# Patient Record
Sex: MALE | Race: WHITE | Employment: OTHER | URBAN - METROPOLITAN AREA
[De-identification: names, ages, dates, MRNs, and addresses within clinical notes are randomized per-mention and may not be internally consistent; named-entity substitution may affect disease eponyms.]

---

## 2018-02-05 PROBLEM — D64.9 ANEMIA: Status: ACTIVE | Noted: 2018-02-05

## 2018-02-06 PROBLEM — K21.9 GERD (GASTROESOPHAGEAL REFLUX DISEASE): Status: ACTIVE | Noted: 2018-02-06

## 2018-02-06 PROBLEM — F41.9 ANXIETY: Status: ACTIVE | Noted: 2018-02-06

## 2018-02-06 PROBLEM — K92.2 GI BLEED: Status: ACTIVE | Noted: 2018-02-06

## 2018-08-17 ENCOUNTER — HOSPITAL ENCOUNTER (OUTPATIENT)
Dept: ULTRASOUND IMAGING | Age: 68
Discharge: HOME | End: 2018-08-17
Payer: MEDICARE

## 2018-08-17 DIAGNOSIS — R60.0 LEG EDEMA: ICD-10-CM

## 2018-08-17 PROCEDURE — 93971 EXTREMITY STUDY: CPT

## 2019-10-30 ENCOUNTER — HOSPITAL ENCOUNTER (OUTPATIENT)
Age: 69
Discharge: HOME | End: 2019-11-01
Payer: COMMERCIAL

## 2019-10-30 PROCEDURE — 88305 TISSUE EXAM BY PATHOLOGIST: CPT

## 2019-11-11 ENCOUNTER — HOSPITAL ENCOUNTER (OUTPATIENT)
Dept: CT IMAGING | Age: 69
Discharge: HOME | End: 2019-11-11
Payer: MEDICARE

## 2019-11-11 DIAGNOSIS — I70.213 ATHEROSCLER OF NATIVE ARTERY OF BOTH LEGS WITH INTERMIT CLAUDICATION (HCC): ICD-10-CM

## 2019-11-11 PROCEDURE — 75635 CT ANGIO ABDOMINAL ARTERIES: CPT

## 2019-11-11 PROCEDURE — 6360000004 HC RX CONTRAST MEDICATION: Performed by: RADIOLOGY

## 2019-11-11 RX ADMIN — IOPAMIDOL 150 ML: 755 INJECTION, SOLUTION INTRAVENOUS at 14:55

## 2021-06-14 ENCOUNTER — HOSPITAL ENCOUNTER (OUTPATIENT)
Age: 71
Discharge: HOME | End: 2021-06-14
Payer: MEDICARE

## 2021-06-14 LAB
BUN BLDV-MCNC: 5 MG/DL (ref 6–23)
CREAT SERPL-MCNC: 0.7 MG/DL (ref 0.7–1.2)
GFR AFRICAN AMERICAN: >60
GFR NON-AFRICAN AMERICAN: >60 ML/MIN/1.73

## 2021-06-14 PROCEDURE — 36415 COLL VENOUS BLD VENIPUNCTURE: CPT

## 2021-06-14 PROCEDURE — 82565 ASSAY OF CREATININE: CPT

## 2021-06-14 PROCEDURE — 84520 ASSAY OF UREA NITROGEN: CPT

## 2022-11-18 ENCOUNTER — APPOINTMENT (OUTPATIENT)
Dept: CT IMAGING | Age: 72
End: 2022-11-18
Payer: MEDICARE

## 2022-11-18 ENCOUNTER — HOSPITAL ENCOUNTER (EMERGENCY)
Age: 72
Discharge: HOME | End: 2022-11-18
Attending: EMERGENCY MEDICINE
Payer: MEDICARE

## 2022-11-18 ENCOUNTER — APPOINTMENT (OUTPATIENT)
Dept: GENERAL RADIOLOGY | Age: 72
End: 2022-11-18
Payer: MEDICARE

## 2022-11-18 VITALS
DIASTOLIC BLOOD PRESSURE: 51 MMHG | WEIGHT: 120 LBS | SYSTOLIC BLOOD PRESSURE: 111 MMHG | BODY MASS INDEX: 17.22 KG/M2 | TEMPERATURE: 98.6 F | HEART RATE: 92 BPM | RESPIRATION RATE: 20 BRPM | OXYGEN SATURATION: 98 %

## 2022-11-18 DIAGNOSIS — R13.10 DYSPHAGIA, UNSPECIFIED TYPE: Primary | ICD-10-CM

## 2022-11-18 LAB
ANION GAP SERPL CALCULATED.3IONS-SCNC: 14 MMOL/L (ref 7–16)
BASOPHILS ABSOLUTE: 0.04 E9/L (ref 0–0.2)
BASOPHILS RELATIVE PERCENT: 0.5 % (ref 0–2)
BUN BLDV-MCNC: 17 MG/DL (ref 6–23)
CALCIUM SERPL-MCNC: 9.1 MG/DL (ref 8.6–10.2)
CHLORIDE BLD-SCNC: 83 MMOL/L (ref 98–107)
CO2: 29 MMOL/L (ref 22–29)
CREAT SERPL-MCNC: 0.7 MG/DL (ref 0.7–1.2)
EOSINOPHILS ABSOLUTE: 0.01 E9/L (ref 0.05–0.5)
EOSINOPHILS RELATIVE PERCENT: 0.1 % (ref 0–6)
GFR SERPL CREATININE-BSD FRML MDRD: >60 ML/MIN/1.73
GLUCOSE BLD-MCNC: 84 MG/DL (ref 74–99)
HCT VFR BLD CALC: 36.3 % (ref 37–54)
HEMOGLOBIN: 12.5 G/DL (ref 12.5–16.5)
IMMATURE GRANULOCYTES #: 0.13 E9/L
IMMATURE GRANULOCYTES %: 1.5 % (ref 0–5)
LYMPHOCYTES ABSOLUTE: 1.06 E9/L (ref 1.5–4)
LYMPHOCYTES RELATIVE PERCENT: 12.4 % (ref 20–42)
MCH RBC QN AUTO: 33.4 PG (ref 26–35)
MCHC RBC AUTO-ENTMCNC: 34.4 % (ref 32–34.5)
MCV RBC AUTO: 97.1 FL (ref 80–99.9)
MONOCYTES ABSOLUTE: 0.73 E9/L (ref 0.1–0.95)
MONOCYTES RELATIVE PERCENT: 8.5 % (ref 2–12)
NEUTROPHILS ABSOLUTE: 6.57 E9/L (ref 1.8–7.3)
NEUTROPHILS RELATIVE PERCENT: 77 % (ref 43–80)
PDW BLD-RTO: 14.6 FL (ref 11.5–15)
PLATELET # BLD: 164 E9/L (ref 130–450)
PMV BLD AUTO: 8.9 FL (ref 7–12)
POTASSIUM SERPL-SCNC: 4 MMOL/L (ref 3.5–5)
RBC # BLD: 3.74 E12/L (ref 3.8–5.8)
SODIUM BLD-SCNC: 126 MMOL/L (ref 132–146)
WBC # BLD: 8.5 E9/L (ref 4.5–11.5)

## 2022-11-18 PROCEDURE — 92526 ORAL FUNCTION THERAPY: CPT

## 2022-11-18 PROCEDURE — 70491 CT SOFT TISSUE NECK W/DYE: CPT

## 2022-11-18 PROCEDURE — 85025 COMPLETE CBC W/AUTO DIFF WBC: CPT

## 2022-11-18 PROCEDURE — 2500000003 HC RX 250 WO HCPCS

## 2022-11-18 PROCEDURE — 36415 COLL VENOUS BLD VENIPUNCTURE: CPT

## 2022-11-18 PROCEDURE — 6360000004 HC RX CONTRAST MEDICATION: Performed by: RADIOLOGY

## 2022-11-18 PROCEDURE — 80048 BASIC METABOLIC PNL TOTAL CA: CPT

## 2022-11-18 PROCEDURE — 99285 EMERGENCY DEPT VISIT HI MDM: CPT

## 2022-11-18 PROCEDURE — 92611 MOTION FLUOROSCOPY/SWALLOW: CPT

## 2022-11-18 PROCEDURE — 74230 X-RAY XM SWLNG FUNCJ C+: CPT

## 2022-11-18 PROCEDURE — 2580000003 HC RX 258

## 2022-11-18 RX ORDER — 0.9 % SODIUM CHLORIDE 0.9 %
1000 INTRAVENOUS SOLUTION INTRAVENOUS ONCE
Status: COMPLETED | OUTPATIENT
Start: 2022-11-18 | End: 2022-11-18

## 2022-11-18 RX ADMIN — BARIUM SULFATE 45 ML: 400 SUSPENSION ORAL at 15:02

## 2022-11-18 RX ADMIN — SODIUM CHLORIDE 1000 ML: 9 INJECTION, SOLUTION INTRAVENOUS at 16:06

## 2022-11-18 RX ADMIN — IOPAMIDOL 75 ML: 755 INJECTION, SOLUTION INTRAVENOUS at 14:28

## 2022-11-18 RX ADMIN — BARIUM SULFATE 45 ML: 400 PASTE ORAL at 15:01

## 2022-11-18 RX ADMIN — BARIUM SULFATE 45 G: 0.81 POWDER, FOR SUSPENSION ORAL at 15:01

## 2022-11-18 ASSESSMENT — ENCOUNTER SYMPTOMS
ABDOMINAL PAIN: 0
CHOKING: 0
COUGH: 0
DIARRHEA: 0
VOMITING: 0
SORE THROAT: 0
SHORTNESS OF BREATH: 0
CONSTIPATION: 0
NAUSEA: 0
COLOR CHANGE: 0
BLOOD IN STOOL: 0
TROUBLE SWALLOWING: 1
CHEST TIGHTNESS: 0

## 2022-11-18 ASSESSMENT — LIFESTYLE VARIABLES: HOW OFTEN DO YOU HAVE A DRINK CONTAINING ALCOHOL: NEVER

## 2022-11-18 NOTE — PROGRESS NOTES
SPEECH/LANGUAGE PATHOLOGY  VIDEOFLUOROSCOPIC STUDY OF SWALLOWING (MBS)   and PLAN OF CARE    PATIENT NAME:  Aline Hernandez  (male)     MRN:  [id number]    :  1950  (67 y.o.)  STATUS:  Inpatient: Room     TODAY'S DATE:  2022  REFERRING PROVIDER:   Treasure Kothari MD   SPECIFIC PROVIDER ORDER: SLP video swallow  Date of order:  22   REASON FOR REFERRAL: To assess oropharyngeal swallow fx s/p recent hospital admission. EVALUATING THERAPIST: CHINYERE Kraft      RESULTS:      DYSPHAGIA DIAGNOSIS:  mild-moderate oral phase dysphagia  and moderate pharyngeal phase dysphagia     DIET RECOMMENDATIONS:  Pureed consistency solids (IDDSI level 4) with  thin liquids (IDDSI level 0)-NO STRAWS as tolerated, MEDICATION ADMINISTRATION, and Administer medication crushed, as able, with pudding/applesauce    FEEDING RECOMMENDATIONS:    Assistance level:  Supervision is needed during all oral intake     Compensatory strategies recommended: Double swallow, Effortful swallow, Small bites/sips, Alternate solids and liquids, and No straw     Discussed recommendations with nursing and/or faxed report to referring provider: Yes; informed nursing staff re: rec for puree, thin-no straws, of rec for supervision with PO intake, of rec for GI consult, and of pt report of pain in throat when swallowing at times and pt report of saliva pooling.      Laryngeal Penetration and Aspiration:  Penetration WITHOUT aspiration was observed in today's study with  thin liquid  Penetration WITH aspiration was observed in today's study with  mildly thick liquid (nectar)    SPEECH THERAPY  PLAN OF CARE   The dysphagia POC is established based on physician order and dysphagia diagnosis    Skilled SLP intervention for dysphagia management on acute care 3-5 x per week until goals met, pt plateaus in function and/or discharged from hospital      Conditions Requiring Skilled Therapeutic Intervention for dysphagia:    Patient is performing below functional baseline d/t  current acute condition, Multiple diagnoses, multiple medications, and increased dependency upon caregivers. Reduced laryngeal closure resulting in penetration  Reduced laryngeal closure resulting in aspiration   Swallow triggered when bolus head at level of pyriform sinus increasing risk of aspiration    SPECIFIC DYSPHAGIA INTERVENTIONS TO INCLUDE:     Compensatory strategy training   Therapeutic exercises  Trials of upgraded diet/liquid if appropriate  Meal time assessment for 1-2 sessions to provide diet modification and compensatory strategy implementation due to need to ensure proper implementation of compensatory strategies during PO intake     Specific instructions for next treatment:  development and training of compensatory swallow strategies to improve airway protection and swallow function  Treatment Goals:    Short Term Goals:  Pt will implement identified compensatory swallowing strategies on 90% of opportunities or greater to improve airway protection and swallow function. Pt will participate in ongoing mealtime assessment to provide diet modification and compensatory strategy implementation to minimize risk of aspiration associated with PO intake  If appropriate, pt will complete PO trials of upgraded diet textures with SLP only to determine the least restrictive PO diet to maintain adequate nutrition/hydration with no more than 1 overt s/s of pen/asp.   Pt will participate in meal time assessment for 1-2 sessions to provide diet modification and compensatory strategy implementation due to need to ensure proper implementation of compensatory strategies during PO intake   Pt will complete BOTR strength/ ROM exercises to reduce pharyngeal residuals and improve epiglottic inversion moderate verbal prompts  Pt will complete laryngeal strength/ ROM therapeutic exercises to improve airway protection for the least restrictive PO diet moderate verbal prompts  Pt will complete Analilia Maneuver therapeutically to target increased pharyngeal constrictor contractions to reduce pharyngeal residue with moderate verbal prompts     Long Term Goals:   Pt will maintain adequate nutrition/hydration via PO intake of the least restrictive oral diet with implementation of safe swallow/ compensatory strategies and decrease signs/symptoms of aspiration to less than 1 x/day. Pt will improve oropharyngeal swallow function to ensure airway protection during PO intake to maintain adequate nutrition/hydration and decrease signs/symptoms of aspiration to less than 1 x/day. OTHER RECOMMENDATIONS:  A GI consult is recommended     Patient/family Goal:    Did not state. Will further assess during treatment. Plan of care discussed with Patient   The Patient guardedly understand(s) the diagnosis, prognosis and plan of care     Rehabilitation Potential/Prognosis: fair                      ADMITTING DIAGNOSIS: No admission diagnoses are documented for this encounter. VISIT DIAGNOSIS:         PATIENT REPORT/COMPLAINT: Pt reported food/liquid \"sticking\" at times and pointed to throat/sternum when asked location. Pt also reported pain when swallowing at times and \"sometimes\" coughing with intake. Pt also reported lot of saliva at times pooling in mouth and feeling need to spit out or swallow. Nurse informed. PRIOR LEVEL OF SWALLOW FUNCTION:    Past History of Dysphagia?:  none reported    Home diet: Pt reported being unable to swallow and only taking fluids as of late.    Current Diet Order:  No diet orders on file    PROCEDURE:  Consistencies Administered During the Evaluation   Liquids: thin liquid, nectar thick liquid, and honey thick liquid   Solids:  pureed foods and solid foods      Method of Intake:   cup, spoon  Self fed      Position:   Seated, upright    INSTRUMENTAL ASSESSMENT:    ORAL PREP/ ORAL PHASE:    Dentition:  dentures-pt with loose fitting dentures during MBSS; pt reported he typically wears adhesive but not using any at this current time   Decreased mastication due to:  loose dentures  Delayed A-P transit due to: loose dentures  Decreased bolus formation resulting in observed premature pharyngeal spillage  Oral residuals were noted :  throughout the oral cavity     PHARYNGEAL PHASE:     ONSET TIME       Delayed initiation of the pharyngeal swallow was noted with swallow reflex triggered at the level of the vallecula (mostly) and pyriform sinus (occasionally)       PHARYNGEAL RESIDUALS        Vallecula/Pharyngeal Wall           Reduced tongue base retraction resulting in residuals in vallecula and/or posterior pharyngeal wall for all consistencies administered which mostly cleared with cued double, effortful swallow       Pyriform Sinuses      Residuals in the pyriform sinuses were noted due to pharyngeal weakness and 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 Further details under aspiration section. Laryngeal penetration occurred in the absence of aspiration DURING the swallow for thin liquid due to  delayed laryngeal closure and inadequate laryngeal closure which cleared from the laryngeal vestibule spontaneously (transient) and mildly remained in the laryngeal vestibule. . Laryngeal penetration was mild and occurred consistently   an absent cough/throat clear was noted    ASPIRATION  Aspiration occurred DURING the swallow for nectar consistency liquid due to  delayed laryngeal closure and inadequate laryngeal closure . Aspiration was mild-moderate and occurred inconsistently . a spontaneous cough was noted    PENETRATION-ASPIRATION SCALE (PAS):  THIN 3 = Material enters the airway, remains above the vocal folds, and is not ejected from the airway (inconsistently-pt mostly cleared penetrated thin liquid (transient) and appeared to mildly remain X1 and then clear with next swallow)  MILDLY THICK 7 = Material enters the airway, passes below the vocal folds, and is not ejected from the trachea despite effort   MODERATELY THICK 1 = Material does not enter the airway  PUREE 1 = Material does not enter the airway  HARD SOLID 1 = Material does not enter the airway    Pt presented with dry throat clear/cough prior to initiation of PO trials; this noted X1 with puree intake; however, was not related to penetration or aspiration. Confirmed with radiologist.        COMPENSATORY STRATEGIES    Compensatory strategies that were beneficial included Double swallow, Effortful swallow, Small bites/sips, Alternate solids and liquids, and No straw      STRUCTURAL/FUNCTIONAL ANOMALIES   No structural/functional anomalies were noted    CERVICAL ESOPHAGEAL STAGE :     The cervical esophagus appeared adequate          ___________    Cognition:   Within functional limits for this exam    Oral Peripheral Examination   Generalized oral weakness    Current Respiratory Status   room air     Parameters of Speech Production  Respiration:  Adequate for speech production  Quality:   Within functional limits  Intensity: Within functional limits    Pain: No pain reported. EDUCATION:   The Speech Language Pathologist (SLP) completed education regarding results of evaluation and that intervention is warranted at this time. Learner: Patient  Education: Reviewed results and recommendations of this evaluation, Reviewed diet and strategies, and Reviewed signs, symptoms and risks of aspiration  Evaluation of Education:  Verbalizes understanding and Needs further instruction    This plan may be re-evaluated and revised as warranted.         Evaluation Time includes thorough review of current medical information, gathering information on past medical history/social history and prior level of function, completion of standardized testing/informal observation of tasks, assessment of data and education on plan of care and goals. [x]The admitting diagnosis and active problem list, have been reviewed prior to initiation of this evaluation. CPT Code: 58066  dysphagia study    ACTIVE PROBLEM LIST:   Patient Active Problem List   Diagnosis    Anemia    GI bleed    GERD (gastroesophageal reflux disease)    Anxiety       Tx note (96439): SLP educated pt re: MBSS logistics and results in detail. SLP reviewed current diet recs and comp swallow strategies (small bites/sips, alt solids and liquids, double, effortful swallow) and rec for ST and GI consult. Questions answered to this time.

## 2022-11-18 NOTE — ED PROVIDER NOTES
[address] ENCOUNTER      Pt Name: Ani Bean  MRN: [id number]  [place] 1950  Date of evaluation: 11/18/2022      CHIEF COMPLAINT       Chief Complaint   Patient presents with    Dysphagia     Difficulty swallowing for a 2 weeks, no pain, unable to eat but is able to drink fluids          HPI  Ani Bean is a 67 y.o. male  with PMHx of centrilobular emphysema, hypertension, claudication status post stents in the left leg presents with dysphagia. Patient states that for the past 2 weeks has been having issues swallowing. States that he feels like solid foods are more difficulty he has some discomfort and feels like the food is stuck. He is able to tolerate fluids but more recently has also been struggling with fluids. Patient states he has a history of dilation of his esophagus 6 months ago and he had significant symptomatic relief after procedure. Describes symptoms moderate in severity with no alleviating or exacerbating factors. Denies any fever, chills, n/v, headache, dizziness, vision changes, neck tenderness or stiffness, weakness, cp, palpitations, leg swelling/tenderness, sob, cough, abd pain, dysuria, hematuria, diarrhea, constipation. Patient is a smoker 1 pack/day. Except as noted above the remainder of the review of systems was reviewed and negative. Review of Systems   Constitutional:  Positive for fatigue. Negative for appetite change, chills and fever. HENT:  Positive for trouble swallowing. Negative for congestion and sore throat. Eyes:  Negative for visual disturbance. Respiratory:  Negative for cough, choking, chest tightness and shortness of breath. Cardiovascular:  Negative for chest pain, palpitations and leg swelling. Gastrointestinal:  Negative for abdominal pain, blood in stool, constipation, diarrhea, nausea and vomiting. Endocrine: Negative for polyphagia.    Genitourinary: Negative for decreased urine volume, difficulty urinating, flank pain and hematuria. Musculoskeletal:  Negative for arthralgias, gait problem, joint swelling and myalgias. Skin:  Negative for color change, pallor, rash and wound. Neurological:  Negative for dizziness, tremors, seizures, syncope, weakness, light-headedness, numbness and headaches. Hematological:  Negative for adenopathy. Does not bruise/bleed easily. Psychiatric/Behavioral:  Negative for confusion and hallucinations. All other systems reviewed and are negative. Physical Exam  Vitals reviewed. Constitutional:       General: He is not in acute distress. Appearance: Normal appearance. He is cachectic. He is not ill-appearing, toxic-appearing or diaphoretic. HENT:      Head: Normocephalic and atraumatic. Right Ear: External ear normal.      Left Ear: External ear normal.      Nose: Nose normal. No congestion or rhinorrhea. Mouth/Throat:      Mouth: Mucous membranes are moist.      Pharynx: Oropharynx is clear. No oropharyngeal exudate or posterior oropharyngeal erythema. Eyes:      Extraocular Movements: Extraocular movements intact. Conjunctiva/sclera: Conjunctivae normal.      Pupils: Pupils are equal, round, and reactive to light. Cardiovascular:      Rate and Rhythm: Normal rate and regular rhythm. Pulses: Normal pulses. Pulmonary:      Effort: Pulmonary effort is normal. No respiratory distress. Breath sounds: Normal breath sounds. No wheezing or rhonchi. Chest:      Chest wall: No tenderness. Abdominal:      General: Abdomen is flat. Bowel sounds are normal. There is no distension. Palpations: Abdomen is soft. Tenderness: There is no abdominal tenderness. There is no right CVA tenderness, left CVA tenderness or guarding. Hernia: No hernia is present. Musculoskeletal:      Cervical back: Normal range of motion. Right lower leg: No edema. Left lower leg: No edema. Skin:     General: Skin is warm and dry. Capillary Refill: Capillary refill takes less than 2 seconds. Neurological:      General: No focal deficit present. Mental Status: He is alert and oriented to person, place, and time. Mental status is at baseline. Psychiatric:         Mood and Affect: Mood normal.         Behavior: Behavior normal.         Thought Content: Thought content normal.         Judgment: Judgment normal.        Procedures     MDM         67 y.o. male  with PMHx of centrilobular emphysema, hypertension, claudication status post stents in the left leg presents with dysphagia. While in the ED patient was hemodynamically stable, afebrile, nontoxic-appearing, in no respiratory distress. Physical exam unremarkable, patient is tolerating secretions. Labs remarkable for hyponatremia, patient received fluids with significant symptomatic improvement. CT soft tissue negative for any acute abnormalities. Barium swallow remarkable for  aspiration with thin barium and nectar with cough reflex. no evidence of aspiration or penetration with the honey, pudding or barium impregnated cookie. Spoke to Dr. Aguila Trujillo  who states he will scope the patient next week, he asked for follow up on Monday. Patient feels comfortable going home and will follow up outpatient. Patient understands to return to the ED in case of worsening symptoms. ED Course as of 11/18/22 2001 Fri Nov 18, 2022   1605    FINDINGS:  During administration of thin barium note is made of transient penetration. During administration of nectar consistency barium note is made of  penetration to the level of the vocal folds. Minimal aspiration was noted. The patient had a cough reflex. There is no evidence of aspiration or penetration with the honey consistency  barium. There is no evidence of aspiration or penetration with the pudding  or barium impregnated cookie.  [TC]   1647 To Dr. Demetria Escalante who states that patient can follow-up with him outpatient and he was scoped him on Monday. He has patient and give them a call on Monday. Patient is given strict return precautions and verbalizes understanding to return to the ED in case of worsening symptoms. [TC]      ED Course User Index  [TC] Ambrocio Zhou MD       --------------------------------------------- PAST HISTORY ---------------------------------------------  Past Medical History:  has a past medical history of Anxiety, Cancer ([address] GERD (gastroesophageal reflux disease), Heartburn, History of blood transfusion, and Hyperlipidemia. Past Surgical History:  has a past surgical history that includes knee surgery; Colonoscopy; Endoscopy, colon, diagnostic; eye surgery; and Colonoscopy (03/07/2018). Social History:  reports that he has been smoking. He started smoking about 52 years ago. He has a 40.00 pack-year smoking history. He has quit using smokeless tobacco. He reports current alcohol use. He reports that he does not use drugs. Family History: family history is not on file. The patients home medications have been reviewed. Allergies: Patient has no known allergies.     -------------------------------------------------- RESULTS -------------------------------------------------  Labs:  Results for orders placed or performed during the hospital encounter of 11/18/22   CBC with Auto Differential   Result Value Ref Range    WBC 8.5 4.5 - 11.5 E9/L    RBC 3.74 (L) 3.80 - 5.80 E12/L    Hemoglobin 12.5 12.5 - 16.5 g/dL    Hematocrit 36.3 (L) 37.0 - 54.0 %    MCV 97.1 80.0 - 99.9 fL    MCH 33.4 26.0 - 35.0 pg    MCHC 34.4 32.0 - 34.5 %    RDW 14.6 11.5 - 15.0 fL    Platelets 991 429 - 506 E9/L    MPV 8.9 7.0 - 12.0 fL    Neutrophils % 77.0 43.0 - 80.0 %    Immature Granulocytes % 1.5 0.0 - 5.0 %    Lymphocytes % 12.4 (L) 20.0 - 42.0 %    Monocytes % 8.5 2.0 - 12.0 %    Eosinophils % 0.1 0.0 - 6.0 %    Basophils % 0.5 0.0 - 2.0 %    Neutrophils Absolute 6.57 1.80 - 7.30 E9/L    Immature Granulocytes # 0.13 E9/L    Lymphocytes Absolute 1.06 (L) 1.50 - 4.00 E9/L    Monocytes Absolute 0.73 0.10 - 0.95 E9/L    Eosinophils Absolute 0.01 (L) 0.05 - 0.50 E9/L    Basophils Absolute 0.04 0.00 - 0.20 E9/L   BMP   Result Value Ref Range    Sodium 126 (L) 132 - 146 mmol/L    Potassium 4.0 3.5 - 5.0 mmol/L    Chloride 83 (L) 98 - 107 mmol/L    CO2 29 22 - 29 mmol/L    Anion Gap 14 7 - 16 mmol/L    Glucose 84 74 - 99 mg/dL    BUN 17 6 - 23 mg/dL    Creatinine 0.7 0.7 - 1.2 mg/dL    Est, Glom Filt Rate >60 >=60 mL/min/1.73    Calcium 9.1 8.6 - 10.2 mg/dL       Radiology:  Fluoroscopy modified barium swallow with video   Final Result   1. There is shallow penetration with the thin barium. 2. Deep penetration with the nectar consistency barium to the level of the   vocal folds with minimal aspiration. A cough reflex was elicited. Please see separate speech pathology report for full discussion of findings   and recommendations. CT SOFT TISSUE NECK W CONTRAST   Final Result   No acute abnormality of the soft tissue structures of the neck. ------------------------- NURSING NOTES AND VITALS REVIEWED ---------------------------  Date / Time Roomed:  11/18/2022  1:01 PM  ED Bed Assignment:  13/13    The nursing notes within the ED encounter and vital signs as below have been reviewed. BP (!) 111/51   Pulse 92   Temp 98.6 °F (37 °C) (Infrared)   Resp 20   Wt 120 lb (54.4 kg)   SpO2 98%   BMI 17.22 kg/m²   Oxygen Saturation Interpretation: Normal      ------------------------------------------ PROGRESS NOTES ------------------------------------------  8:01 PM EST  I have spoken with the patient and discussed todays results, in addition to providing specific details for the plan of care and counseling regarding the diagnosis and prognosis. Their questions are answered at this time and they are agreeable with the plan.  I discussed at length with them reasons for immediate return here for re evaluation. They will followup with their  GI  by calling their office on Monday.      --------------------------------- ADDITIONAL PROVIDER NOTES ---------------------------------  At this time the patient is without objective evidence of an acute process requiring hospitalization or inpatient management. They have remained hemodynamically stable throughout their entire ED visit and are stable for discharge with outpatient follow-up. The plan has been discussed in detail and they are aware of the specific conditions for emergent return, as well as the importance of follow-up. Discharge Medication List as of 11/18/2022  5:28 PM          Diagnosis:  1. Dysphagia, unspecified type        Disposition:  Patient's disposition: Discharge to home  Patient's condition is stable. Eliseo Harrell MD  Resident  11/18/22 2002    ATTENDING PROVIDER ATTESTATION:     I have personally performed and/or participated in the history, exam, medical decision making, and procedures and agree with all pertinent clinical information unless otherwise noted. I have also reviewed and agree with the past medical, family and social history unless otherwise noted. I have discussed this patient in detail with the resident and provided the instruction and education regarding the evidence-based evaluation and treatment of dysphagia. Any EKG that may have been performed has been personally reviewed by me and I agree with the documentation as noted by the resident. History: patient states he's been having difficulty and discomfort swallowing for the past 2 weeks. He denies SOB. He has required esophageal dilation in the past.    My findings: Earl Perkins is a 67 y.o. male whom is in no distress. Physical exam reveals well appearing. HEENT without evidence of infection or edema. No anterior masses appreciated. Heart RRR, lungs CTA, abdomen is soft and nontender.       My plan: Symptomatic and supportive care. Patient had CT and swallowing studies performed. He is able to swallow food and liquid at this time. Arrangements made for him to follow up with GI on Monday. He understands reasons to return to the ED.     Electronically signed by Cyn Sam DO on 11/18/22 at 10:46 PM CHRIS Sam DO  11/18/22 1505

## 2022-11-28 ENCOUNTER — HOSPITAL ENCOUNTER (EMERGENCY)
Age: 72
Discharge: HOME | End: 2022-11-28
Payer: MEDICARE

## 2022-11-28 VITALS
BODY MASS INDEX: 18.61 KG/M2 | HEART RATE: 66 BPM | RESPIRATION RATE: 18 BRPM | DIASTOLIC BLOOD PRESSURE: 56 MMHG | WEIGHT: 130 LBS | SYSTOLIC BLOOD PRESSURE: 110 MMHG | OXYGEN SATURATION: 99 % | HEIGHT: 70 IN | TEMPERATURE: 97.2 F

## 2022-11-28 DIAGNOSIS — R60.9 EDEMA, UNSPECIFIED TYPE: ICD-10-CM

## 2022-11-28 DIAGNOSIS — I73.9 PAD (PERIPHERAL ARTERY DISEASE) (HCC): Primary | ICD-10-CM

## 2022-11-28 PROCEDURE — 99211 OFF/OP EST MAY X REQ PHY/QHP: CPT

## 2022-11-28 RX ORDER — PENTOXIFYLLINE 400 MG/1
TABLET, EXTENDED RELEASE ORAL
COMMUNITY
Start: 2022-10-13

## 2022-11-28 RX ORDER — PANTOPRAZOLE SODIUM 40 MG/1
TABLET, DELAYED RELEASE ORAL
COMMUNITY
Start: 2022-10-13

## 2022-11-28 RX ORDER — METOPROLOL TARTRATE 100 MG/1
TABLET ORAL
COMMUNITY
Start: 2022-11-19

## 2022-11-28 ASSESSMENT — PAIN SCALES - GENERAL: PAINLEVEL_OUTOF10: 0

## 2022-11-28 ASSESSMENT — PAIN - FUNCTIONAL ASSESSMENT: PAIN_FUNCTIONAL_ASSESSMENT: NONE - DENIES PAIN

## 2022-11-28 NOTE — ED PROVIDER NOTES
Department of Emergency [address] [facility]  Provider Note  Admit Date/Time: 2022  1:56 PM  Room:   NAME: Chika Hightower  : 1950  MRN: [id number]     Chief Complaint:  Joint Swelling (Bilateral ankle and feet swelling started Friday )    History of Present Illness        Chika Hightower is a 67 y.o. male who has a past medical history of:   Past Medical History:   Diagnosis Date    Anxiety     Cancer ([address] 2010    prostrate  had radiation with seeds    GERD (gastroesophageal reflux disease)     Heartburn     History of blood transfusion     Hyperlipidemia     presents to the urgent care center by private car for evaluation. He is presenting today for swelling of his feet and ankles. He said that he does not really have pain. He said that they are never swollen and they become swollen over the past 2 days. He is denying any chest pain or shortness of breath denying any other complaints. ROS    Pertinent positives and negatives are stated within HPI, all other systems reviewed and are negative. Past Surgical History:   Procedure Laterality Date    COLONOSCOPY      COLONOSCOPY  2018    ENDOSCOPY, COLON, DIAGNOSTIC      EYE SURGERY      cataract lt eye    KNEE SURGERY      lacerations  as a child   Social History:  reports that he has been smoking. He started smoking about 52 years ago. He has a 40.00 pack-year smoking history. He has quit using smokeless tobacco. He reports current alcohol use. He reports that he does not use drugs. Family History: family history is not on file. Allergies: Patient has no known allergies.     Physical Exam   Oxygen Saturation Interpretation: Normal.   ED Triage Vitals [22 1358]   BP Temp Temp Source Heart Rate Resp SpO2 Height Weight   (!) 110/56 97.2 °F (36.2 °C) Infrared 66 18 99 % 5' 10\" (1.778 m) 130 lb (59 kg)       Physical Exam  Constitutional/General: Alert and oriented x3, well appearing, non toxic in NAD  HEENT:  NC/NT  Neck: Supple, full ROM,   Respiratory: Lungs clear to auscultation bilaterally, no wheezes, rales, or rhonchi. Not in respiratory distress  CV:  Regular rate. Both lower legs have are thin shiny and hairless with some scaly skin present. Both feet are edematous with pitting edema there there is scattered erythema and his feet and toes are cool compared to the temperature of his lower legs. I was not able to palpate pedal pulses. Musculoskeletal: Moves all extremities x 4. Integument: skin warm and dry. No rashes. Lymphatic: no lymphadenopathy noted  Neurologic: GCS 15, no focal deficits, symmetric strength 5/5 in the upper and lower extremities bilaterally  Psychiatric: Normal Affect    Lab / Imaging Results   (All laboratory and radiology results have been personally reviewed by myself)  Labs:  No results found for this visit on 11/28/22. Imaging: All Radiology results interpreted by Radiologist unless otherwise noted. No orders to display       ED Course / Medical Decision Making   Medications - No data to display       Consult(s):   None      MDM:   He has known peripheral artery disease he continues to smoke apparently he told me he had a stent placed in his groin but he said it did not work. This could be  a progression of his peripheral artery disease he said he is never swollen like this. I did advise him that he needs to go to the ED for further work-up than what can be done in urgent care. Assessment      1. PAD (peripheral artery disease) ([facility] [address]    2. Edema, unspecified type      Plan   Advised to go to the ED  New Medications     New Prescriptions    No medications on file     Electronically signed by STANFORD Huerta CNP   DD: 11/28/22  **This report was transcribed using voice recognition software. Every effort was made to ensure accuracy; however, inadvertent computerized transcription errors may be present.   END OF ED PROVIDER NOTE      STANFORD Huerta - CNP  11/28/22 1421

## 2023-09-12 ENCOUNTER — HOSPITAL ENCOUNTER (OUTPATIENT)
Age: 73
Setting detail: SPECIMEN
Discharge: HOME | End: 2023-09-12
Payer: COMMERCIAL

## 2023-09-12 LAB
HCT VFR BLD AUTO: 20.8 % (ref 37–54)
HGB BLD-MCNC: 6.6 G/DL (ref 12.5–16.5)

## 2023-09-12 PROCEDURE — 86923 COMPATIBILITY TEST ELECTRIC: CPT

## 2023-09-12 PROCEDURE — 36415 COLL VENOUS BLD VENIPUNCTURE: CPT

## 2023-09-12 PROCEDURE — 86901 BLOOD TYPING SEROLOGIC RH(D): CPT

## 2023-09-12 PROCEDURE — 85014 HEMATOCRIT: CPT

## 2023-09-12 PROCEDURE — 85018 HEMOGLOBIN: CPT

## 2023-09-12 PROCEDURE — 86850 RBC ANTIBODY SCREEN: CPT

## 2023-09-12 PROCEDURE — 86900 BLOOD TYPING SEROLOGIC ABO: CPT

## 2023-09-12 RX ORDER — SODIUM CHLORIDE 9 MG/ML
INJECTION, SOLUTION INTRAVENOUS PRN
Status: CANCELLED | OUTPATIENT
Start: 2023-09-12

## 2023-09-13 ENCOUNTER — HOSPITAL ENCOUNTER (OUTPATIENT)
Dept: PREOP | Age: 73
Discharge: HOME | End: 2023-09-13
Payer: MEDICARE

## 2023-09-13 VITALS
WEIGHT: 147 LBS | HEIGHT: 70 IN | BODY MASS INDEX: 21.05 KG/M2 | OXYGEN SATURATION: 100 % | TEMPERATURE: 98.2 F | DIASTOLIC BLOOD PRESSURE: 63 MMHG | RESPIRATION RATE: 16 BRPM | SYSTOLIC BLOOD PRESSURE: 142 MMHG | HEART RATE: 92 BPM

## 2023-09-13 PROCEDURE — 36430 TRANSFUSION BLD/BLD COMPNT: CPT

## 2023-09-13 RX ORDER — SODIUM CHLORIDE 9 MG/ML
INJECTION, SOLUTION INTRAVENOUS PRN
Status: DISCONTINUED | OUTPATIENT
Start: 2023-09-13 | End: 2023-09-14 | Stop reason: HOSPADM

## 2023-09-13 RX ORDER — LOSARTAN POTASSIUM 25 MG/1
12.5 TABLET ORAL DAILY
COMMUNITY

## 2023-09-13 RX ORDER — FERROUS SULFATE 325(65) MG
325 TABLET ORAL
COMMUNITY

## 2023-09-13 RX ORDER — ATORVASTATIN CALCIUM 40 MG/1
40 TABLET, FILM COATED ORAL DAILY
COMMUNITY

## 2023-09-13 RX ORDER — CLOPIDOGREL BISULFATE 75 MG/1
75 TABLET ORAL DAILY
COMMUNITY

## 2023-09-13 ASSESSMENT — PAIN DESCRIPTION - DESCRIPTORS: DESCRIPTORS: ACHING;DISCOMFORT;STABBING

## 2023-09-13 ASSESSMENT — PAIN - FUNCTIONAL ASSESSMENT: PAIN_FUNCTIONAL_ASSESSMENT: 0-10

## 2023-09-13 NOTE — DISCHARGE INSTRUCTIONS
Discharge Instructions           Blood Transfusion    You have received a blood transfusion today. Although a transfusion is a relatively safe treatment, a delayed reaction may occur.     If you develop any of the following symptoms, GO to the emergency room immediately:    Fever ( greater than 100 degrees)    Chills    Low back pain    Hives    Red urine

## 2023-09-14 LAB
ABO/RH: NORMAL
ANTIBODY SCREEN: NEGATIVE
ARM BAND NUMBER: NORMAL
BLOOD BANK BLOOD PRODUCT EXPIRATION DATE: NORMAL
BLOOD BANK DISPENSE STATUS: NORMAL
BLOOD BANK ISBT PRODUCT BLOOD TYPE: 5100
BLOOD BANK PRODUCT CODE: NORMAL
BLOOD BANK SAMPLE EXPIRATION: NORMAL
BLOOD BANK UNIT TYPE AND RH: NORMAL
BPU ID: NORMAL
COMPONENT: NORMAL
CROSSMATCH RESULT: NORMAL
TRANSFUSION STATUS: NORMAL
UNIT DIVISION: 0
UNIT ISSUE DATE/TIME: NORMAL

## 2023-10-02 ENCOUNTER — TRANSCRIBE ORDERS (OUTPATIENT)
Dept: VASCULAR SURGERY | Facility: HOSPITAL | Age: 73
End: 2023-10-02
Payer: MEDICARE

## 2023-10-02 DIAGNOSIS — I73.9 CLAUDICATION OF LOWER EXTREMITY WITH HISTORY OF REVASCULARIZATION (CMS-HCC): ICD-10-CM

## 2023-10-02 DIAGNOSIS — Z98.890 CLAUDICATION OF LOWER EXTREMITY WITH HISTORY OF REVASCULARIZATION (CMS-HCC): ICD-10-CM

## 2023-10-02 DIAGNOSIS — I73.9 PAD (PERIPHERAL ARTERY DISEASE) (CMS-HCC): Primary | ICD-10-CM

## 2023-11-29 ENCOUNTER — HOSPITAL ENCOUNTER (OUTPATIENT)
Dept: VASCULAR MEDICINE | Facility: HOSPITAL | Age: 73
Discharge: HOME | End: 2023-11-29
Payer: MEDICARE

## 2023-11-29 DIAGNOSIS — I73.9 CLAUDICATION OF LOWER EXTREMITY WITH HISTORY OF REVASCULARIZATION (CMS-HCC): ICD-10-CM

## 2023-11-29 DIAGNOSIS — I73.9 PAD (PERIPHERAL ARTERY DISEASE) (CMS-HCC): ICD-10-CM

## 2023-11-29 DIAGNOSIS — Z98.890 CLAUDICATION OF LOWER EXTREMITY WITH HISTORY OF REVASCULARIZATION (CMS-HCC): ICD-10-CM

## 2023-11-29 PROCEDURE — 93922 UPR/L XTREMITY ART 2 LEVELS: CPT

## 2023-11-29 PROCEDURE — 93922 UPR/L XTREMITY ART 2 LEVELS: CPT | Performed by: INTERNAL MEDICINE

## 2023-12-06 ENCOUNTER — APPOINTMENT (OUTPATIENT)
Dept: VASCULAR MEDICINE | Facility: HOSPITAL | Age: 73
End: 2023-12-06
Payer: MEDICARE

## 2023-12-06 ENCOUNTER — APPOINTMENT (OUTPATIENT)
Dept: VASCULAR SURGERY | Facility: HOSPITAL | Age: 73
End: 2023-12-06
Payer: MEDICARE

## 2024-04-04 ENCOUNTER — HOSPITAL ENCOUNTER (INPATIENT)
Facility: HOSPITAL | Age: 74
LOS: 26 days | Discharge: NURSING, REHAB OR LONG-TERM CARE | DRG: 853 | End: 2024-04-30
Attending: INTERNAL MEDICINE | Admitting: STUDENT IN AN ORGANIZED HEALTH CARE EDUCATION/TRAINING PROGRAM
Payer: MEDICARE

## 2024-04-04 DIAGNOSIS — S88.919A AMPUTATION OF LEG (MULTI): ICD-10-CM

## 2024-04-04 DIAGNOSIS — I73.9 PERIPHERAL VASCULAR DISEASE (CMS-HCC): ICD-10-CM

## 2024-04-04 DIAGNOSIS — A41.9 SEPTICEMIA (MULTI): ICD-10-CM

## 2024-04-04 DIAGNOSIS — R63.8 INADEQUATE ORAL INTAKE: ICD-10-CM

## 2024-04-04 DIAGNOSIS — E87.0 ACUTE HYPERNATREMIA: ICD-10-CM

## 2024-04-04 DIAGNOSIS — R06.02 SHORTNESS OF BREATH: ICD-10-CM

## 2024-04-04 DIAGNOSIS — J44.9 CHRONIC OBSTRUCTIVE PULMONARY DISEASE, UNSPECIFIED COPD TYPE (MULTI): ICD-10-CM

## 2024-04-04 DIAGNOSIS — D64.9 ANEMIA, UNSPECIFIED TYPE: ICD-10-CM

## 2024-04-04 DIAGNOSIS — I15.9 SECONDARY HYPERTENSION: ICD-10-CM

## 2024-04-04 DIAGNOSIS — J18.9 SEPSIS DUE TO PNEUMONIA (MULTI): Primary | ICD-10-CM

## 2024-04-04 DIAGNOSIS — I50.21 ACUTE SYSTOLIC CONGESTIVE HEART FAILURE (MULTI): ICD-10-CM

## 2024-04-04 DIAGNOSIS — A41.9 SEPSIS DUE TO PNEUMONIA (MULTI): Primary | ICD-10-CM

## 2024-04-04 PROCEDURE — 2020000001 HC ICU ROOM DAILY

## 2024-04-05 ENCOUNTER — APPOINTMENT (OUTPATIENT)
Dept: RADIOLOGY | Facility: HOSPITAL | Age: 74
DRG: 853 | End: 2024-04-05
Payer: MEDICARE

## 2024-04-05 ENCOUNTER — ANESTHESIA (OUTPATIENT)
Dept: OPERATING ROOM | Facility: HOSPITAL | Age: 74
DRG: 853 | End: 2024-04-05
Payer: MEDICARE

## 2024-04-05 ENCOUNTER — ANESTHESIA EVENT (OUTPATIENT)
Dept: OPERATING ROOM | Facility: HOSPITAL | Age: 74
DRG: 853 | End: 2024-04-05
Payer: MEDICARE

## 2024-04-05 PROBLEM — I10 HTN (HYPERTENSION): Status: ACTIVE | Noted: 2024-04-05

## 2024-04-05 PROBLEM — I25.10 CAD (CORONARY ARTERY DISEASE): Status: ACTIVE | Noted: 2024-04-05

## 2024-04-05 PROBLEM — E78.5 HYPERLIPIDEMIA: Status: ACTIVE | Noted: 2024-04-05

## 2024-04-05 PROBLEM — J44.9 CHRONIC OBSTRUCTIVE PULMONARY DISEASE (MULTI): Status: ACTIVE | Noted: 2024-04-05

## 2024-04-05 PROBLEM — K21.9 GASTROESOPHAGEAL REFLUX DISEASE: Status: ACTIVE | Noted: 2024-04-05

## 2024-04-05 PROBLEM — A41.9 SEPTICEMIA (MULTI): Status: ACTIVE | Noted: 2024-04-05

## 2024-04-05 PROBLEM — A41.9 SEPSIS DUE TO PNEUMONIA (MULTI): Status: ACTIVE | Noted: 2024-04-05

## 2024-04-05 PROBLEM — I50.9 CHF (CONGESTIVE HEART FAILURE) (MULTI): Status: ACTIVE | Noted: 2024-04-05

## 2024-04-05 PROBLEM — D64.9 ANEMIA: Status: ACTIVE | Noted: 2024-04-05

## 2024-04-05 PROBLEM — I73.9 PERIPHERAL VASCULAR DISEASE (CMS-HCC): Status: ACTIVE | Noted: 2024-04-05

## 2024-04-05 PROBLEM — J18.9 SEPSIS DUE TO PNEUMONIA (MULTI): Status: ACTIVE | Noted: 2024-04-05

## 2024-04-05 LAB
ABO GROUP (TYPE) IN BLOOD: NORMAL
ALBUMIN SERPL BCP-MCNC: 1.6 G/DL (ref 3.4–5)
ALBUMIN SERPL BCP-MCNC: 1.6 G/DL (ref 3.4–5)
ALP SERPL-CCNC: 239 U/L (ref 33–136)
ALP SERPL-CCNC: 251 U/L (ref 33–136)
ALT SERPL W P-5'-P-CCNC: 114 U/L (ref 10–52)
ALT SERPL W P-5'-P-CCNC: 118 U/L (ref 10–52)
ANION GAP BLDA CALCULATED.4IONS-SCNC: 3 MMO/L (ref 10–25)
ANION GAP BLDA CALCULATED.4IONS-SCNC: 3 MMO/L (ref 10–25)
ANION GAP BLDA CALCULATED.4IONS-SCNC: 6 MMO/L (ref 10–25)
ANION GAP SERPL CALC-SCNC: 17 MMOL/L (ref 10–20)
ANION GAP SERPL CALC-SCNC: 9 MMOL/L (ref 10–20)
ANTIBODY SCREEN: NORMAL
APPEARANCE UR: ABNORMAL
APTT PPP: 46 SECONDS (ref 27–38)
AST SERPL W P-5'-P-CCNC: 150 U/L (ref 9–39)
AST SERPL W P-5'-P-CCNC: 178 U/L (ref 9–39)
BASE EXCESS BLDA CALC-SCNC: 3.3 MMOL/L (ref -2–3)
BASE EXCESS BLDA CALC-SCNC: 5.3 MMOL/L (ref -2–3)
BASE EXCESS BLDA CALC-SCNC: 5.4 MMOL/L (ref -2–3)
BASOPHILS # BLD AUTO: 0.03 X10*3/UL (ref 0–0.1)
BASOPHILS NFR BLD AUTO: 0.2 %
BILIRUB DIRECT SERPL-MCNC: 0.8 MG/DL (ref 0–0.3)
BILIRUB SERPL-MCNC: 1 MG/DL (ref 0–1.2)
BILIRUB SERPL-MCNC: 1.1 MG/DL (ref 0–1.2)
BILIRUB UR STRIP.AUTO-MCNC: NEGATIVE MG/DL
BNP SERPL-MCNC: 146 PG/ML (ref 0–99)
BODY TEMPERATURE: 37 DEGREES CELSIUS
BUN SERPL-MCNC: 4 MG/DL (ref 6–23)
BUN SERPL-MCNC: 5 MG/DL (ref 6–23)
CA-I BLDA-SCNC: 1.1 MMOL/L (ref 1.1–1.33)
CA-I BLDA-SCNC: 1.11 MMOL/L (ref 1.1–1.33)
CA-I BLDA-SCNC: 1.13 MMOL/L (ref 1.1–1.33)
CALCIUM SERPL-MCNC: 6.9 MG/DL (ref 8.6–10.6)
CALCIUM SERPL-MCNC: 7.1 MG/DL (ref 8.6–10.6)
CHLORIDE BLDA-SCNC: 112 MMOL/L (ref 98–107)
CHLORIDE BLDA-SCNC: 112 MMOL/L (ref 98–107)
CHLORIDE BLDA-SCNC: 113 MMOL/L (ref 98–107)
CHLORIDE SERPL-SCNC: 114 MMOL/L (ref 98–107)
CHLORIDE SERPL-SCNC: 116 MMOL/L (ref 98–107)
CO2 SERPL-SCNC: 22 MMOL/L (ref 21–32)
CO2 SERPL-SCNC: 29 MMOL/L (ref 21–32)
COHGB MFR BLDA: 1 %
COLOR UR: ABNORMAL
CREAT SERPL-MCNC: 0.37 MG/DL (ref 0.5–1.3)
CREAT SERPL-MCNC: 0.4 MG/DL (ref 0.5–1.3)
DO-HGB MFR BLDA: 0.1 % (ref 0–5)
EGFRCR SERPLBLD CKD-EPI 2021: >90 ML/MIN/1.73M*2
EGFRCR SERPLBLD CKD-EPI 2021: >90 ML/MIN/1.73M*2
EOSINOPHIL # BLD AUTO: 0 X10*3/UL (ref 0–0.4)
EOSINOPHIL NFR BLD AUTO: 0 %
ERYTHROCYTE [DISTWIDTH] IN BLOOD BY AUTOMATED COUNT: 17.3 % (ref 11.5–14.5)
GLUCOSE BLD MANUAL STRIP-MCNC: 150 MG/DL (ref 74–99)
GLUCOSE BLD MANUAL STRIP-MCNC: 159 MG/DL (ref 74–99)
GLUCOSE BLD MANUAL STRIP-MCNC: 171 MG/DL (ref 74–99)
GLUCOSE BLD MANUAL STRIP-MCNC: 202 MG/DL (ref 74–99)
GLUCOSE BLDA-MCNC: 130 MG/DL (ref 74–99)
GLUCOSE BLDA-MCNC: 163 MG/DL (ref 74–99)
GLUCOSE BLDA-MCNC: 188 MG/DL (ref 74–99)
GLUCOSE SERPL-MCNC: 141 MG/DL (ref 74–99)
GLUCOSE SERPL-MCNC: 154 MG/DL (ref 74–99)
GLUCOSE UR STRIP.AUTO-MCNC: NORMAL MG/DL
HCO3 BLDA-SCNC: 28.7 MMOL/L (ref 22–26)
HCO3 BLDA-SCNC: 29.8 MMOL/L (ref 22–26)
HCO3 BLDA-SCNC: 29.9 MMOL/L (ref 22–26)
HCT VFR BLD AUTO: 28.2 % (ref 41–52)
HCT VFR BLD EST: 31 % (ref 41–52)
HCT VFR BLD EST: 32 % (ref 41–52)
HCT VFR BLD EST: 35 % (ref 41–52)
HGB BLD-MCNC: 10 G/DL (ref 13.5–17.5)
HGB BLDA-MCNC: 10.3 G/DL (ref 13.5–17.5)
HGB BLDA-MCNC: 10.8 G/DL (ref 13.5–17.5)
HGB BLDA-MCNC: 10.8 G/DL (ref 13.5–17.5)
HGB BLDA-MCNC: 11.5 G/DL (ref 13.5–17.5)
HOLD SPECIMEN: NORMAL
HYALINE CASTS #/AREA URNS AUTO: ABNORMAL /LPF
IMM GRANULOCYTES # BLD AUTO: 0.12 X10*3/UL (ref 0–0.5)
IMM GRANULOCYTES NFR BLD AUTO: 0.7 % (ref 0–0.9)
INHALED O2 CONCENTRATION: 100 %
INHALED O2 CONCENTRATION: 40 %
INHALED O2 CONCENTRATION: 80 %
INR PPP: 1.8 (ref 0.9–1.1)
KETONES UR STRIP.AUTO-MCNC: NEGATIVE MG/DL
LACTATE BLDA-SCNC: 1.1 MMOL/L (ref 0.4–2)
LACTATE BLDA-SCNC: 1.2 MMOL/L (ref 0.4–2)
LACTATE BLDA-SCNC: 1.3 MMOL/L (ref 0.4–2)
LEUKOCYTE ESTERASE UR QL STRIP.AUTO: ABNORMAL
LYMPHOCYTES # BLD AUTO: 0.25 X10*3/UL (ref 0.8–3)
LYMPHOCYTES NFR BLD AUTO: 1.5 %
MAGNESIUM SERPL-MCNC: 1.7 MG/DL (ref 1.6–2.4)
MCH RBC QN AUTO: 32.3 PG (ref 26–34)
MCHC RBC AUTO-ENTMCNC: 35.5 G/DL (ref 32–36)
MCV RBC AUTO: 91 FL (ref 80–100)
METHGB MFR BLDA: 0.7 % (ref 0–1.5)
MONOCYTES # BLD AUTO: 0.68 X10*3/UL (ref 0.05–0.8)
MONOCYTES NFR BLD AUTO: 4 %
MRSA DNA SPEC QL NAA+PROBE: NOT DETECTED
MUCOUS THREADS #/AREA URNS AUTO: ABNORMAL /LPF
NEUTROPHILS # BLD AUTO: 15.99 X10*3/UL (ref 1.6–5.5)
NEUTROPHILS NFR BLD AUTO: 93.6 %
NITRITE UR QL STRIP.AUTO: NEGATIVE
NRBC BLD-RTO: 0 /100 WBCS (ref 0–0)
OXYHGB MFR BLDA: 95.1 % (ref 94–98)
OXYHGB MFR BLDA: 97.3 % (ref 94–98)
OXYHGB MFR BLDA: 98.2 % (ref 94–98)
OXYHGB MFR BLDA: 98.2 % (ref 94–98)
PCO2 BLDA: 36 MM HG (ref 38–42)
PCO2 BLDA: 43 MM HG (ref 38–42)
PCO2 BLDA: 54 MM HG (ref 38–42)
PH BLDA: 7.35 PH (ref 7.38–7.42)
PH BLDA: 7.45 PH (ref 7.38–7.42)
PH BLDA: 7.51 PH (ref 7.38–7.42)
PH UR STRIP.AUTO: 6 [PH]
PHOSPHATE SERPL-MCNC: 3.1 MG/DL (ref 2.5–4.9)
PHOSPHATE SERPL-MCNC: 3.2 MG/DL (ref 2.5–4.9)
PLATELET # BLD AUTO: 139 X10*3/UL (ref 150–450)
PO2 BLDA: 311 MM HG (ref 85–95)
PO2 BLDA: 81 MM HG (ref 85–95)
PO2 BLDA: 99 MM HG (ref 85–95)
POTASSIUM BLDA-SCNC: 3.2 MMOL/L (ref 3.5–5.3)
POTASSIUM BLDA-SCNC: 3.4 MMOL/L (ref 3.5–5.3)
POTASSIUM BLDA-SCNC: 3.4 MMOL/L (ref 3.5–5.3)
POTASSIUM SERPL-SCNC: 3.4 MMOL/L (ref 3.5–5.3)
POTASSIUM SERPL-SCNC: 3.9 MMOL/L (ref 3.5–5.3)
PROT SERPL-MCNC: 3.4 G/DL (ref 6.4–8.2)
PROT SERPL-MCNC: 3.6 G/DL (ref 6.4–8.2)
PROT UR STRIP.AUTO-MCNC: ABNORMAL MG/DL
PROTHROMBIN TIME: 20.7 SECONDS (ref 9.8–12.8)
RBC # BLD AUTO: 3.1 X10*6/UL (ref 4.5–5.9)
RBC # UR STRIP.AUTO: ABNORMAL /UL
RBC #/AREA URNS AUTO: >20 /HPF
RH FACTOR (ANTIGEN D): NORMAL
SAO2 % BLDA: 100 % (ref 94–100)
SAO2 % BLDA: 98 % (ref 94–100)
SAO2 % BLDA: 99 % (ref 94–100)
SODIUM BLDA-SCNC: 141 MMOL/L (ref 136–145)
SODIUM BLDA-SCNC: 142 MMOL/L (ref 136–145)
SODIUM BLDA-SCNC: 144 MMOL/L (ref 136–145)
SODIUM SERPL-SCNC: 149 MMOL/L (ref 136–145)
SODIUM SERPL-SCNC: 151 MMOL/L (ref 136–145)
SP GR UR STRIP.AUTO: 1.02
SQUAMOUS #/AREA URNS AUTO: ABNORMAL /HPF
UROBILINOGEN UR STRIP.AUTO-MCNC: NORMAL MG/DL
WBC # BLD AUTO: 17.1 X10*3/UL (ref 4.4–11.3)
WBC #/AREA URNS AUTO: >50 /HPF

## 2024-04-05 PROCEDURE — 99223 1ST HOSP IP/OBS HIGH 75: CPT | Performed by: SURGERY

## 2024-04-05 PROCEDURE — 88311 DECALCIFY TISSUE: CPT | Performed by: PATHOLOGY

## 2024-04-05 PROCEDURE — 3600000008 HC OR TIME - EACH INCREMENTAL 1 MINUTE - PROCEDURE LEVEL THREE: Performed by: SURGERY

## 2024-04-05 PROCEDURE — 87205 SMEAR GRAM STAIN: CPT | Mod: 91 | Performed by: STUDENT IN AN ORGANIZED HEALTH CARE EDUCATION/TRAINING PROGRAM

## 2024-04-05 PROCEDURE — 70450 CT HEAD/BRAIN W/O DYE: CPT | Performed by: RADIOLOGY

## 2024-04-05 PROCEDURE — 94002 VENT MGMT INPAT INIT DAY: CPT

## 2024-04-05 PROCEDURE — 2500000005 HC RX 250 GENERAL PHARMACY W/O HCPCS

## 2024-04-05 PROCEDURE — 36415 COLL VENOUS BLD VENIPUNCTURE: CPT | Performed by: STUDENT IN AN ORGANIZED HEALTH CARE EDUCATION/TRAINING PROGRAM

## 2024-04-05 PROCEDURE — 2500000002 HC RX 250 W HCPCS SELF ADMINISTERED DRUGS (ALT 637 FOR MEDICARE OP, ALT 636 FOR OP/ED): Performed by: STUDENT IN AN ORGANIZED HEALTH CARE EDUCATION/TRAINING PROGRAM

## 2024-04-05 PROCEDURE — 83735 ASSAY OF MAGNESIUM: CPT | Performed by: STUDENT IN AN ORGANIZED HEALTH CARE EDUCATION/TRAINING PROGRAM

## 2024-04-05 PROCEDURE — 74018 RADEX ABDOMEN 1 VIEW: CPT | Performed by: RADIOLOGY

## 2024-04-05 PROCEDURE — 84132 ASSAY OF SERUM POTASSIUM: CPT | Mod: 91 | Performed by: STUDENT IN AN ORGANIZED HEALTH CARE EDUCATION/TRAINING PROGRAM

## 2024-04-05 PROCEDURE — 82947 ASSAY GLUCOSE BLOOD QUANT: CPT | Mod: 91

## 2024-04-05 PROCEDURE — 2500000001 HC RX 250 WO HCPCS SELF ADMINISTERED DRUGS (ALT 637 FOR MEDICARE OP): Performed by: STUDENT IN AN ORGANIZED HEALTH CARE EDUCATION/TRAINING PROGRAM

## 2024-04-05 PROCEDURE — 84100 ASSAY OF PHOSPHORUS: CPT | Performed by: STUDENT IN AN ORGANIZED HEALTH CARE EDUCATION/TRAINING PROGRAM

## 2024-04-05 PROCEDURE — 0Y6D0Z3 DETACHMENT AT LEFT UPPER LEG, LOW, OPEN APPROACH: ICD-10-PCS | Performed by: SURGERY

## 2024-04-05 PROCEDURE — 82306 VITAMIN D 25 HYDROXY: CPT

## 2024-04-05 PROCEDURE — 3E043XZ INTRODUCTION OF VASOPRESSOR INTO CENTRAL VEIN, PERCUTANEOUS APPROACH: ICD-10-PCS | Performed by: STUDENT IN AN ORGANIZED HEALTH CARE EDUCATION/TRAINING PROGRAM

## 2024-04-05 PROCEDURE — 84132 ASSAY OF SERUM POTASSIUM: CPT | Performed by: STUDENT IN AN ORGANIZED HEALTH CARE EDUCATION/TRAINING PROGRAM

## 2024-04-05 PROCEDURE — 86920 COMPATIBILITY TEST SPIN: CPT | Mod: 91

## 2024-04-05 PROCEDURE — P9045 ALBUMIN (HUMAN), 5%, 250 ML: HCPCS | Mod: JZ | Performed by: ANESTHESIOLOGIST ASSISTANT

## 2024-04-05 PROCEDURE — 2500000004 HC RX 250 GENERAL PHARMACY W/ HCPCS (ALT 636 FOR OP/ED): Performed by: STUDENT IN AN ORGANIZED HEALTH CARE EDUCATION/TRAINING PROGRAM

## 2024-04-05 PROCEDURE — 82607 VITAMIN B-12: CPT

## 2024-04-05 PROCEDURE — 71045 X-RAY EXAM CHEST 1 VIEW: CPT | Performed by: RADIOLOGY

## 2024-04-05 PROCEDURE — A27590 PR AMPUTATE THIGH,THRU FEMUR: Performed by: ANESTHESIOLOGY

## 2024-04-05 PROCEDURE — 83880 ASSAY OF NATRIURETIC PEPTIDE: CPT | Performed by: STUDENT IN AN ORGANIZED HEALTH CARE EDUCATION/TRAINING PROGRAM

## 2024-04-05 PROCEDURE — 36620 INSERTION CATHETER ARTERY: CPT | Performed by: STUDENT IN AN ORGANIZED HEALTH CARE EDUCATION/TRAINING PROGRAM

## 2024-04-05 PROCEDURE — 27590 AMPUTATE LEG AT THIGH: CPT | Performed by: SURGERY

## 2024-04-05 PROCEDURE — 84132 ASSAY OF SERUM POTASSIUM: CPT

## 2024-04-05 PROCEDURE — 99291 CRITICAL CARE FIRST HOUR: CPT | Performed by: STUDENT IN AN ORGANIZED HEALTH CARE EDUCATION/TRAINING PROGRAM

## 2024-04-05 PROCEDURE — 5A1945Z RESPIRATORY VENTILATION, 24-96 CONSECUTIVE HOURS: ICD-10-PCS | Performed by: STUDENT IN AN ORGANIZED HEALTH CARE EDUCATION/TRAINING PROGRAM

## 2024-04-05 PROCEDURE — 37799 UNLISTED PX VASCULAR SURGERY: CPT | Performed by: STUDENT IN AN ORGANIZED HEALTH CARE EDUCATION/TRAINING PROGRAM

## 2024-04-05 PROCEDURE — 85730 THROMBOPLASTIN TIME PARTIAL: CPT | Mod: 91 | Performed by: STUDENT IN AN ORGANIZED HEALTH CARE EDUCATION/TRAINING PROGRAM

## 2024-04-05 PROCEDURE — 84075 ASSAY ALKALINE PHOSPHATASE: CPT | Performed by: STUDENT IN AN ORGANIZED HEALTH CARE EDUCATION/TRAINING PROGRAM

## 2024-04-05 PROCEDURE — 0Y6H0Z1 DETACHMENT AT RIGHT LOWER LEG, HIGH, OPEN APPROACH: ICD-10-PCS | Performed by: SURGERY

## 2024-04-05 PROCEDURE — 99100 ANES PT EXTEME AGE<1 YR&>70: CPT | Performed by: ANESTHESIOLOGY

## 2024-04-05 PROCEDURE — 71045 X-RAY EXAM CHEST 1 VIEW: CPT

## 2024-04-05 PROCEDURE — 99140 ANES COMP EMERGENCY COND: CPT | Performed by: ANESTHESIOLOGY

## 2024-04-05 PROCEDURE — 87086 URINE CULTURE/COLONY COUNT: CPT | Performed by: STUDENT IN AN ORGANIZED HEALTH CARE EDUCATION/TRAINING PROGRAM

## 2024-04-05 PROCEDURE — 87040 BLOOD CULTURE FOR BACTERIA: CPT | Mod: 91 | Performed by: STUDENT IN AN ORGANIZED HEALTH CARE EDUCATION/TRAINING PROGRAM

## 2024-04-05 PROCEDURE — 86900 BLOOD TYPING SEROLOGIC ABO: CPT | Mod: 91 | Performed by: STUDENT IN AN ORGANIZED HEALTH CARE EDUCATION/TRAINING PROGRAM

## 2024-04-05 PROCEDURE — 76705 ECHO EXAM OF ABDOMEN: CPT

## 2024-04-05 PROCEDURE — 27882 AMPUTATION OF LOWER LEG: CPT | Performed by: SURGERY

## 2024-04-05 PROCEDURE — 3600000003 HC OR TIME - INITIAL BASE CHARGE - PROCEDURE LEVEL THREE: Performed by: SURGERY

## 2024-04-05 PROCEDURE — 2500000004 HC RX 250 GENERAL PHARMACY W/ HCPCS (ALT 636 FOR OP/ED)

## 2024-04-05 PROCEDURE — 2020000001 HC ICU ROOM DAILY

## 2024-04-05 PROCEDURE — 74018 RADEX ABDOMEN 1 VIEW: CPT

## 2024-04-05 PROCEDURE — 85025 COMPLETE CBC W/AUTO DIFF WBC: CPT | Performed by: STUDENT IN AN ORGANIZED HEALTH CARE EDUCATION/TRAINING PROGRAM

## 2024-04-05 PROCEDURE — 83050 HGB METHEMOGLOBIN QUAN: CPT | Mod: 91

## 2024-04-05 PROCEDURE — 70450 CT HEAD/BRAIN W/O DYE: CPT

## 2024-04-05 PROCEDURE — 2780000003 HC OR 278 NO HCPCS: Performed by: SURGERY

## 2024-04-05 PROCEDURE — 71250 CT THORAX DX C-: CPT

## 2024-04-05 PROCEDURE — 88307 TISSUE EXAM BY PATHOLOGIST: CPT | Mod: TC,91,SUR | Performed by: STUDENT IN AN ORGANIZED HEALTH CARE EDUCATION/TRAINING PROGRAM

## 2024-04-05 PROCEDURE — 2500000005 HC RX 250 GENERAL PHARMACY W/O HCPCS: Performed by: ANESTHESIOLOGIST ASSISTANT

## 2024-04-05 PROCEDURE — 2500000005 HC RX 250 GENERAL PHARMACY W/O HCPCS: Performed by: HOSPITALIST

## 2024-04-05 PROCEDURE — 3700000002 HC GENERAL ANESTHESIA TIME - EACH INCREMENTAL 1 MINUTE: Performed by: SURGERY

## 2024-04-05 PROCEDURE — 94640 AIRWAY INHALATION TREATMENT: CPT

## 2024-04-05 PROCEDURE — 36620 INSERTION CATHETER ARTERY: CPT | Mod: GC | Performed by: STUDENT IN AN ORGANIZED HEALTH CARE EDUCATION/TRAINING PROGRAM

## 2024-04-05 PROCEDURE — 88307 TISSUE EXAM BY PATHOLOGIST: CPT | Performed by: PATHOLOGY

## 2024-04-05 PROCEDURE — 85025 COMPLETE CBC W/AUTO DIFF WBC: CPT | Mod: 91 | Performed by: STUDENT IN AN ORGANIZED HEALTH CARE EDUCATION/TRAINING PROGRAM

## 2024-04-05 PROCEDURE — 81001 URINALYSIS AUTO W/SCOPE: CPT | Performed by: STUDENT IN AN ORGANIZED HEALTH CARE EDUCATION/TRAINING PROGRAM

## 2024-04-05 PROCEDURE — C9113 INJ PANTOPRAZOLE SODIUM, VIA: HCPCS | Performed by: STUDENT IN AN ORGANIZED HEALTH CARE EDUCATION/TRAINING PROGRAM

## 2024-04-05 PROCEDURE — A27590 PR AMPUTATE THIGH,THRU FEMUR: Performed by: ANESTHESIOLOGIST ASSISTANT

## 2024-04-05 PROCEDURE — 2720000007 HC OR 272 NO HCPCS: Performed by: SURGERY

## 2024-04-05 PROCEDURE — 3700000001 HC GENERAL ANESTHESIA TIME - INITIAL BASE CHARGE: Performed by: SURGERY

## 2024-04-05 PROCEDURE — 87640 STAPH A DNA AMP PROBE: CPT | Mod: 91 | Performed by: STUDENT IN AN ORGANIZED HEALTH CARE EDUCATION/TRAINING PROGRAM

## 2024-04-05 PROCEDURE — 2500000004 HC RX 250 GENERAL PHARMACY W/ HCPCS (ALT 636 FOR OP/ED): Performed by: ANESTHESIOLOGIST ASSISTANT

## 2024-04-05 RX ORDER — NOREPINEPHRINE BITARTRATE/D5W 8 MG/250ML
.01-1 PLASTIC BAG, INJECTION (ML) INTRAVENOUS CONTINUOUS
Status: DISCONTINUED | OUTPATIENT
Start: 2024-04-05 | End: 2024-04-11

## 2024-04-05 RX ORDER — FERROUS SULFATE 325(65) MG
325 TABLET ORAL 2 TIMES DAILY
Status: ON HOLD | COMMUNITY
End: 2024-04-30

## 2024-04-05 RX ORDER — MIDAZOLAM HYDROCHLORIDE 1 MG/ML
0-20 INJECTION, SOLUTION INTRAVENOUS CONTINUOUS
Status: DISCONTINUED | OUTPATIENT
Start: 2024-04-05 | End: 2024-04-05

## 2024-04-05 RX ORDER — FENTANYL CITRATE 50 UG/ML
INJECTION, SOLUTION INTRAMUSCULAR; INTRAVENOUS CONTINUOUS PRN
Status: DISCONTINUED | OUTPATIENT
Start: 2024-04-05 | End: 2024-04-05

## 2024-04-05 RX ORDER — POTASSIUM CHLORIDE 1.5 G/1.58G
40 POWDER, FOR SOLUTION ORAL ONCE
Status: COMPLETED | OUTPATIENT
Start: 2024-04-05 | End: 2024-04-09

## 2024-04-05 RX ORDER — METRONIDAZOLE 500 MG/100ML
INJECTION, SOLUTION INTRAVENOUS AS NEEDED
Status: DISCONTINUED | OUTPATIENT
Start: 2024-04-05 | End: 2024-04-05

## 2024-04-05 RX ORDER — ROCURONIUM BROMIDE 10 MG/ML
INJECTION, SOLUTION INTRAVENOUS AS NEEDED
Status: DISCONTINUED | OUTPATIENT
Start: 2024-04-05 | End: 2024-04-05

## 2024-04-05 RX ORDER — PROPOFOL 10 MG/ML
5-20 INJECTION, EMULSION INTRAVENOUS CONTINUOUS
Status: DISCONTINUED | OUTPATIENT
Start: 2024-04-05 | End: 2024-04-09

## 2024-04-05 RX ORDER — MAGNESIUM SULFATE HEPTAHYDRATE 40 MG/ML
2 INJECTION, SOLUTION INTRAVENOUS ONCE
Status: COMPLETED | OUTPATIENT
Start: 2024-04-05 | End: 2024-04-05

## 2024-04-05 RX ORDER — FENTANYL CITRATE-0.9 % NACL/PF 10 MCG/ML
50-200 PLASTIC BAG, INJECTION (ML) INTRAVENOUS CONTINUOUS
Status: DISCONTINUED | OUTPATIENT
Start: 2024-04-05 | End: 2024-04-09

## 2024-04-05 RX ORDER — ASPIRIN 81 MG/1
81 TABLET ORAL DAILY
Status: ON HOLD | COMMUNITY
End: 2024-04-30

## 2024-04-05 RX ORDER — CLOPIDOGREL BISULFATE 75 MG/1
75 TABLET ORAL DAILY
Status: DISCONTINUED | OUTPATIENT
Start: 2024-04-05 | End: 2024-04-09

## 2024-04-05 RX ORDER — THIAMINE HYDROCHLORIDE 100 MG/ML
100 INJECTION, SOLUTION INTRAMUSCULAR; INTRAVENOUS DAILY
Status: DISCONTINUED | OUTPATIENT
Start: 2024-04-05 | End: 2024-04-05

## 2024-04-05 RX ORDER — ALBUMIN HUMAN 50 G/1000ML
SOLUTION INTRAVENOUS AS NEEDED
Status: DISCONTINUED | OUTPATIENT
Start: 2024-04-05 | End: 2024-04-05

## 2024-04-05 RX ORDER — IPRATROPIUM BROMIDE AND ALBUTEROL SULFATE 2.5; .5 MG/3ML; MG/3ML
3 SOLUTION RESPIRATORY (INHALATION)
Status: DISCONTINUED | OUTPATIENT
Start: 2024-04-05 | End: 2024-04-05

## 2024-04-05 RX ORDER — ATORVASTATIN CALCIUM 40 MG/1
40 TABLET, FILM COATED ORAL DAILY
Status: DISCONTINUED | OUTPATIENT
Start: 2024-04-05 | End: 2024-04-17

## 2024-04-05 RX ORDER — PENTOXIFYLLINE 400 MG/1
400 TABLET, EXTENDED RELEASE ORAL DAILY
Status: DISCONTINUED | OUTPATIENT
Start: 2024-04-05 | End: 2024-04-10

## 2024-04-05 RX ORDER — ATORVASTATIN CALCIUM 40 MG/1
40 TABLET, FILM COATED ORAL DAILY
COMMUNITY
End: 2024-04-30 | Stop reason: HOSPADM

## 2024-04-05 RX ORDER — PANTOPRAZOLE SODIUM 40 MG/10ML
40 INJECTION, POWDER, LYOPHILIZED, FOR SOLUTION INTRAVENOUS
Status: DISCONTINUED | OUTPATIENT
Start: 2024-04-05 | End: 2024-04-12

## 2024-04-05 RX ORDER — IPRATROPIUM BROMIDE AND ALBUTEROL SULFATE 2.5; .5 MG/3ML; MG/3ML
3 SOLUTION RESPIRATORY (INHALATION) EVERY 6 HOURS PRN
Status: DISCONTINUED | OUTPATIENT
Start: 2024-04-05 | End: 2024-04-30 | Stop reason: HOSPADM

## 2024-04-05 RX ORDER — VANCOMYCIN HYDROCHLORIDE 1 G/20ML
INJECTION, POWDER, LYOPHILIZED, FOR SOLUTION INTRAVENOUS DAILY PRN
Status: DISCONTINUED | OUTPATIENT
Start: 2024-04-05 | End: 2024-04-11

## 2024-04-05 RX ORDER — POLYETHYLENE GLYCOL 3350 17 G/17G
17 POWDER, FOR SOLUTION ORAL DAILY
Status: DISCONTINUED | OUTPATIENT
Start: 2024-04-05 | End: 2024-04-10

## 2024-04-05 RX ORDER — POTASSIUM CHLORIDE 750 MG/1
10 TABLET, FILM COATED, EXTENDED RELEASE ORAL DAILY
Status: ON HOLD | COMMUNITY
End: 2024-04-30

## 2024-04-05 RX ORDER — METRONIDAZOLE 500 MG/100ML
500 INJECTION, SOLUTION INTRAVENOUS EVERY 8 HOURS
Status: DISCONTINUED | OUTPATIENT
Start: 2024-04-05 | End: 2024-04-05

## 2024-04-05 RX ORDER — CEFTRIAXONE 1 G/50ML
1 INJECTION, SOLUTION INTRAVENOUS EVERY 24 HOURS
Status: DISCONTINUED | OUTPATIENT
Start: 2024-04-05 | End: 2024-04-05

## 2024-04-05 RX ORDER — ENOXAPARIN SODIUM 100 MG/ML
40 INJECTION SUBCUTANEOUS EVERY 24 HOURS
Status: DISCONTINUED | OUTPATIENT
Start: 2024-04-05 | End: 2024-04-30 | Stop reason: HOSPADM

## 2024-04-05 RX ORDER — SODIUM CHLORIDE, SODIUM LACTATE, POTASSIUM CHLORIDE, CALCIUM CHLORIDE 600; 310; 30; 20 MG/100ML; MG/100ML; MG/100ML; MG/100ML
INJECTION, SOLUTION INTRAVENOUS CONTINUOUS PRN
Status: DISCONTINUED | OUTPATIENT
Start: 2024-04-05 | End: 2024-04-05

## 2024-04-05 RX ORDER — FOLIC ACID 1 MG/1
1 TABLET ORAL DAILY
Status: DISCONTINUED | OUTPATIENT
Start: 2024-04-05 | End: 2024-04-17

## 2024-04-05 RX ORDER — NOREPINEPHRINE BITARTRATE/D5W 8 MG/250ML
PLASTIC BAG, INJECTION (ML) INTRAVENOUS
Status: COMPLETED
Start: 2024-04-05 | End: 2024-04-05

## 2024-04-05 RX ORDER — PROPOFOL 10 MG/ML
INJECTION, EMULSION INTRAVENOUS CONTINUOUS PRN
Status: DISCONTINUED | OUTPATIENT
Start: 2024-04-05 | End: 2024-04-05

## 2024-04-05 RX ORDER — VANCOMYCIN HYDROCHLORIDE 1 G/200ML
1 INJECTION, SOLUTION INTRAVENOUS EVERY 12 HOURS
Status: DISCONTINUED | OUTPATIENT
Start: 2024-04-05 | End: 2024-04-09

## 2024-04-05 RX ADMIN — CLOPIDOGREL BISULFATE 75 MG: 75 TABLET ORAL at 08:19

## 2024-04-05 RX ADMIN — SUGAMMADEX 200 MG: 100 INJECTION, SOLUTION INTRAVENOUS at 12:43

## 2024-04-05 RX ADMIN — MAGNESIUM SULFATE HEPTAHYDRATE 2 G: 40 INJECTION, SOLUTION INTRAVENOUS at 08:20

## 2024-04-05 RX ADMIN — ATORVASTATIN CALCIUM 40 MG: 40 TABLET, FILM COATED ORAL at 08:19

## 2024-04-05 RX ADMIN — NOREPINEPHRINE BITARTRATE 0.1 MCG/KG/MIN: 8 INJECTION, SOLUTION INTRAVENOUS at 17:07

## 2024-04-05 RX ADMIN — PROPOFOL 5 MCG/KG/MIN: 10 INJECTION, EMULSION INTRAVENOUS at 01:45

## 2024-04-05 RX ADMIN — ALBUMIN HUMAN 250 ML: 0.05 INJECTION, SOLUTION INTRAVENOUS at 12:01

## 2024-04-05 RX ADMIN — Medication 100 MCG/HR: at 00:52

## 2024-04-05 RX ADMIN — VASOPRESSIN 0.03 UNITS/MIN: 0.2 INJECTION INTRAVENOUS at 22:42

## 2024-04-05 RX ADMIN — SODIUM CHLORIDE, POTASSIUM CHLORIDE, SODIUM LACTATE AND CALCIUM CHLORIDE: 600; 310; 30; 20 INJECTION, SOLUTION INTRAVENOUS at 11:15

## 2024-04-05 RX ADMIN — POLYETHYLENE GLYCOL 3350 17 G: 17 POWDER, FOR SOLUTION ORAL at 08:19

## 2024-04-05 RX ADMIN — Medication 80 PERCENT: at 00:15

## 2024-04-05 RX ADMIN — METRONIDAZOLE 500 MG: 500 INJECTION, SOLUTION INTRAVENOUS at 11:37

## 2024-04-05 RX ADMIN — THIAMINE HYDROCHLORIDE 500 MG: 100 INJECTION, SOLUTION INTRAMUSCULAR; INTRAVENOUS at 17:15

## 2024-04-05 RX ADMIN — FOLIC ACID 1 MG: 1 TABLET ORAL at 09:44

## 2024-04-05 RX ADMIN — PIPERACILLIN SODIUM AND TAZOBACTAM SODIUM 4.5 G: 4; .5 INJECTION, SOLUTION INTRAVENOUS at 22:42

## 2024-04-05 RX ADMIN — MIDAZOLAM IN SODIUM CHLORIDE 3 MG/HR: 1 INJECTION INTRAVENOUS at 01:06

## 2024-04-05 RX ADMIN — PIPERACILLIN SODIUM AND TAZOBACTAM SODIUM 4.5 G: 4; .5 INJECTION, SOLUTION INTRAVENOUS at 09:44

## 2024-04-05 RX ADMIN — ENOXAPARIN SODIUM 40 MG: 100 INJECTION SUBCUTANEOUS at 05:22

## 2024-04-05 RX ADMIN — NOREPINEPHRINE BITARTRATE 0.12 MCG/KG/MIN: 8 INJECTION, SOLUTION INTRAVENOUS at 01:44

## 2024-04-05 RX ADMIN — Medication 8 MCG: at 11:51

## 2024-04-05 RX ADMIN — DOXYCYCLINE 100 MG: 100 INJECTION, POWDER, LYOPHILIZED, FOR SOLUTION INTRAVENOUS at 04:21

## 2024-04-05 RX ADMIN — Medication 50 MCG/HR: at 17:08

## 2024-04-05 RX ADMIN — PANTOPRAZOLE SODIUM 40 MG: 40 INJECTION, POWDER, FOR SOLUTION INTRAVENOUS at 06:33

## 2024-04-05 RX ADMIN — METRONIDAZOLE 500 MG: 500 INJECTION, SOLUTION INTRAVENOUS at 02:27

## 2024-04-05 RX ADMIN — VANCOMYCIN HYDROCHLORIDE 1 G: 1 INJECTION, SOLUTION INTRAVENOUS at 21:33

## 2024-04-05 RX ADMIN — HYDROCORTISONE SODIUM SUCCINATE 50 MG: 100 INJECTION, POWDER, FOR SOLUTION INTRAMUSCULAR; INTRAVENOUS at 18:15

## 2024-04-05 RX ADMIN — PIPERACILLIN SODIUM AND TAZOBACTAM SODIUM 4.5 G: 4; .5 INJECTION, SOLUTION INTRAVENOUS at 17:07

## 2024-04-05 RX ADMIN — ROCURONIUM BROMIDE 50 MG: 10 INJECTION INTRAVENOUS at 11:22

## 2024-04-05 RX ADMIN — IPRATROPIUM BROMIDE AND ALBUTEROL SULFATE 3 ML: .5; 3 SOLUTION RESPIRATORY (INHALATION) at 02:15

## 2024-04-05 RX ADMIN — PROPOFOL 10 MCG/KG/MIN: 10 INJECTION, EMULSION INTRAVENOUS at 17:08

## 2024-04-05 RX ADMIN — VANCOMYCIN HYDROCHLORIDE 1 G: 1 INJECTION, SOLUTION INTRAVENOUS at 09:44

## 2024-04-05 RX ADMIN — Medication 8 MCG: at 12:02

## 2024-04-05 RX ADMIN — PENTOXIFYLLINE 400 MG: 400 TABLET, EXTENDED RELEASE ORAL at 09:44

## 2024-04-05 RX ADMIN — CEFTRIAXONE SODIUM 1 G: 1 INJECTION, SOLUTION INTRAVENOUS at 05:22

## 2024-04-05 SDOH — SOCIAL STABILITY: SOCIAL INSECURITY: DO YOU FEEL ANYONE HAS EXPLOITED OR TAKEN ADVANTAGE OF YOU FINANCIALLY OR OF YOUR PERSONAL PROPERTY?: UNABLE TO ASSESS

## 2024-04-05 SDOH — SOCIAL STABILITY: SOCIAL INSECURITY
WITHIN THE LAST YEAR, HAVE YOU BEEN KICKED, HIT, SLAPPED, OR OTHERWISE PHYSICALLY HURT BY YOUR PARTNER OR EX-PARTNER?: NO

## 2024-04-05 SDOH — ECONOMIC STABILITY: FOOD INSECURITY: WITHIN THE PAST 12 MONTHS, YOU WORRIED THAT YOUR FOOD WOULD RUN OUT BEFORE YOU GOT MONEY TO BUY MORE.: NEVER TRUE

## 2024-04-05 SDOH — ECONOMIC STABILITY: TRANSPORTATION INSECURITY
IN THE PAST 12 MONTHS, HAS LACK OF TRANSPORTATION KEPT YOU FROM MEETINGS, WORK, OR FROM GETTING THINGS NEEDED FOR DAILY LIVING?: PATIENT UNABLE TO ANSWER

## 2024-04-05 SDOH — SOCIAL STABILITY: SOCIAL INSECURITY: WITHIN THE LAST YEAR, HAVE YOU BEEN AFRAID OF YOUR PARTNER OR EX-PARTNER?: NO

## 2024-04-05 SDOH — SOCIAL STABILITY: SOCIAL INSECURITY: WITHIN THE LAST YEAR, HAVE YOU BEEN HUMILIATED OR EMOTIONALLY ABUSED IN OTHER WAYS BY YOUR PARTNER OR EX-PARTNER?: NO

## 2024-04-05 SDOH — ECONOMIC STABILITY: INCOME INSECURITY
HOW HARD IS IT FOR YOU TO PAY FOR THE VERY BASICS LIKE FOOD, HOUSING, MEDICAL CARE, AND HEATING?: PATIENT UNABLE TO ANSWER

## 2024-04-05 SDOH — ECONOMIC STABILITY: FOOD INSECURITY: WITHIN THE PAST 12 MONTHS, THE FOOD YOU BOUGHT JUST DIDN'T LAST AND YOU DIDN'T HAVE MONEY TO GET MORE.: NEVER TRUE

## 2024-04-05 SDOH — ECONOMIC STABILITY: INCOME INSECURITY: IN THE LAST 12 MONTHS, WAS THERE A TIME WHEN YOU WERE NOT ABLE TO PAY THE MORTGAGE OR RENT ON TIME?: NO

## 2024-04-05 SDOH — ECONOMIC STABILITY: HOUSING INSECURITY: IN THE LAST 12 MONTHS, HOW MANY PLACES HAVE YOU LIVED?: 1

## 2024-04-05 SDOH — ECONOMIC STABILITY: HOUSING INSECURITY
IN THE LAST 12 MONTHS, WAS THERE A TIME WHEN YOU DID NOT HAVE A STEADY PLACE TO SLEEP OR SLEPT IN A SHELTER (INCLUDING NOW)?: PATIENT UNABLE TO ANSWER

## 2024-04-05 SDOH — ECONOMIC STABILITY: INCOME INSECURITY: IN THE PAST 12 MONTHS, HAS THE ELECTRIC, GAS, OIL, OR WATER COMPANY THREATENED TO SHUT OFF SERVICE IN YOUR HOME?: NO

## 2024-04-05 SDOH — ECONOMIC STABILITY: TRANSPORTATION INSECURITY
IN THE PAST 12 MONTHS, HAS THE LACK OF TRANSPORTATION KEPT YOU FROM MEDICAL APPOINTMENTS OR FROM GETTING MEDICATIONS?: PATIENT UNABLE TO ANSWER

## 2024-04-05 SDOH — SOCIAL STABILITY: SOCIAL INSECURITY: DO YOU FEEL UNSAFE GOING BACK TO THE PLACE WHERE YOU ARE LIVING?: UNABLE TO ASSESS

## 2024-04-05 SDOH — SOCIAL STABILITY: SOCIAL INSECURITY: DOES ANYONE TRY TO KEEP YOU FROM HAVING/CONTACTING OTHER FRIENDS OR DOING THINGS OUTSIDE YOUR HOME?: UNABLE TO ASSESS

## 2024-04-05 SDOH — ECONOMIC STABILITY: HOUSING INSECURITY
IN THE LAST 12 MONTHS, WAS THERE A TIME WHEN YOU DID NOT HAVE A STEADY PLACE TO SLEEP OR SLEPT IN A SHELTER (INCLUDING NOW)?: NO

## 2024-04-05 SDOH — SOCIAL STABILITY: SOCIAL INSECURITY: ARE THERE ANY APPARENT SIGNS OF INJURIES/BEHAVIORS THAT COULD BE RELATED TO ABUSE/NEGLECT?: UNABLE TO ASSESS

## 2024-04-05 SDOH — SOCIAL STABILITY: SOCIAL INSECURITY
WITHIN THE LAST YEAR, HAVE TO BEEN RAPED OR FORCED TO HAVE ANY KIND OF SEXUAL ACTIVITY BY YOUR PARTNER OR EX-PARTNER?: NO

## 2024-04-05 SDOH — SOCIAL STABILITY: SOCIAL INSECURITY: ARE YOU OR HAVE YOU BEEN THREATENED OR ABUSED PHYSICALLY, EMOTIONALLY, OR SEXUALLY BY ANYONE?: UNABLE TO ASSESS

## 2024-04-05 SDOH — ECONOMIC STABILITY: TRANSPORTATION INSECURITY
IN THE PAST 12 MONTHS, HAS LACK OF TRANSPORTATION KEPT YOU FROM MEETINGS, WORK, OR FROM GETTING THINGS NEEDED FOR DAILY LIVING?: NO

## 2024-04-05 SDOH — ECONOMIC STABILITY: TRANSPORTATION INSECURITY
IN THE PAST 12 MONTHS, HAS THE LACK OF TRANSPORTATION KEPT YOU FROM MEDICAL APPOINTMENTS OR FROM GETTING MEDICATIONS?: NO

## 2024-04-05 SDOH — SOCIAL STABILITY: SOCIAL INSECURITY: HAVE YOU HAD THOUGHTS OF HARMING ANYONE ELSE?: UNABLE TO ASSESS

## 2024-04-05 SDOH — SOCIAL STABILITY: SOCIAL INSECURITY: ABUSE: ADULT

## 2024-04-05 SDOH — SOCIAL STABILITY: SOCIAL INSECURITY: HAS ANYONE EVER THREATENED TO HURT YOUR FAMILY OR YOUR PETS?: UNABLE TO ASSESS

## 2024-04-05 SDOH — ECONOMIC STABILITY: INCOME INSECURITY: HOW HARD IS IT FOR YOU TO PAY FOR THE VERY BASICS LIKE FOOD, HOUSING, MEDICAL CARE, AND HEATING?: NOT HARD AT ALL

## 2024-04-05 SDOH — ECONOMIC STABILITY: INCOME INSECURITY
IN THE LAST 12 MONTHS, WAS THERE A TIME WHEN YOU WERE NOT ABLE TO PAY THE MORTGAGE OR RENT ON TIME?: PATIENT UNABLE TO ANSWER

## 2024-04-05 SDOH — SOCIAL STABILITY: SOCIAL INSECURITY: WERE YOU ABLE TO COMPLETE ALL THE BEHAVIORAL HEALTH SCREENINGS?: NO

## 2024-04-05 ASSESSMENT — ACTIVITIES OF DAILY LIVING (ADL)
LACK_OF_TRANSPORTATION: PATIENT UNABLE TO ANSWER
TOILETING: UNABLE TO ASSESS
ASSISTIVE_DEVICE: OTHER (COMMENT)
DRESSING YOURSELF: UNABLE TO ASSESS
BATHING: UNABLE TO ASSESS
WALKS IN HOME: UNABLE TO ASSESS
PATIENT'S MEMORY ADEQUATE TO SAFELY COMPLETE DAILY ACTIVITIES?: UNABLE TO ASSESS
ADEQUATE_TO_COMPLETE_ADL: UNABLE TO ASSESS
HEARING - LEFT EAR: UNABLE TO ASSESS
GROOMING: UNABLE TO ASSESS
JUDGMENT_ADEQUATE_SAFELY_COMPLETE_DAILY_ACTIVITIES: UNABLE TO ASSESS
HEARING - RIGHT EAR: UNABLE TO ASSESS
FEEDING YOURSELF: UNABLE TO ASSESS

## 2024-04-05 ASSESSMENT — LIFESTYLE VARIABLES
HOW OFTEN DO YOU HAVE 6 OR MORE DRINKS ON ONE OCCASION: PATIENT UNABLE TO ANSWER
AUDIT-C TOTAL SCORE: -1
HOW OFTEN DO YOU HAVE A DRINK CONTAINING ALCOHOL: PATIENT UNABLE TO ANSWER
AUDIT-C TOTAL SCORE: -1
SKIP TO QUESTIONS 9-10: 0
HOW MANY STANDARD DRINKS CONTAINING ALCOHOL DO YOU HAVE ON A TYPICAL DAY: PATIENT UNABLE TO ANSWER

## 2024-04-05 ASSESSMENT — COLUMBIA-SUICIDE SEVERITY RATING SCALE - C-SSRS
2. HAVE YOU ACTUALLY HAD ANY THOUGHTS OF KILLING YOURSELF?: NO
1. IN THE PAST MONTH, HAVE YOU WISHED YOU WERE DEAD OR WISHED YOU COULD GO TO SLEEP AND NOT WAKE UP?: NO
6. HAVE YOU EVER DONE ANYTHING, STARTED TO DO ANYTHING, OR PREPARED TO DO ANYTHING TO END YOUR LIFE?: NO

## 2024-04-05 ASSESSMENT — PAIN - FUNCTIONAL ASSESSMENT
PAIN_FUNCTIONAL_ASSESSMENT: CPOT (CRITICAL CARE PAIN OBSERVATION TOOL)

## 2024-04-05 ASSESSMENT — COGNITIVE AND FUNCTIONAL STATUS - GENERAL: PATIENT BASELINE BEDBOUND: UNABLE TO ASSESS AT THIS TIME

## 2024-04-05 ASSESSMENT — PATIENT HEALTH QUESTIONNAIRE - PHQ9
SUM OF ALL RESPONSES TO PHQ9 QUESTIONS 1 & 2: 0
2. FEELING DOWN, DEPRESSED OR HOPELESS: NOT AT ALL
1. LITTLE INTEREST OR PLEASURE IN DOING THINGS: NOT AT ALL

## 2024-04-05 NOTE — OP NOTE
DATE OF SERVICE: 4/5/2024     PREOPERATIVE DIAGNOSIS:  Bilateral leg gangrene, pneumonia, septic shock.     POSTOPERATIVE DIAGNOSIS:  Same.     PROCEDURES:   1. Right below knee guillotine amputation.  2. Left above knee guillotine amputation.       SURGEON:  Efrain Arango MD, PhD.      ASSISTANT(S):  Aly Veras MD.      ANESTHESIA:  General     INDICATIONS:   This is a 74 year old male with septic shock from pneumonia and history of bilateral lower extremity wounds who is requiring increasing pressor requirement. Patient's case was discussed the case with MICU team. They report he has PNA but also now with increasing pressor requirement with concern for legs contributing to the pressor need.   Together, we discussed with the patient's wife, given that the patient is not able to voice his opinion. With the MICU team we discussed non-operative vs operative management (bilateral leg amputations; first guillotine amputations) but we also discussed this may not be curative for his ongoing sepsis and he may not recover.   After the lengthy discussion, patient's wife requested us to proceed with bilateral lower extremity amputations.   I discussed the plan for both sides left through knee, right likely below knee vs through knee guillotine amputations for the source control of his sepsis in the setting of increasing pressor need. The risks were discussed, including bleeding, infection, wound complications, chronic pain, limb swelling, reaction to medication, or the need for reoperation (including revision to a higher level of amputation), were discussed. We also discussed the additional risks of myocardial infarction, anesthetic complications, or other serious complications. The plan for post-operative care and rehabilitation were addressed. The patient's wife indicated understanding of the procedure, plan, alternatives, and risks, and voiced consent to proceed.         DESCRIPTION OF PROCEDURE:    The patient was  placed in the supine position. General endotracheal anesthesia was achieved by anesthesia team; bilateral lower extremities were prepped and draped in the usual sterile fashion.   A time out was completed verifying correct patient, site, procedure and positioning.   Circumferential skin incisions were marked on the skin above the right ankle joint.   The incision was extended medially and laterally.   The skin and subcutaneous tissues were incised down to the fascia then the muscles. The tibia and fibula were divided by using the saw approximately 2 cm proximal to the level of the ankle joint. The specimen was sent to pathology. The anterior tibial artery, posterior tibial artery and peroneal vessels were ligated. Further hemostasis was achieved by using sutures. The wound was irrigated. Hemostasis was achieved.   The tissue and muscle appeared viable without any purulent drainage.     Circumferential skin incisions were marked on the skin above the left knee joint.   The incision was extended medially and laterally.   The skin and subcutaneous tissues were incised down to the fascia then the muscles. The distal femur was divided by using the saw approximately 2 cm proximal to the level of the left knee joint. By using an electrocautery, remaining muscle and the limb was amputated. The specimen was sent to pathology. The popliteal artery and vein were ligated. Further hemostasis was achieved by using sutures. The wound was irrigated. Hemostasis was achieved.   Sterile dressing was applied to both wounds.   The patient tolerated the procedure well and was transferred to the MICU in stable condition.   I was physically present for the entire length of the case and scrubbed for the entire portions.      Efrain Arango MD, PhD.   Clinical   [employer]  Co-Director, Aortic Center  [facility]

## 2024-04-05 NOTE — CONSULTS
"Nutrition Initial Assessment:   Nutrition Assessment    Reason for Assessment: Tube feeding recommendations    Patient is a 74 y.o. male presenting from an OSH for hypoxic respiratory failure, CAP, septic shock and encephalopathy.  Noted that he had 3-4 days of N/V/D and weakness.  Treated for colitis.  Concern for B/L acute on chronic limb ischemia.  Now s/p L corry AKA and R corry ESTRADAA today.  He is intubated, sedated with fentanyl and propofol and on levo and vaso for pressure support.    Past medical history includes HTN, HLD, COPD, CAD, PAD s/p revascularization procedures to BLE,     Pt has an NGT in place for enteral access.  Current Propofol rate is 2.9mls/hr which provides 77kcals daily from fat.    Nutrition History:  Food and Nutrient History: Met with patient's wife and his daughter at bedside.  They report poor oral intake PTA for prolonged period of time, likely months.  Has lost a large amount of weight and they feel that he looks \"like skin and bones\".  He was recently having vomiting issues the few days PTA.  Wife also reports profuse diarrhea.  He was trying to drink Boost/Ensure at home for extra nutrition.  Food Allergies/Intolerances:  None       Anthropometrics:  Height: 188 cm (6' 2\")   Weight: 54.2 kg (119 lb 7.8 oz)   BMI (Calculated): 15.33  IBW/kg (Dietitian Calculated):  (Daughter reports that pt was closer to 170cm and not his current height of 188cm.  Will use 170cm for calculations.)  Percent of IBW:  (67.3kg pre amputations/61.3kg post amputations)     Weight History:     Weight from earlier today is 48.2kg  Wt Readings from Last 15 Encounters:   04/05/24 54.2 kg (119 lb 7.8 oz)   09/14/23 62.1 kg (137 lb)       Weight Change %:     Pt is down 22% in weight in 7 months.  At one time, when healthy, he weighed between 75kg to 84kg.  Unclear when he last weighed these numbers.  He is down anywhere between 36-43% in weight overall.     Nutrition Focused Physical Exam " "Findings:    Subcutaneous Fat Loss:   Orbital Fat Pads: Severe (dark circles, hollowing and loose skin)  Buccal Fat Pads: Severe (hollow, sunken and narrow face)  Triceps: Severe (negligible fat tissue)  Muscle Wasting:  Temporalis: Severe (hollowed scooping depression)  Pectoralis (Clavicular Region): Severe (protruding prominent clavicle)  Deltoid/Trapezius: Severe (squared shoulders, acromion process prominent)  Quadriceps: Defer  Gastrocnemius: Defer  Edema:  Edema: none  Physical Findings:  Skin: Positive (multiple areas of breakdown noted, some pics in chart: B/L feet; B/L pre-tibial; B/L ventral; B/L axilla; penis; B/L ankles; perineum; back; coccyx; B/L elbow; nose; B/L proximal pretibial)    Nutrition Significant Labs:  BMP Trend:   Results from last 7 days   Lab Units 04/05/24  0838 04/05/24  0138   GLUCOSE mg/dL 154* 141*   CALCIUM mg/dL 6.9* 7.1*   SODIUM mmol/L 149* 151*   POTASSIUM mmol/L 3.4* 3.9   CO2 mmol/L 29 22   CHLORIDE mmol/L 114* 116*   BUN mg/dL 5* 4*   CREATININE mg/dL 0.40* 0.37*    , A1C:No results found for: \"HGBA1C\", BG POCT trend:   Results from last 7 days   Lab Units 04/05/24  0806 04/05/24  0409   POCT GLUCOSE mg/dL 159* 150*    , Renal Lab Trend:   Results from last 7 days   Lab Units 04/05/24  0838 04/05/24  0138   POTASSIUM mmol/L 3.4* 3.9   PHOSPHORUS mg/dL 3.1 3.2   SODIUM mmol/L 149* 151*   MAGNESIUM mg/dL  --  1.70   EGFR mL/min/1.73m*2 >90 >90   BUN mg/dL 5* 4*   CREATININE mg/dL 0.40* 0.37*    , Vit D:   Lab Results   Component Value Date    VITD25 8 (A) 08/18/2023        Nutrition Specific Medications:  Scheduled medications  atorvastatin, 40 mg, oral, Daily  clopidogrel, 75 mg, oral, Daily  enoxaparin, 40 mg, subcutaneous, q24h  folic acid, 1 mg, oral, Daily  pantoprazole, 40 mg, intravenous, Daily before breakfast  pentoxifylline, 400 mg, oral, Daily  piperacillin-tazobactam, 4.5 g, intravenous, q6h  polyethylene glycol, 17 g, oral, Daily  potassium chloride, 40 mEq, " oral, Once  thiamine, 100 mg, intravenous, Daily  vancomycin, 1 g, intravenous, q12h      Continuous medications  fentaNYL,  mcg/hr, Last Rate: 50 mcg/hr (04/05/24 1300)  norepinephrine, 0.01-1 mcg/kg/min, Last Rate: 0.1 mcg/kg/min (04/05/24 1330)  oxygen,   propofol, 5-20 mcg/kg/min, Last Rate: 2.892 mL/hr (04/05/24 1213)  vasopressin, 0.03 Units/min, Last Rate: 0.03 Units/min (04/05/24 1000)      PRN medications  PRN medications: ipratropium-albuteroL, oxygen, vancomycin     I/O:   Last BM Date: 04/05/24; Stool Appearance: Loose (04/05/24 0015)        Dietary Orders (From admission, onward)       Start     Ordered    04/05/24 0101  NPO Diet; Effective now  Diet effective now         04/05/24 0104                     Estimated Needs:   Total Energy Estimated Needs (kCal):  (4337-6858)  Method for Estimating Needs: MV= 6.5, RMR= 1230 using 170.2cm and 48.2kg  Total Protein Estimated Needs (g):  (75-85)  Method for Estimating Needs: 1.5-1.8 x 48.2kg              Nutrition Diagnosis   Malnutrition Diagnosis  Patient has Malnutrition Diagnosis: Yes  Diagnosis Status: New  Malnutrition Diagnosis: Severe malnutrition related to chronic disease or condition  As Evidenced by: family reports a terrible appetite and intake for months along with a 22% weight loss in 7 months and severe fat and muscle wasting noted all over his body    Pt may benefit from a high protein, immune-modulating TF while critically ill and in need for wound healing vitamins and minerals.         Nutrition Interventions/Recommendations         Nutrition Prescription:  For tube feed: order Pivot 1.5@ start rate of 15mls/hr.  Increase by 10mls q8hrs until goal rate of 35mls/hr.  This is his goal while on Propofol.    Once off of Propofol, increase to 40mls/hr.  Water flushes per team's discretion.  Goal rate provides 730mls free water daily.    Pt is a refeeding risk.  Would order 500mgs thiamin TID for 3 days followed by 100mgs thiamin once  daily.    Check RFP with mag BID and replete lytes that are low.  Currently, potassium is low.  His Vitamin D was deficient in August 2023.  Would recheck level and replete as needed.  Repletion dose would be 8,000liquid Vitamin D daily for 6-8 weeks.        Nutrition Interventions:   Food and/or Nutrient Delivery Interventions  Interventions: Enteral intake  Goal:   TF at goal with propofol= 1337kcals, 79grams protein   TF at goal without Propofol= 1440kcals, 90grams protein         Nutrition Education:   Not appropriate       Nutrition Monitoring and Evaluation   Food/Nutrient Related History Monitoring  Monitoring and Evaluation Plan: Enteral and parenteral nutrition intake  Criteria: Pt to meet>75% estimated needs via TF       Time Spent/Follow-up Reminder:   Time Spent (min): 60 minutes  Last Date of Nutrition Visit: 04/05/24  Nutrition Follow-Up Needed?: Dietitian to reassess per policy  Follow up Comment: TF via NGT/vent

## 2024-04-05 NOTE — H&P
History Of Present Illness  74-year-old male with history of HTN, HLD, COPD, CAD, PAD s/p revascularization procedures to BLE, presents to [facility] MICU as transfer from [facility] for acute hypoxic respiratory failure secondary to community acquired pneumonia, presumed septic shock, and encephalopathy.      Patient initially presenting to outside hospital with 3 to 4 days of generalized weakness, nausea, vomiting, and diarrhea.  Found to have colitis on CT imaging, was started on IV flagyl and admitted.  Hgb 7.2 without obvious source of bleeding, received 1 unit pRBC.  Found to have severe bilateral lower extremity peripheral vascular disease with dry gangrene of the left foot, vascular surgery consulted and recommending no acute surgical intervention.    Hospital course complicated by worsening altered mental status, with CT head at that time demonstrating findings suspicious for early ischemic change.      New hypoxic respiratory failure, found to have bilateral pneumonia with pleural effusions, was placed on CPAP.  Thoracentesis on 4/2 draining 1L on left.  New hypotension requiring initiation of norepinephrine gtt.    Stable on CPAP settings, but was intubated prior to transfer here for airway protection given degree of obtundation (A&O x0 but responsive).    OUTSIDE HOSPITAL COURSE:  - Imaging:   CTH (3/31) - findings suspicious for early ischemic changes in the right basal ganglia    CT chest (4/3) - large effusions bilaterally with large geographic regions of mixed ground-glass opacities and consolidation throughout the upper and lower lobes  - Interventions:   Flagyl for colitis   Ceftriaxone and azithromycin -> doxycycline for pneumonia   Thoracentesis   CPAP -> intubation   Hydrocortisone   Norepinephrine gtt   Midodrine     Past Medical History  No past medical history on file.    Surgical History  Past Surgical History:   Procedure Laterality Date    CT ANGIO HEART CORONARY  8/16/2023    CT  "HEART CORONARY ANGIOGRAM 8/16/2023 [facility] CT    CT AORTA AND BILATERAL ILIOFEMORAL RUNOFF ANGIOGRAM W AND/OR WO IV CONTRAST  8/2/2023    CT AORTA AND BILATERAL ILIOFEMORAL RUNOFF ANGIOGRAM W AND/OR WO IV CONTRAST 8/2/2023 [facility] CT        Social History  He has no history on file for tobacco use, alcohol use, and drug use.    Family History  No family history on file.     Allergies  Patient has no allergy information on record.    Review of Systems   Unable to perform ROS: Intubated        Physical Exam  Vitals and nursing note reviewed.   Constitutional:       Comments: Critically ill cachectic male in no acute distress   HENT:      Head: Normocephalic and atraumatic.      Mouth/Throat:      Mouth: Mucous membranes are dry.   Eyes:      Comments: Bilateral chemosis.  Pupils pinpoint bilaterally.   Cardiovascular:      Rate and Rhythm: Normal rate and regular rhythm.      Heart sounds: Normal heart sounds.      Comments: PULSES:  L DP: no dopplerable signal  L PT: monophasic signal  R DP: no dopplerable signal  R PT: biphasic signal  Pulmonary:      Breath sounds: Rales (coarse bilaterally) present.      Comments: Equal breath sounds on mechanical ventilation.  Abdominal:      General: There is no distension.      Palpations: Abdomen is soft. There is no mass.      Hernia: No hernia is present.   Musculoskeletal:      Right lower leg: No edema.      Left lower leg: No edema.      Comments: Reddish discoloration of bilateral lower extremities.  Dry gangrene with desquamation of 2nd and 3rd digits of left foot.  BLE are cool to touch.  Vascular ulcer to right shin.  Sluggish capillary refill (5 seconds) BLE.   Neurological:      Comments: Intubated and sedated.  Withdraws from pain all 4 extremities.  Reaching for ETT.          Last Recorded Vitals  Pulse 97, resp. rate 16, height 1.88 m (6' 2\"), weight 48.2 kg (106 lb 4.2 oz), SpO2 93 %.    Assessment/Plan   Principal Problem:    Sepsis due to pneumonia " (CMS/[facility])    ASSESSMENT:   74-year-old male with history of COPD, PAD, CHF, transferred from outside hospital for continued management of community-acquired pneumonia with acute hypoxic respiratory failure, presumed septic shock requiring vasopressor support, and encephalopathy possibly secondary to ischemic changes.    PLAN:    NEURO:  #Obtundation  #Sedated  - Discontinue midazolam gtt  - Continue fentanyl gtt  - Start propofol gtt  - Wean sedation goal RASS 0 to -2    #Concern for right basal ganglia ischemia  #Microvascular disease  - Will need MRI brain once more stable    CV:  #Shock  - Recovered EF on most recent echo (63% 4/2/2024)  - Likely septic due to pneumonia  - Continue norepinephrine gg, wean as tolerated to goal MAP > 65  - Discontinue midodrine  - Discontinue SDS (received 1 dose of hydrocortisone)    #Severe PAD with gangrenous changes  - Continue home pentoxyfylline 400 mg daily  - Obtain CTA C/A/P with BLE run-offs  - Vascular surgery consulted, appreciate recs    #History of HTN  - Hold home losartan 12.5 mg PO daily  - Hold home metoprolol tartrate 25 mg PO daily    #History of CAD and PAD  - Continue home clopidogrel 75 mg PO daily  - Continue home atorvastatin 40 mg PO daily    PULM:  #History of COPD  - Duonebs q6h PRN wheezing    #Community acquired pneumonia  #Acute hypoxic respiratory failure  #Pleural effusion  - S/p 1L L thoracentesis 4/2   - Current vent settings: 450/16/40%/5.0, wean as tolerated  - Continue ceftriaxone and doxycycline  - Repeat CT chest  - Repeat sputum culture    FEN/GI:  #History of GERD  #GI prophylaxis  - Start pantoprazole 40 mg IV daily    #Colitis  - Seen on outpatient CT scan  - Obtain CTA C/A/P with BLE run-offs to evaluate for mesenteric ischemia or other acute process  - Continue flagyl for now, consider discontinuing based on CT results    #Diet  - NPO for now, advance tube feeds pending CT results    RENAL:  - Exchange renteria on arrival to MICU  -  Follow-up repeat urinalysis and culture  - Replace electrolytes per ICU protocol  - Strict I/Os     HEME/ONC:  #Anemia  - Baseline Hgb 8-9  - Hgb 8.1 -> 7.2 at OSH, received 1u pRBC  - Repeat CBC, coags, type and screen on arrival to MICU    ID:  - Azithromycin 500 mg daily (4/1 - 4/4)  - Ceftriaxone (3/31 - )  - Doxycycline (4/4 - )  - Flagyl (3/25 - )    ENDO:  - Follow-up CMP on arrival to MICU    PSYCH:  #History of depression  - Hold home citalopram 20 mg daily    F: bolus PRN  E: replace per ICU protocol  N: NPO for now  A: R IJ CVC (4/1 - ), L radial A-line (4/5 - ), L PIV  Vent: 450/16/40%/5.0  Drips: propofol, fentanyl, norepinephrine   Abx: Ceftriaxone (3/31 - ), Doxycycline (4/4 - ), Flagyl (3/25 - )  Bowel reg: miralax  DVT PPx: SQH  GI PPx: pantoprazole 40 mg IV daily    CODE STATUS: full code   SURROGATE DECISION MAKER: wife           Neo Walker MD

## 2024-04-05 NOTE — CONSULTS
Vancomycin Dosing by Pharmacy  INITIAL CONSULT      Ry Sandhu is a 74 y.o. male who Pharmacy is consulted to dose vancomycin for pneumonia.     Based on the patient's indication and renal status, this patient will be dosed based on a goal vancomycin AUC of 400-600.     Renal function is currently stable.    Estimated Creatinine Clearance: 117.6 mL/min (A) (by C-G formula based on SCr of 0.37 mg/dL (L)).      Results from last 7 days   Lab Units 04/05/24  0138   CREATININE mg/dL 0.37*   BUN mg/dL 4*   WBC AUTO x10*3/uL 17.1*        Visit Vitals  BP 94/57   Pulse 76   Temp 35.9 °C (96.6 °F) (Temporal)   Resp 16       Gram Stain   Date/Time Value Ref Range Status   04/05/2024 01:51 AM No polymorphonuclear leukocytes seen (A)  Preliminary   04/05/2024 01:51 AM (4+) Abundant Gram positive pleomorphic bacilli (A)  Preliminary     Blood Culture   Date/Time Value Ref Range Status   04/05/2024 01:38 AM Loaded on Instrument - Culture in progress  Preliminary        Assessment/Plan     Will order maintenance dose of 1000 mg every 12 hours. This dosing regimen is predicted by [employer] to result in the following pharmacokinetic parameters:   AUC24,ss: 448 mg/L.hr  Probability of AUC24 > 400: 62 %  Ctrough,ss: 11.8 mg/L  Probability of Ctrough,ss > 20: 15 %  Probability of nephrotoxicity (Lodise GONZALO 2009): 7 %  Vancomycin follow-up level will be ordered for 4/6 AM.  Will continue to monitor renal function daily while on vancomycin and order serum creatinine at least every 48 hours if not already ordered.  Will follow for continued vancomycin needs, clinical response, and signs/symptoms of toxicity.       Karine Weems, KristenD

## 2024-04-05 NOTE — H&P
I have reviewed the patient's History and Physical Examination. I have personally seen and evaluated the patient, repeating key portions. There is no significant interval change.     Surgery is indicated: Yes. (Increasing pressor requirement)     Consent reviewed and signed by patient/family: Yes    Operative site verified and marked: Yes bilateral    Homa Donald MD  PGY1 General Surgery  Vascular Surgery [id number]  Available on Secure Chat

## 2024-04-05 NOTE — CONSULTS
Reason For Consult  Bilateral acute on chronic limb ischemia    History Of Present Illness  Ry Sandhu is a 74 y.o. male history of HTN, HLD, COPD, CAD, PAD s/p b/l iliofemoral endarterectomy and bovine patch angioplasty (8/21/23) and L CFA/SFA arterectomy and SFA, YISEL/EIA stent (05/2020) who presents to [facility] MICU as transfer from [facility] for acute hypoxic respiratory failure secondary to community acquired pneumonia, presumed septic shock, and encephalopathy. Vascular Surgery consulted with concern for bilateral acute on chronic limb ischemia.    History is limited as patient transferred from OSH intubated and sedated for respiratory failure 2/2 to pneumonia. Maintained on norepinephrine 0.16. Sedated on fentanyl and propofol. Takes Plavix and Trental.      Past Medical History  He has no past medical history on file.    Surgical History  He has a past surgical history that includes CT angio aorta and bilateral iliofemoral runoff w and or wo IV contrast (8/2/2023) and CT angio coronary art with heartflow if score >30% (8/16/2023).     Social History  HeAlcohol use questions deferred to the physician. Drug use questions deferred to the physician. No history on file for tobacco use.    Family History  No family history on file.     Allergies  Patient has no known allergies.    Review of Systems  Limited due to intubation/sedation     Physical Exam  General: intubated, sedated  Cardiac: RRR per monitor; on norepi 0.16  Pulm: intubated, sedated; FiO2 40%, PEEP 5  GI: soft, nondistended  Extremities: bilateral groin incisions well healed; palp b/l femoral pulse; monophasic b/l PT; no DP appreciated  Skin: mottled, dry gangrene to distal BLE, left worse than right multiple areas of tissue loss over dorsal aspect of medial three digits of LLE  Neuro: withdrawals to pain; does not follow commands     Last Recorded Vitals  Blood pressure 94/57, pulse 76, temperature 35.7 °C (96.3 °F), temperature source  "Temporal, resp. rate 16, height 1.88 m (6' 2\"), weight 48.2 kg (106 lb 4.2 oz), SpO2 97 %.    Vent Mode: Volume control/assist control  FiO2 (%):  [40 %-80 %] 40 %  S RR:  [16] 16  S VT:  [400 mL-450 mL] 400 mL  PEEP/CPAP (cm H2O):  [5 cm H20] 5 cm H20  MAP (cm H2O):  [9] 9     Relevant Results  Scheduled medications  atorvastatin, 40 mg, oral, Daily  cefTRIAXone, 1 g, intravenous, q24h  clopidogrel, 75 mg, oral, Daily  doxycycline, 100 mg, intravenous, q12h  enoxaparin, 40 mg, subcutaneous, q24h  metroNIDAZOLE, 500 mg, intravenous, q8h  oxygen, , inhalation, Continuous - Inhalation  pantoprazole, 40 mg, intravenous, Daily before breakfast  pentoxifylline, 400 mg, oral, Daily  polyethylene glycol, 17 g, oral, Daily  vasopressin, , ,       Continuous medications  fentaNYL,  mcg/hr, Last Rate: 50 mcg/hr (04/05/24 0521)  norepinephrine, 0.01-1 mcg/kg/min, Last Rate: 0.14 mcg/kg/min (04/05/24 0552)  propofol, 5-20 mcg/kg/min, Last Rate: 10 mcg/kg/min (04/05/24 0149)      PRN medications  PRN medications: ipratropium-albuteroL, vasopressin    Results for orders placed or performed during the hospital encounter of 04/04/24 (from the past 24 hour(s))   Magnesium   Result Value Ref Range    Magnesium 1.70 1.60 - 2.40 mg/dL   Phosphorus   Result Value Ref Range    Phosphorus 3.2 2.5 - 4.9 mg/dL   B-Type Natriuretic Peptide   Result Value Ref Range     (H) 0 - 99 pg/mL   Coagulation Screen   Result Value Ref Range    Protime 20.7 (H) 9.8 - 12.8 seconds    INR 1.8 (H) 0.9 - 1.1    aPTT 46 (H) 27 - 38 seconds   CBC and Auto Differential   Result Value Ref Range    WBC 17.1 (H) 4.4 - 11.3 x10*3/uL    nRBC 0.0 0.0 - 0.0 /100 WBCs    RBC 3.10 (L) 4.50 - 5.90 x10*6/uL    Hemoglobin 10.0 (L) 13.5 - 17.5 g/dL    Hematocrit 28.2 (L) 41.0 - 52.0 %    MCV 91 80 - 100 fL    MCH 32.3 26.0 - 34.0 pg    MCHC 35.5 32.0 - 36.0 g/dL    RDW 17.3 (H) 11.5 - 14.5 %    Platelets 139 (L) 150 - 450 x10*3/uL    Neutrophils % 93.6 40.0 - " 80.0 %    Immature Granulocytes %, Automated 0.7 0.0 - 0.9 %    Lymphocytes % 1.5 13.0 - 44.0 %    Monocytes % 4.0 2.0 - 10.0 %    Eosinophils % 0.0 0.0 - 6.0 %    Basophils % 0.2 0.0 - 2.0 %    Neutrophils Absolute 15.99 (H) 1.60 - 5.50 x10*3/uL    Immature Granulocytes Absolute, Automated 0.12 0.00 - 0.50 x10*3/uL    Lymphocytes Absolute 0.25 (L) 0.80 - 3.00 x10*3/uL    Monocytes Absolute 0.68 0.05 - 0.80 x10*3/uL    Eosinophils Absolute 0.00 0.00 - 0.40 x10*3/uL    Basophils Absolute 0.03 0.00 - 0.10 x10*3/uL   Comprehensive metabolic panel   Result Value Ref Range    Glucose 141 (H) 74 - 99 mg/dL    Sodium 151 (H) 136 - 145 mmol/L    Potassium 3.9 3.5 - 5.3 mmol/L    Chloride 116 (H) 98 - 107 mmol/L    Bicarbonate 22 21 - 32 mmol/L    Anion Gap 17 10 - 20 mmol/L    Urea Nitrogen 4 (L) 6 - 23 mg/dL    Creatinine 0.37 (L) 0.50 - 1.30 mg/dL    eGFR >90 >60 mL/min/1.73m*2    Calcium 7.1 (L) 8.6 - 10.6 mg/dL    Albumin 1.6 (L) 3.4 - 5.0 g/dL    Alkaline Phosphatase 239 (H) 33 - 136 U/L    Total Protein 3.6 (L) 6.4 - 8.2 g/dL     (H) 9 - 39 U/L    Bilirubin, Total 1.1 0.0 - 1.2 mg/dL     (H) 10 - 52 U/L   Blood Culture    Specimen: Peripheral Venipuncture; Blood culture   Result Value Ref Range    Blood Culture Loaded on Instrument - Culture in progress    Blood Gas Arterial Full Panel   Result Value Ref Range    POCT pH, Arterial 7.51 (H) 7.38 - 7.42 pH    POCT pCO2, Arterial 36 (L) 38 - 42 mm Hg    POCT pO2, Arterial 99 (H) 85 - 95 mm Hg    POCT SO2, Arterial 99 94 - 100 %    POCT Oxy Hemoglobin, Arterial 97.3 94.0 - 98.0 %    POCT Hematocrit Calculated, Arterial 31.0 (L) 41.0 - 52.0 %    POCT Sodium, Arterial 144 136 - 145 mmol/L    POCT Potassium, Arterial 3.4 (L) 3.5 - 5.3 mmol/L    POCT Chloride, Arterial 113 (H) 98 - 107 mmol/L    POCT Ionized Calcium, Arterial 1.13 1.10 - 1.33 mmol/L    POCT Glucose, Arterial 130 (H) 74 - 99 mg/dL    POCT Lactate, Arterial 1.3 0.4 - 2.0 mmol/L    POCT Base  Excess, Arterial 5.4 (H) -2.0 - 3.0 mmol/L    POCT HCO3 Calculated, Arterial 28.7 (H) 22.0 - 26.0 mmol/L    POCT Hemoglobin, Arterial 10.3 (L) 13.5 - 17.5 g/dL    POCT Anion Gap, Arterial 6 (L) 10 - 25 mmo/L    Patient Temperature 37.0 degrees Celsius    FiO2 80 %   Type And Screen   Result Value Ref Range    ABO TYPE O     Rh TYPE POS     ANTIBODY SCREEN NEG    Urinalysis with Reflex Culture and Microscopic   Result Value Ref Range    Color, Urine Orange (N) Light-Yellow, Yellow, Dark-Yellow    Appearance, Urine Turbid (N) Clear    Specific Gravity, Urine 1.024 1.005 - 1.035    pH, Urine 6.0 5.0, 5.5, 6.0, 6.5, 7.0, 7.5, 8.0    Protein, Urine 50 (1+) (A) NEGATIVE, 10 (TRACE), 20 (TRACE) mg/dL    Glucose, Urine Normal Normal mg/dL    Blood, Urine 0.5 (2+) (A) NEGATIVE    Ketones, Urine NEGATIVE NEGATIVE mg/dL    Bilirubin, Urine NEGATIVE NEGATIVE    Urobilinogen, Urine Normal Normal mg/dL    Nitrite, Urine NEGATIVE NEGATIVE    Leukocyte Esterase, Urine 500 Jose/µL (A) NEGATIVE   Microscopic Only, Urine   Result Value Ref Range    WBC, Urine >50 (A) 1-5, NONE /HPF    RBC, Urine >20 (A) NONE, 1-2, 3-5 /HPF    Squamous Epithelial Cells, Urine 10-25 (FEW) Reference range not established. /HPF    Mucus, Urine 3+ Reference range not established. /LPF    Hyaline Casts, Urine 3+ (A) NONE /LPF   POCT GLUCOSE   Result Value Ref Range    POCT Glucose 150 (H) 74 - 99 mg/dL     XR chest 1 view    Result Date: 4/5/2024  STUDY: XR CHEST 1 VIEW; XR ABDOMEN 1 VIEW;  4/5/2024 1:29 am   INDICATION: Signs/Symptoms:ETT placement s/p transport, pneumonia, pleural effusions; Signs/Symptoms:NGT placement.   COMPARISON: Chest x-ray 08/21/2023.   ACCESSION NUMBER(S): [id number]; [id number]   ORDERING CLINICIAN: ANGELA SALDAÑA   FINDINGS: AP radiograph of the chest was provided.   Endotracheal tube tip is approximately 5.2 cm above the westley. An enteric tube courses below the diaphragm with the tip projecting over stomach. Right IJ approach  central venous catheter tip projects over lower SVC.   CARDIOMEDIASTINAL SILHOUETTE: Cardiomediastinal silhouette is normal in size and configuration.   LUNGS: There are airspace opacities in the bilateral perihilar regions extending to the bilateral lower lobes, left-greater-than-right. There is obscuration of the left costophrenic angle. No evidence of pneumothorax.   ABDOMEN: Nonobstructive bowel gas pattern.   BONES: No acute osseous changes.       1.  Airspace opacities in the bilateral perihilar regions extending to the left-greater-than-right lower lobes are concerning for multifocal pneumonia versus aspiration pneumonitis. Obscuration of the left costophrenic angle and left retrocardiac lung may represent a component of pleural effusion. 2. Medical devices as above.   I personally reviewed the images/study and I agree with the findings as stated by resident physician Dr. Evan Lai . This study was interpreted at [place], [place].   MACRO: None     Dictation workstation:   [id number]    XR abdomen 1 view    Result Date: 4/5/2024  STUDY: XR CHEST 1 VIEW; XR ABDOMEN 1 VIEW;  4/5/2024 1:29 am   INDICATION: Signs/Symptoms:ETT placement s/p transport, pneumonia, pleural effusions; Signs/Symptoms:NGT placement.   COMPARISON: Chest x-ray 08/21/2023.   ACCESSION NUMBER(S): [id number]; [id number]   ORDERING CLINICIAN: ANGELA SALDAÑA   FINDINGS: AP radiograph of the chest was provided.   Endotracheal tube tip is approximately 5.2 cm above the westley. An enteric tube courses below the diaphragm with the tip projecting over stomach. Right IJ approach central venous catheter tip projects over lower SVC.   CARDIOMEDIASTINAL SILHOUETTE: Cardiomediastinal silhouette is normal in size and configuration.   LUNGS: There are airspace opacities in the bilateral perihilar regions extending to the bilateral lower lobes, left-greater-than-right. There is obscuration of the  left costophrenic angle. No evidence of pneumothorax.   ABDOMEN: Nonobstructive bowel gas pattern.   BONES: No acute osseous changes.       1.  Airspace opacities in the bilateral perihilar regions extending to the left-greater-than-right lower lobes are concerning for multifocal pneumonia versus aspiration pneumonitis. Obscuration of the left costophrenic angle and left retrocardiac lung may represent a component of pleural effusion. 2. Medical devices as above.   I personally reviewed the images/study and I agree with the findings as stated by resident physician Dr. Evan Lai . This study was interpreted at [facility], [place], [place].   MACRO: None     Dictation workstation:   [id number]        Assessment/Plan   Ry Sandhu is a 74 y.o. male history of HTN, HLD, COPD, CAD, PAD s/p b/l iliofemoral endarterectomy and bovine patch angioplasty (8/21/23) and L CFA/SFA arterectomy and SFA, YISEL/EIA stent (05/2020) who presents to [facility] MICU as transfer from [facility] ICU for acute hypoxic respiratory failure secondary to community acquired pneumonia, presumed septic shock, and encephalopathy. Vascular Surgery consulted with concern for bilateral acute on chronic limb ischemia.    Recommendations  - Cont. MICU supportive care.   - BLE with non-re constructible disease with significant tissue loss.     Discussed with Dr. Nieves.    Lala Lassiter MD  Vascular Surgery  [id number]

## 2024-04-05 NOTE — PROCEDURES
Arterial Line Insertion    Date/Time: 4/5/2024 1:51 AM    Performed by: Neo Walker MD  Authorized by: Bertha Ahmadi MD    Consent:     Consent obtained:  Emergent situation  Pre-procedure details:     Skin preparation:  Chlorhexidine    Preparation: Patient was prepped and draped in sterile fashion    Sedation:     Sedation type: Intubated on moderate sedation.  Anesthesia:     Anesthesia method:  Local infiltration    Local anesthetic:  Lidocaine 1% WITH epi  Procedure details:     Location:  L radial    Needle gauge:  20 G    Placement technique:  Seldinger and ultrasound guided    Number of attempts:  1    Transducer: waveform confirmed    Post-procedure details:     Post-procedure:  Biopatch applied, sterile dressing applied and sutured    CMS:  Unable to assess    Procedure completion:  Tolerated well, no immediate complications

## 2024-04-05 NOTE — PROGRESS NOTES
Ry Sandhu is a 74 y.o. male on day 1 of admission presenting with Pneumonia.    Subjective   Patient remains intubated and sedated this morning.  On conversation with wife, she states that patient used to have heavy alcohol history however now drinks only 1 drink per night.  Wife also states that  may be drinking more than he she sees.    Sodium at outside hospital was 149, potassium was 3.2, AST was 473, ALT was 155, BNP was 1240, white blood cells 13.5, hemoglobin 9.5, platelets 123  RSV negative, COVID negative, blood blood cultures negative for 3 days, strep pneumo Legionella urine antigen were negative, C. difficile negative, stool pathogen panel negative  Thoracentesis studies showed no organisms after 1 day, serum total protein on 3.8, pleural fluid protein was 0.7, serum LDH was 722, fluid LDH was 183, thoracentesis studies lights criteria positive    Home meds   metoprolol to tartrate 25 mg daily  Potassium chloride 10 mill oral daily  Iron 325 mg oral twice daily  Lipitor 40 mg daily  Vitamin C 500 mg daily  Losartan 12.5 oral daily  Clopidogrel 75 mg oral daily  Zinc sulfate 220 mg oral daily  Vitamin D 1000 units daily  Pantoprazole 40 daily  Citalopram 20 mg daily  Folic acid 1 mg daily  DuoNebs 3 mL inhalation every 4 as needed  Pentoxifylline 400 mg oral daily    Objective     Physical Exam  Constitutional:       Comments: Remains sedated and intubated   HENT:      Head: Normocephalic.      Nose: Nose normal.   Cardiovascular:      Rate and Rhythm: Normal rate and regular rhythm.   Pulmonary:      Comments: Mechanically ventilated, mechanical breath sounds auscultated.  Abdominal:      General: Abdomen is flat. Bowel sounds are normal.      Palpations: Abdomen is soft.   Musculoskeletal:      Cervical back: Normal range of motion.      Comments: Lower extremities are dusky, poorly palpable posterior tibial pulses bilaterally, no palpable dorsalis pedis pulses   Neurological:       "Comments: sedated         Last Recorded Vitals  Blood pressure 94/57, pulse 76, temperature 35.9 °C (96.6 °F), temperature source Temporal, resp. rate 17, height 1.88 m (6' 2\"), weight 48.2 kg (106 lb 4.2 oz), SpO2 97 %.  Intake/Output last 3 Shifts:  I/O last 3 completed shifts:  In: 469 (9.7 mL/kg) [I.V.:119 (2.5 mL/kg); NG/GT:100; IV Piggyback:250]  Out: 455 (9.4 mL/kg) [Urine:455 (0.3 mL/kg/hr)]  Weight: 48.2 kg     Relevant Results  Scheduled medications  [MAR Hold] atorvastatin, 40 mg, oral, Daily  [MAR Hold] clopidogrel, 75 mg, oral, Daily  [MAR Hold] enoxaparin, 40 mg, subcutaneous, q24h  [MAR Hold] folic acid, 1 mg, oral, Daily  [MAR Hold] pantoprazole, 40 mg, intravenous, Daily before breakfast  [MAR Hold] pentoxifylline, 400 mg, oral, Daily  [MAR Hold] piperacillin-tazobactam, 4.5 g, intravenous, q6h  [MAR Hold] polyethylene glycol, 17 g, oral, Daily  [MAR Hold] potassium chloride, 40 mEq, oral, Once  [MAR Hold] thiamine, 100 mg, intravenous, Daily  [MAR Hold] vancomycin, 1 g, intravenous, q12h  vasopressin, , ,       Continuous medications  fentaNYL,  mcg/hr, Last Rate: 50 mcg/hr (04/05/24 0521)  norepinephrine, 0.01-1 mcg/kg/min, Last Rate: 0.14 mcg/kg/min (04/05/24 0655)  oxygen,   propofol, 5-20 mcg/kg/min, Last Rate: 10 mcg/kg/min (04/05/24 0149)  vasopressin, 0.03 Units/min      PRN medications  PRN medications: [MAR Hold] ipratropium-albuteroL, oxygen, [MAR Hold] vancomycin, vasopressin    Results for orders placed or performed during the hospital encounter of 04/04/24 (from the past 24 hour(s))   Magnesium   Result Value Ref Range    Magnesium 1.70 1.60 - 2.40 mg/dL   Phosphorus   Result Value Ref Range    Phosphorus 3.2 2.5 - 4.9 mg/dL   B-Type Natriuretic Peptide   Result Value Ref Range     (H) 0 - 99 pg/mL   Coagulation Screen   Result Value Ref Range    Protime 20.7 (H) 9.8 - 12.8 seconds    INR 1.8 (H) 0.9 - 1.1    aPTT 46 (H) 27 - 38 seconds   CBC and Auto Differential   Result " Value Ref Range    WBC 17.1 (H) 4.4 - 11.3 x10*3/uL    nRBC 0.0 0.0 - 0.0 /100 WBCs    RBC 3.10 (L) 4.50 - 5.90 x10*6/uL    Hemoglobin 10.0 (L) 13.5 - 17.5 g/dL    Hematocrit 28.2 (L) 41.0 - 52.0 %    MCV 91 80 - 100 fL    MCH 32.3 26.0 - 34.0 pg    MCHC 35.5 32.0 - 36.0 g/dL    RDW 17.3 (H) 11.5 - 14.5 %    Platelets 139 (L) 150 - 450 x10*3/uL    Neutrophils % 93.6 40.0 - 80.0 %    Immature Granulocytes %, Automated 0.7 0.0 - 0.9 %    Lymphocytes % 1.5 13.0 - 44.0 %    Monocytes % 4.0 2.0 - 10.0 %    Eosinophils % 0.0 0.0 - 6.0 %    Basophils % 0.2 0.0 - 2.0 %    Neutrophils Absolute 15.99 (H) 1.60 - 5.50 x10*3/uL    Immature Granulocytes Absolute, Automated 0.12 0.00 - 0.50 x10*3/uL    Lymphocytes Absolute 0.25 (L) 0.80 - 3.00 x10*3/uL    Monocytes Absolute 0.68 0.05 - 0.80 x10*3/uL    Eosinophils Absolute 0.00 0.00 - 0.40 x10*3/uL    Basophils Absolute 0.03 0.00 - 0.10 x10*3/uL   Comprehensive metabolic panel   Result Value Ref Range    Glucose 141 (H) 74 - 99 mg/dL    Sodium 151 (H) 136 - 145 mmol/L    Potassium 3.9 3.5 - 5.3 mmol/L    Chloride 116 (H) 98 - 107 mmol/L    Bicarbonate 22 21 - 32 mmol/L    Anion Gap 17 10 - 20 mmol/L    Urea Nitrogen 4 (L) 6 - 23 mg/dL    Creatinine 0.37 (L) 0.50 - 1.30 mg/dL    eGFR >90 >60 mL/min/1.73m*2    Calcium 7.1 (L) 8.6 - 10.6 mg/dL    Albumin 1.6 (L) 3.4 - 5.0 g/dL    Alkaline Phosphatase 239 (H) 33 - 136 U/L    Total Protein 3.6 (L) 6.4 - 8.2 g/dL     (H) 9 - 39 U/L    Bilirubin, Total 1.1 0.0 - 1.2 mg/dL     (H) 10 - 52 U/L   Blood Culture    Specimen: Peripheral Venipuncture; Blood culture   Result Value Ref Range    Blood Culture Loaded on Instrument - Culture in progress    Blood Gas Arterial Full Panel   Result Value Ref Range    POCT pH, Arterial 7.51 (H) 7.38 - 7.42 pH    POCT pCO2, Arterial 36 (L) 38 - 42 mm Hg    POCT pO2, Arterial 99 (H) 85 - 95 mm Hg    POCT SO2, Arterial 99 94 - 100 %    POCT Oxy Hemoglobin, Arterial 97.3 94.0 - 98.0 %    POCT  Hematocrit Calculated, Arterial 31.0 (L) 41.0 - 52.0 %    POCT Sodium, Arterial 144 136 - 145 mmol/L    POCT Potassium, Arterial 3.4 (L) 3.5 - 5.3 mmol/L    POCT Chloride, Arterial 113 (H) 98 - 107 mmol/L    POCT Ionized Calcium, Arterial 1.13 1.10 - 1.33 mmol/L    POCT Glucose, Arterial 130 (H) 74 - 99 mg/dL    POCT Lactate, Arterial 1.3 0.4 - 2.0 mmol/L    POCT Base Excess, Arterial 5.4 (H) -2.0 - 3.0 mmol/L    POCT HCO3 Calculated, Arterial 28.7 (H) 22.0 - 26.0 mmol/L    POCT Hemoglobin, Arterial 10.3 (L) 13.5 - 17.5 g/dL    POCT Anion Gap, Arterial 6 (L) 10 - 25 mmo/L    Patient Temperature 37.0 degrees Celsius    FiO2 80 %   Respiratory Culture/Smear    Specimen: Tracheal Aspirate; Fluid   Result Value Ref Range    Gram Stain No polymorphonuclear leukocytes seen (A)     Gram Stain (4+) Abundant Gram positive pleomorphic bacilli (A)    Type And Screen   Result Value Ref Range    ABO TYPE O     Rh TYPE POS     ANTIBODY SCREEN NEG    Urinalysis with Reflex Culture and Microscopic   Result Value Ref Range    Color, Urine Orange (N) Light-Yellow, Yellow, Dark-Yellow    Appearance, Urine Turbid (N) Clear    Specific Gravity, Urine 1.024 1.005 - 1.035    pH, Urine 6.0 5.0, 5.5, 6.0, 6.5, 7.0, 7.5, 8.0    Protein, Urine 50 (1+) (A) NEGATIVE, 10 (TRACE), 20 (TRACE) mg/dL    Glucose, Urine Normal Normal mg/dL    Blood, Urine 0.5 (2+) (A) NEGATIVE    Ketones, Urine NEGATIVE NEGATIVE mg/dL    Bilirubin, Urine NEGATIVE NEGATIVE    Urobilinogen, Urine Normal Normal mg/dL    Nitrite, Urine NEGATIVE NEGATIVE    Leukocyte Esterase, Urine 500 Jose/µL (A) NEGATIVE   Microscopic Only, Urine   Result Value Ref Range    WBC, Urine >50 (A) 1-5, NONE /HPF    RBC, Urine >20 (A) NONE, 1-2, 3-5 /HPF    Squamous Epithelial Cells, Urine 10-25 (FEW) Reference range not established. /HPF    Mucus, Urine 3+ Reference range not established. /LPF    Hyaline Casts, Urine 3+ (A) NONE /LPF   POCT GLUCOSE   Result Value Ref Range    POCT Glucose 150  (H) 74 - 99 mg/dL   POCT GLUCOSE   Result Value Ref Range    POCT Glucose 159 (H) 74 - 99 mg/dL   Hepatic Function Panel   Result Value Ref Range    Albumin 1.6 (L) 3.4 - 5.0 g/dL    Bilirubin, Total 1.0 0.0 - 1.2 mg/dL    Bilirubin, Direct 0.8 (H) 0.0 - 0.3 mg/dL    Alkaline Phosphatase 251 (H) 33 - 136 U/L     (H) 10 - 52 U/L     (H) 9 - 39 U/L    Total Protein 3.4 (L) 6.4 - 8.2 g/dL   Phosphorus   Result Value Ref Range    Phosphorus 3.1 2.5 - 4.9 mg/dL   Basic Metabolic Panel   Result Value Ref Range    Glucose 154 (H) 74 - 99 mg/dL    Sodium 149 (H) 136 - 145 mmol/L    Potassium 3.4 (L) 3.5 - 5.3 mmol/L    Chloride 114 (H) 98 - 107 mmol/L    Bicarbonate 29 21 - 32 mmol/L    Anion Gap 9 (L) 10 - 20 mmol/L    Urea Nitrogen 5 (L) 6 - 23 mg/dL    Creatinine 0.40 (L) 0.50 - 1.30 mg/dL    eGFR >90 >60 mL/min/1.73m*2    Calcium 6.9 (L) 8.6 - 10.6 mg/dL   Blood Gas Arterial Full Panel   Result Value Ref Range    POCT pH, Arterial 7.45 (H) 7.38 - 7.42 pH    POCT pCO2, Arterial 43 (H) 38 - 42 mm Hg    POCT pO2, Arterial 81 (L) 85 - 95 mm Hg    POCT SO2, Arterial 98 94 - 100 %    POCT Oxy Hemoglobin, Arterial 95.1 94.0 - 98.0 %    POCT Hematocrit Calculated, Arterial 35.0 (L) 41.0 - 52.0 %    POCT Sodium, Arterial 141 136 - 145 mmol/L    POCT Potassium, Arterial 3.4 (L) 3.5 - 5.3 mmol/L    POCT Chloride, Arterial 112 (H) 98 - 107 mmol/L    POCT Ionized Calcium, Arterial 1.10 1.10 - 1.33 mmol/L    POCT Glucose, Arterial 163 (H) 74 - 99 mg/dL    POCT Lactate, Arterial 1.2 0.4 - 2.0 mmol/L    POCT Base Excess, Arterial 5.3 (H) -2.0 - 3.0 mmol/L    POCT HCO3 Calculated, Arterial 29.9 (H) 22.0 - 26.0 mmol/L    POCT Hemoglobin, Arterial 11.5 (L) 13.5 - 17.5 g/dL    POCT Anion Gap, Arterial 3 (L) 10 - 25 mmo/L    Patient Temperature 37.0 degrees Celsius    FiO2 40 %   Prepare RBC: 2 Units   Result Value Ref Range    PRODUCT CODE G4724N25     Unit Number [id number]     Unit ABO O     Unit RH POS     XM INTEP  COMP     Dispense Status XM     Blood Expiration Date April 29, 2024 23:59 EDT     PRODUCT BLOOD TYPE 5100     UNIT VOLUME 350     PRODUCT CODE T5971S69     Unit Number [id number]     Unit ABO O     Unit RH POS     XM INTEP COMP     Dispense Status XM     Blood Expiration Date April 26, 2024 23:59 EDT     PRODUCT BLOOD TYPE 5100     UNIT VOLUME 285      XR chest 1 view    Result Date: 4/5/2024  STUDY: XR CHEST 1 VIEW; XR ABDOMEN 1 VIEW;  4/5/2024 1:29 am   INDICATION: Signs/Symptoms:ETT placement s/p transport, pneumonia, pleural effusions; Signs/Symptoms:NGT placement.   COMPARISON: Chest x-ray 08/21/2023.   ACCESSION NUMBER(S): [id number]; [id number]   ORDERING CLINICIAN: ANGELA SALDAÑA   FINDINGS: AP radiograph of the chest was provided.   Endotracheal tube tip is approximately 5.2 cm above the westley. An enteric tube courses below the diaphragm with the tip projecting over stomach. Right IJ approach central venous catheter tip projects over lower SVC.   CARDIOMEDIASTINAL SILHOUETTE: Cardiomediastinal silhouette is normal in size and configuration.   LUNGS: There are airspace opacities in the bilateral perihilar regions extending to the bilateral lower lobes, left-greater-than-right. There is obscuration of the left costophrenic angle. No evidence of pneumothorax.   ABDOMEN: Nonobstructive bowel gas pattern.   BONES: No acute osseous changes.       1.  Airspace opacities in the bilateral perihilar regions extending to the left-greater-than-right lower lobes are concerning for multifocal pneumonia versus aspiration pneumonitis. Obscuration of the left costophrenic angle and left retrocardiac lung may represent a component of pleural effusion. 2. Medical devices as above.   I personally reviewed the images/study and I agree with the findings as stated by resident physician Dr. Evan Lai . This study was interpreted at [facility], [place], [place].   MACRO: None      Dictation workstation:   [id number]    XR abdomen 1 view    Result Date: 4/5/2024  STUDY: XR CHEST 1 VIEW; XR ABDOMEN 1 VIEW;  4/5/2024 1:29 am   INDICATION: Signs/Symptoms:ETT placement s/p transport, pneumonia, pleural effusions; Signs/Symptoms:NGT placement.   COMPARISON: Chest x-ray 08/21/2023.   ACCESSION NUMBER(S): [id number]; [id number]   ORDERING CLINICIAN: ANGELA SALDAÑA   FINDINGS: AP radiograph of the chest was provided.   Endotracheal tube tip is approximately 5.2 cm above the westley. An enteric tube courses below the diaphragm with the tip projecting over stomach. Right IJ approach central venous catheter tip projects over lower SVC.   CARDIOMEDIASTINAL SILHOUETTE: Cardiomediastinal silhouette is normal in size and configuration.   LUNGS: There are airspace opacities in the bilateral perihilar regions extending to the bilateral lower lobes, left-greater-than-right. There is obscuration of the left costophrenic angle. No evidence of pneumothorax.   ABDOMEN: Nonobstructive bowel gas pattern.   BONES: No acute osseous changes.       1.  Airspace opacities in the bilateral perihilar regions extending to the left-greater-than-right lower lobes are concerning for multifocal pneumonia versus aspiration pneumonitis. Obscuration of the left costophrenic angle and left retrocardiac lung may represent a component of pleural effusion. 2. Medical devices as above.   I personally reviewed the images/study and I agree with the findings as stated by resident physician Dr. Evan Lai . This study was interpreted at [facility], [place], [place].   MACRO: None     Dictation workstation:   [id number]              Assessment/Plan   Principal Problem:    Pneumonia  Active Problems:    CAD (coronary artery disease)    CHF (congestive heart failure) (CMS/HCC)    HTN (hypertension)    Hyperlipidemia    Chronic obstructive pulmonary disease (CMS/HCC)    Peripheral vascular disease  (CMS/[facility])    Gastroesophageal reflux disease    Anemia    This is a 74-year-old male with past medical history of COPD, CAD, PAD who is presenting to the MICU from outside hospital for acute hypoxic respiratory failure secondary to acute community-acquired pneumonia and septic shock secondary to community-acquired pneumonia versus colitis now requiring bilateral below the knee amputations due to severe peripheral arterial disease and increased pressor requirements.    Updates 4/5  -Emergently going to OR for below-knee amputations bilaterally    Neurology  #Sedated  #Altered mental status  Patient noted to be altered at outside hospital, EEG showing borderline slowing consistent with toxic metabolic encephalopathy  patient got IV thiamine  Psychiatry was consulted at outside hospital for altered mental status and refusing medications, was endorsing that wife was conspiring against him and became agitated towards wife will try to get out of bed    #C/F right basal ganglia ischemia  CT head at outside hospital showing moderate distribution of white matter hypodensities consistent with chronic small vessel ischemic disease with findings suspicious for early ischemic changes in the right basal ganglia, MRI recommended to clinically correlate  -Planning for CT head after procedure      Cardiovascular  #Undifferentiated shock, likely septic  TTE on 4/1/2024 showing EF 63% with mild mitral valve regurgitation mild tricuspid regurgitation otherwise within normal limits  -Maintain maps greater than 65 with pressor support  -Stress dose steroids    #CAD  #PAD  Status post left SFA stent with Dr. Alvares at  in August 2023  This morning patient developed increased pressor requirements, due to unsalvageable wounds, vascular surgery believes that emergent BKA's are necessary  -Vascular surgery consulted, patient emergently going to the OR for bilateral below-knee amputation  -Continue Lipitor  -Continue clopidogrel  -Continue  pentoxifylline    #HTN  - Holding antihypertensives in setting of requiring pressors      Pulmonary   #COPD  Current everyday tobacco smoker  No pfts on file  -Continue supplemental oxygen    #CAP  #AHRF  #Pleural effusion  CT chest at outside hospital showing large bilateral effusions with dependent atelectasis, multiple pulmonary disease  S/p thora on 4/2, lights criteria positive but culture negative  -Will stop CTX (3/31-4/5), Doxy (4/4-4/5)  -Will start vanc, zosyn (4-5*)  -Repeat ct chest  -Repeat sputum cx      Gastrointestinal   #Elevated lfts  #Fatty infiltration of liver on CT abdomen  #Colitis  #GERD  #Concern for GI bleed  CTAP on 3/25 showing diffuse thickening of large intestines secondary to diffuse colitis, moderate fatty infiltrated liver  Drinks 1 to 2 glasses of alcohol every day which includes vodka beer and wine, what ever he can get  Outside hospital records indicating that patient endorsing dark tarry stools, patient with esophageal stricture on EGD done in January 2024, superior mesenteric artery stenosis  -Continue PPI  -Will discontinue Flagyl (3/25-4/5)  -Continue Vanco and Zosyn (4/5-*)  -Follow-up right upper quadrant ultrasound      Renal   #Hypernatremia  -Start free water flushes      Heme/onc  #Chronic Normocytic Anemia  BL 8-9  Had receieved one unit prbc for hgb 7.2  -Team active type and screen  -Transfuse for hemoglobin less than 7    #Coagulopathy  INR 1.8, was 1.0 in August  Likely due to  Liver injury in the setting of heavy alcohol history versus sepsis  -Continue to monitor      Endocrine  #JALEEL      MSK/Rheum  #No active issues      Infectious Disease  #c/f septic shock  Azithromycin 500 mg daily (4/1 - 4/4)  Ceftriaxone (3/31 - 4/5)  Doxycycline (4/4 - 4/5)  Flagyl (3/25 - 4/5)  -Continue Vanco and Zosyn (4/5-*)  -Will remove central line as it was placed at outside hospital, will replace central line sterilely    Patient recently received an antibiotic (last 12 hours)        Date/Time Action Medication Dose Rate    04/05/24 0522 New Bag    cefTRIAXone (Rocephin) IVPB 1 g 1 g 100 mL/hr    04/05/24 0421 New Bag    doxycycline (Vibramycin) in dextrose 5 % in water (D5W) 100 mL  mg 100 mg 100 mL/hr    04/05/24 0227 New Bag    metroNIDAZOLE (Flagyl) 500 mg in NaCl (iso-os) 100 mL 500 mg            F: prn  E: prn  N: npo  A: RIJ, A line, PIV  Drains: Tucker, NG  Bowel regimen: Miralax   Last bm: 4/5  Drips: Fent, prop, NE  DVT ppx: lovenox  GI ppx: PPI    Code Status: Full    LAYO RIVERA (Spouse)  [phone] (Mobile)         Debbie Emanuel MD

## 2024-04-05 NOTE — PROGRESS NOTES
SW met with patient and wife at bedside. See flowsheet for assessment details. Wife states they live at home with her 97 year old mother. Wife confirmed patient has a cane at home and reports patient has some difficulty with steps inside the home. Wife states patient was going to outpatient therapy about a week prior to this admission. Wife reports they are moving to a one story home in a month. When prompted, wife did not report SW needs. Pending PT/OT evals. SW will continue to follow.     NUBIA Castro

## 2024-04-05 NOTE — SIGNIFICANT EVENT
Vascular Surgery Plan of Care Update  74M significant PVD history s/p revascularization in August 2023 with Dr. Alvares. Admitted to OSH with colitis, progressing to sepsis and development of bilateral pneumonia resulting in septic shock and respiratory failure requiring intubation.     Secondary to critical illness and septic shock, patient has developed b/l ischemic necrosis of feet, predisposed due to known occlusive disease. At this time, primary septic source is likely pneumonia, however necrotic b/l LE represent a potential nidus for further infection and are likely going to progress as his sepsis is worsening and pressor requirements are increasing. In order to limit the potential damage that his LE present from ischemia, emergent b/l guillotine amputations are required.    Long discussion with wife at bedside regarding this. Elected to move forward with amputations with knowledge that patient will eventually require formal bilateral above-knee amputations if he were to recovery.    Aly Veras MD  PGY5 - Integrated Vascular Surgery

## 2024-04-05 NOTE — SIGNIFICANT EVENT
"S: Patient seen at bedside s/p b/l guillotine amputations in setting of sepsis secondary to pneumonia with ischemic necrosis of b/l LE for post-operative evaluation. Patient remains intubated, sedated on pressors     O:  BP 94/57   Pulse 78   Temp 36.1 °C (97 °F) (Temporal)   Resp 16   Ht 1.88 m (6' 2\")   Wt 54.2 kg (119 lb 7.8 oz)   SpO2 96%   BMI 15.34 kg/m²      - General: intubated, sedated  - CV: Nontachycardic, on levo 0.1, vaso 0.03   - Resp: intubated, PEEP 5, FiO2 40  - Abd: soft, ND  - Ext: Stump dressings in place with no strikethrough     A/P: Patient is doing appropriate post-operatively. No changes in management at this time.   Vascular surgery will change dressings to LE daily.       Homa Donald MD  PGY1 General Surgery  Vascular Surgery [id number]   "

## 2024-04-05 NOTE — BRIEF OP NOTE
Date: 2024  OR Location: [facility] OR    Name: Ry Sandhu : 1950, Age: 74 y.o., MRN: [id number], Sex: male    Diagnosis  Pre-op Diagnosis     * Sepsis due to pneumonia (CMS/HCC) [J18.9, A41.9] Post-op Diagnosis     * Sepsis due to pneumonia (CMS/HCC) [J18.9, A41.9]     Procedures  Left guillotine above-knee amputation  Right guillotine below-knee amputation      Surgeons      * Efrain Arango - Tori     * Aly Veras    Resident/Fellow/Other Assistant:  Surgeon(s) and Role:     * Aly Veras MD    Procedure Summary  Anesthesia: General  ASA: IV  Anesthesia Staff: Anesthesiologist: Adriel Ronquillo DO  C-AA: MANDO Craft; MANDO Bland  Estimated Blood Loss: 100mL  Intra-op Medications:   Administrations occurring from 1055 to 1330 on 24:   Medication Name Total Dose   atorvastatin (Lipitor) tablet 40 mg Cannot be calculated   clopidogrel (Plavix) tablet 75 mg Cannot be calculated   enoxaparin (Lovenox) syringe 40 mg Cannot be calculated   fentanyl (Sublimaze) 1000 mcg in sodium chloride 0.9% 100 mL (10 mcg/mL) infusion (premix) Cannot be calculated   folic acid (Folvite) tablet 1 mg Cannot be calculated   ipratropium-albuteroL (Duo-Neb) 0.5-2.5 mg/3 mL nebulizer solution 3 mL Cannot be calculated   norepinephrine (Levophed) 8 mg in dextrose 5% 250 mL (0.032 mg/mL) infusion (premix) 1.18 mg   pantoprazole (ProtoNix) injection 40 mg Cannot be calculated   pentoxifylline (Trental) ER tablet 400 mg Cannot be calculated   piperacillin-tazobactam-dextrose (Zosyn) IV 4.5 g Cannot be calculated   polyethylene glycol (Glycolax, Miralax) packet 17 g Cannot be calculated   potassium chloride (Klor-Con) packet 40 mEq Cannot be calculated   propofol (Diprivan) infusion Cannot be calculated   thiamine (Vitamin B1) injection 100 mg Cannot be calculated   vancomycin (Vancocin) 1 g in dextrose 5% water 200 mL Cannot be calculated   vancomycin (Vancocin) pharmacy to dose - pharmacy  monitoring Cannot be calculated              Anesthesia Record               Intraprocedure I/O Totals          Intake    Fentanyl Drip 0.00 mL    The total shown is the total volume documented since Anesthesia Start was filed.    Norepinephrine Drip 0.00 mL    The total shown is the total volume documented since Anesthesia Start was filed.    Propofol Drip 0.00 mL    The total shown is the total volume documented since Anesthesia Start was filed.    Total Intake 0 mL       Output    Urine 35 mL    Total Output 35 mL       Net    Net Volume -35 mL          Specimen:   ID Type Source Tests Collected by Time   1 : RIGHT LEG Tissue LEG AMPUTATION BELOW THE KNEE RIGHT SURGICAL PATHOLOGY EXAM Efrain Arango MD PhD 4/5/2024 1141   2 : LEFT LEG THROUGH THE KNEE AMPUTATION Tissue LEG AMPUTATION ABOVE THE KNEE LEFT SURGICAL PATHOLOGY EXAM Efrain Arango MD PhD 4/5/2024 4049        Staff:   Circulator: Kaylynn Quintanilla RN; Bean Marcelo, SURYA; Neeru Phillips, RN  Relief Scrub: Iggy Wood  Scrub Person: Yuli Alcantar RN; Bridgett Joseph RN          Findings:   Ischemic necrosis of b/l feet, poorly reactive muscle to electrocautery    Complications:  None; patient tolerated the procedure well.     Disposition: PACU - hemodynamically stable.  Condition: stable  Specimens Collected:   ID Type Source Tests Collected by Time   1 : RIGHT LEG Tissue LEG AMPUTATION BELOW THE KNEE RIGHT SURGICAL PATHOLOGY EXAM Efrain Arango MD PhD 4/5/2024 1141   2 : LEFT LEG THROUGH THE KNEE AMPUTATION Tissue LEG AMPUTATION ABOVE THE KNEE LEFT SURGICAL PATHOLOGY EXAM Efrain Arango MD PhD 4/5/2024 1157     Attending Attestation: I was present and scrubbed for the entire procedure.    Efrain Arango  Phone Number: [phone]

## 2024-04-05 NOTE — ANESTHESIA PREPROCEDURE EVALUATION
Patient: Ry Sandhu    Procedure Information       Date/Time: 04/05/24 1055    Procedure: Amputation Above Knee (Bilateral)    Location: [facility] OR 15 / [facility] OR    Surgeons: Efrain Arango MD PhD            Relevant Problems   Cardiac  Cath in August 2023 showed EF of 40%, moderate CAD, no stents placed , echo in April 2024 showed recovered EF of 63%  Currently on NE 0.16 due to septic shock    (+) CAD (coronary artery disease)   (+) CHF (congestive heart failure) (CMS/HCC)   (+) HTN (hypertension)   (+) Hyperlipidemia   (+) Peripheral vascular disease (CMS/HCC) (Sp aortic fem bypass)      Pulmonary  Presented with acute respiratory failure currently intubated secondary to pneumonia, in septic shock   Sp thora and drainage of 1L pleural effusion   (+) Chronic obstructive pulmonary disease (CMS/HCC)   (+) Pneumonia      Neuro  Currently sedated      GI   (+) Gastroesophageal reflux disease      Hematology  On plavix for PAD   (+) Anemia      ID  In septic shock on vasopressors    (+) Septicemia (CMS/HCC)       Clinical information reviewed:   Tobacco  Allergies    Med Hx  Surg Hx   Fam Hx  Soc Hx        NPO Detail:  No data recorded     Physical Exam    Airway  Mallampati: unable to assess     Cardiovascular   Rhythm: regular  Rate: normal     Dental    Pulmonary   Breath sounds clear to auscultation     Abdominal          Anesthesia Plan    History of general anesthesia?: yes  History of complications of general anesthesia?: no    ASA 4 - emergent     general   (Patient already intubated and sedated )  Patient did not smoke on day of procedure.    intravenous induction   Postoperative administration of opioids is intended.  Anesthetic plan and risks discussed with spouse.  Use of blood products discussed with spouse who consented to blood products.

## 2024-04-05 NOTE — PROGRESS NOTES
Pharmacy Admission Order Reconciliation Review    Ry Sandhu is a 74 y.o. male admitted for Pneumonia. Pharmacy reviewed the patient's unreconciled admission medications.    Prior to admission medications that were reviewed and acted on by the pharmacist include:  Acetaminophen  Ascorbic acid  Atorvastatin  Clopidogrel  Ferrous sulfate  Losartan  Metoprolol  Multivitamin  Potassium cloride  Sennside/docusate  These medications have been reconciled.     Any other unreconcilied medications have been addressed and will be ordered or held by the patient's medical team. Medications addressed by the pharmacist may be added or changed by the patient's medical team at any time.    Shana Ramirez, PharmD  Transitions of Care Pharmacist  [facility]  Please reach out via Secure Chat for questions, or if no response call [phone]

## 2024-04-05 NOTE — PROGRESS NOTES
Occupational Therapy                 Therapy Communication Note    Patient Name: Ry Sandhu  MRN: [id number]  Today's Date: 4/5/2024     Discipline: Occupational Therapy    Missed Visit Reason: Missed Visit Reason:  (patient intubated; concern for bilateral acute on chronic limb ischemia; awaiting further imaging and POC. OT will hold.)    Missed Time: Attempt    Comment:  Melissa Gonzalez OTR/L  Inpatient Occupational Therapist   Rehab Office: [phone]

## 2024-04-05 NOTE — PROGRESS NOTES
Physical Therapy                 Therapy Communication Note    Patient Name: Ry Sandhu  MRN: [id number]  Today's Date: 4/5/2024     Discipline: Physical Therapy    Missed Visit Reason: Missed Visit Reason:  (patient intubated; concern for bilateral acute on chronic limb ischemia; awaiting further imaging and POC. PT will hold.)    Missed Time: Attempt    Comment:

## 2024-04-05 NOTE — SIGNIFICANT EVENT
Vascular Surgery Post-Op Plan of Care  S/p b/l guillotine amputations in setting of sepsis secondary to pneumonia with ischemic necrosis of b/l LE.    Stumps open with kerlix/ace/ABD/betadine dressing.    Vascular surgery will change dressings to LE daily.   Monitor for signs of bleeding. Stumps may ooze somewhat, but call vascular surgery if saturating through dressing.     Resuscitation and management of sepsis per MICU    Aly Veras MD  PGY5 - Integrated Vascular Surgery

## 2024-04-05 NOTE — CONSULTS
Wound Care Consult     Visit Date: 4/5/2024      Patient Name: Ry Sandhu         MRN: [id number]           YOB: 1950     Reason for Consult: multiple wounds     Wound History: Pt is cachectic (106lb) with known hx of arterial insufficiency s/p revascularization in August 2023 with Dr. Alvares. Admitted to OSH with colitis, progressing to sepsis and development of bilateral pneumonia resulting in septic shock and respiratory failure requiring intubation. Just underwent emergent BLE amputations for source control.      Wound Assessment:  Wound 04/04/24 Knee Right;Ventral (Active)   Wound Image   04/04/24 2350   Shape round 04/05/24 0300   Margins Well-defined edges 04/05/24 0300       Wound 04/04/24 Arm Left;Lower;Ventral (Active)   Wound Image   04/04/24 2350   Shape round 04/05/24 0300   State of Healing Closed wound edges 04/05/24 0300   Margins Well-defined edges 04/05/24 0300       Wound 04/04/24 Pretibial Proximal;Right (Active)   Wound Image   04/04/24 2351   Shape round 04/05/24 0300   Margins Well-defined edges 04/05/24 0300       Wound 04/04/24 Axilla Right (Active)   Wound Image   04/04/24 2352   State of Healing Early/partial granulation 04/05/24 0300   Margins Well-defined edges 04/05/24 0300       Wound 04/04/24 Axilla Left (Active)   Wound Image   04/04/24 2352   Shape round 04/05/24 0300   State of Healing Early/partial granulation 04/05/24 0300   Margins Well-defined edges 04/05/24 0300       Wound 04/04/24 Penis (Active)   Wound Image   04/04/24 2359   Shape irregular 04/05/24 0300       Wound 04/05/24 Ankle Dorsal;Left (Active)   Wound Image   04/05/24 0003   Shape irregular 04/05/24 0300   Margins Poorly defined 04/05/24 0300       Wound 04/05/24 Ankle Dorsal;Right (Active)   Wound Image   04/05/24 0005   Shape irregular 04/05/24 0300   Margins Poorly defined 04/05/24 0300       Wound 04/05/24 Pretibial Left;Proximal (Active)   Wound Image   04/05/24 0006   Shape round  scattered 04/05/24 0300   State of Healing Closed wound edges 04/05/24 0300   Margins Other (Comment);Well-defined edges 04/05/24 1226       Wound 04/05/24 Elbow Dorsal;Right (Active)   Wound Image   04/05/24 0006   Shape round 04/05/24 0300   Margins Well-defined edges 04/05/24 0300       Wound 04/05/24 Elbow Dorsal;Left (Active)   Wound Image   04/05/24 0007   Shape round 04/05/24 0300   Margins Well-defined edges 04/05/24 0300       Wound 04/05/24 Nose (Active)   Wound Image   04/05/24 0008   Shape round 04/05/24 0300   Margins Well-defined edges 04/05/24 0300       Wound 04/05/24 Perineum (Active)   Wound Image   04/05/24 0010   Shape irregular 04/05/24 0300   Margins Poorly defined 04/05/24 0300       Wound 04/05/24 Back Medial (Active)   Wound Image   04/05/24 0009   Shape round 04/05/24 0300   State of Healing Early/partial granulation 04/05/24 0300   Margins Well-defined edges 04/05/24 0300       Wound 04/05/24 Coccyx (Active)   Wound Image   04/05/24 0010   Shape irregular 04/05/24 0300   Margins Well-defined edges 04/05/24 0300       Wound 04/05/24 Incision Pretibial Right (Active)   Margins Well-defined edges 04/05/24 1224   Closure None 04/05/24 1224   Dressing ABD;Kerlix/rolled gauze;Other (Comment) 04/05/24 1224     Wound Team Summary Assessment: Pt seen resting in MICU bed having just returned from OR for BLE amputations. Pt is cachectic with fragile skin and many BUE weeping skin tears. These were assessed and redressed with Mepilex Lite or Mepilex bordered foams. Remainder of recs d/w primary RN at bedside based on photos above, taken earlier today. Pt already on excellent sleep surface (Progressa Pulmonary bed with EHOB waffle mattress overlay). Extra layers of linens removed, and pt turned to R using wedges placed to offload sacrum. Recs below:     Wound Team Recs: Continue Progressa Pulmomary bed and EHOB waffle mattress overlay. Turn pt q2 hours using wedges placed to float sacrum/coccyx. Limit  layers of linens to fitted sheet, blue turning/positioning mat and 1-2 chux. Keep pt clean and dry. Mepilex foam to sacrum/coccyx for protection from friction (recommend placing upside down to avoid covering anus). Change q3 days and PRN. Triad barrier cream (oracle #[id number]) to perianal skin BID and PRN with incontinence care. Absorbent foams to skin tears and other draining wounds. Recommend Mepilex bordered foams, as these hold more drainage than Mepilex Lite. No need to change qshift or even daily. Change only when drainage reaches 3 out of 4 sides of foam or saturates >75% of foam. Please no Xeroform underneath. BLE wound care per Vascular Surgery.      Provider, please review.     Jeanne Howard RN, CWON  4/5/2024  1:14 PM

## 2024-04-05 NOTE — HOSPITAL COURSE
Mr. Sandhu is a 74 YOM with PMH HTN, DLD, COPD, CAD, PAD s/p revascularization procedures to b/l LE (iliofemoral endarterectomy and angioplasty 8/21/23; left CFA/SFA arterectomy and SFA, YISEL/EIA stent 05/2020) presenting [facility]  [facility]2 CAP vs colitis, septic shock, and encephalopathy.    [place] Hospital Course:  Initially presented to OSH with 3-4 days of generalized weakness, nausea, vomiting, and diarrhea.  Found to have colitis on CT imaging, was started on IV flagyl and admitted.  Hgb 7.2 without obvious source of bleeding, received 1 unit pRBC.  Found to have dry gangrene of left foot i/s/o known severe bilateral lower extremity peripheral vascular disease, vascular surgery consulted and recommending no acute surgical intervention.  Hospital course complicated by worsening altered mental status, with CT head at that time demonstrating findings suspicious for early ischemic change in right basal ganglia.  Also with acute hypoxic respiratory failure, found to have bilateral pneumonia with pleural effusions, was placed on CPAP for AHRF and CTX/doxy for PNA.  Thoracentesis on 4/2 draining 1L on left with labs suggesting transudative effusion (serum total protein on 3.8, pleural fluid protein was 0.7, serum LDH was 722, fluid LDH was 183), cultures negative. New hypotension requiring initiation of norepinephrine gtt. Stable on CPAP settings, but was intubated prior to transfer here for airway protection given degree of obtundation (A&O x0 but responsive).     MICU Course:  Patient started on vanc/zosyn for CAP and continued on Levophed, initially requiring 2nd pressor (vaso). Vascular surgery was consulted on admission due to b/l LE gangrene contributing to escalating pressor requirement. Underwent emergent right BKA and 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, planning for revision 4/16. Started midodrine 5 mg TID and weaned off pressors 4/13. Was also stress dosed hydrocortisone (50 q6  4/5-4/7 > 40 q8 4/7-4/9). Sputum cultures 4/5 growing Corneybacterium striatum group, continued on vanc/zosyn with initial plan for 7 day treatment (4/5-4/12) with repeat sputum cultures 4/10 NGTD. Extubated 4/8, weaned to as low as 4L NC but with escalating O2 requirements to 10L NC , with uptrending leukocytosis from 13 to 19. CXR 4/13 with complete opacification of right lung thought 2/2 mucus plugging, vanc/zosyn restarted and aggressive BPH initiated with repeat CXR with improved oxygenation. Left CVC removed and WBC downtrended to 14.3, vanc/zosyn discontinued. 4/15 patient underwent right-sided thora with 1L off, transudative by Light's criteria. Patient also requiring multiple transfusions of PRBC (4/8: H/H 5.5 s/p 2uPRBC; 4/13: H/H 6.7 s/p 1uPRBC, 4/16: H/H 7.6 s/p 1uPRBC) thought to be partially due to oozing from surgical site vs underlying coagulopathy i/s/o liver injury due to heavy EtOH use vs sepsis. Patient also with hypernatremia, treating with intermittent FWF. Plan for revision of amputations with vascular surgery pending resolution of leukocytosis. He failed an MBS on 4/17 and eventually there was a decision to have a PEG placed.     Regular Nursing Floor:   He was transferred to the [facility] 4/20, and he then received a PEG placement in the OR with vascular surgery during his revision of BKA/AKA on Tuesday 4/23. He tolerated this well and remained without issue. He was monitored for medical stability and bilateral lower extremity wounds were changed on 4/26. He was instructed to stop his home Plavix but continue with Aspirin daily. His home Metop Succinate was split into 2 doses and transitioned to Metop Tartrate given the Succinate's incompatibility with PEG tube administration route. The resumption of his home Losartan and PPI was left to the discretion of his PCP. He was eventually discharged to a SNF on 4/30 to continue rehab. His Na was stable at ~142 with recommendations for this to be checked  by his PCP over the subsequent 5 weeks with qweekly RFPs. Final daily wound recs from Vascular Surgery were listed as follows:    Cleanse, dry, apply cover sponge over staple line, apply occlusive dressing (tegederm) to create a barrier over the incision; wrap gently with ACE for edema management.

## 2024-04-05 NOTE — CARE PLAN
The patient's goals for the shift include      The clinical goals for the shift include wean down pressors. Over the shift, the patient did not make progress toward the following goals. Barriers to progression include increasing need for bp support. Recommendations to address these barriers include asses pt needs.

## 2024-04-05 NOTE — ANESTHESIA POSTPROCEDURE EVALUATION
Patient: Ry Sandhu    Procedure Summary       Date: 04/05/24 Room / Location: [facility] OR 15 / [facility]    Anesthesia Start: 1120 Anesthesia Stop: 1245    Procedure: Amputation Above Knee (Bilateral) Diagnosis:       Sepsis due to pneumonia (CMS/[facility])      (Sepsis due to pneumonia (CMS/[facility]) [J18.9, A41.9])    Surgeons: Efrain Arango MD PhD Responsible Provider: Adriel Ronquillo DO    Anesthesia Type: general ASA Status: 4 - Emergent            Anesthesia Type: general    Vitals Value Taken Time   /53 04/05/24 1245   Temp 36.1 04/05/24 1245   Pulse 83 04/05/24 1245   Resp 16 04/05/24 1245   SpO2 95 04/05/24 1245     Vent settings: 400 x 16, 40%, 5 PEEP    Anesthesia Post Evaluation    Patient location during evaluation: ICU  Patient participation: complete - patient cannot participate  Level of consciousness: sedated  Pain management: adequate  Airway patency: patent  Cardiovascular status: acceptable  Respiratory status: ETT  Hydration status: acceptable  Postoperative Nausea and Vomiting: none        No notable events documented.

## 2024-04-05 NOTE — PROGRESS NOTES
Pharmacy Medication History Review    Ry Sandhu is a 74 y.o. male admitted for Pneumonia. Pharmacy reviewed the patient's [id number] medications and allergies for accuracy.    The list below reflects the updated PTA list. Comments regarding how patient may be taking medications differently can be found in the Admit Orders Activity  Prior to Admission Medications   Prescriptions Informant   acetaminophen (Tylenol) 325 mg tablet Spouse/Significant Other, Child, Other   Sig: TAKE 2 TABLETS BY MOUTH EVERY 6 HOURS   ascorbic acid (Vitamin C) 500 mg tablet Spouse/Significant Other, Child, Other   Sig: TAKE 1 TABLET BY MOUTH TWO TIMES A DAY   aspirin 81 mg EC tablet Spouse/Significant Other, Child, Other   Sig: Take 1 tablet (81 mg) by mouth once daily.   atorvastatin (Lipitor) 40 mg tablet Spouse/Significant Other, Child, Other   Sig: Take 1 tablet (40 mg) by mouth once daily.   clopidogrel (Plavix) 75 mg tablet Spouse/Significant Other, Child, Other   Sig: TAKE 1 TABLET BY MOUTH ONCE DAILY   ferrous sulfate, 325 mg ferrous sulfate, tablet Spouse/Significant Other, Child, Other   Sig: Take 1 tablet by mouth 2 times a day.   losartan (Cozaar) 25 mg tablet Spouse/Significant Other, Child, Other   Sig: TAKE 1/2 TABLET BY MOUTH ONCE DAILY.   metoprolol succinate XL (Kapspargo Sprinkle) 25 mg 24 hr capsule Spouse/Significant Other, Child, Other   Si TAB(S) ORALLY ONCE A DAY -.ADOD - 23.MEDS TO BEDS - .PATIENT LOCATION T 70   multivitamin tablet Spouse/Significant Other, Child, Other   Sig: TAKE 1 TABLET BY MOUTH ONCE DAILY   potassium chloride CR 10 mEq ER tablet Spouse/Significant Other, Child, Other   Sig: Take 1 tablet (10 mEq) by mouth once daily. Do not crush, chew, or split.   sennosides-docusate sodium (Carly-Colace) 8.6-50 mg tablet Spouse/Significant Other, Child, Other   Sig: TAKE 2 TABLETS BY MOUTH TWO TIMES A DAY      Facility-Administered Medications: None        The list below reflects the  updated allergy list. Please review each documented allergy for additional clarification and justification.  Allergies  Reviewed by Ramila Vora on 4/5/2024   No Known Allergies         Patient was unable to be assessed for M2B at discharge. Please reassess when family available    Sources used: Pharmacy dispense history, OARRs, patient interview (unable to participate; spoke with daughter, Celina, and spouse, Pura- bedside; provided medication list and minimal medication history ), 11/29/23 office visit through care everywhere, 8/23 discharge summary, and called [facility] pharmacy (pt's preferred pharmacy- [phone])    Below are additional concerns with the patient's PTA list.  -per pharmacy, only recent fill was in February for a lactulose solution (1 tabletspoon 4 times daily for 30 day supply); all other medications are overdue for a fill (most maintenance medications filled in October 2023)  -could not confirm OTC medications  -Per spouse, pt has multiple bottles of medications at home    Shana Ramirez, Chantel, [facility]  Transitions of Care Pharmacist  [facility]  Please reach out via Secure Chat for questions, or if no response call [employer] or [facility]

## 2024-04-06 ENCOUNTER — APPOINTMENT (OUTPATIENT)
Dept: RADIOLOGY | Facility: HOSPITAL | Age: 74
DRG: 853 | End: 2024-04-06
Payer: MEDICARE

## 2024-04-06 LAB
25(OH)D3 SERPL-MCNC: 18 NG/ML (ref 30–100)
ALBUMIN SERPL BCP-MCNC: 1.7 G/DL (ref 3.4–5)
ALBUMIN SERPL BCP-MCNC: 1.8 G/DL (ref 3.4–5)
ALBUMIN SERPL BCP-MCNC: 1.8 G/DL (ref 3.4–5)
ALP SERPL-CCNC: 197 U/L (ref 33–136)
ALT SERPL W P-5'-P-CCNC: 75 U/L (ref 10–52)
ANION GAP BLDA CALCULATED.4IONS-SCNC: 5 MMO/L (ref 10–25)
ANION GAP SERPL CALC-SCNC: 7 MMOL/L (ref 10–20)
ANION GAP SERPL CALC-SCNC: 8 MMOL/L (ref 10–20)
ANION GAP SERPL CALC-SCNC: 8 MMOL/L (ref 10–20)
APTT PPP: 36 SECONDS (ref 27–38)
AST SERPL W P-5'-P-CCNC: 56 U/L (ref 9–39)
BACTERIA UR CULT: NO GROWTH
BASE EXCESS BLDA CALC-SCNC: 4.5 MMOL/L (ref -2–3)
BASOPHILS # BLD AUTO: 0.01 X10*3/UL (ref 0–0.1)
BASOPHILS NFR BLD AUTO: 0.1 %
BILIRUB SERPL-MCNC: 1 MG/DL (ref 0–1.2)
BODY TEMPERATURE: 37 DEGREES CELSIUS
BUN SERPL-MCNC: 6 MG/DL (ref 6–23)
BUN SERPL-MCNC: 7 MG/DL (ref 6–23)
BUN SERPL-MCNC: 8 MG/DL (ref 6–23)
CA-I BLDA-SCNC: 1.12 MMOL/L (ref 1.1–1.33)
CALCIUM SERPL-MCNC: 6.9 MG/DL (ref 8.6–10.6)
CALCIUM SERPL-MCNC: 7.1 MG/DL (ref 8.6–10.6)
CALCIUM SERPL-MCNC: 7.1 MG/DL (ref 8.6–10.6)
CHLORIDE BLDA-SCNC: 102 MMOL/L (ref 98–107)
CHLORIDE SERPL-SCNC: 104 MMOL/L (ref 98–107)
CHLORIDE SERPL-SCNC: 108 MMOL/L (ref 98–107)
CHLORIDE SERPL-SCNC: 109 MMOL/L (ref 98–107)
CO2 SERPL-SCNC: 29 MMOL/L (ref 21–32)
CREAT SERPL-MCNC: 0.38 MG/DL (ref 0.5–1.3)
CREAT SERPL-MCNC: 0.4 MG/DL (ref 0.5–1.3)
CREAT SERPL-MCNC: 0.41 MG/DL (ref 0.5–1.3)
EGFRCR SERPLBLD CKD-EPI 2021: >90 ML/MIN/1.73M*2
EOSINOPHIL # BLD AUTO: 0 X10*3/UL (ref 0–0.4)
EOSINOPHIL NFR BLD AUTO: 0 %
ERYTHROCYTE [DISTWIDTH] IN BLOOD BY AUTOMATED COUNT: 17 % (ref 11.5–14.5)
ERYTHROCYTE [DISTWIDTH] IN BLOOD BY AUTOMATED COUNT: 17.1 % (ref 11.5–14.5)
ERYTHROCYTE [DISTWIDTH] IN BLOOD BY AUTOMATED COUNT: 17.2 % (ref 11.5–14.5)
GLUCOSE BLD MANUAL STRIP-MCNC: 122 MG/DL (ref 74–99)
GLUCOSE BLD MANUAL STRIP-MCNC: 153 MG/DL (ref 74–99)
GLUCOSE BLD MANUAL STRIP-MCNC: 173 MG/DL (ref 74–99)
GLUCOSE BLD MANUAL STRIP-MCNC: 174 MG/DL (ref 74–99)
GLUCOSE BLD MANUAL STRIP-MCNC: 175 MG/DL (ref 74–99)
GLUCOSE BLD MANUAL STRIP-MCNC: 189 MG/DL (ref 74–99)
GLUCOSE BLDA-MCNC: 126 MG/DL (ref 74–99)
GLUCOSE SERPL-MCNC: 129 MG/DL (ref 74–99)
GLUCOSE SERPL-MCNC: 199 MG/DL (ref 74–99)
GLUCOSE SERPL-MCNC: 232 MG/DL (ref 74–99)
HCO3 BLDA-SCNC: 29.2 MMOL/L (ref 22–26)
HCT VFR BLD AUTO: 22.7 % (ref 41–52)
HCT VFR BLD AUTO: 23.1 % (ref 41–52)
HCT VFR BLD AUTO: 24.1 % (ref 41–52)
HCT VFR BLD EST: 30 % (ref 41–52)
HGB BLD-MCNC: 8 G/DL (ref 13.5–17.5)
HGB BLD-MCNC: 8.1 G/DL (ref 13.5–17.5)
HGB BLD-MCNC: 8.1 G/DL (ref 13.5–17.5)
HGB BLDA-MCNC: 10 G/DL (ref 13.5–17.5)
IMM GRANULOCYTES # BLD AUTO: 0.1 X10*3/UL (ref 0–0.5)
IMM GRANULOCYTES # BLD AUTO: 0.11 X10*3/UL (ref 0–0.5)
IMM GRANULOCYTES # BLD AUTO: 0.19 X10*3/UL (ref 0–0.5)
IMM GRANULOCYTES NFR BLD AUTO: 0.9 % (ref 0–0.9)
IMM GRANULOCYTES NFR BLD AUTO: 0.9 % (ref 0–0.9)
IMM GRANULOCYTES NFR BLD AUTO: 1.6 % (ref 0–0.9)
INHALED O2 CONCENTRATION: 40 %
INR PPP: 1.1 (ref 0.9–1.1)
LACTATE BLDA-SCNC: 1.2 MMOL/L (ref 0.4–2)
LYMPHOCYTES # BLD AUTO: 0.32 X10*3/UL (ref 0.8–3)
LYMPHOCYTES # BLD AUTO: 0.37 X10*3/UL (ref 0.8–3)
LYMPHOCYTES # BLD AUTO: 0.42 X10*3/UL (ref 0.8–3)
LYMPHOCYTES NFR BLD AUTO: 2.8 %
LYMPHOCYTES NFR BLD AUTO: 3 %
LYMPHOCYTES NFR BLD AUTO: 3.5 %
MAGNESIUM SERPL-MCNC: 1.81 MG/DL (ref 1.6–2.4)
MCH RBC QN AUTO: 31.6 PG (ref 26–34)
MCH RBC QN AUTO: 31.7 PG (ref 26–34)
MCH RBC QN AUTO: 32.4 PG (ref 26–34)
MCHC RBC AUTO-ENTMCNC: 33.6 G/DL (ref 32–36)
MCHC RBC AUTO-ENTMCNC: 34.6 G/DL (ref 32–36)
MCHC RBC AUTO-ENTMCNC: 35.7 G/DL (ref 32–36)
MCV RBC AUTO: 91 FL (ref 80–100)
MCV RBC AUTO: 92 FL (ref 80–100)
MCV RBC AUTO: 94 FL (ref 80–100)
MONOCYTES # BLD AUTO: 0.52 X10*3/UL (ref 0.05–0.8)
MONOCYTES # BLD AUTO: 0.61 X10*3/UL (ref 0.05–0.8)
MONOCYTES # BLD AUTO: 0.61 X10*3/UL (ref 0.05–0.8)
MONOCYTES NFR BLD AUTO: 4.3 %
MONOCYTES NFR BLD AUTO: 4.9 %
MONOCYTES NFR BLD AUTO: 5.2 %
NEUTROPHILS # BLD AUTO: 10.58 X10*3/UL (ref 1.6–5.5)
NEUTROPHILS # BLD AUTO: 10.89 X10*3/UL (ref 1.6–5.5)
NEUTROPHILS # BLD AUTO: 11.29 X10*3/UL (ref 1.6–5.5)
NEUTROPHILS NFR BLD AUTO: 90.5 %
NEUTROPHILS NFR BLD AUTO: 91 %
NEUTROPHILS NFR BLD AUTO: 91.1 %
NRBC BLD-RTO: 0 /100 WBCS (ref 0–0)
OXYHGB MFR BLDA: 96.6 % (ref 94–98)
PCO2 BLDA: 43 MM HG (ref 38–42)
PH BLDA: 7.44 PH (ref 7.38–7.42)
PHOSPHATE SERPL-MCNC: 1.3 MG/DL (ref 2.5–4.9)
PHOSPHATE SERPL-MCNC: 1.5 MG/DL (ref 2.5–4.9)
PHOSPHATE SERPL-MCNC: 2 MG/DL (ref 2.5–4.9)
PLATELET # BLD AUTO: 100 X10*3/UL (ref 150–450)
PLATELET # BLD AUTO: 105 X10*3/UL (ref 150–450)
PLATELET # BLD AUTO: 106 X10*3/UL (ref 150–450)
PO2 BLDA: 96 MM HG (ref 85–95)
POTASSIUM BLDA-SCNC: 4.2 MMOL/L (ref 3.5–5.3)
POTASSIUM SERPL-SCNC: 3.4 MMOL/L (ref 3.5–5.3)
POTASSIUM SERPL-SCNC: 4.2 MMOL/L (ref 3.5–5.3)
POTASSIUM SERPL-SCNC: 4.5 MMOL/L (ref 3.5–5.3)
PROT SERPL-MCNC: 3.6 G/DL (ref 6.4–8.2)
PROTHROMBIN TIME: 12.2 SECONDS (ref 9.8–12.8)
RBC # BLD AUTO: 2.5 X10*6/UL (ref 4.5–5.9)
RBC # BLD AUTO: 2.52 X10*6/UL (ref 4.5–5.9)
RBC # BLD AUTO: 2.56 X10*6/UL (ref 4.5–5.9)
SAO2 % BLDA: 99 % (ref 94–100)
SODIUM BLDA-SCNC: 132 MMOL/L (ref 136–145)
SODIUM SERPL-SCNC: 137 MMOL/L (ref 136–145)
SODIUM SERPL-SCNC: 140 MMOL/L (ref 136–145)
SODIUM SERPL-SCNC: 142 MMOL/L (ref 136–145)
VANCOMYCIN SERPL-MCNC: 13.6 UG/ML (ref 5–20)
WBC # BLD AUTO: 11.6 X10*3/UL (ref 4.4–11.3)
WBC # BLD AUTO: 12 X10*3/UL (ref 4.4–11.3)
WBC # BLD AUTO: 12.4 X10*3/UL (ref 4.4–11.3)

## 2024-04-06 PROCEDURE — 02HV33Z INSERTION OF INFUSION DEVICE INTO SUPERIOR VENA CAVA, PERCUTANEOUS APPROACH: ICD-10-PCS

## 2024-04-06 PROCEDURE — 2500000001 HC RX 250 WO HCPCS SELF ADMINISTERED DRUGS (ALT 637 FOR MEDICARE OP)

## 2024-04-06 PROCEDURE — 2500000004 HC RX 250 GENERAL PHARMACY W/ HCPCS (ALT 636 FOR OP/ED)

## 2024-04-06 PROCEDURE — 84100 ASSAY OF PHOSPHORUS: CPT

## 2024-04-06 PROCEDURE — 85025 COMPLETE CBC W/AUTO DIFF WBC: CPT

## 2024-04-06 PROCEDURE — 84132 ASSAY OF SERUM POTASSIUM: CPT

## 2024-04-06 PROCEDURE — 80202 ASSAY OF VANCOMYCIN: CPT

## 2024-04-06 PROCEDURE — 2500000004 HC RX 250 GENERAL PHARMACY W/ HCPCS (ALT 636 FOR OP/ED): Performed by: STUDENT IN AN ORGANIZED HEALTH CARE EDUCATION/TRAINING PROGRAM

## 2024-04-06 PROCEDURE — 2500000002 HC RX 250 W HCPCS SELF ADMINISTERED DRUGS (ALT 637 FOR MEDICARE OP, ALT 636 FOR OP/ED)

## 2024-04-06 PROCEDURE — 2500000002 HC RX 250 W HCPCS SELF ADMINISTERED DRUGS (ALT 637 FOR MEDICARE OP, ALT 636 FOR OP/ED): Performed by: STUDENT IN AN ORGANIZED HEALTH CARE EDUCATION/TRAINING PROGRAM

## 2024-04-06 PROCEDURE — 37799 UNLISTED PX VASCULAR SURGERY: CPT

## 2024-04-06 PROCEDURE — 71045 X-RAY EXAM CHEST 1 VIEW: CPT

## 2024-04-06 PROCEDURE — 71045 X-RAY EXAM CHEST 1 VIEW: CPT | Performed by: RADIOLOGY

## 2024-04-06 PROCEDURE — 80053 COMPREHEN METABOLIC PANEL: CPT

## 2024-04-06 PROCEDURE — 82947 ASSAY GLUCOSE BLOOD QUANT: CPT | Mod: MUE

## 2024-04-06 PROCEDURE — 2020000001 HC ICU ROOM DAILY

## 2024-04-06 PROCEDURE — 94003 VENT MGMT INPAT SUBQ DAY: CPT

## 2024-04-06 PROCEDURE — 85610 PROTHROMBIN TIME: CPT

## 2024-04-06 PROCEDURE — 2500000005 HC RX 250 GENERAL PHARMACY W/O HCPCS

## 2024-04-06 PROCEDURE — 83735 ASSAY OF MAGNESIUM: CPT

## 2024-04-06 PROCEDURE — 2500000001 HC RX 250 WO HCPCS SELF ADMINISTERED DRUGS (ALT 637 FOR MEDICARE OP): Performed by: STUDENT IN AN ORGANIZED HEALTH CARE EDUCATION/TRAINING PROGRAM

## 2024-04-06 PROCEDURE — C9113 INJ PANTOPRAZOLE SODIUM, VIA: HCPCS

## 2024-04-06 RX ORDER — INSULIN LISPRO 100 [IU]/ML
0-10 INJECTION, SOLUTION INTRAVENOUS; SUBCUTANEOUS
Status: DISCONTINUED | OUTPATIENT
Start: 2024-04-06 | End: 2024-04-06

## 2024-04-06 RX ORDER — POTASSIUM CHLORIDE 1.5 G/1.58G
40 POWDER, FOR SOLUTION ORAL ONCE
Status: COMPLETED | OUTPATIENT
Start: 2024-04-06 | End: 2024-04-06

## 2024-04-06 RX ORDER — DEXTROSE 50 % IN WATER (D50W) INTRAVENOUS SYRINGE
25
Status: DISCONTINUED | OUTPATIENT
Start: 2024-04-06 | End: 2024-04-30 | Stop reason: HOSPADM

## 2024-04-06 RX ORDER — INSULIN LISPRO 100 [IU]/ML
0-10 INJECTION, SOLUTION INTRAVENOUS; SUBCUTANEOUS EVERY 4 HOURS
Status: DISCONTINUED | OUTPATIENT
Start: 2024-04-06 | End: 2024-04-11

## 2024-04-06 RX ORDER — MAGNESIUM SULFATE HEPTAHYDRATE 40 MG/ML
2 INJECTION, SOLUTION INTRAVENOUS ONCE
Status: COMPLETED | OUTPATIENT
Start: 2024-04-06 | End: 2024-04-06

## 2024-04-06 RX ORDER — DEXTROSE 50 % IN WATER (D50W) INTRAVENOUS SYRINGE
12.5
Status: DISCONTINUED | OUTPATIENT
Start: 2024-04-06 | End: 2024-04-30 | Stop reason: HOSPADM

## 2024-04-06 RX ADMIN — SODIUM PHOSPHATE, MONOBASIC, MONOHYDRATE AND SODIUM PHOSPHATE, DIBASIC, ANHYDROUS 21 MMOL: 142; 276 INJECTION, SOLUTION INTRAVENOUS at 18:49

## 2024-04-06 RX ADMIN — PROPOFOL 10 MCG/KG/MIN: 10 INJECTION, EMULSION INTRAVENOUS at 05:42

## 2024-04-06 RX ADMIN — MAGNESIUM SULFATE HEPTAHYDRATE 2 G: 40 INJECTION, SOLUTION INTRAVENOUS at 09:31

## 2024-04-06 RX ADMIN — ATORVASTATIN CALCIUM 40 MG: 40 TABLET, FILM COATED ORAL at 09:31

## 2024-04-06 RX ADMIN — Medication 50 MCG/HR: at 17:03

## 2024-04-06 RX ADMIN — INSULIN LISPRO 2 UNITS: 100 INJECTION, SOLUTION INTRAVENOUS; SUBCUTANEOUS at 09:48

## 2024-04-06 RX ADMIN — HYDROCORTISONE SODIUM SUCCINATE 50 MG: 100 INJECTION, POWDER, FOR SOLUTION INTRAMUSCULAR; INTRAVENOUS at 00:18

## 2024-04-06 RX ADMIN — VANCOMYCIN HYDROCHLORIDE 1 G: 1 INJECTION, SOLUTION INTRAVENOUS at 10:45

## 2024-04-06 RX ADMIN — HYDROCORTISONE SODIUM SUCCINATE 50 MG: 100 INJECTION, POWDER, FOR SOLUTION INTRAMUSCULAR; INTRAVENOUS at 06:07

## 2024-04-06 RX ADMIN — PIPERACILLIN SODIUM AND TAZOBACTAM SODIUM 4.5 G: 4; .5 INJECTION, SOLUTION INTRAVENOUS at 17:11

## 2024-04-06 RX ADMIN — CLOPIDOGREL BISULFATE 75 MG: 75 TABLET ORAL at 09:31

## 2024-04-06 RX ADMIN — HYDROCORTISONE SODIUM SUCCINATE 50 MG: 100 INJECTION, POWDER, FOR SOLUTION INTRAMUSCULAR; INTRAVENOUS at 18:19

## 2024-04-06 RX ADMIN — VASOPRESSIN 0.03 UNITS/MIN: 0.2 INJECTION INTRAVENOUS at 16:59

## 2024-04-06 RX ADMIN — PIPERACILLIN SODIUM AND TAZOBACTAM SODIUM 4.5 G: 4; .5 INJECTION, SOLUTION INTRAVENOUS at 05:09

## 2024-04-06 RX ADMIN — FOLIC ACID 1 MG: 1 TABLET ORAL at 09:31

## 2024-04-06 RX ADMIN — PROPOFOL 10 MCG/KG/MIN: 10 INJECTION, EMULSION INTRAVENOUS at 17:03

## 2024-04-06 RX ADMIN — VANCOMYCIN HYDROCHLORIDE 1 G: 1 INJECTION, SOLUTION INTRAVENOUS at 21:45

## 2024-04-06 RX ADMIN — PIPERACILLIN SODIUM AND TAZOBACTAM SODIUM 4.5 G: 4; .5 INJECTION, SOLUTION INTRAVENOUS at 23:27

## 2024-04-06 RX ADMIN — INSULIN LISPRO 2 UNITS: 100 INJECTION, SOLUTION INTRAVENOUS; SUBCUTANEOUS at 12:27

## 2024-04-06 RX ADMIN — THIAMINE HYDROCHLORIDE 500 MG: 100 INJECTION, SOLUTION INTRAMUSCULAR; INTRAVENOUS at 09:30

## 2024-04-06 RX ADMIN — HYDROCORTISONE SODIUM SUCCINATE 50 MG: 100 INJECTION, POWDER, FOR SOLUTION INTRAMUSCULAR; INTRAVENOUS at 12:20

## 2024-04-06 RX ADMIN — POLYETHYLENE GLYCOL 3350 17 G: 17 POWDER, FOR SOLUTION ORAL at 09:31

## 2024-04-06 RX ADMIN — PANTOPRAZOLE SODIUM 40 MG: 40 INJECTION, POWDER, FOR SOLUTION INTRAVENOUS at 09:29

## 2024-04-06 RX ADMIN — POTASSIUM CHLORIDE 40 MEQ: 1.5 POWDER, FOR SOLUTION ORAL at 01:51

## 2024-04-06 RX ADMIN — Medication 50 MCG/HR: at 10:53

## 2024-04-06 RX ADMIN — INSULIN LISPRO 2 UNITS: 100 INJECTION, SOLUTION INTRAVENOUS; SUBCUTANEOUS at 20:12

## 2024-04-06 RX ADMIN — VASOPRESSIN 0.03 UNITS/MIN: 0.2 INJECTION INTRAVENOUS at 08:27

## 2024-04-06 RX ADMIN — PIPERACILLIN SODIUM AND TAZOBACTAM SODIUM 4.5 G: 4; .5 INJECTION, SOLUTION INTRAVENOUS at 12:21

## 2024-04-06 RX ADMIN — ENOXAPARIN SODIUM 40 MG: 100 INJECTION SUBCUTANEOUS at 00:56

## 2024-04-06 RX ADMIN — NOREPINEPHRINE BITARTRATE 0.08 MCG/KG/MIN: 8 INJECTION, SOLUTION INTRAVENOUS at 17:00

## 2024-04-06 ASSESSMENT — PAIN - FUNCTIONAL ASSESSMENT
PAIN_FUNCTIONAL_ASSESSMENT: CPOT (CRITICAL CARE PAIN OBSERVATION TOOL)

## 2024-04-06 NOTE — CARE PLAN
Problem: Safety - Medical Restraint  Goal: Remains free of injury from restraints (Restraint for Interference with Medical Device)  Outcome: Progressing  Flowsheets (Taken 4/6/2024 1322)  Remains free of injury from restraints (restraint for interference with medical device): Every 2 hours: Monitor safety, psychosocial status, comfort, nutrition and hydration  Goal: Free from restraint(s) (Restraint for Interference with Medical Device)  Outcome: Progressing  Flowsheets (Taken 4/6/2024 1322)  Free from restraint(s) (restraint for interference with medical device): ONCE/SHIFT or MINIMUM Every 12 hours: Assess and document the continuing need for restraints     Problem: Skin  Goal: Participates in plan/prevention/treatment measures  Outcome: Progressing  Flowsheets (Taken 4/6/2024 1322)  Participates in plan/prevention/treatment measures: Elevate heels  Goal: Prevent/minimize sheer/friction injuries  Outcome: Progressing  Flowsheets (Taken 4/6/2024 1322)  Prevent/minimize sheer/friction injuries:   Use pull sheet   Turn/reposition every 2 hours/use positioning/transfer devices

## 2024-04-06 NOTE — PROGRESS NOTES
"Vancomycin Dosing by Pharmacy- FOLLOW UP    Ry Sandhu is a 74 y.o. year old male who Pharmacy has been consulted for vancomycin dosing for pneumonia. Based on the patient's indication and renal status this patient is being dosed based on a goal AUC of 500-600.     Renal function is currently stable.    Current vancomycin dose: 1000 mg given every 12 hours    Estimated vancomycin AUC on current dose: 504 mg/L.hr     Visit Vitals  BP 94/57   Pulse 69   Temp 36.3 °C (97.3 °F) (Temporal)   Resp 16        Lab Results   Component Value Date    CREATININE 0.40 (L) 04/06/2024    CREATININE 0.38 (L) 04/05/2024    CREATININE 0.40 (L) 04/05/2024    CREATININE 0.37 (L) 04/05/2024        Patient weight is No results found for: \"PTWEIGHT\"    No results found for: \"CULTURE\"     I/O last 3 completed shifts:  In: 4281.6 (76.6 mL/kg) [I.V.:1021.6 (18.3 mL/kg); NG/GT:2110; IV Piggyback:1150]  Out: 980 (17.5 mL/kg) [Urine:780 (0.4 mL/kg/hr); Blood:200]  Weight: 55.9 kg   [unfilled]    Lab Results   Component Value Date    PATIENTTEMP 37.0 04/05/2024    PATIENTTEMP 37.0 04/05/2024    PATIENTTEMP 37.0 04/05/2024        Assessment/Plan    Within goal AUC range. Continue current vancomycin regimen.    This dosing regimen is predicted by [employer] to result in the following pharmacokinetic parameters:  <<<<<PASTE [employer] DATA HERE>>>>>  Loading dose: N/A  Regimen: 1000 mg IV every 12 hours.  Start time: 10:36 on 04/06/2024  Exposure target: AUC24 (range)400-600 mg/L.hr   AUC24,ss: 504 mg/L.hr  Probability of AUC24 > 400: 91 %  Ctrough,ss: 14.2 mg/L  Probability of Ctrough,ss > 20: 9 %  Probability of nephrotoxicity (Lodise GONZALO 2009): 9 %    The next level will be obtained on 4/9 at 0500. May be obtained sooner if clinically indicated.   Will continue to monitor renal function daily while on vancomycin and order serum creatinine at least every 48 hours if not already ordered.  Follow for continued vancomycin needs, clinical " response, and signs/symptoms of toxicity.       Mikaela Bartlett, PharmD

## 2024-04-06 NOTE — SIGNIFICANT EVENT
04/06/24 1400   Pre-Procedure Checklist   Emergent Line Insertion No   Type of Line to be Placed CVC   Consent Obtained Yes   Emergency Medication Necessary No   Patient Identified with 2 Independent Identifiers Yes   Review of Allergies, Anticoagulation, Relevant Labs, ECG/Telemetry Yes   Risks/Benefits/Alternatives Discussed with Patient/POA/Legal Representative Yes   Stop Sign on Door Yes   Time Out Performed Yes   Catheter Exchange No   Positioning and Preparation Checklist   All People, Including Patient, in the Room with Cap and Mask Yes   Fluoroscopy Used to Identify Vessel and Guide Insertion; Sterile Cover Used No   Ultrasound Used to Identify Vessel and Guide Insertion; Sterile Cover Used Yes   Full Barrier Precautions Followed (Mask, Cap, Gown, Gloves) Yes   Hands Washed Yes   Monitors Attached with Sound Alarms On Yes   Full Body Sterile Drape (Head-to-Toe) Used to Cover Patient Yes   [place] Position (For IJ and Subclavian) Yes   CHG Skin Prep Used and Allowed to Air Dry Prior to Skin Procedure Yes   Procedure Checklist   Blood Aspirated From All Lumens, All Ports Subsequently Flushed Yes   Catheter Caps Placed On All Lumens; Lumens Clamped Yes   Maintain Guidewire Control Throughout, Ensuring Guidewire Removal Yes   Maintain Sterile Field Throughout Insertion Yes   Catheter Secured Yes   Confirmatory Test of Venous Placement Non-pulsatile blood  (xray ordered)   Post-Procedure Checklist   Date and Time Written on Dressing Yes   Sharp and Wire Count and Safe Disposal of all Sharps/Wires Yes   Sterile Dressing Applied Per Protocol Yes   X-ray Ordered or ECG Image Yes

## 2024-04-06 NOTE — PROCEDURES
Central Line Placement Procedure Note    Indication: hemodynamic monitoring and parenteral medications (I.e. chemo, vasoactive)     Consent: the patient's  and wife    UNIVERSAL PROTOCOL/ TIMEOUT:  Preprocedure verification is complete patient verified and consents confirmed, procedure sites are identified and marked, timeout was called before the start of the procedure.     left internal jugular vein was prepped with betadine and draped in a sterile fashion. Local anesthesia was with 2% lidocaine was used. Real time ultrasound was utilized throughout this procedure. A large bore needle was used to identify the vein.  A guide wire was then inserted into the vein through the needle. Triple lumen catheter was then inserted into the vessel over the guide wire using the Seldinger technique.  All ports showed good, free flowing blood return and were flushed with saline solution.  The catheter was then securely fastened to the skin with sutures and covered with a sterile dressing.  A post procedure X-ray is pending at this time.    The patient tolerated the procedure Patient tolerated the procedure well.    Complications: None    Dr. Norman and Dr. Carpenter were present and supervised all critical portions of the procedure.

## 2024-04-06 NOTE — PROGRESS NOTES
"Ry Sandhu is a 74 y.o. male on day 2 of admission presenting with Pneumonia.    Subjective   Patient remains sedated and intubated. Patient with decreased urine output.     Objective     Physical Exam  Constitutional:       Comments: Remains sedated and intubated   HENT:      Head: Normocephalic.      Nose: Nose normal.   Cardiovascular:      Rate and Rhythm: Normal rate and regular rhythm.   Pulmonary:      Comments: Mechanically ventilated, mechanical breath sounds auscultated.  Abdominal:      General: Abdomen is flat. Bowel sounds are normal.      Palpations: Abdomen is soft.   Musculoskeletal:      Cervical back: Normal range of motion.      Comments: Lower extremities are dusky, poorly palpable posterior tibial pulses bilaterally, no palpable dorsalis pedis pulses   Neurological:      Comments: sedated         Last Recorded Vitals  Blood pressure 94/57, pulse 65, temperature 36.1 °C (97 °F), temperature source Temporal, resp. rate 16, height 1.88 m (6' 2\"), weight 55.9 kg (123 lb 3.8 oz), SpO2 94 %.  Intake/Output last 3 Shifts:  I/O last 3 completed shifts:  In: 1706.7 (31.5 mL/kg) [I.V.:676.7 (12.5 mL/kg); NG/GT:280; IV Piggyback:750]  Out: 805 (14.9 mL/kg) [Urine:605 (0.3 mL/kg/hr); Blood:200]  Weight: 54.2 kg     Relevant Results  Scheduled medications  atorvastatin, 40 mg, oral, Daily  clopidogrel, 75 mg, oral, Daily  enoxaparin, 40 mg, subcutaneous, q24h  folic acid, 1 mg, oral, Daily  hydrocortisone sodium succinate, 50 mg, intravenous, q6h  insulin lispro, 0-10 Units, subcutaneous, TID with meals  pantoprazole, 40 mg, intravenous, Daily before breakfast  pentoxifylline, 400 mg, oral, Daily  piperacillin-tazobactam, 4.5 g, intravenous, q6h  polyethylene glycol, 17 g, oral, Daily  potassium chloride, 40 mEq, oral, Once  thiamine, 500 mg, intravenous, Daily  vancomycin, 1 g, intravenous, q12h      Continuous medications  fentaNYL,  mcg/hr, Last Rate: 50 mcg/hr (04/06/24 0600)  norepinephrine, " 0.01-1 mcg/kg/min, Last Rate: 0.06 mcg/kg/min (04/06/24 0615)  oxygen,   propofol, 5-20 mcg/kg/min, Last Rate: 10 mcg/kg/min (04/06/24 0600)  vasopressin, 0.03 Units/min, Last Rate: 0.03 Units/min (04/06/24 0600)      PRN medications  PRN medications: dextrose, dextrose, glucagon, glucagon, ipratropium-albuteroL, oxygen, vancomycin    Results for orders placed or performed during the hospital encounter of 04/04/24 (from the past 24 hour(s))   POCT GLUCOSE   Result Value Ref Range    POCT Glucose 159 (H) 74 - 99 mg/dL   Blood Culture    Specimen: Arterial Line; Blood culture   Result Value Ref Range    Blood Culture Loaded on Instrument - Culture in progress    Hepatic Function Panel   Result Value Ref Range    Albumin 1.6 (L) 3.4 - 5.0 g/dL    Bilirubin, Total 1.0 0.0 - 1.2 mg/dL    Bilirubin, Direct 0.8 (H) 0.0 - 0.3 mg/dL    Alkaline Phosphatase 251 (H) 33 - 136 U/L     (H) 10 - 52 U/L     (H) 9 - 39 U/L    Total Protein 3.4 (L) 6.4 - 8.2 g/dL   Phosphorus   Result Value Ref Range    Phosphorus 3.1 2.5 - 4.9 mg/dL   Basic Metabolic Panel   Result Value Ref Range    Glucose 154 (H) 74 - 99 mg/dL    Sodium 149 (H) 136 - 145 mmol/L    Potassium 3.4 (L) 3.5 - 5.3 mmol/L    Chloride 114 (H) 98 - 107 mmol/L    Bicarbonate 29 21 - 32 mmol/L    Anion Gap 9 (L) 10 - 20 mmol/L    Urea Nitrogen 5 (L) 6 - 23 mg/dL    Creatinine 0.40 (L) 0.50 - 1.30 mg/dL    eGFR >90 >60 mL/min/1.73m*2    Calcium 6.9 (L) 8.6 - 10.6 mg/dL   Blood Gas Arterial Full Panel   Result Value Ref Range    POCT pH, Arterial 7.45 (H) 7.38 - 7.42 pH    POCT pCO2, Arterial 43 (H) 38 - 42 mm Hg    POCT pO2, Arterial 81 (L) 85 - 95 mm Hg    POCT SO2, Arterial 98 94 - 100 %    POCT Oxy Hemoglobin, Arterial 95.1 94.0 - 98.0 %    POCT Hematocrit Calculated, Arterial 35.0 (L) 41.0 - 52.0 %    POCT Sodium, Arterial 141 136 - 145 mmol/L    POCT Potassium, Arterial 3.4 (L) 3.5 - 5.3 mmol/L    POCT Chloride, Arterial 112 (H) 98 - 107 mmol/L    POCT  Ionized Calcium, Arterial 1.10 1.10 - 1.33 mmol/L    POCT Glucose, Arterial 163 (H) 74 - 99 mg/dL    POCT Lactate, Arterial 1.2 0.4 - 2.0 mmol/L    POCT Base Excess, Arterial 5.3 (H) -2.0 - 3.0 mmol/L    POCT HCO3 Calculated, Arterial 29.9 (H) 22.0 - 26.0 mmol/L    POCT Hemoglobin, Arterial 11.5 (L) 13.5 - 17.5 g/dL    POCT Anion Gap, Arterial 3 (L) 10 - 25 mmo/L    Patient Temperature 37.0 degrees Celsius    FiO2 40 %   MRSA Surveillance for Vancomycin De-escalation, PCR    Specimen: Anterior Nares; Swab   Result Value Ref Range    MRSA PCR Not Detected Not Detected   Prepare RBC: 2 Units   Result Value Ref Range    PRODUCT CODE L8008R38     Unit Number [id number]     Unit ABO O     Unit RH POS     XM INTEP COMP     Dispense Status XM     Blood Expiration Date April 29, 2024 23:59 EDT     PRODUCT BLOOD TYPE 5100     UNIT VOLUME 350     PRODUCT CODE U5668T46     Unit Number [id number]     Unit ABO O     Unit RH POS     XM INTEP COMP     Dispense Status XM     Blood Expiration Date April 26, 2024 23:59 EDT     PRODUCT BLOOD TYPE 5100     UNIT VOLUME 285    Blood Gas Arterial Full Panel Unsolicited   Result Value Ref Range    POCT pH, Arterial 7.35 (L) 7.38 - 7.42 pH    POCT pCO2, Arterial 54 (H) 38 - 42 mm Hg    POCT pO2, Arterial 311 (H) 85 - 95 mm Hg    POCT SO2, Arterial 100 94 - 100 %    POCT Oxy Hemoglobin, Arterial 98.2 (H) 94.0 - 98.0 %    POCT Hematocrit Calculated, Arterial 32.0 (L) 41.0 - 52.0 %    POCT Sodium, Arterial 142 136 - 145 mmol/L    POCT Potassium, Arterial 3.2 (L) 3.5 - 5.3 mmol/L    POCT Chloride, Arterial 112 (H) 98 - 107 mmol/L    POCT Ionized Calcium, Arterial 1.11 1.10 - 1.33 mmol/L    POCT Glucose, Arterial 188 (H) 74 - 99 mg/dL    POCT Lactate, Arterial 1.1 0.4 - 2.0 mmol/L    POCT Base Excess, Arterial 3.3 (H) -2.0 - 3.0 mmol/L    POCT HCO3 Calculated, Arterial 29.8 (H) 22.0 - 26.0 mmol/L    POCT Hemoglobin, Arterial 10.8 (L) 13.5 - 17.5 g/dL    POCT Anion Gap, Arterial 3 (L)  10 - 25 mmo/L    Patient Temperature 37.0 degrees Celsius    FiO2 100 %   Coox Panel, Arterial Unsolicited   Result Value Ref Range    POCT Hemoglobin, Arterial 10.8 (L) 13.5 - 17.5 g/dL    POCT Oxy Hemoglobin, Arterial 98.2 (H) 94.0 - 98.0 %    POCT Carboxyhemoglobin, Arterial 1.0 %    POCT Methemoglobin, Arterial 0.7 0.0 - 1.5 %    POCT Deoxy Hemoglobin, Arterial 0.1 0.0 - 5.0 %   POCT GLUCOSE   Result Value Ref Range    POCT Glucose 171 (H) 74 - 99 mg/dL   Vitamin D 25-Hydroxy,Total (for eval of Vitamin D levels)   Result Value Ref Range    Vitamin D, 25-Hydroxy, Total 18 (L) 30 - 100 ng/mL   Renal Function Panel   Result Value Ref Range    Glucose 232 (H) 74 - 99 mg/dL    Sodium 142 136 - 145 mmol/L    Potassium 3.4 (L) 3.5 - 5.3 mmol/L    Chloride 109 (H) 98 - 107 mmol/L    Bicarbonate 29 21 - 32 mmol/L    Anion Gap 7 (L) 10 - 20 mmol/L    Urea Nitrogen 6 6 - 23 mg/dL    Creatinine 0.38 (L) 0.50 - 1.30 mg/dL    eGFR >90 >60 mL/min/1.73m*2    Calcium 7.1 (L) 8.6 - 10.6 mg/dL    Phosphorus 2.0 (L) 2.5 - 4.9 mg/dL    Albumin 1.8 (L) 3.4 - 5.0 g/dL   CBC and Auto Differential   Result Value Ref Range    WBC 12.0 (H) 4.4 - 11.3 x10*3/uL    nRBC 0.0 0.0 - 0.0 /100 WBCs    RBC 2.52 (L) 4.50 - 5.90 x10*6/uL    Hemoglobin 8.0 (L) 13.5 - 17.5 g/dL    Hematocrit 23.1 (L) 41.0 - 52.0 %    MCV 92 80 - 100 fL    MCH 31.7 26.0 - 34.0 pg    MCHC 34.6 32.0 - 36.0 g/dL    RDW 17.2 (H) 11.5 - 14.5 %    Platelets 105 (L) 150 - 450 x10*3/uL    Neutrophils % 90.5 40.0 - 80.0 %    Immature Granulocytes %, Automated 1.6 (H) 0.0 - 0.9 %    Lymphocytes % 3.5 13.0 - 44.0 %    Monocytes % 4.3 2.0 - 10.0 %    Eosinophils % 0.0 0.0 - 6.0 %    Basophils % 0.1 0.0 - 2.0 %    Neutrophils Absolute 10.89 (H) 1.60 - 5.50 x10*3/uL    Immature Granulocytes Absolute, Automated 0.19 0.00 - 0.50 x10*3/uL    Lymphocytes Absolute 0.42 (L) 0.80 - 3.00 x10*3/uL    Monocytes Absolute 0.52 0.05 - 0.80 x10*3/uL    Eosinophils Absolute 0.00 0.00 - 0.40  x10*3/uL    Basophils Absolute 0.01 0.00 - 0.10 x10*3/uL   POCT GLUCOSE   Result Value Ref Range    POCT Glucose 202 (H) 74 - 99 mg/dL   POCT GLUCOSE   Result Value Ref Range    POCT Glucose 189 (H) 74 - 99 mg/dL   Vancomycin   Result Value Ref Range    Vancomycin 13.6 5.0 - 20.0 ug/mL   CBC and Auto Differential   Result Value Ref Range    WBC 11.6 (H) 4.4 - 11.3 x10*3/uL    nRBC 0.0 0.0 - 0.0 /100 WBCs    RBC 2.50 (L) 4.50 - 5.90 x10*6/uL    Hemoglobin 8.1 (L) 13.5 - 17.5 g/dL    Hematocrit 22.7 (L) 41.0 - 52.0 %    MCV 91 80 - 100 fL    MCH 32.4 26.0 - 34.0 pg    MCHC 35.7 32.0 - 36.0 g/dL    RDW 17.1 (H) 11.5 - 14.5 %    Platelets 106 (L) 150 - 450 x10*3/uL    Neutrophils % 91.0 40.0 - 80.0 %    Immature Granulocytes %, Automated 0.9 0.0 - 0.9 %    Lymphocytes % 2.8 13.0 - 44.0 %    Monocytes % 5.2 2.0 - 10.0 %    Eosinophils % 0.0 0.0 - 6.0 %    Basophils % 0.1 0.0 - 2.0 %    Neutrophils Absolute 10.58 (H) 1.60 - 5.50 x10*3/uL    Immature Granulocytes Absolute, Automated 0.10 0.00 - 0.50 x10*3/uL    Lymphocytes Absolute 0.32 (L) 0.80 - 3.00 x10*3/uL    Monocytes Absolute 0.61 0.05 - 0.80 x10*3/uL    Eosinophils Absolute 0.00 0.00 - 0.40 x10*3/uL    Basophils Absolute 0.01 0.00 - 0.10 x10*3/uL   Comprehensive Metabolic Panel   Result Value Ref Range    Glucose 199 (H) 74 - 99 mg/dL    Sodium 140 136 - 145 mmol/L    Potassium 4.5 3.5 - 5.3 mmol/L    Chloride 108 (H) 98 - 107 mmol/L    Bicarbonate 29 21 - 32 mmol/L    Anion Gap 8 (L) 10 - 20 mmol/L    Urea Nitrogen 7 6 - 23 mg/dL    Creatinine 0.40 (L) 0.50 - 1.30 mg/dL    eGFR >90 >60 mL/min/1.73m*2    Calcium 7.1 (L) 8.6 - 10.6 mg/dL    Albumin 1.8 (L) 3.4 - 5.0 g/dL    Alkaline Phosphatase 197 (H) 33 - 136 U/L    Total Protein 3.6 (L) 6.4 - 8.2 g/dL    AST 56 (H) 9 - 39 U/L    Bilirubin, Total 1.0 0.0 - 1.2 mg/dL    ALT 75 (H) 10 - 52 U/L   Magnesium   Result Value Ref Range    Magnesium 1.81 1.60 - 2.40 mg/dL   Phosphorus   Result Value Ref Range    Phosphorus  1.5 (L) 2.5 - 4.9 mg/dL     XR chest 1 view    Result Date: 4/5/2024  STUDY: XR CHEST 1 VIEW; XR ABDOMEN 1 VIEW;  4/5/2024 1:29 am   INDICATION: Signs/Symptoms:ETT placement s/p transport, pneumonia, pleural effusions; Signs/Symptoms:NGT placement.   COMPARISON: Chest x-ray 08/21/2023.   ACCESSION NUMBER(S): [id number]; [id number]   ORDERING CLINICIAN: ANGELA SALDAÑA   FINDINGS: AP radiograph of the chest was provided.   Endotracheal tube tip is approximately 5.2 cm above the westley. An enteric tube courses below the diaphragm with the tip projecting over stomach. Right IJ approach central venous catheter tip projects over lower SVC.   CARDIOMEDIASTINAL SILHOUETTE: Cardiomediastinal silhouette is normal in size and configuration.   LUNGS: There are airspace opacities in the bilateral perihilar regions extending to the bilateral lower lobes, left-greater-than-right. There is obscuration of the left costophrenic angle. No evidence of pneumothorax.   ABDOMEN: Nonobstructive bowel gas pattern.   BONES: No acute osseous changes.       1.  Airspace opacities in the bilateral perihilar regions extending to the left-greater-than-right lower lobes are concerning for multifocal pneumonia versus aspiration pneumonitis. Obscuration of the left costophrenic angle and left retrocardiac lung may represent a component of pleural effusion. 2. Medical devices as above.   I personally reviewed the images/study and I agree with the findings as stated by resident physician Dr. Evan Lai . This study was interpreted at [facility], [place], [place].   MACRO: None     Dictation workstation:   [id number]    XR abdomen 1 view    Result Date: 4/5/2024  STUDY: XR CHEST 1 VIEW; XR ABDOMEN 1 VIEW;  4/5/2024 1:29 am   INDICATION: Signs/Symptoms:ETT placement s/p transport, pneumonia, pleural effusions; Signs/Symptoms:NGT placement.   COMPARISON: Chest x-ray 08/21/2023.   ACCESSION NUMBER(S):  [id number]; [id number]   ORDERING CLINICIAN: ANGELA SALDAÑA   FINDINGS: AP radiograph of the chest was provided.   Endotracheal tube tip is approximately 5.2 cm above the westley. An enteric tube courses below the diaphragm with the tip projecting over stomach. Right IJ approach central venous catheter tip projects over lower SVC.   CARDIOMEDIASTINAL SILHOUETTE: Cardiomediastinal silhouette is normal in size and configuration.   LUNGS: There are airspace opacities in the bilateral perihilar regions extending to the bilateral lower lobes, left-greater-than-right. There is obscuration of the left costophrenic angle. No evidence of pneumothorax.   ABDOMEN: Nonobstructive bowel gas pattern.   BONES: No acute osseous changes.       1.  Airspace opacities in the bilateral perihilar regions extending to the left-greater-than-right lower lobes are concerning for multifocal pneumonia versus aspiration pneumonitis. Obscuration of the left costophrenic angle and left retrocardiac lung may represent a component of pleural effusion. 2. Medical devices as above.   I personally reviewed the images/study and I agree with the findings as stated by resident physician Dr. Evan Lai . This study was interpreted at [facility], [place], [place].   MACRO: None     Dictation workstation:   [id number]             Assessment/Plan   Principal Problem:    Pneumonia  Active Problems:    CAD (coronary artery disease)    CHF (congestive heart failure) (CMS/HCC)    HTN (hypertension)    Hyperlipidemia    Chronic obstructive pulmonary disease (CMS/HCC)    Peripheral vascular disease (CMS/HCC)    Gastroesophageal reflux disease    Anemia    Septicemia (CMS/HCC)    This is a 74-year-old male with past medical history of COPD, CAD, PAD who is presenting to the MICU from outside hospital for acute hypoxic respiratory failure secondary to acute community-acquired pneumonia and septic shock secondary to  community-acquired pneumonia versus colitis now requiring bilateral below the knee amputations due to severe peripheral arterial disease and increased pressor requirements.    Updates 4/6  - Removing CVC from outside hospital, placing new CVC in left IJ    Neurology  #Sedated  #Altered mental status  Patient noted to be altered at outside hospital, EEG showing borderline slowing consistent with toxic metabolic encephalopathy  patient got IV thiamine  Psychiatry was consulted at outside hospital for altered mental status and refusing medications, was endorsing that wife was conspiring against him and became agitated towards wife will try to get out of bed  -Continue sedation with propofol and fentanyl     #C/F right basal ganglia ischemia  CT head 4/5 nonacute  -NTD      Cardiovascular  #Undifferentiated shock, likely septic  TTE on 4/1/2024 showing EF 63% with mild mitral valve regurgitation mild tricuspid regurgitation otherwise within normal limits  -Maintain maps greater than 65 with pressor support  -Stress dose steroids    #CAD  #PAD  POD1 bilateral AKA/BKA amputation  -Vascular surgery consulted, appreciate recs  -Continue Lipitor  -Continue clopidogrel  -Continue pentoxifylline    #HTN  - Holding antihypertensives in setting of requiring pressors      Pulmonary   #COPD  Current everyday tobacco smoker  No pfts on file  -Continue mechanical ventilation     #CAP  #AHRF  #Pleural effusion  CT Chest 4/5 showing ground glass opacities and large bilateral pleural effusions  -Continue zosyn (4/5-*)  -Follow sputum culture      Gastrointestinal   #Elevated lfts  #Fatty infiltration of liver on CT abdomen  #Colitis  #GERD  #Concern for GI bleed  RUQ showing evidence of hepatic steatosis and edematous gallbladder due to volume overload  -Continue PPI  -Continue Vanco and Zosyn (4/5-*)      Renal   #Hypernatremia  -Continue to monitor       Heme/onc  #Chronic Normocytic Anemia  BL 8-9  Had receieved one unit prbc for  hgb 7.2  -Keep active type and screen  -Transfuse for hemoglobin less than 7    #Coagulopathy  INR 1.8, was 1.0 in August  Likely due to liver injury in the setting of heavy alcohol history versus sepsis  -Continue to monitor      Endocrine  #JALEEL      MSK/Rheum  #No active issues      Infectious Disease  #c/f septic shock  Azithromycin 500 mg daily (4/1 - 4/4)  Ceftriaxone (3/31 - 4/5)  Doxycycline (4/4 - 4/5)  Flagyl (3/25 - 4/5)  -Continue Vanco and Zosyn (4/5-*)  -Will remove central line as it was placed at outside hospital, will replace central line sterilely    F: prn  E: prn  N: npo  A: RIJ, A line, PIV  Drains: Tucker, NG  Bowel regimen: Miralax   Last bm: 4/5  Drips: Fent, prop, NE, vaso  DVT ppx: lovenox  GI ppx: PPI    Code Status: Full    LAYO RIVERA (Spouse)  [phone] (Mobile)         Debbie Emanuel MD       English

## 2024-04-06 NOTE — PROGRESS NOTES
Ry Sandhu is a 74 y.o. male on day 2 of admission presenting with Pneumonia.      Subjective   S/p bilateral guillotine amputations yesterday  Improving vasopressor requirements       Objective   Last Recorded Vitals  Heart Rate:  [60-82]   Temp:  [35.5 °C (95.9 °F)-36.3 °C (97.3 °F)]   Resp:  [16-20]   Weight:  [54.2 kg (119 lb 7.8 oz)-55.9 kg (123 lb 3.8 oz)]   SpO2:  [94 %-99 %]     Intake/Output last 3 Shifts:  I/O last 3 completed shifts:  In: 4281.6 (76.6 mL/kg) [I.V.:1021.6 (18.3 mL/kg); NG/GT:2110; IV Piggyback:1150]  Out: 980 (17.5 mL/kg) [Urine:780 (0.4 mL/kg/hr); Blood:200]  Weight: 55.9 kg     Intubated, sedated  RRR  Satting well on vent  Abdomen flat, soft, nontender  Palpable femoral pulses  L AKA and R BKA guillotine amputation stumps redressed; minimal ooze, no purulence or drainage    Relevant Results  Lab Results   Component Value Date    WBC 11.6 (H) 04/06/2024    HGB 8.1 (L) 04/06/2024    HCT 22.7 (L) 04/06/2024    MCV 91 04/06/2024     (L) 04/06/2024     Lab Results   Component Value Date    GLUCOSE 199 (H) 04/06/2024    CALCIUM 7.1 (L) 04/06/2024     04/06/2024    K 4.5 04/06/2024    CO2 29 04/06/2024     (H) 04/06/2024    BUN 7 04/06/2024    CREATININE 0.40 (L) 04/06/2024       Active Medications  Scheduled medications  atorvastatin, 40 mg, oral, Daily  clopidogrel, 75 mg, oral, Daily  enoxaparin, 40 mg, subcutaneous, q24h  folic acid, 1 mg, oral, Daily  hydrocortisone sodium succinate, 50 mg, intravenous, q6h  insulin lispro, 0-10 Units, subcutaneous, q4h  magnesium sulfate, 2 g, intravenous, Once  pantoprazole, 40 mg, intravenous, Daily before breakfast  pentoxifylline, 400 mg, oral, Daily  piperacillin-tazobactam, 4.5 g, intravenous, q6h  polyethylene glycol, 17 g, oral, Daily  potassium chloride, 40 mEq, oral, Once  thiamine, 500 mg, intravenous, Daily  vancomycin, 1 g, intravenous, q12h      Continuous medications  fentaNYL,  mcg/hr, Last Rate: 50 mcg/hr  (04/06/24 1053)  norepinephrine, 0.01-1 mcg/kg/min, Last Rate: 0.06 mcg/kg/min (04/06/24 0615)  oxygen,   propofol, 5-20 mcg/kg/min, Last Rate: 10 mcg/kg/min (04/06/24 0600)  vasopressin, 0.03 Units/min, Last Rate: Stopped (04/06/24 1000)      PRN medications  PRN medications: dextrose, dextrose, glucagon, glucagon, ipratropium-albuteroL, oxygen, vancomycin     Assessment/Plan   Principal Problem:    Pneumonia  Active Problems:    CAD (coronary artery disease)    CHF (congestive heart failure) (CMS/[facility])    HTN (hypertension)    Hyperlipidemia    Chronic obstructive pulmonary disease (CMS/[facility])    Peripheral vascular disease (CMS/[facility])    Gastroesophageal reflux disease    Anemia    Septicemia (CMS/HCC)    74M admitted for care of septic shock secondary to pneumonia; consequently developed ischemic necrosis of b/l LE in context of shock and pre-existing occlusive disease requiring bilateral major guillotine amputations.     Improving sepsis picture with downtrending pressors requirements, leukocytosis.    Recommendations:  - Vascular surgery will do daily changes of stump dresses for first few days, and then had off to nursing  - Abx and ongoing sepsis care per ICU  - No indication for plavix from vascular surgery perspective given major amputations (from my view, this was added by our team following SFA stenting in August 2023. Now patient has leg amputated, further use not of much benefit). Unless patient has other indication for plavix, ASA81 is acceptable for the purposes of secondary CVD prevention    Discussed with Dr. Imelda Veras MD  PGY5 - Integrated Vascular Surgery  [id number]

## 2024-04-07 ENCOUNTER — APPOINTMENT (OUTPATIENT)
Dept: RADIOLOGY | Facility: HOSPITAL | Age: 74
DRG: 853 | End: 2024-04-07
Payer: MEDICARE

## 2024-04-07 LAB
ALBUMIN SERPL BCP-MCNC: 1.8 G/DL (ref 3.4–5)
ALP SERPL-CCNC: 351 U/L (ref 33–136)
ALT SERPL W P-5'-P-CCNC: 57 U/L (ref 10–52)
ANION GAP BLDA CALCULATED.4IONS-SCNC: 3 MMO/L (ref 10–25)
ANION GAP SERPL CALC-SCNC: 7 MMOL/L (ref 10–20)
AST SERPL W P-5'-P-CCNC: 39 U/L (ref 9–39)
BASE EXCESS BLDA CALC-SCNC: 5.9 MMOL/L (ref -2–3)
BASOPHILS # BLD AUTO: 0.01 X10*3/UL (ref 0–0.1)
BASOPHILS NFR BLD AUTO: 0.1 %
BILIRUB SERPL-MCNC: 0.6 MG/DL (ref 0–1.2)
BODY TEMPERATURE: 37 DEGREES CELSIUS
BUN SERPL-MCNC: 11 MG/DL (ref 6–23)
CA-I BLDA-SCNC: 1.1 MMOL/L (ref 1.1–1.33)
CALCIUM SERPL-MCNC: 6.8 MG/DL (ref 8.6–10.6)
CHLORIDE BLDA-SCNC: 103 MMOL/L (ref 98–107)
CHLORIDE SERPL-SCNC: 103 MMOL/L (ref 98–107)
CO2 SERPL-SCNC: 30 MMOL/L (ref 21–32)
CREAT SERPL-MCNC: 0.35 MG/DL (ref 0.5–1.3)
EGFRCR SERPLBLD CKD-EPI 2021: >90 ML/MIN/1.73M*2
EOSINOPHIL # BLD AUTO: 0 X10*3/UL (ref 0–0.4)
EOSINOPHIL NFR BLD AUTO: 0 %
ERYTHROCYTE [DISTWIDTH] IN BLOOD BY AUTOMATED COUNT: 16.5 % (ref 11.5–14.5)
ERYTHROCYTE [DISTWIDTH] IN BLOOD BY AUTOMATED COUNT: 17.2 % (ref 11.5–14.5)
GLUCOSE BLD MANUAL STRIP-MCNC: 121 MG/DL (ref 74–99)
GLUCOSE BLD MANUAL STRIP-MCNC: 132 MG/DL (ref 74–99)
GLUCOSE BLD MANUAL STRIP-MCNC: 150 MG/DL (ref 74–99)
GLUCOSE BLD MANUAL STRIP-MCNC: 153 MG/DL (ref 74–99)
GLUCOSE BLD MANUAL STRIP-MCNC: 169 MG/DL (ref 74–99)
GLUCOSE BLD MANUAL STRIP-MCNC: 197 MG/DL (ref 74–99)
GLUCOSE BLDA-MCNC: 144 MG/DL (ref 74–99)
GLUCOSE SERPL-MCNC: 149 MG/DL (ref 74–99)
HCO3 BLDA-SCNC: 30.6 MMOL/L (ref 22–26)
HCT VFR BLD AUTO: 21.5 % (ref 41–52)
HCT VFR BLD AUTO: 24 % (ref 41–52)
HCT VFR BLD EST: 23 % (ref 41–52)
HGB BLD-MCNC: 7.3 G/DL (ref 13.5–17.5)
HGB BLD-MCNC: 7.5 G/DL (ref 13.5–17.5)
HGB BLDA-MCNC: 7.5 G/DL (ref 13.5–17.5)
IMM GRANULOCYTES # BLD AUTO: 0.1 X10*3/UL (ref 0–0.5)
IMM GRANULOCYTES NFR BLD AUTO: 0.9 % (ref 0–0.9)
INHALED O2 CONCENTRATION: 40 %
LACTATE BLDA-SCNC: 1 MMOL/L (ref 0.4–2)
LYMPHOCYTES # BLD AUTO: 0.29 X10*3/UL (ref 0.8–3)
LYMPHOCYTES NFR BLD AUTO: 2.7 %
MAGNESIUM SERPL-MCNC: 1.84 MG/DL (ref 1.6–2.4)
MCH RBC QN AUTO: 30.9 PG (ref 26–34)
MCH RBC QN AUTO: 32.3 PG (ref 26–34)
MCHC RBC AUTO-ENTMCNC: 30.4 G/DL (ref 32–36)
MCHC RBC AUTO-ENTMCNC: 34.9 G/DL (ref 32–36)
MCV RBC AUTO: 102 FL (ref 80–100)
MCV RBC AUTO: 93 FL (ref 80–100)
MONOCYTES # BLD AUTO: 0.61 X10*3/UL (ref 0.05–0.8)
MONOCYTES NFR BLD AUTO: 5.7 %
NEUTROPHILS # BLD AUTO: 9.61 X10*3/UL (ref 1.6–5.5)
NEUTROPHILS NFR BLD AUTO: 90.6 %
NRBC BLD-RTO: 0 /100 WBCS (ref 0–0)
NRBC BLD-RTO: 0 /100 WBCS (ref 0–0)
OXYHGB MFR BLDA: 95.3 % (ref 94–98)
PCO2 BLDA: 45 MM HG (ref 38–42)
PH BLDA: 7.44 PH (ref 7.38–7.42)
PHOSPHATE SERPL-MCNC: 2.4 MG/DL (ref 2.5–4.9)
PLATELET # BLD AUTO: 120 X10*3/UL (ref 150–450)
PLATELET # BLD AUTO: 92 X10*3/UL (ref 150–450)
PO2 BLDA: 80 MM HG (ref 85–95)
POTASSIUM BLDA-SCNC: 4.2 MMOL/L (ref 3.5–5.3)
POTASSIUM SERPL-SCNC: 4.1 MMOL/L (ref 3.5–5.3)
PROT SERPL-MCNC: 3.7 G/DL (ref 6.4–8.2)
RBC # BLD AUTO: 2.32 X10*6/UL (ref 4.5–5.9)
RBC # BLD AUTO: 2.36 X10*6/UL (ref 4.5–5.9)
SAO2 % BLDA: 98 % (ref 94–100)
SODIUM BLDA-SCNC: 132 MMOL/L (ref 136–145)
SODIUM SERPL-SCNC: 136 MMOL/L (ref 136–145)
WBC # BLD AUTO: 10.6 X10*3/UL (ref 4.4–11.3)
WBC # BLD AUTO: 12 X10*3/UL (ref 4.4–11.3)

## 2024-04-07 PROCEDURE — 85025 COMPLETE CBC W/AUTO DIFF WBC: CPT

## 2024-04-07 PROCEDURE — C9113 INJ PANTOPRAZOLE SODIUM, VIA: HCPCS

## 2024-04-07 PROCEDURE — 71045 X-RAY EXAM CHEST 1 VIEW: CPT | Performed by: RADIOLOGY

## 2024-04-07 PROCEDURE — 82947 ASSAY GLUCOSE BLOOD QUANT: CPT | Mod: MUE

## 2024-04-07 PROCEDURE — 2500000004 HC RX 250 GENERAL PHARMACY W/ HCPCS (ALT 636 FOR OP/ED)

## 2024-04-07 PROCEDURE — 2500000001 HC RX 250 WO HCPCS SELF ADMINISTERED DRUGS (ALT 637 FOR MEDICARE OP)

## 2024-04-07 PROCEDURE — 2500000004 HC RX 250 GENERAL PHARMACY W/ HCPCS (ALT 636 FOR OP/ED): Performed by: STUDENT IN AN ORGANIZED HEALTH CARE EDUCATION/TRAINING PROGRAM

## 2024-04-07 PROCEDURE — 84100 ASSAY OF PHOSPHORUS: CPT

## 2024-04-07 PROCEDURE — 37799 UNLISTED PX VASCULAR SURGERY: CPT | Performed by: STUDENT IN AN ORGANIZED HEALTH CARE EDUCATION/TRAINING PROGRAM

## 2024-04-07 PROCEDURE — 94003 VENT MGMT INPAT SUBQ DAY: CPT

## 2024-04-07 PROCEDURE — 83735 ASSAY OF MAGNESIUM: CPT

## 2024-04-07 PROCEDURE — 84132 ASSAY OF SERUM POTASSIUM: CPT

## 2024-04-07 PROCEDURE — 84075 ASSAY ALKALINE PHOSPHATASE: CPT

## 2024-04-07 PROCEDURE — 37799 UNLISTED PX VASCULAR SURGERY: CPT

## 2024-04-07 PROCEDURE — 2020000001 HC ICU ROOM DAILY

## 2024-04-07 PROCEDURE — 71045 X-RAY EXAM CHEST 1 VIEW: CPT

## 2024-04-07 PROCEDURE — 85027 COMPLETE CBC AUTOMATED: CPT | Performed by: STUDENT IN AN ORGANIZED HEALTH CARE EDUCATION/TRAINING PROGRAM

## 2024-04-07 RX ORDER — SILVER NITRATE 38.21; 12.74 MG/1; MG/1
STICK TOPICAL ONCE
Status: COMPLETED | OUTPATIENT
Start: 2024-04-07 | End: 2024-04-07

## 2024-04-07 RX ORDER — SILVER NITRATE 38.21; 12.74 MG/1; MG/1
STICK TOPICAL 2 TIMES DAILY PRN
Status: DISCONTINUED | OUTPATIENT
Start: 2024-04-07 | End: 2024-04-30 | Stop reason: HOSPADM

## 2024-04-07 RX ADMIN — HYDROCORTISONE SODIUM SUCCINATE 50 MG: 100 INJECTION, POWDER, FOR SOLUTION INTRAMUSCULAR; INTRAVENOUS at 13:33

## 2024-04-07 RX ADMIN — SILVER NITRATE APPLICATORS 1 APPLICATOR: 25; 75 STICK TOPICAL at 20:51

## 2024-04-07 RX ADMIN — HYDROCORTISONE SODIUM SUCCINATE 50 MG: 100 INJECTION, POWDER, FOR SOLUTION INTRAMUSCULAR; INTRAVENOUS at 00:16

## 2024-04-07 RX ADMIN — THIAMINE HYDROCHLORIDE 500 MG: 100 INJECTION, SOLUTION INTRAMUSCULAR; INTRAVENOUS at 08:39

## 2024-04-07 RX ADMIN — FOLIC ACID 1 MG: 1 TABLET ORAL at 08:39

## 2024-04-07 RX ADMIN — PIPERACILLIN SODIUM AND TAZOBACTAM SODIUM 4.5 G: 4; .5 INJECTION, SOLUTION INTRAVENOUS at 16:26

## 2024-04-07 RX ADMIN — HYDROCORTISONE SODIUM SUCCINATE 50 MG: 100 INJECTION, POWDER, FOR SOLUTION INTRAMUSCULAR; INTRAVENOUS at 21:12

## 2024-04-07 RX ADMIN — CLOPIDOGREL BISULFATE 75 MG: 75 TABLET ORAL at 08:38

## 2024-04-07 RX ADMIN — HYDROCORTISONE SODIUM SUCCINATE 50 MG: 100 INJECTION, POWDER, FOR SOLUTION INTRAMUSCULAR; INTRAVENOUS at 05:43

## 2024-04-07 RX ADMIN — PIPERACILLIN SODIUM AND TAZOBACTAM SODIUM 4.5 G: 4; .5 INJECTION, SOLUTION INTRAVENOUS at 04:57

## 2024-04-07 RX ADMIN — INSULIN LISPRO 2 UNITS: 100 INJECTION, SOLUTION INTRAVENOUS; SUBCUTANEOUS at 23:13

## 2024-04-07 RX ADMIN — VANCOMYCIN HYDROCHLORIDE 1 G: 1 INJECTION, SOLUTION INTRAVENOUS at 22:07

## 2024-04-07 RX ADMIN — PROPOFOL 10 MCG/KG/MIN: 10 INJECTION, EMULSION INTRAVENOUS at 16:13

## 2024-04-07 RX ADMIN — PIPERACILLIN SODIUM AND TAZOBACTAM SODIUM 4.5 G: 4; .5 INJECTION, SOLUTION INTRAVENOUS at 12:02

## 2024-04-07 RX ADMIN — INSULIN LISPRO 2 UNITS: 100 INJECTION, SOLUTION INTRAVENOUS; SUBCUTANEOUS at 00:16

## 2024-04-07 RX ADMIN — PANTOPRAZOLE SODIUM 40 MG: 40 INJECTION, POWDER, FOR SOLUTION INTRAVENOUS at 08:38

## 2024-04-07 RX ADMIN — ATORVASTATIN CALCIUM 40 MG: 40 TABLET, FILM COATED ORAL at 08:39

## 2024-04-07 RX ADMIN — INSULIN LISPRO 2 UNITS: 100 INJECTION, SOLUTION INTRAVENOUS; SUBCUTANEOUS at 03:47

## 2024-04-07 RX ADMIN — VANCOMYCIN HYDROCHLORIDE 1 G: 1 INJECTION, SOLUTION INTRAVENOUS at 10:26

## 2024-04-07 RX ADMIN — Medication 50 MCG/HR: at 06:31

## 2024-04-07 RX ADMIN — PIPERACILLIN SODIUM AND TAZOBACTAM SODIUM 4.5 G: 4; .5 INJECTION, SOLUTION INTRAVENOUS at 23:14

## 2024-04-07 RX ADMIN — ENOXAPARIN SODIUM 40 MG: 100 INJECTION SUBCUTANEOUS at 00:52

## 2024-04-07 RX ADMIN — VASOPRESSIN 0.03 UNITS/MIN: 0.2 INJECTION INTRAVENOUS at 02:03

## 2024-04-07 RX ADMIN — INSULIN LISPRO 2 UNITS: 100 INJECTION, SOLUTION INTRAVENOUS; SUBCUTANEOUS at 12:02

## 2024-04-07 ASSESSMENT — PAIN - FUNCTIONAL ASSESSMENT
PAIN_FUNCTIONAL_ASSESSMENT: CPOT (CRITICAL CARE PAIN OBSERVATION TOOL)

## 2024-04-07 NOTE — CARE PLAN
Problem: Safety - Medical Restraint  Goal: Free from restraint(s) (Restraint for Interference with Medical Device)  Outcome: Not Progressing  Flowsheets (Taken 4/7/2024 0743)  Free from restraint(s) (restraint for interference with medical device): ONCE/SHIFT or MINIMUM Every 12 hours: Assess and document the continuing need for restraints     Problem: Safety - Medical Restraint  Goal: Remains free of injury from restraints (Restraint for Interference with Medical Device)  Outcome: Progressing     Problem: Skin  Goal: Participates in plan/prevention/treatment measures  Outcome: Progressing  Flowsheets (Taken 4/7/2024 0743)  Participates in plan/prevention/treatment measures: Elevate heels  Goal: Prevent/manage excess moisture  Outcome: Progressing  Flowsheets (Taken 4/7/2024 0743)  Prevent/manage excess moisture:   Monitor for/manage infection if present   Cleanse incontinence/protect with barrier cream  Goal: Promote skin healing  Outcome: Progressing  Flowsheets (Taken 4/7/2024 0743)  Promote skin healing:   Turn/reposition every 2 hours/use positioning/transfer devices   Assess skin/pad under line(s)/device(s)   Protective dressings over bony prominences   Rotate device position/do not position patient on device

## 2024-04-07 NOTE — PROGRESS NOTES
"Ry Sandhu is a 74 y.o. male on day 3 of admission presenting with Pneumonia.    Subjective   No acute events overnight. Patient opened eyes to commands but, otherwise did not respond appropriately to other commands. SAT and SBT were stopped after patient was noted to be tachypneic and was found with copious secretions.     Objective     Physical Exam  Constitutional:       Comments: Remains sedated and intubated   HENT:      Head: Normocephalic.      Nose: Nose normal.   Cardiovascular:      Rate and Rhythm: Normal rate and regular rhythm.   Pulmonary:      Comments: Mechanically ventilated, mechanical breath sounds auscultated.  Abdominal:      General: Abdomen is flat. Bowel sounds are normal.      Palpations: Abdomen is soft.   Musculoskeletal:      Cervical back: Normal range of motion.      Comments: Lower extremities are dusky, poorly palpable posterior tibial pulses bilaterally, no palpable dorsalis pedis pulses   Neurological:      Comments: sedated         Last Recorded Vitals  Blood pressure 94/57, pulse 79, temperature 36.1 °C (97 °F), temperature source Temporal, resp. rate 12, height 1.88 m (6' 2\"), weight 57.2 kg (126 lb 1.7 oz), SpO2 93 %.  Intake/Output last 3 Shifts:  I/O last 3 completed shifts:  In: 5218.4 (91.2 mL/kg) [I.V.:903.4 (15.8 mL/kg); NG/GT:2765; IV Piggyback:1550]  Out: 696 (12.2 mL/kg) [Urine:696 (0.3 mL/kg/hr)]  Weight: 57.2 kg     Relevant Results  Scheduled medications  atorvastatin, 40 mg, oral, Daily  clopidogrel, 75 mg, oral, Daily  enoxaparin, 40 mg, subcutaneous, q24h  folic acid, 1 mg, oral, Daily  hydrocortisone sodium succinate, 50 mg, intravenous, q8h  insulin lispro, 0-10 Units, subcutaneous, q4h  pantoprazole, 40 mg, intravenous, Daily before breakfast  pentoxifylline, 400 mg, oral, Daily  piperacillin-tazobactam, 4.5 g, intravenous, q6h  polyethylene glycol, 17 g, oral, Daily  potassium chloride, 40 mEq, oral, Once  vancomycin, 1 g, intravenous, " q12h      Continuous medications  fentaNYL,  mcg/hr, Last Rate: 50 mcg/hr (04/07/24 0930)  norepinephrine, 0.01-1 mcg/kg/min, Last Rate: 0.04 mcg/kg/min (04/07/24 0939)  oxygen,   propofol, 5-20 mcg/kg/min, Last Rate: 5 mcg/kg/min (04/07/24 0939)  vasopressin, 0.03 Units/min, Last Rate: 0.03 Units/min (04/07/24 0600)      PRN medications  PRN medications: dextrose, dextrose, glucagon, glucagon, ipratropium-albuteroL, oxygen, vancomycin    Results for orders placed or performed during the hospital encounter of 04/04/24 (from the past 24 hour(s))   POCT GLUCOSE   Result Value Ref Range    POCT Glucose 173 (H) 74 - 99 mg/dL   Blood Gas Arterial Full Panel   Result Value Ref Range    POCT pH, Arterial 7.44 (H) 7.38 - 7.42 pH    POCT pCO2, Arterial 43 (H) 38 - 42 mm Hg    POCT pO2, Arterial 96 (H) 85 - 95 mm Hg    POCT SO2, Arterial 99 94 - 100 %    POCT Oxy Hemoglobin, Arterial 96.6 94.0 - 98.0 %    POCT Hematocrit Calculated, Arterial 30.0 (L) 41.0 - 52.0 %    POCT Sodium, Arterial 132 (L) 136 - 145 mmol/L    POCT Potassium, Arterial 4.2 3.5 - 5.3 mmol/L    POCT Chloride, Arterial 102 98 - 107 mmol/L    POCT Ionized Calcium, Arterial 1.12 1.10 - 1.33 mmol/L    POCT Glucose, Arterial 126 (H) 74 - 99 mg/dL    POCT Lactate, Arterial 1.2 0.4 - 2.0 mmol/L    POCT Base Excess, Arterial 4.5 (H) -2.0 - 3.0 mmol/L    POCT HCO3 Calculated, Arterial 29.2 (H) 22.0 - 26.0 mmol/L    POCT Hemoglobin, Arterial 10.0 (L) 13.5 - 17.5 g/dL    POCT Anion Gap, Arterial 5 (L) 10 - 25 mmo/L    Patient Temperature 37.0 degrees Celsius    FiO2 40 %   CBC and Auto Differential   Result Value Ref Range    WBC 12.4 (H) 4.4 - 11.3 x10*3/uL    nRBC 0.0 0.0 - 0.0 /100 WBCs    RBC 2.56 (L) 4.50 - 5.90 x10*6/uL    Hemoglobin 8.1 (L) 13.5 - 17.5 g/dL    Hematocrit 24.1 (L) 41.0 - 52.0 %    MCV 94 80 - 100 fL    MCH 31.6 26.0 - 34.0 pg    MCHC 33.6 32.0 - 36.0 g/dL    RDW 17.0 (H) 11.5 - 14.5 %    Platelets 100 (L) 150 - 450 x10*3/uL    Neutrophils  % 91.1 40.0 - 80.0 %    Immature Granulocytes %, Automated 0.9 0.0 - 0.9 %    Lymphocytes % 3.0 13.0 - 44.0 %    Monocytes % 4.9 2.0 - 10.0 %    Eosinophils % 0.0 0.0 - 6.0 %    Basophils % 0.1 0.0 - 2.0 %    Neutrophils Absolute 11.29 (H) 1.60 - 5.50 x10*3/uL    Immature Granulocytes Absolute, Automated 0.11 0.00 - 0.50 x10*3/uL    Lymphocytes Absolute 0.37 (L) 0.80 - 3.00 x10*3/uL    Monocytes Absolute 0.61 0.05 - 0.80 x10*3/uL    Eosinophils Absolute 0.00 0.00 - 0.40 x10*3/uL    Basophils Absolute 0.01 0.00 - 0.10 x10*3/uL   Coagulation Screen   Result Value Ref Range    Protime 12.2 9.8 - 12.8 seconds    INR 1.1 0.9 - 1.1    aPTT 36 27 - 38 seconds   Renal function panel   Result Value Ref Range    Glucose 129 (H) 74 - 99 mg/dL    Sodium 137 136 - 145 mmol/L    Potassium 4.2 3.5 - 5.3 mmol/L    Chloride 104 98 - 107 mmol/L    Bicarbonate 29 21 - 32 mmol/L    Anion Gap 8 (L) 10 - 20 mmol/L    Urea Nitrogen 8 6 - 23 mg/dL    Creatinine 0.41 (L) 0.50 - 1.30 mg/dL    eGFR >90 >60 mL/min/1.73m*2    Calcium 6.9 (L) 8.6 - 10.6 mg/dL    Phosphorus 1.3 (L) 2.5 - 4.9 mg/dL    Albumin 1.7 (L) 3.4 - 5.0 g/dL   POCT GLUCOSE   Result Value Ref Range    POCT Glucose 122 (H) 74 - 99 mg/dL   POCT GLUCOSE   Result Value Ref Range    POCT Glucose 153 (H) 74 - 99 mg/dL   POCT GLUCOSE   Result Value Ref Range    POCT Glucose 175 (H) 74 - 99 mg/dL   CBC and Auto Differential   Result Value Ref Range    WBC 10.6 4.4 - 11.3 x10*3/uL    nRBC 0.0 0.0 - 0.0 /100 WBCs    RBC 2.36 (L) 4.50 - 5.90 x10*6/uL    Hemoglobin 7.3 (L) 13.5 - 17.5 g/dL    Hematocrit 24.0 (L) 41.0 - 52.0 %     (H) 80 - 100 fL    MCH 30.9 26.0 - 34.0 pg    MCHC 30.4 (L) 32.0 - 36.0 g/dL    RDW 17.2 (H) 11.5 - 14.5 %    Platelets 92 (L) 150 - 450 x10*3/uL    Neutrophils % 90.6 40.0 - 80.0 %    Immature Granulocytes %, Automated 0.9 0.0 - 0.9 %    Lymphocytes % 2.7 13.0 - 44.0 %    Monocytes % 5.7 2.0 - 10.0 %    Eosinophils % 0.0 0.0 - 6.0 %    Basophils % 0.1  0.0 - 2.0 %    Neutrophils Absolute 9.61 (H) 1.60 - 5.50 x10*3/uL    Immature Granulocytes Absolute, Automated 0.10 0.00 - 0.50 x10*3/uL    Lymphocytes Absolute 0.29 (L) 0.80 - 3.00 x10*3/uL    Monocytes Absolute 0.61 0.05 - 0.80 x10*3/uL    Eosinophils Absolute 0.00 0.00 - 0.40 x10*3/uL    Basophils Absolute 0.01 0.00 - 0.10 x10*3/uL   Comprehensive Metabolic Panel   Result Value Ref Range    Glucose 149 (H) 74 - 99 mg/dL    Sodium 136 136 - 145 mmol/L    Potassium 4.1 3.5 - 5.3 mmol/L    Chloride 103 98 - 107 mmol/L    Bicarbonate 30 21 - 32 mmol/L    Anion Gap 7 (L) 10 - 20 mmol/L    Urea Nitrogen 11 6 - 23 mg/dL    Creatinine 0.35 (L) 0.50 - 1.30 mg/dL    eGFR >90 >60 mL/min/1.73m*2    Calcium 6.8 (L) 8.6 - 10.6 mg/dL    Albumin 1.8 (L) 3.4 - 5.0 g/dL    Alkaline Phosphatase 351 (H) 33 - 136 U/L    Total Protein 3.7 (L) 6.4 - 8.2 g/dL    AST 39 9 - 39 U/L    Bilirubin, Total 0.6 0.0 - 1.2 mg/dL    ALT 57 (H) 10 - 52 U/L   Magnesium   Result Value Ref Range    Magnesium 1.84 1.60 - 2.40 mg/dL   Phosphorus   Result Value Ref Range    Phosphorus 2.4 (L) 2.5 - 4.9 mg/dL   Blood Gas Arterial Full Panel   Result Value Ref Range    POCT pH, Arterial 7.44 (H) 7.38 - 7.42 pH    POCT pCO2, Arterial 45 (H) 38 - 42 mm Hg    POCT pO2, Arterial 80 (L) 85 - 95 mm Hg    POCT SO2, Arterial 98 94 - 100 %    POCT Oxy Hemoglobin, Arterial 95.3 94.0 - 98.0 %    POCT Hematocrit Calculated, Arterial 23.0 (L) 41.0 - 52.0 %    POCT Sodium, Arterial 132 (L) 136 - 145 mmol/L    POCT Potassium, Arterial 4.2 3.5 - 5.3 mmol/L    POCT Chloride, Arterial 103 98 - 107 mmol/L    POCT Ionized Calcium, Arterial 1.10 1.10 - 1.33 mmol/L    POCT Glucose, Arterial 144 (H) 74 - 99 mg/dL    POCT Lactate, Arterial 1.0 0.4 - 2.0 mmol/L    POCT Base Excess, Arterial 5.9 (H) -2.0 - 3.0 mmol/L    POCT HCO3 Calculated, Arterial 30.6 (H) 22.0 - 26.0 mmol/L    POCT Hemoglobin, Arterial 7.5 (L) 13.5 - 17.5 g/dL    POCT Anion Gap, Arterial 3 (L) 10 - 25 mmo/L     Patient Temperature 37.0 degrees Celsius    FiO2 40 %   POCT GLUCOSE   Result Value Ref Range    POCT Glucose 153 (H) 74 - 99 mg/dL   POCT GLUCOSE   Result Value Ref Range    POCT Glucose 150 (H) 74 - 99 mg/dL     XR chest 1 view    Result Date: 4/5/2024  STUDY: XR CHEST 1 VIEW; XR ABDOMEN 1 VIEW;  4/5/2024 1:29 am   INDICATION: Signs/Symptoms:ETT placement s/p transport, pneumonia, pleural effusions; Signs/Symptoms:NGT placement.   COMPARISON: Chest x-ray 08/21/2023.   ACCESSION NUMBER(S): [id number]; [id number]   ORDERING CLINICIAN: ANGELA SALDAÑA   FINDINGS: AP radiograph of the chest was provided.   Endotracheal tube tip is approximately 5.2 cm above the westley. An enteric tube courses below the diaphragm with the tip projecting over stomach. Right IJ approach central venous catheter tip projects over lower SVC.   CARDIOMEDIASTINAL SILHOUETTE: Cardiomediastinal silhouette is normal in size and configuration.   LUNGS: There are airspace opacities in the bilateral perihilar regions extending to the bilateral lower lobes, left-greater-than-right. There is obscuration of the left costophrenic angle. No evidence of pneumothorax.   ABDOMEN: Nonobstructive bowel gas pattern.   BONES: No acute osseous changes.       1.  Airspace opacities in the bilateral perihilar regions extending to the left-greater-than-right lower lobes are concerning for multifocal pneumonia versus aspiration pneumonitis. Obscuration of the left costophrenic angle and left retrocardiac lung may represent a component of pleural effusion. 2. Medical devices as above.   I personally reviewed the images/study and I agree with the findings as stated by resident physician Dr. Evan Lai . This study was interpreted at [facility], [place], [place].   MACRO: None     Dictation workstation:   [id number]    XR abdomen 1 view    Result Date: 4/5/2024  STUDY: XR CHEST 1 VIEW; XR ABDOMEN 1 VIEW;  4/5/2024 1:29 am    INDICATION: Signs/Symptoms:ETT placement s/p transport, pneumonia, pleural effusions; Signs/Symptoms:NGT placement.   COMPARISON: Chest x-ray 08/21/2023.   ACCESSION NUMBER(S): [id number]; [id number]   ORDERING CLINICIAN: ANGELA SALDAÑA   FINDINGS: AP radiograph of the chest was provided.   Endotracheal tube tip is approximately 5.2 cm above the westley. An enteric tube courses below the diaphragm with the tip projecting over stomach. Right IJ approach central venous catheter tip projects over lower SVC.   CARDIOMEDIASTINAL SILHOUETTE: Cardiomediastinal silhouette is normal in size and configuration.   LUNGS: There are airspace opacities in the bilateral perihilar regions extending to the bilateral lower lobes, left-greater-than-right. There is obscuration of the left costophrenic angle. No evidence of pneumothorax.   ABDOMEN: Nonobstructive bowel gas pattern.   BONES: No acute osseous changes.       1.  Airspace opacities in the bilateral perihilar regions extending to the left-greater-than-right lower lobes are concerning for multifocal pneumonia versus aspiration pneumonitis. Obscuration of the left costophrenic angle and left retrocardiac lung may represent a component of pleural effusion. 2. Medical devices as above.   I personally reviewed the images/study and I agree with the findings as stated by resident physician Dr. Evan Lai . This study was interpreted at [facility], [place], [place].   MACRO: None     Dictation workstation:   [id number]             Assessment/Plan   Principal Problem:    Pneumonia  Active Problems:    CAD (coronary artery disease)    CHF (congestive heart failure) (CMS/HCC)    HTN (hypertension)    Hyperlipidemia    Chronic obstructive pulmonary disease (CMS/HCC)    Peripheral vascular disease (CMS/HCC)    Gastroesophageal reflux disease    Anemia    Septicemia (CMS/HCC)    This is a 74-year-old male with past medical history of COPD,  CAD, PAD who is presenting to the MICU from outside hospital for acute hypoxic respiratory failure secondary to acute community-acquired pneumonia and septic shock secondary to community-acquired pneumonia versus colitis now requiring bilateral below the knee amputations due to severe peripheral arterial disease and increased pressor requirements.    Updates 4/7:  -Weaning Vasopressin today  -Decreased SDS to 50 q8h   -Failed SBT today due to secretions, will re-attempt tomorrow   -Will consider diuresis if UOP is minimal tomorrow       Neurology  #Sedated  #Altered mental status  Patient noted to be altered at outside hospital, EEG showing borderline slowing consistent with toxic metabolic encephalopathy  patient got IV thiamine  Psychiatry was consulted at outside hospital for altered mental status and refusing medications, was endorsing that wife was conspiring against him and became agitated towards wife will try to get out of bed  -Continue sedation with propofol and fentanyl     #C/F right basal ganglia ischemia  CT head 4/5 nonacute  -NTD      Cardiovascular  #Undifferentiated shock, likely septic  TTE on 4/1/2024 showing EF 63% with mild mitral valve regurgitation mild tricuspid regurgitation otherwise within normal limits  -Maintain maps greater than 65 with pressor support  -Stress dose steroids    #CAD  #PAD  POD1 bilateral AKA/BKA amputation  -Vascular surgery consulted, appreciate recs  -Continue Lipitor  -Continue clopidogrel  -Continue pentoxifylline    #HTN  - Holding antihypertensives in setting of requiring pressors      Pulmonary   #COPD  Current everyday tobacco smoker  No pfts on file  -Continue mechanical ventilation     #CAP  #AHRF  #Pleural effusion  CT Chest 4/5 showing ground glass opacities and large bilateral pleural effusions  -Continue zosyn (4/5-*)  -Follow sputum culture      Gastrointestinal   #Elevated lfts  #Fatty infiltration of liver on CT abdomen  #Colitis  #GERD  #Concern for  GI bleed  RUQ showing evidence of hepatic steatosis and edematous gallbladder due to volume overload  -Continue PPI  -Continue Vanco and Zosyn (4/5-*)  -Discuss narrowing antibiotics after sputum cx back     Renal   #Hypernatremia  -Continue to monitor       Heme/onc  #Chronic Normocytic Anemia  BL 8-9  Had receieved one unit prbc for hgb 7.2  -Keep active type and screen  -Transfuse for hemoglobin less than 7    #Coagulopathy  INR 1.8, was 1.0 in August  Likely due to liver injury in the setting of heavy alcohol history versus sepsis  -Continue to monitor      Endocrine  #JALEEL      MSK/Rheum  #No active issues      Infectious Disease  #c/f septic shock  Azithromycin 500 mg daily (4/1 - 4/4)  Ceftriaxone (3/31 - 4/5)  Doxycycline (4/4 - 4/5)  Flagyl (3/25 - 4/5)  -Continue Vanco and Zosyn (4/5-*)      F: prn  E: prn  N: npo  A: RIJ, A line, PIV  Drains: Tucker, NG  Bowel regimen: Miralax   Last bm: 4/5  Drips: Fent, prop, NE, vaso  DVT ppx: lovenox  GI ppx: PPI    Code Status: Full    RIVERALAYO (Spouse)  [phone] ([place])         Sachin Crain MD

## 2024-04-07 NOTE — PROGRESS NOTES
Ry Sandhu is a 74 y.o. male on day 3 of admission presenting with Pneumonia.      Subjective   CPAPing this morning  Continued decrease in vasopressors       Objective   Last Recorded Vitals  Heart Rate:  [56-86]   Temp:  [35.9 °C (96.6 °F)-36.6 °C (97.9 °F)]   Resp:  [11-18]   Weight:  [57.2 kg (126 lb 1.7 oz)]   SpO2:  [88 %-100 %]     Intake/Output last 3 Shifts:  I/O last 3 completed shifts:  In: 5218.4 (91.2 mL/kg) [I.V.:903.4 (15.8 mL/kg); NG/GT:2765; IV Piggyback:1550]  Out: 696 (12.2 mL/kg) [Urine:696 (0.3 mL/kg/hr)]  Weight: 57.2 kg     Intubated, sedated  RRR  Satting well on vent  Abdomen flat, soft, nontender  Palpable femoral pulses  L AKA and R BKA guillotine amputation stumps redressed; minimal ooze, no purulence or drainage    Relevant Results  Lab Results   Component Value Date    WBC 10.6 04/07/2024    HGB 7.3 (L) 04/07/2024    HCT 24.0 (L) 04/07/2024     (H) 04/07/2024    PLT 92 (L) 04/07/2024     Lab Results   Component Value Date    GLUCOSE 149 (H) 04/07/2024    CALCIUM 6.8 (L) 04/07/2024     04/07/2024    K 4.1 04/07/2024    CO2 30 04/07/2024     04/07/2024    BUN 11 04/07/2024    CREATININE 0.35 (L) 04/07/2024       Active Medications  Scheduled medications  atorvastatin, 40 mg, oral, Daily  clopidogrel, 75 mg, oral, Daily  enoxaparin, 40 mg, subcutaneous, q24h  folic acid, 1 mg, oral, Daily  hydrocortisone sodium succinate, 50 mg, intravenous, q8h  insulin lispro, 0-10 Units, subcutaneous, q4h  pantoprazole, 40 mg, intravenous, Daily before breakfast  pentoxifylline, 400 mg, oral, Daily  piperacillin-tazobactam, 4.5 g, intravenous, q6h  polyethylene glycol, 17 g, oral, Daily  potassium chloride, 40 mEq, oral, Once  vancomycin, 1 g, intravenous, q12h      Continuous medications  fentaNYL,  mcg/hr, Last Rate: 50 mcg/hr (04/07/24 0930)  norepinephrine, 0.01-1 mcg/kg/min, Last Rate: 0.04 mcg/kg/min (04/07/24 0939)  oxygen,   propofol, 5-20 mcg/kg/min, Last Rate: 5  mcg/kg/min (04/07/24 0913)  vasopressin, 0.03 Units/min, Last Rate: Stopped (04/07/24 1130)      PRN medications  PRN medications: dextrose, dextrose, glucagon, glucagon, ipratropium-albuteroL, oxygen, vancomycin     Assessment/Plan   Principal Problem:    Pneumonia  Active Problems:    CAD (coronary artery disease)    CHF (congestive heart failure) (CMS/[facility])    HTN (hypertension)    Hyperlipidemia    Chronic obstructive pulmonary disease (CMS/[facility])    Peripheral vascular disease (CMS/[facility])    Gastroesophageal reflux disease    Anemia    Septicemia (CMS/HCC)    74M admitted for care of septic shock secondary to pneumonia; consequently developed ischemic necrosis of b/l LE in context of shock and pre-existing occlusive disease requiring bilateral major guillotine amputations.     Improving sepsis picture, stumps clean. Will make plans for closure when extubated, off pressors, overall sepsis improved.     Recommendations:  - Vascular surgery will do daily changes of stump dresses for first few days, and then had off to nursing  - Abx and ongoing sepsis care per ICU    Discussed with Dr. Imelda Veras MD  PGY5 - Integrated Vascular Surgery  [id number]

## 2024-04-08 ENCOUNTER — APPOINTMENT (OUTPATIENT)
Dept: RADIOLOGY | Facility: HOSPITAL | Age: 74
DRG: 853 | End: 2024-04-08
Payer: MEDICARE

## 2024-04-08 LAB
ABO GROUP (TYPE) IN BLOOD: NORMAL
ALBUMIN SERPL BCP-MCNC: 1.7 G/DL (ref 3.4–5)
ALP SERPL-CCNC: 685 U/L (ref 33–136)
ALT SERPL W P-5'-P-CCNC: 48 U/L (ref 10–52)
ANION GAP SERPL CALC-SCNC: 7 MMOL/L (ref 10–20)
ANTIBODY SCREEN: NORMAL
AST SERPL W P-5'-P-CCNC: 39 U/L (ref 9–39)
BACTERIA SPEC RESP CULT: ABNORMAL
BASOPHILS # BLD AUTO: 0 X10*3/UL (ref 0–0.1)
BASOPHILS NFR BLD AUTO: 0 %
BILIRUB SERPL-MCNC: 0.6 MG/DL (ref 0–1.2)
BLOOD EXPIRATION DATE: NORMAL
BLOOD EXPIRATION DATE: NORMAL
BUN SERPL-MCNC: 14 MG/DL (ref 6–23)
CALCIUM SERPL-MCNC: 6.9 MG/DL (ref 8.6–10.6)
CHLORIDE SERPL-SCNC: 105 MMOL/L (ref 98–107)
CO2 SERPL-SCNC: 34 MMOL/L (ref 21–32)
CREAT SERPL-MCNC: 0.41 MG/DL (ref 0.5–1.3)
DISPENSE STATUS: NORMAL
DISPENSE STATUS: NORMAL
EGFRCR SERPLBLD CKD-EPI 2021: >90 ML/MIN/1.73M*2
EOSINOPHIL # BLD AUTO: 0 X10*3/UL (ref 0–0.4)
EOSINOPHIL NFR BLD AUTO: 0 %
ERYTHROCYTE [DISTWIDTH] IN BLOOD BY AUTOMATED COUNT: 16.6 % (ref 11.5–14.5)
GLUCOSE BLD MANUAL STRIP-MCNC: 115 MG/DL (ref 74–99)
GLUCOSE BLD MANUAL STRIP-MCNC: 117 MG/DL (ref 74–99)
GLUCOSE BLD MANUAL STRIP-MCNC: 139 MG/DL (ref 74–99)
GLUCOSE BLD MANUAL STRIP-MCNC: 152 MG/DL (ref 74–99)
GLUCOSE BLD MANUAL STRIP-MCNC: 155 MG/DL (ref 74–99)
GLUCOSE SERPL-MCNC: 150 MG/DL (ref 74–99)
GRAM STN SPEC: ABNORMAL
GRAM STN SPEC: ABNORMAL
HCT VFR BLD AUTO: 26.2 % (ref 41–52)
HGB BLD-MCNC: 8.8 G/DL (ref 13.5–17.5)
IMM GRANULOCYTES # BLD AUTO: 0.12 X10*3/UL (ref 0–0.5)
IMM GRANULOCYTES NFR BLD AUTO: 1.6 % (ref 0–0.9)
LYMPHOCYTES # BLD AUTO: 0.3 X10*3/UL (ref 0.8–3)
LYMPHOCYTES NFR BLD AUTO: 4.1 %
MAGNESIUM SERPL-MCNC: 1.76 MG/DL (ref 1.6–2.4)
MCH RBC QN AUTO: 31.3 PG (ref 26–34)
MCHC RBC AUTO-ENTMCNC: 33.6 G/DL (ref 32–36)
MCV RBC AUTO: 93 FL (ref 80–100)
MONOCYTES # BLD AUTO: 0.56 X10*3/UL (ref 0.05–0.8)
MONOCYTES NFR BLD AUTO: 7.6 %
NEUTROPHILS # BLD AUTO: 6.39 X10*3/UL (ref 1.6–5.5)
NEUTROPHILS NFR BLD AUTO: 86.7 %
NRBC BLD-RTO: 0 /100 WBCS (ref 0–0)
PHOSPHATE SERPL-MCNC: 1.7 MG/DL (ref 2.5–4.9)
PLATELET # BLD AUTO: 97 X10*3/UL (ref 150–450)
POTASSIUM SERPL-SCNC: 4 MMOL/L (ref 3.5–5.3)
PRODUCT BLOOD TYPE: 5100
PRODUCT BLOOD TYPE: 5100
PRODUCT CODE: NORMAL
PRODUCT CODE: NORMAL
PROT SERPL-MCNC: 3.5 G/DL (ref 6.4–8.2)
RBC # BLD AUTO: 2.81 X10*6/UL (ref 4.5–5.9)
RH FACTOR (ANTIGEN D): NORMAL
SODIUM SERPL-SCNC: 142 MMOL/L (ref 136–145)
UNIT ABO: NORMAL
UNIT ABO: NORMAL
UNIT NUMBER: NORMAL
UNIT NUMBER: NORMAL
UNIT RH: NORMAL
UNIT RH: NORMAL
UNIT VOLUME: 285
UNIT VOLUME: 350
WBC # BLD AUTO: 7.4 X10*3/UL (ref 4.4–11.3)
XM INTEP: NORMAL
XM INTEP: NORMAL

## 2024-04-08 PROCEDURE — 85025 COMPLETE CBC W/AUTO DIFF WBC: CPT

## 2024-04-08 PROCEDURE — 94003 VENT MGMT INPAT SUBQ DAY: CPT

## 2024-04-08 PROCEDURE — 2500000004 HC RX 250 GENERAL PHARMACY W/ HCPCS (ALT 636 FOR OP/ED)

## 2024-04-08 PROCEDURE — 74018 RADEX ABDOMEN 1 VIEW: CPT | Performed by: RADIOLOGY

## 2024-04-08 PROCEDURE — C9113 INJ PANTOPRAZOLE SODIUM, VIA: HCPCS

## 2024-04-08 PROCEDURE — 99024 POSTOP FOLLOW-UP VISIT: CPT | Performed by: SURGERY

## 2024-04-08 PROCEDURE — 84100 ASSAY OF PHOSPHORUS: CPT

## 2024-04-08 PROCEDURE — 2500000005 HC RX 250 GENERAL PHARMACY W/O HCPCS: Performed by: STUDENT IN AN ORGANIZED HEALTH CARE EDUCATION/TRAINING PROGRAM

## 2024-04-08 PROCEDURE — 37799 UNLISTED PX VASCULAR SURGERY: CPT

## 2024-04-08 PROCEDURE — 2500000001 HC RX 250 WO HCPCS SELF ADMINISTERED DRUGS (ALT 637 FOR MEDICARE OP)

## 2024-04-08 PROCEDURE — 99291 CRITICAL CARE FIRST HOUR: CPT

## 2024-04-08 PROCEDURE — 71045 X-RAY EXAM CHEST 1 VIEW: CPT | Performed by: RADIOLOGY

## 2024-04-08 PROCEDURE — 82947 ASSAY GLUCOSE BLOOD QUANT: CPT

## 2024-04-08 PROCEDURE — 2020000001 HC ICU ROOM DAILY

## 2024-04-08 PROCEDURE — 2500000005 HC RX 250 GENERAL PHARMACY W/O HCPCS

## 2024-04-08 PROCEDURE — 86920 COMPATIBILITY TEST SPIN: CPT

## 2024-04-08 PROCEDURE — 80053 COMPREHEN METABOLIC PANEL: CPT

## 2024-04-08 PROCEDURE — 74018 RADEX ABDOMEN 1 VIEW: CPT

## 2024-04-08 PROCEDURE — 83735 ASSAY OF MAGNESIUM: CPT

## 2024-04-08 PROCEDURE — 86901 BLOOD TYPING SEROLOGIC RH(D): CPT

## 2024-04-08 PROCEDURE — 71045 X-RAY EXAM CHEST 1 VIEW: CPT

## 2024-04-08 RX ADMIN — FOLIC ACID 1 MG: 1 TABLET ORAL at 08:21

## 2024-04-08 RX ADMIN — Medication 4 L/MIN: at 10:30

## 2024-04-08 RX ADMIN — PIPERACILLIN SODIUM AND TAZOBACTAM SODIUM 4.5 G: 4; .5 INJECTION, SOLUTION INTRAVENOUS at 23:30

## 2024-04-08 RX ADMIN — PIPERACILLIN SODIUM AND TAZOBACTAM SODIUM 4.5 G: 4; .5 INJECTION, SOLUTION INTRAVENOUS at 17:22

## 2024-04-08 RX ADMIN — ATORVASTATIN CALCIUM 40 MG: 40 TABLET, FILM COATED ORAL at 08:21

## 2024-04-08 RX ADMIN — HYDROCORTISONE SODIUM SUCCINATE 50 MG: 100 INJECTION, POWDER, FOR SOLUTION INTRAMUSCULAR; INTRAVENOUS at 13:33

## 2024-04-08 RX ADMIN — PIPERACILLIN SODIUM AND TAZOBACTAM SODIUM 4.5 G: 4; .5 INJECTION, SOLUTION INTRAVENOUS at 04:34

## 2024-04-08 RX ADMIN — INSULIN LISPRO 2 UNITS: 100 INJECTION, SOLUTION INTRAVENOUS; SUBCUTANEOUS at 20:21

## 2024-04-08 RX ADMIN — CLOPIDOGREL BISULFATE 75 MG: 75 TABLET ORAL at 08:21

## 2024-04-08 RX ADMIN — ENOXAPARIN SODIUM 40 MG: 100 INJECTION SUBCUTANEOUS at 00:39

## 2024-04-08 RX ADMIN — Medication 50 MCG/HR: at 00:25

## 2024-04-08 RX ADMIN — POLYETHYLENE GLYCOL 3350 17 G: 17 POWDER, FOR SOLUTION ORAL at 08:21

## 2024-04-08 RX ADMIN — PIPERACILLIN SODIUM AND TAZOBACTAM SODIUM 4.5 G: 4; .5 INJECTION, SOLUTION INTRAVENOUS at 11:26

## 2024-04-08 RX ADMIN — HYDROCORTISONE SODIUM SUCCINATE 50 MG: 100 INJECTION, POWDER, FOR SOLUTION INTRAMUSCULAR; INTRAVENOUS at 05:11

## 2024-04-08 RX ADMIN — PANTOPRAZOLE SODIUM 40 MG: 40 INJECTION, POWDER, FOR SOLUTION INTRAVENOUS at 08:21

## 2024-04-08 RX ADMIN — VANCOMYCIN HYDROCHLORIDE 1 G: 1 INJECTION, SOLUTION INTRAVENOUS at 22:18

## 2024-04-08 RX ADMIN — VANCOMYCIN HYDROCHLORIDE 1 G: 1 INJECTION, SOLUTION INTRAVENOUS at 10:10

## 2024-04-08 RX ADMIN — POTASSIUM PHOSPHATE, MONOBASIC AND POTASSIUM PHOSPHATE, DIBASIC 21 MMOL: 224; 236 INJECTION, SOLUTION, CONCENTRATE INTRAVENOUS at 17:22

## 2024-04-08 RX ADMIN — HYDROCORTISONE SODIUM SUCCINATE 50 MG: 100 INJECTION, POWDER, FOR SOLUTION INTRAMUSCULAR; INTRAVENOUS at 20:56

## 2024-04-08 RX ADMIN — INSULIN LISPRO 2 UNITS: 100 INJECTION, SOLUTION INTRAVENOUS; SUBCUTANEOUS at 04:33

## 2024-04-08 ASSESSMENT — PAIN - FUNCTIONAL ASSESSMENT
PAIN_FUNCTIONAL_ASSESSMENT: CPOT (CRITICAL CARE PAIN OBSERVATION TOOL)
PAIN_FUNCTIONAL_ASSESSMENT: 0-10
PAIN_FUNCTIONAL_ASSESSMENT: 0-10
PAIN_FUNCTIONAL_ASSESSMENT: CPOT (CRITICAL CARE PAIN OBSERVATION TOOL)
PAIN_FUNCTIONAL_ASSESSMENT: CPOT (CRITICAL CARE PAIN OBSERVATION TOOL)
PAIN_FUNCTIONAL_ASSESSMENT: 0-10

## 2024-04-08 ASSESSMENT — PAIN SCALES - GENERAL
PAINLEVEL_OUTOF10: 0 - NO PAIN

## 2024-04-08 NOTE — PROGRESS NOTES
Ry Sandhu is a 74 y.o. male on day 4 of admission presenting with Pneumonia.    Subjective   CPAPing this morning  Continued decrease in vasopressors     Objective   Last Recorded Vitals  Heart Rate:  []   Temp:  [36.1 °C (97 °F)-36.7 °C (98.1 °F)]   Resp:  [12-29]   Weight:  [55.4 kg (122 lb 2.2 oz)]   SpO2:  [85 %-100 %]     Intake/Output last 3 Shifts:  I/O last 3 completed shifts:  In: 3328 (60.1 mL/kg) [I.V.:573 (10.3 mL/kg); NG/GT:1305; IV Piggyback:1450]  Out: 2449 (44.2 mL/kg) [Urine:2449 (1.2 mL/kg/hr)]  Weight: 55.4 kg     Intubated, sedated  RRR  Satting well on vent  Abdomen flat, soft, nontender  Palpable femoral pulses  L AKA and R BKA guillotine amputation stumps dressed    Relevant Results  Lab Results   Component Value Date    WBC 7.4 04/08/2024    HGB 8.8 (L) 04/08/2024    HCT 26.2 (L) 04/08/2024    MCV 93 04/08/2024    PLT 97 (L) 04/08/2024     Lab Results   Component Value Date    GLUCOSE 150 (H) 04/08/2024    CALCIUM 6.9 (L) 04/08/2024     04/08/2024    K 4.0 04/08/2024    CO2 34 (H) 04/08/2024     04/08/2024    BUN 14 04/08/2024    CREATININE 0.41 (L) 04/08/2024       Active Medications  Scheduled medications  atorvastatin, 40 mg, oral, Daily  clopidogrel, 75 mg, oral, Daily  enoxaparin, 40 mg, subcutaneous, q24h  folic acid, 1 mg, oral, Daily  hydrocortisone sodium succinate, 50 mg, intravenous, q8h  insulin lispro, 0-10 Units, subcutaneous, q4h  pantoprazole, 40 mg, intravenous, Daily before breakfast  pentoxifylline, 400 mg, oral, Daily  piperacillin-tazobactam, 4.5 g, intravenous, q6h  polyethylene glycol, 17 g, oral, Daily  potassium chloride, 40 mEq, oral, Once  potassium phosphate, 21 mmol, intravenous, Once  vancomycin, 1 g, intravenous, q12h      Continuous medications  fentaNYL,  mcg/hr, Last Rate: Stopped (04/08/24 0727)  norepinephrine, 0.01-1 mcg/kg/min, Last Rate: Stopped (04/08/24 1040)  oxygen,   propofol, 5-20 mcg/kg/min, Last Rate: Stopped  (04/08/24 0715)  vasopressin, 0.03 Units/min, Last Rate: Stopped (04/07/24 1130)      PRN medications  PRN medications: dextrose, dextrose, glucagon, glucagon, ipratropium-albuteroL, oxygen, oxygen, silver nitrate applicators, vancomycin     Assessment/Plan   Principal Problem:    Pneumonia  Active Problems:    CAD (coronary artery disease)    CHF (congestive heart failure) (CMS/[facility])    HTN (hypertension)    Hyperlipidemia    Chronic obstructive pulmonary disease (CMS/[facility])    Peripheral vascular disease (CMS/[facility])    Gastroesophageal reflux disease    Anemia    Septicemia (CMS/HCC)    74M admitted for care of septic shock secondary to pneumonia; consequently developed ischemic necrosis of b/l LE in context of shock and pre-existing occlusive disease requiring bilateral major guillotine amputations.     Improving sepsis picture, stumps clean. Will make plans for closure when extubated, off pressors, overall sepsis improved.     Recommendations:  - Vascular Surgery to change dressing today  - Pending plan for formalization    Discussed with Dr. Conchita Arboleda MD  PGY3   General Surgery   [id number]

## 2024-04-08 NOTE — SIGNIFICANT EVENT
Restraints indicated as alternative therapies have been attempted and have been ineffective.  Restrain with soft wrist restraints until medical devices discontinued and/or patient able to participate with plan of care.

## 2024-04-08 NOTE — PROGRESS NOTES
"Ry Sandhu is a 74 y.o. male on day 4 of admission presenting with Pneumonia.    Subjective   Patient with oozing at lower extremity stump overnight, seen by vascular, new dressing placed. No other acute issues.    Upon evaluation, patient was awake, following commands.     Objective     Physical Exam  Constitutional:       Comments: Remains sedated and intubated   HENT:      Head: Normocephalic.      Nose: Nose normal.   Cardiovascular:      Rate and Rhythm: Normal rate and regular rhythm.   Pulmonary:      Comments: Mechanically ventilated, mechanical breath sounds auscultated.  Abdominal:      General: Abdomen is flat. Bowel sounds are normal.      Palpations: Abdomen is soft.   Musculoskeletal:      Cervical back: Normal range of motion.      Comments: Lower extremities are dusky, poorly palpable posterior tibial pulses bilaterally, no palpable dorsalis pedis pulses   Neurological:      Comments: sedated       Last Recorded Vitals  Blood pressure 94/57, pulse 101, temperature 36.4 °C (97.5 °F), resp. rate 16, height 1.88 m (6' 2\"), weight 55.4 kg (122 lb 2.2 oz), SpO2 100 %.  Intake/Output last 3 Shifts:  I/O last 3 completed shifts:  In: 3651.4 (63.8 mL/kg) [I.V.:731.4 (12.8 mL/kg); NG/GT:1470; IV Piggyback:1450]  Out: 1425 (24.9 mL/kg) [Urine:1425 (0.7 mL/kg/hr)]  Weight: 57.2 kg     Relevant Results  Scheduled medications  atorvastatin, 40 mg, oral, Daily  clopidogrel, 75 mg, oral, Daily  enoxaparin, 40 mg, subcutaneous, q24h  folic acid, 1 mg, oral, Daily  hydrocortisone sodium succinate, 50 mg, intravenous, q8h  insulin lispro, 0-10 Units, subcutaneous, q4h  pantoprazole, 40 mg, intravenous, Daily before breakfast  pentoxifylline, 400 mg, oral, Daily  piperacillin-tazobactam, 4.5 g, intravenous, q6h  polyethylene glycol, 17 g, oral, Daily  potassium chloride, 40 mEq, oral, Once  vancomycin, 1 g, intravenous, q12h      Continuous medications  fentaNYL,  mcg/hr, Last Rate: 50 mcg/hr (04/08/24 " 0600)  norepinephrine, 0.01-1 mcg/kg/min, Last Rate: 0.03 mcg/kg/min (04/08/24 0600)  oxygen,   propofol, 5-20 mcg/kg/min, Last Rate: 7.5 mcg/kg/min (04/08/24 0600)  vasopressin, 0.03 Units/min, Last Rate: Stopped (04/07/24 1130)      PRN medications  PRN medications: dextrose, dextrose, glucagon, glucagon, ipratropium-albuteroL, oxygen, silver nitrate applicators, vancomycin    Results for orders placed or performed during the hospital encounter of 04/04/24 (from the past 24 hour(s))   POCT GLUCOSE   Result Value Ref Range    POCT Glucose 150 (H) 74 - 99 mg/dL   POCT GLUCOSE   Result Value Ref Range    POCT Glucose 197 (H) 74 - 99 mg/dL   POCT GLUCOSE   Result Value Ref Range    POCT Glucose 132 (H) 74 - 99 mg/dL   CBC   Result Value Ref Range    WBC 12.0 (H) 4.4 - 11.3 x10*3/uL    nRBC 0.0 0.0 - 0.0 /100 WBCs    RBC 2.32 (L) 4.50 - 5.90 x10*6/uL    Hemoglobin 7.5 (L) 13.5 - 17.5 g/dL    Hematocrit 21.5 (L) 41.0 - 52.0 %    MCV 93 80 - 100 fL    MCH 32.3 26.0 - 34.0 pg    MCHC 34.9 32.0 - 36.0 g/dL    RDW 16.5 (H) 11.5 - 14.5 %    Platelets 120 (L) 150 - 450 x10*3/uL   POCT GLUCOSE   Result Value Ref Range    POCT Glucose 121 (H) 74 - 99 mg/dL   POCT GLUCOSE   Result Value Ref Range    POCT Glucose 169 (H) 74 - 99 mg/dL   POCT GLUCOSE   Result Value Ref Range    POCT Glucose 152 (H) 74 - 99 mg/dL   CBC and Auto Differential   Result Value Ref Range    WBC 7.4 4.4 - 11.3 x10*3/uL    nRBC 0.0 0.0 - 0.0 /100 WBCs    RBC 2.81 (L) 4.50 - 5.90 x10*6/uL    Hemoglobin 8.8 (L) 13.5 - 17.5 g/dL    Hematocrit 26.2 (L) 41.0 - 52.0 %    MCV 93 80 - 100 fL    MCH 31.3 26.0 - 34.0 pg    MCHC 33.6 32.0 - 36.0 g/dL    RDW 16.6 (H) 11.5 - 14.5 %    Platelets 97 (L) 150 - 450 x10*3/uL    Neutrophils % 86.7 40.0 - 80.0 %    Immature Granulocytes %, Automated 1.6 (H) 0.0 - 0.9 %    Lymphocytes % 4.1 13.0 - 44.0 %    Monocytes % 7.6 2.0 - 10.0 %    Eosinophils % 0.0 0.0 - 6.0 %    Basophils % 0.0 0.0 - 2.0 %    Neutrophils Absolute 6.39  (H) 1.60 - 5.50 x10*3/uL    Immature Granulocytes Absolute, Automated 0.12 0.00 - 0.50 x10*3/uL    Lymphocytes Absolute 0.30 (L) 0.80 - 3.00 x10*3/uL    Monocytes Absolute 0.56 0.05 - 0.80 x10*3/uL    Eosinophils Absolute 0.00 0.00 - 0.40 x10*3/uL    Basophils Absolute 0.00 0.00 - 0.10 x10*3/uL   Comprehensive Metabolic Panel   Result Value Ref Range    Glucose 150 (H) 74 - 99 mg/dL    Sodium 142 136 - 145 mmol/L    Potassium 4.0 3.5 - 5.3 mmol/L    Chloride 105 98 - 107 mmol/L    Bicarbonate 34 (H) 21 - 32 mmol/L    Anion Gap 7 (L) 10 - 20 mmol/L    Urea Nitrogen 14 6 - 23 mg/dL    Creatinine 0.41 (L) 0.50 - 1.30 mg/dL    eGFR >90 >60 mL/min/1.73m*2    Calcium 6.9 (L) 8.6 - 10.6 mg/dL    Albumin 1.7 (L) 3.4 - 5.0 g/dL    Alkaline Phosphatase 685 (H) 33 - 136 U/L    Total Protein 3.5 (L) 6.4 - 8.2 g/dL    AST 39 9 - 39 U/L    Bilirubin, Total 0.6 0.0 - 1.2 mg/dL    ALT 48 10 - 52 U/L   Magnesium   Result Value Ref Range    Magnesium 1.76 1.60 - 2.40 mg/dL   Phosphorus   Result Value Ref Range    Phosphorus 1.7 (L) 2.5 - 4.9 mg/dL     XR chest 1 view    Result Date: 4/5/2024  STUDY: XR CHEST 1 VIEW; XR ABDOMEN 1 VIEW;  4/5/2024 1:29 am   INDICATION: Signs/Symptoms:ETT placement s/p transport, pneumonia, pleural effusions; Signs/Symptoms:NGT placement.   COMPARISON: Chest x-ray 08/21/2023.   ACCESSION NUMBER(S): [id number]; [id number]   ORDERING CLINICIAN: ANGELA SALDAÑA   FINDINGS: AP radiograph of the chest was provided.   Endotracheal tube tip is approximately 5.2 cm above the westley. An enteric tube courses below the diaphragm with the tip projecting over stomach. Right IJ approach central venous catheter tip projects over lower SVC.   CARDIOMEDIASTINAL SILHOUETTE: Cardiomediastinal silhouette is normal in size and configuration.   LUNGS: There are airspace opacities in the bilateral perihilar regions extending to the bilateral lower lobes, left-greater-than-right. There is obscuration of the left costophrenic  angle. No evidence of pneumothorax.   ABDOMEN: Nonobstructive bowel gas pattern.   BONES: No acute osseous changes.       1.  Airspace opacities in the bilateral perihilar regions extending to the left-greater-than-right lower lobes are concerning for multifocal pneumonia versus aspiration pneumonitis. Obscuration of the left costophrenic angle and left retrocardiac lung may represent a component of pleural effusion. 2. Medical devices as above.   I personally reviewed the images/study and I agree with the findings as stated by resident physician Dr. Evan Lai . This study was interpreted at [place], [place].   MACRO: None     Dictation workstation:   [id number]    XR abdomen 1 view    Result Date: 4/5/2024  STUDY: XR CHEST 1 VIEW; XR ABDOMEN 1 VIEW;  4/5/2024 1:29 am   INDICATION: Signs/Symptoms:ETT placement s/p transport, pneumonia, pleural effusions; Signs/Symptoms:NGT placement.   COMPARISON: Chest x-ray 08/21/2023.   ACCESSION NUMBER(S): [id number]; [id number]   ORDERING CLINICIAN: ANGELA SALDAÑA   FINDINGS: AP radiograph of the chest was provided.   Endotracheal tube tip is approximately 5.2 cm above the westley. An enteric tube courses below the diaphragm with the tip projecting over stomach. Right IJ approach central venous catheter tip projects over lower SVC.   CARDIOMEDIASTINAL SILHOUETTE: Cardiomediastinal silhouette is normal in size and configuration.   LUNGS: There are airspace opacities in the bilateral perihilar regions extending to the bilateral lower lobes, left-greater-than-right. There is obscuration of the left costophrenic angle. No evidence of pneumothorax.   ABDOMEN: Nonobstructive bowel gas pattern.   BONES: No acute osseous changes.       1.  Airspace opacities in the bilateral perihilar regions extending to the left-greater-than-right lower lobes are concerning for multifocal pneumonia versus aspiration pneumonitis. Obscuration  of the left costophrenic angle and left retrocardiac lung may represent a component of pleural effusion. 2. Medical devices as above.   I personally reviewed the images/study and I agree with the findings as stated by resident physician Dr. Evan Lai . This study was interpreted at [facility], [place], [place].   MACRO: None     Dictation workstation:   [id number]             Assessment/Plan   Principal Problem:    Pneumonia  Active Problems:    CAD (coronary artery disease)    CHF (congestive heart failure) (CMS/[facility])    HTN (hypertension)    Hyperlipidemia    Chronic obstructive pulmonary disease (CMS/[facility])    Peripheral vascular disease (CMS/[facility])    Gastroesophageal reflux disease    Anemia    Septicemia (CMS/[facility])    This is a 74-year-old male with past medical history of COPD, CAD, PAD who is presenting to the MICU from outside hospital for acute hypoxic respiratory failure secondary to acute community-acquired pneumonia and septic shock secondary to community-acquired pneumonia versus colitis now requiring bilateral below the knee amputations due to severe peripheral arterial disease and increased pressor requirements.    Updates 4/8  - no new updates, may be extubated today    Neurology  #Sedated  #Altered mental status  Weaning off sedation  - Continue to monitor     #C/F right basal ganglia ischemia  CT head 4/5 nonacute  - NTD      Cardiovascular  #Undifferentiated shock, likely septic, resolving  Weaning off pressors  - Continue pressor support for MAPs > 65    #CAD  #PAD  POD3 bilateral AKA/BKA amputation  -Vascular surgery consulted, appreciate recs  -Continue Lipitor  -Continue clopidogrel  -Continue pentoxifylline    #HTN  - Holding antihypertensives in setting of requiring pressors      Pulmonary   #COPD  Current everyday tobacco smoker  No pfts on file  -Continue mechanical ventilation     #CAP  #AHRF  #Pleural effusion  CXR 4/7 showing similar airspace  opacities with improving pulmonary edema   Sputum culture growing corn  -Continue zosyn (4/5-*)      Gastrointestinal   #Elevated lfts  #Fatty infiltration of liver on CT abdomen  #Colitis  #GERD  #Concern for GI bleed  RUQ showing evidence of hepatic steatosis and edematous gallbladder due to volume overload  -Continue PPI  -Continue Vanco and Zosyn (4/5-*)  -Discuss narrowing antibiotics after sputum cx back     Renal   #Hypernatremia  -Continue to monitor       Heme/onc  #Chronic Normocytic Anemia  -Keep active type and screen  -Transfuse for hemoglobin less than 7    #Coagulopathy  INR 1.8, was 1.0 in August  Likely due to liver injury in the setting of heavy alcohol history versus sepsis  -Continue to monitor      Endocrine  #JALEEL      MSK/Rheum  #No active issues      Infectious Disease  #c/f septic shock  Azithromycin 500 mg daily (4/1 - 4/4)  Ceftriaxone (3/31 - 4/5)  Doxycycline (4/4 - 4/5)  Flagyl (3/25 - 4/5)  -Continue Vanco and Zosyn (4/5-*)      F: prn  E: prn  N: npo  A: LIJ, A line, PIV  Drains: Tucker, NG  Bowel regimen: Miralax   Last bm: 4/5  Drips: Fent, prop, NE  DVT ppx: lovenox  GI ppx: PPI    Code Status: Full    LAYO RIVERA (Spouse)  [phone] ([place])         Debbie Emanuel MD

## 2024-04-09 ENCOUNTER — APPOINTMENT (OUTPATIENT)
Dept: RADIOLOGY | Facility: HOSPITAL | Age: 74
DRG: 853 | End: 2024-04-09
Payer: MEDICARE

## 2024-04-09 LAB
ALBUMIN SERPL BCP-MCNC: 1.8 G/DL (ref 3.4–5)
ALP SERPL-CCNC: 917 U/L (ref 33–136)
ALT SERPL W P-5'-P-CCNC: 61 U/L (ref 10–52)
ANION GAP BLDA CALCULATED.4IONS-SCNC: 4 MMO/L (ref 10–25)
ANION GAP BLDMV CALCULATED.4IONS-SCNC: 5 MMO/L (ref 10–25)
ANION GAP BLDV CALCULATED.4IONS-SCNC: ABNORMAL MMOL/L
ANION GAP SERPL CALC-SCNC: 12 MMOL/L (ref 10–20)
AST SERPL W P-5'-P-CCNC: 74 U/L (ref 9–39)
BACTERIA BLD CULT: NORMAL
BACTERIA BLD CULT: NORMAL
BASE EXCESS BLDA CALC-SCNC: 9.3 MMOL/L (ref -2–3)
BASE EXCESS BLDMV CALC-SCNC: 10 MMOL/L (ref -2–3)
BASE EXCESS BLDV CALC-SCNC: 4.8 MMOL/L (ref -2–3)
BASOPHILS # BLD AUTO: 0.02 X10*3/UL (ref 0–0.1)
BASOPHILS # BLD MANUAL: 0 X10*3/UL (ref 0–0.1)
BASOPHILS NFR BLD AUTO: 0.1 %
BASOPHILS NFR BLD MANUAL: 0 %
BILIRUB SERPL-MCNC: 0.9 MG/DL (ref 0–1.2)
BLOOD EXPIRATION DATE: NORMAL
BLOOD EXPIRATION DATE: NORMAL
BODY TEMPERATURE: 37 DEGREES CELSIUS
BUN SERPL-MCNC: 20 MG/DL (ref 6–23)
CA-I BLDA-SCNC: 1.06 MMOL/L (ref 1.1–1.33)
CA-I BLDMV-SCNC: 1.06 MMOL/L (ref 1.1–1.33)
CA-I BLDV-SCNC: 1.07 MMOL/L (ref 1.1–1.33)
CALCIUM SERPL-MCNC: 7.4 MG/DL (ref 8.6–10.6)
CHLORIDE BLD-SCNC: 105 MMOL/L (ref 98–107)
CHLORIDE BLDA-SCNC: 106 MMOL/L (ref 98–107)
CHLORIDE BLDV-SCNC: ABNORMAL MMOL/L
CHLORIDE SERPL-SCNC: 103 MMOL/L (ref 98–107)
CO2 SERPL-SCNC: 32 MMOL/L (ref 21–32)
CREAT SERPL-MCNC: 0.45 MG/DL (ref 0.5–1.3)
DISPENSE STATUS: NORMAL
DISPENSE STATUS: NORMAL
EGFRCR SERPLBLD CKD-EPI 2021: >90 ML/MIN/1.73M*2
EOSINOPHIL # BLD AUTO: 0 X10*3/UL (ref 0–0.4)
EOSINOPHIL # BLD MANUAL: 0 X10*3/UL (ref 0–0.4)
EOSINOPHIL NFR BLD AUTO: 0 %
EOSINOPHIL NFR BLD MANUAL: 0 %
ERYTHROCYTE [DISTWIDTH] IN BLOOD BY AUTOMATED COUNT: 15.5 % (ref 11.5–14.5)
ERYTHROCYTE [DISTWIDTH] IN BLOOD BY AUTOMATED COUNT: 15.6 % (ref 11.5–14.5)
ERYTHROCYTE [DISTWIDTH] IN BLOOD BY AUTOMATED COUNT: 17 % (ref 11.5–14.5)
GLUCOSE BLD MANUAL STRIP-MCNC: 132 MG/DL (ref 74–99)
GLUCOSE BLD MANUAL STRIP-MCNC: 136 MG/DL (ref 74–99)
GLUCOSE BLD MANUAL STRIP-MCNC: 136 MG/DL (ref 74–99)
GLUCOSE BLD MANUAL STRIP-MCNC: 140 MG/DL (ref 74–99)
GLUCOSE BLD MANUAL STRIP-MCNC: 143 MG/DL (ref 74–99)
GLUCOSE BLD MANUAL STRIP-MCNC: 157 MG/DL (ref 74–99)
GLUCOSE BLD-MCNC: 151 MG/DL (ref 74–99)
GLUCOSE BLDA-MCNC: 147 MG/DL (ref 74–99)
GLUCOSE BLDV-MCNC: 127 MG/DL (ref 74–99)
GLUCOSE SERPL-MCNC: 137 MG/DL (ref 74–99)
HCO3 BLDA-SCNC: 32.4 MMOL/L (ref 22–26)
HCO3 BLDMV-SCNC: 33.3 MMOL/L (ref 22–26)
HCO3 BLDV-SCNC: 29.2 MMOL/L (ref 22–26)
HCT VFR BLD AUTO: 15.8 % (ref 41–52)
HCT VFR BLD AUTO: 22.4 % (ref 41–52)
HCT VFR BLD AUTO: 24.8 % (ref 41–52)
HCT VFR BLD EST: 15 % (ref 41–52)
HCT VFR BLD EST: 27 % (ref 41–52)
HCT VFR BLD EST: 29 % (ref 41–52)
HGB BLD-MCNC: 5.1 G/DL (ref 13.5–17.5)
HGB BLD-MCNC: 7.9 G/DL (ref 13.5–17.5)
HGB BLD-MCNC: 8.9 G/DL (ref 13.5–17.5)
HGB BLDA-MCNC: 8.9 G/DL (ref 13.5–17.5)
HGB BLDMV-MCNC: 9.5 G/DL (ref 13.5–17.5)
HGB BLDV-MCNC: 5.1 G/DL (ref 13.5–17.5)
IMM GRANULOCYTES # BLD AUTO: 0.55 X10*3/UL (ref 0–0.5)
IMM GRANULOCYTES # BLD AUTO: 0.64 X10*3/UL (ref 0–0.5)
IMM GRANULOCYTES NFR BLD AUTO: 4.1 % (ref 0–0.9)
IMM GRANULOCYTES NFR BLD AUTO: 4.6 % (ref 0–0.9)
INHALED O2 CONCENTRATION: 36 %
INHALED O2 CONCENTRATION: 40 %
LACTATE BLDA-SCNC: 1.4 MMOL/L (ref 0.4–2)
LACTATE BLDMV-SCNC: 1.5 MMOL/L (ref 0.4–2)
LACTATE BLDV-SCNC: 6.7 MMOL/L (ref 0.4–2)
LACTATE SERPL-SCNC: 1.6 MMOL/L (ref 0.4–2)
LACTATE SERPL-SCNC: 1.7 MMOL/L (ref 0.4–2)
LYMPHOCYTES # BLD AUTO: 0.5 X10*3/UL (ref 0.8–3)
LYMPHOCYTES # BLD MANUAL: 0.78 X10*3/UL (ref 0.8–3)
LYMPHOCYTES NFR BLD AUTO: 3.6 %
LYMPHOCYTES NFR BLD MANUAL: 5.9 %
MAGNESIUM SERPL-MCNC: 1.98 MG/DL (ref 1.6–2.4)
MCH RBC QN AUTO: 30.9 PG (ref 26–34)
MCH RBC QN AUTO: 31.9 PG (ref 26–34)
MCH RBC QN AUTO: 32.1 PG (ref 26–34)
MCHC RBC AUTO-ENTMCNC: 32.3 G/DL (ref 32–36)
MCHC RBC AUTO-ENTMCNC: 35.3 G/DL (ref 32–36)
MCHC RBC AUTO-ENTMCNC: 35.9 G/DL (ref 32–36)
MCV RBC AUTO: 88 FL (ref 80–100)
MCV RBC AUTO: 89 FL (ref 80–100)
MCV RBC AUTO: 99 FL (ref 80–100)
MONOCYTES # BLD AUTO: 0.95 X10*3/UL (ref 0.05–0.8)
MONOCYTES # BLD MANUAL: 0.77 X10*3/UL (ref 0.05–0.8)
MONOCYTES NFR BLD AUTO: 6.9 %
MONOCYTES NFR BLD MANUAL: 5.8 %
MYELOCYTES # BLD MANUAL: 0.11 X10*3/UL
MYELOCYTES NFR BLD MANUAL: 0.8 %
NEUTROPHILS # BLD AUTO: 11.75 X10*3/UL (ref 1.6–5.5)
NEUTROPHILS NFR BLD AUTO: 84.8 %
NEUTS SEG # BLD MANUAL: 11.64 X10*3/UL (ref 1.6–5)
NEUTS SEG NFR BLD MANUAL: 87.5 %
NRBC BLD-RTO: 0.2 /100 WBCS (ref 0–0)
NRBC BLD-RTO: 0.2 /100 WBCS (ref 0–0)
NRBC BLD-RTO: 0.4 /100 WBCS (ref 0–0)
OXYHGB MFR BLDA: 95.8 % (ref 94–98)
OXYHGB MFR BLDMV: 95.9 % (ref 45–75)
OXYHGB MFR BLDV: 48.4 % (ref 45–75)
PCO2 BLDA: 37 MM HG (ref 38–42)
PCO2 BLDMV: 39 MM HG (ref 41–51)
PCO2 BLDV: 42 MM HG (ref 41–51)
PH BLDA: 7.55 PH (ref 7.38–7.42)
PH BLDMV: 7.54 PH (ref 7.33–7.43)
PH BLDV: 7.45 PH (ref 7.33–7.43)
PHOSPHATE SERPL-MCNC: 2.2 MG/DL (ref 2.5–4.9)
PLATELET # BLD AUTO: 138 X10*3/UL (ref 150–450)
PLATELET # BLD AUTO: 164 X10*3/UL (ref 150–450)
PLATELET # BLD AUTO: 213 X10*3/UL (ref 150–450)
PO2 BLDA: 79 MM HG (ref 85–95)
PO2 BLDMV: 79 MM HG (ref 35–45)
PO2 BLDV: 35 MM HG (ref 35–45)
POTASSIUM BLDA-SCNC: 3.6 MMOL/L (ref 3.5–5.3)
POTASSIUM BLDMV-SCNC: 3.8 MMOL/L (ref 3.5–5.3)
POTASSIUM BLDV-SCNC: 3.9 MMOL/L (ref 3.5–5.3)
POTASSIUM SERPL-SCNC: 3.8 MMOL/L (ref 3.5–5.3)
PRODUCT BLOOD TYPE: 5100
PRODUCT BLOOD TYPE: 5100
PRODUCT CODE: NORMAL
PRODUCT CODE: NORMAL
PROT SERPL-MCNC: 3.8 G/DL (ref 6.4–8.2)
RBC # BLD AUTO: 1.59 X10*6/UL (ref 4.5–5.9)
RBC # BLD AUTO: 2.56 X10*6/UL (ref 4.5–5.9)
RBC # BLD AUTO: 2.79 X10*6/UL (ref 4.5–5.9)
RBC MORPH BLD: ABNORMAL
SAO2 % BLDA: 99 % (ref 94–100)
SAO2 % BLDMV: 98 % (ref 45–75)
SAO2 % BLDV: 50 % (ref 45–75)
SODIUM BLDA-SCNC: 139 MMOL/L (ref 136–145)
SODIUM BLDMV-SCNC: 139 MMOL/L (ref 136–145)
SODIUM BLDV-SCNC: 140 MMOL/L (ref 136–145)
SODIUM SERPL-SCNC: 143 MMOL/L (ref 136–145)
TOTAL CELLS COUNTED BLD: 120
UNIT ABO: NORMAL
UNIT ABO: NORMAL
UNIT NUMBER: NORMAL
UNIT NUMBER: NORMAL
UNIT RH: NORMAL
UNIT RH: NORMAL
UNIT VOLUME: 350
UNIT VOLUME: 350
VANCOMYCIN SERPL-MCNC: 25.7 UG/ML (ref 5–20)
WBC # BLD AUTO: 12.3 X10*3/UL (ref 4.4–11.3)
WBC # BLD AUTO: 13.3 X10*3/UL (ref 4.4–11.3)
WBC # BLD AUTO: 13.9 X10*3/UL (ref 4.4–11.3)
XM INTEP: NORMAL
XM INTEP: NORMAL

## 2024-04-09 PROCEDURE — 37799 UNLISTED PX VASCULAR SURGERY: CPT | Performed by: STUDENT IN AN ORGANIZED HEALTH CARE EDUCATION/TRAINING PROGRAM

## 2024-04-09 PROCEDURE — 99024 POSTOP FOLLOW-UP VISIT: CPT | Performed by: SURGERY

## 2024-04-09 PROCEDURE — 71045 X-RAY EXAM CHEST 1 VIEW: CPT

## 2024-04-09 PROCEDURE — 85027 COMPLETE CBC AUTOMATED: CPT | Performed by: STUDENT IN AN ORGANIZED HEALTH CARE EDUCATION/TRAINING PROGRAM

## 2024-04-09 PROCEDURE — 36415 COLL VENOUS BLD VENIPUNCTURE: CPT | Performed by: HOSPITALIST

## 2024-04-09 PROCEDURE — 83605 ASSAY OF LACTIC ACID: CPT | Mod: MUE | Performed by: STUDENT IN AN ORGANIZED HEALTH CARE EDUCATION/TRAINING PROGRAM

## 2024-04-09 PROCEDURE — 2020000001 HC ICU ROOM DAILY

## 2024-04-09 PROCEDURE — 94667 MNPJ CHEST WALL 1ST: CPT

## 2024-04-09 PROCEDURE — C9113 INJ PANTOPRAZOLE SODIUM, VIA: HCPCS

## 2024-04-09 PROCEDURE — 92610 EVALUATE SWALLOWING FUNCTION: CPT | Mod: GN

## 2024-04-09 PROCEDURE — 36430 TRANSFUSION BLD/BLD COMPNT: CPT

## 2024-04-09 PROCEDURE — 80202 ASSAY OF VANCOMYCIN: CPT | Performed by: STUDENT IN AN ORGANIZED HEALTH CARE EDUCATION/TRAINING PROGRAM

## 2024-04-09 PROCEDURE — 71045 X-RAY EXAM CHEST 1 VIEW: CPT | Performed by: RADIOLOGY

## 2024-04-09 PROCEDURE — 83735 ASSAY OF MAGNESIUM: CPT

## 2024-04-09 PROCEDURE — 2500000004 HC RX 250 GENERAL PHARMACY W/ HCPCS (ALT 636 FOR OP/ED)

## 2024-04-09 PROCEDURE — 84132 ASSAY OF SERUM POTASSIUM: CPT | Performed by: HOSPITALIST

## 2024-04-09 PROCEDURE — 99291 CRITICAL CARE FIRST HOUR: CPT

## 2024-04-09 PROCEDURE — 94660 CPAP INITIATION&MGMT: CPT

## 2024-04-09 PROCEDURE — 84100 ASSAY OF PHOSPHORUS: CPT

## 2024-04-09 PROCEDURE — 97162 PT EVAL MOD COMPLEX 30 MIN: CPT | Mod: GP

## 2024-04-09 PROCEDURE — 85007 BL SMEAR W/DIFF WBC COUNT: CPT

## 2024-04-09 PROCEDURE — P9016 RBC LEUKOCYTES REDUCED: HCPCS

## 2024-04-09 PROCEDURE — 85025 COMPLETE CBC W/AUTO DIFF WBC: CPT

## 2024-04-09 PROCEDURE — 2500000001 HC RX 250 WO HCPCS SELF ADMINISTERED DRUGS (ALT 637 FOR MEDICARE OP)

## 2024-04-09 PROCEDURE — 84075 ASSAY ALKALINE PHOSPHATASE: CPT

## 2024-04-09 PROCEDURE — 2500000004 HC RX 250 GENERAL PHARMACY W/ HCPCS (ALT 636 FOR OP/ED): Performed by: STUDENT IN AN ORGANIZED HEALTH CARE EDUCATION/TRAINING PROGRAM

## 2024-04-09 PROCEDURE — 82947 ASSAY GLUCOSE BLOOD QUANT: CPT

## 2024-04-09 PROCEDURE — 94668 MNPJ CHEST WALL SBSQ: CPT

## 2024-04-09 PROCEDURE — 37799 UNLISTED PX VASCULAR SURGERY: CPT

## 2024-04-09 PROCEDURE — 85027 COMPLETE CBC AUTOMATED: CPT

## 2024-04-09 PROCEDURE — 84132 ASSAY OF SERUM POTASSIUM: CPT

## 2024-04-09 PROCEDURE — 84132 ASSAY OF SERUM POTASSIUM: CPT | Performed by: STUDENT IN AN ORGANIZED HEALTH CARE EDUCATION/TRAINING PROGRAM

## 2024-04-09 PROCEDURE — 2500000005 HC RX 250 GENERAL PHARMACY W/O HCPCS: Performed by: STUDENT IN AN ORGANIZED HEALTH CARE EDUCATION/TRAINING PROGRAM

## 2024-04-09 RX ORDER — HYDROMORPHONE HYDROCHLORIDE 1 MG/ML
INJECTION, SOLUTION INTRAMUSCULAR; INTRAVENOUS; SUBCUTANEOUS
Status: COMPLETED
Start: 2024-04-09 | End: 2024-04-09

## 2024-04-09 RX ORDER — HYDROMORPHONE HYDROCHLORIDE 1 MG/ML
0.2 INJECTION, SOLUTION INTRAMUSCULAR; INTRAVENOUS; SUBCUTANEOUS EVERY 6 HOURS PRN
Status: DISCONTINUED | OUTPATIENT
Start: 2024-04-09 | End: 2024-04-09

## 2024-04-09 RX ORDER — OXYCODONE HYDROCHLORIDE 5 MG/1
5 TABLET ORAL EVERY 6 HOURS PRN
Status: DISCONTINUED | OUTPATIENT
Start: 2024-04-09 | End: 2024-04-23

## 2024-04-09 RX ORDER — ACETAMINOPHEN 325 MG/1
650 TABLET ORAL EVERY 6 HOURS PRN
Status: DISCONTINUED | OUTPATIENT
Start: 2024-04-09 | End: 2024-04-17

## 2024-04-09 RX ORDER — VANCOMYCIN HYDROCHLORIDE 750 MG/150ML
750 INJECTION, SOLUTION INTRAVENOUS EVERY 12 HOURS
Status: DISCONTINUED | OUTPATIENT
Start: 2024-04-09 | End: 2024-04-11

## 2024-04-09 RX ORDER — NOREPINEPHRINE BITARTRATE 1 MG/ML
INJECTION, SOLUTION INTRAVENOUS
Status: COMPLETED
Start: 2024-04-09 | End: 2024-04-09

## 2024-04-09 RX ADMIN — Medication 40 PERCENT: at 06:24

## 2024-04-09 RX ADMIN — PANTOPRAZOLE SODIUM 40 MG: 40 INJECTION, POWDER, FOR SOLUTION INTRAVENOUS at 08:50

## 2024-04-09 RX ADMIN — POTASSIUM CHLORIDE 40 MEQ: 1.5 POWDER, FOR SOLUTION ORAL at 05:59

## 2024-04-09 RX ADMIN — VANCOMYCIN HYDROCHLORIDE 750 MG: 750 INJECTION, SOLUTION INTRAVENOUS at 22:17

## 2024-04-09 RX ADMIN — SODIUM CHLORIDE, POTASSIUM CHLORIDE, SODIUM LACTATE AND CALCIUM CHLORIDE 500 ML: 600; 310; 30; 20 INJECTION, SOLUTION INTRAVENOUS at 11:23

## 2024-04-09 RX ADMIN — ATORVASTATIN CALCIUM 40 MG: 40 TABLET, FILM COATED ORAL at 08:51

## 2024-04-09 RX ADMIN — VANCOMYCIN HYDROCHLORIDE 750 MG: 750 INJECTION, SOLUTION INTRAVENOUS at 09:02

## 2024-04-09 RX ADMIN — HYDROCORTISONE SODIUM SUCCINATE 50 MG: 100 INJECTION, POWDER, FOR SOLUTION INTRAMUSCULAR; INTRAVENOUS at 05:09

## 2024-04-09 RX ADMIN — NOREPINEPHRINE BITARTRATE 8 MG: 1 INJECTION, SOLUTION, CONCENTRATE INTRAVENOUS at 07:57

## 2024-04-09 RX ADMIN — ENOXAPARIN SODIUM 40 MG: 100 INJECTION SUBCUTANEOUS at 00:32

## 2024-04-09 RX ADMIN — FOLIC ACID 1 MG: 1 TABLET ORAL at 08:51

## 2024-04-09 RX ADMIN — PIPERACILLIN SODIUM AND TAZOBACTAM SODIUM 4.5 G: 4; .5 INJECTION, SOLUTION INTRAVENOUS at 04:22

## 2024-04-09 RX ADMIN — HYDROMORPHONE HYDROCHLORIDE 0.2 MG: 1 INJECTION, SOLUTION INTRAMUSCULAR; INTRAVENOUS; SUBCUTANEOUS at 09:04

## 2024-04-09 RX ADMIN — SODIUM CHLORIDE, POTASSIUM CHLORIDE, SODIUM LACTATE AND CALCIUM CHLORIDE 1000 ML: 600; 310; 30; 20 INJECTION, SOLUTION INTRAVENOUS at 13:35

## 2024-04-09 RX ADMIN — CLOPIDOGREL BISULFATE 75 MG: 75 TABLET ORAL at 08:51

## 2024-04-09 RX ADMIN — INSULIN LISPRO 2 UNITS: 100 INJECTION, SOLUTION INTRAVENOUS; SUBCUTANEOUS at 00:32

## 2024-04-09 ASSESSMENT — PAIN SCALES - GENERAL
PAINLEVEL_OUTOF10: 0 - NO PAIN
PAINLEVEL_OUTOF10: 10 - WORST POSSIBLE PAIN
PAINLEVEL_OUTOF10: 0 - NO PAIN
PAINLEVEL_OUTOF10: 10 - WORST POSSIBLE PAIN
PAINLEVEL_OUTOF10: 0 - NO PAIN

## 2024-04-09 ASSESSMENT — PAIN - FUNCTIONAL ASSESSMENT
PAIN_FUNCTIONAL_ASSESSMENT: 0-10

## 2024-04-09 ASSESSMENT — COGNITIVE AND FUNCTIONAL STATUS - GENERAL
CLIMB 3 TO 5 STEPS WITH RAILING: TOTAL
WALKING IN HOSPITAL ROOM: TOTAL
MOVING TO AND FROM BED TO CHAIR: TOTAL
STANDING UP FROM CHAIR USING ARMS: TOTAL
MOBILITY SCORE: 8
TURNING FROM BACK TO SIDE WHILE IN FLAT BAD: A LOT
MOVING FROM LYING ON BACK TO SITTING ON SIDE OF FLAT BED WITH BEDRAILS: A LOT

## 2024-04-09 ASSESSMENT — PAIN DESCRIPTION - ORIENTATION: ORIENTATION: RIGHT;LEFT

## 2024-04-09 ASSESSMENT — PAIN DESCRIPTION - LOCATION: LOCATION: LEG

## 2024-04-09 ASSESSMENT — ACTIVITIES OF DAILY LIVING (ADL): ADL_ASSISTANCE: INDEPENDENT

## 2024-04-09 NOTE — PROGRESS NOTES
Critical Care Daily Progress      INTERVAL HISTORY:  Patient pulled NG tube overnight.  Blood pressures were labile with systolics in the 50s.  Respiratory 30s and breath sounds were coarse.  Patient was briefly placed on CPAP with improvement.  CBC notable for hemoglobin of 5.1 without any evidence of overt GI bleed.  Patient transfused with 2 units of blood.  Vascular surgery saw patient overnight due to wounds weeping, dressing changed overnight.    Subjective :  Patient evaluated at bedside this AM, alert and oriented. Sad affect this morning. Denies CP, SOB (on 5L NC ), N/V or abdominal pain. He had 3 BM's, soft brown. No melena or hematochezia.       Objective :  VITALS:  Vitals:    04/09/24 0618 04/09/24 0624 04/09/24 0633 04/09/24 0700   BP: 113/52  119/57    BP Location:       Pulse: 110 108 109 94   Resp: (!) 34 (!) 46 23 26   Temp: 36.3 °C (97.3 °F)  36.5 °C (97.7 °F)    TempSrc: Temporal  Temporal    SpO2: 100% 99% 100% 100%   Weight:       Height:            Vent Mode: Pressure support  FiO2 (%):  [36 %-40 %] 40 %  PEEP/CPAP (cm H2O):  [5 cm H20] 5 cm H20  NH SUP:  [5 cm H20] 5 cm H20:     24hr Min/Max:  Temp  Min: 36.1 °C (97 °F)  Max: 36.8 °C (98.2 °F)  Pulse  Min: 82  Max: 122  BP  Min: 113/52  Max: 119/57  Resp  Min: 15  Max: 46  SpO2  Min: 85 %  Max: 100 %      PHYSICAL EXAM:  Physical Exam  Constitutional:       General: He is not in acute distress.     Appearance: He is not ill-appearing.   HENT:      Head: Normocephalic.      Mouth/Throat:      Mouth: Mucous membranes are dry.      Pharynx: Oropharynx is clear.   Eyes:      Extraocular Movements: Extraocular movements intact.      Pupils: Pupils are equal, round, and reactive to light.   Cardiovascular:      Rate and Rhythm: Normal rate and regular rhythm.      Pulses: Normal pulses.      Heart sounds: No murmur heard.  Pulmonary:      Effort: Pulmonary effort is normal. No respiratory distress.      Breath sounds: Normal breath sounds. No  wheezing or rales.   Abdominal:      General: Bowel sounds are normal. There is no distension.      Palpations: Abdomen is soft.      Tenderness: There is no abdominal tenderness.   Musculoskeletal:      Comments: Palpable femoral pulses. L AKA and R BKA guillotine amputation stumps dressed.    Skin:     General: Skin is warm and dry.   Neurological:      General: No focal deficit present.      Mental Status: He is alert and oriented to person, place, and time.   Psychiatric:         Mood and Affect: Mood normal.          Scheduled Medications:   atorvastatin, 40 mg, oral, Daily  clopidogrel, 75 mg, oral, Daily  enoxaparin, 40 mg, subcutaneous, q24h  folic acid, 1 mg, oral, Daily  hydrocortisone sodium succinate, 50 mg, intravenous, q8h  insulin lispro, 0-10 Units, subcutaneous, q4h  oxygen, , inhalation, Continuous - Inhalation  pantoprazole, 40 mg, intravenous, Daily before breakfast  pentoxifylline, 400 mg, oral, Daily  piperacillin-tazobactam, 4.5 g, intravenous, q6h  polyethylene glycol, 17 g, oral, Daily  vancomycin, 750 mg, intravenous, q12h         Continuous Medications:   norepinephrine, 0.01-1 mcg/kg/min, Last Rate: 0.06 mcg/kg/min (04/09/24 0400)  oxygen,   vasopressin, 0.03 Units/min, Last Rate: Stopped (04/07/24 1130)         PRN Medications:   PRN medications: dextrose, dextrose, glucagon, glucagon, ipratropium-albuteroL, oxygen, oxygen, silver nitrate applicators, vancomycin    LDA:  CVC, pIV, A-line (L radial)  Urethral catheter        LABS:  Results from last 7 days   Lab Units 04/09/24  0406 04/08/24  0430 04/07/24  1806   WBC AUTO x10*3/uL 13.3* 7.4 12.0*   HEMOGLOBIN g/dL 5.1* 8.8* 7.5*   HEMATOCRIT % 15.8* 26.2* 21.5*   PLATELETS AUTO x10*3/uL 213 97* 120*            Results from last 7 days   Lab Units 04/09/24  0406 04/08/24  0430 04/07/24  0309   SODIUM mmol/L 143 142 136   POTASSIUM mmol/L 3.8 4.0 4.1   CHLORIDE mmol/L 103 105 103   CO2 mmol/L 32 34* 30   BUN mg/dL 20 14 11   CREATININE  mg/dL 0.45* 0.41* 0.35*   CALCIUM mg/dL 7.4* 6.9* 6.8*     Results from last 7 days   Lab Units 04/09/24  0406 04/08/24  0430 04/07/24  0309 04/06/24  0504 04/05/24  0838   ALK PHOS U/L 917* 685* 351*   < > 251*   BILIRUBIN TOTAL mg/dL 0.9 0.6 0.6   < > 1.0   BILIRUBIN DIRECT mg/dL  --   --   --   --  0.8*   PROTEIN TOTAL g/dL 3.8* 3.5* 3.7*   < > 3.4*   ALT U/L 61* 48 57*   < > 114*   AST U/L 74* 39 39   < > 150*    < > = values in this interval not displayed.      Results from last 7 days   Lab Units 04/06/24  1452 04/05/24  0138   APTT seconds 36 46*   INR  1.1 1.8*        IMAGING:  === 04/04/24 ===    US RIGHT UPPER QUADRANT    - Impression -  1. Diffusely echogenic liver parenchyma consistent with steatosis.  2. Thickened/edematous gallbladder wall with no evidence of  hyperemia, gallbladder wall distention or cholelithiasis. Findings  are equivocal and most likely due to fluid overload.      I personally reviewed the images/study and I agree with the findings  as stated by Resident Dalton Bernal MD. This study was interpreted  at [place], [place].    MACRO:  None    Signed by: Daniel Shahid 4/5/2024 6:56 PM  Dictation workstation:   [id number]  === 04/04/24 ===    CT HEAD WO IV CONTRAST    - Impression -  No evidence of acute intracranial abnormality. If concerns for an  acute stroke may consider brain MRI for further evaluation.    I personally reviewed the images/study and I agree with the findings  as stated by resident physician Dr. Evan Lai . This study  was interpreted at [place], [place].    MACRO:  None    Signed by: Rashi Michael 4/5/2024 6:36 PM  Dictation workstation:   [id number]    Assessment/Plan :  Ry Sandhu is a 74-year-old M w/PMHx of COPD, CAD, PAD who is presenting to the MICU from outside hospital for acute hypoxic respiratory failure secondary to acute  community-acquired pneumonia and septic shock secondary to community-acquired pneumonia versus colitis now requiring bilateral below the knee amputations due to severe peripheral arterial disease and increased pressor requirements.     UPDATES 4/9:  -Appeared dry on exam, given 500 ml LR   -Plavix discontinued per vasc surgery recommendation   -Received 2 units pRBC given Hgb 5.5  -Follow up post transfusion CBC, mixed venous and lactate @ 1pm   -Zosyn stopped     NEURO:  #Altered Mental Status-Resolved   Weaned off sedation  PLAN:  - Continue to monitor      #C/F right basal ganglia ischemia  CT head 4/5 nonacute  - NTD     CARDIO:  #Undifferentiated shock, likely septic, resolving  #Lactic Acidosis  :: Septic shock likely 2/2 CAP. Weaning off pressors as able   :: VBG: pH 7.45, pC02 42, HC03 32  :: Lactate 6.7  PLAN:  - Continue pressor support for MAPs > 65     #CAD  #HTN  #PAD  #L AKA and R BKA   PLAN:  -Vascular surgery consulted, appreciate recs  - Continue Lipitor  - Discontinued clopidogrel  - Continue pentoxifylline  - Holding antihypertensives in setting of requiring pressors     PULM:  #COPD  Current everyday tobacco smoker  No pfts on file  -on 5L NC      #CAP  #AHRF 2/2 CAP   #Pleural Effusion  :: CXR 4/7 showing similar airspace opacities with improving pulmonary edema   Sputum culture growing Corynebacterium striatum group   PLAN:  -C/w Vancomycin (4/5-*), Zosyn discontinued (4/9)     GI  #Elevated Liver Enzymes   #Fatty infiltration of liver on CT abdomen  #Colitis  #GERD  #Concern for GI bleed  RUQ showing evidence of hepatic steatosis and edematous gallbladder.  PLAN:  - C/w PPI  - C/w Vancomycin (4/5-*), discontinued zosyn (4/5-4/9)    RENAL  #Hypernatremia-Resolved   PLAN:  - RFP, Mg   - Continue to monitor      HEME/ONC  #Chronic Normocytic Anemia  #Coagulopathy  Likely due to liver injury in the setting of heavy alcohol history versus sepsis  :: INR 1.8, was 1.0 in August  :: 4/8: Hgb dropped  to 5.5, received 2 units pRBCs  PLAN:  -Follow up with pm CBC   -Keep active type and screen  -Transfuse for Hgb < 7  -Maintain 2 large bore pIVs      ENDO:  -No active issues to address      MSK/Rheum:  -No active issues to address        ID:  #C/f Septic Shock 2/2 CAP  Azithromycin 500 mg daily (4/1 - 4/4)  Ceftriaxone (3/31 - 4/5)  Doxycycline (4/4 - 4/5)  Flagyl (3/25 - 4/5)  PLAN:  -C/w Vancomycin (4/5-4/12* end date)       Patient staffed with the attending physician.  Rinku Saenz MD, MPH  PGY-1 Internal Medicine

## 2024-04-09 NOTE — PROGRESS NOTES
Physical Therapy    Physical Therapy Evaluation    Patient Name: Ry Sandhu  MRN: [id number]  Today's Date: 4/9/2024   Time Calculation  Start Time: 1233  Stop Time: 1300  Time Calculation (min): 27 min    Assessment/Plan   PT Assessment  PT Assessment Results: Decreased strength, Impaired balance, Decreased mobility, Decreased cognition, Impaired judgement, Decreased safety awareness, Orthopedic restrictions, Impaired hearing  Rehab Prognosis: Good  End of Session Communication: Bedside nurse  End of Session Patient Position: Bed, 3 rail up, Alarm off, not on at start of session (DEP chair position)  IP OR SWING BED PT PLAN  Inpatient or Swing Bed: Inpatient  PT Plan  Treatment/Interventions: Bed mobility, Transfer training, Gait training, Balance training, Strengthening, Neuromuscular re-education, Therapeutic exercise, Therapeutic activity, Positioning, Postural re-education  PT Plan: Skilled PT  PT Frequency: 4 times per week  PT Discharge Recommendations: Moderate intensity level of continued care  PT Recommended Transfer Status: Assist x2 (to EOB)  PT - OK to Discharge: Yes      Subjective   General Visit Information:  General  Reason for Referral: presenting to the MICU from OSH for acute hypoxic respiratory failure secondary to acute community-acquired pneumonia and septic shock secondary to community-acquired pneumonia vs colitis, intubated (now extubated); s/p L AKA and R BKA due to severe peripheral arterial disease and increased pressor requirements.  Past Medical History Relevant to Rehab: COPD, CAD, PAD  Family/Caregiver Present: No  Co-Treatment:  (rehab tech in room to assist with room set-up, line management and patient mobility)  Prior to Session Communication: Bedside nurse  Patient Position Received: Bed, 3 rail up, Alarm off, not on at start of session  General Comment: agreeable to participate in therapy; required increase verbal stim to become alert; patient with mild fluctuations in  "alertness during session. triple lumen, 5l NC, renteria, tele, dobhoff, arterial line.  Home Living:  Home Living  Type of Home: Apartment (reports 3rd floor apartment)  Lives With:  (+wife, wife's 98 y/o mother)  Home Adaptive Equipment:  (+SPC, walker)  Home Access:  (patient did not report elevator, anticipate at least 2 flights of stairs to 2nd floor)  Bathroom Shower/Tub: Walk-in shower  Bathroom Equipment: None  Prior Level of Function:  Prior Function Per Pt/Caregiver Report  Level of [place]: Independent with ADLs and functional transfers, Needs assistance with homemaking (reports cooking \"sometimes\")  Receives Help From: Family  ADL Assistance: Independent  Homemaking Assistance: Needs assistance  Driving/Transportation:  ((+) drive)  Ambulatory Assistance:  (reports using walker for ambulation; unclear if household vs community ambulator)  Hand Dominance: Right  Precautions:  Precautions  Hearing/Visual Limitations: hearing appears somewhat [place], reports wearing glasses  LE Weight Bearing Status:  (anticipate NWB BLEs d/t recent AKA/BKA)  Medical Precautions: Fall precautions, Oxygen therapy device and L/min  Vital Signs:  Vital Signs  Heart Rate:  (pre: 92 post: 93)  Resp:  (pre: 18 post: 20)  SpO2:  (pre: 98% post: 100%)  BP:  (pre: 128/44 post: 115/44; MAP ranged from 62-67)  BP Location: Left arm  BP Method: Arterial line    Objective   Pain:  Pain Assessment  Pain Assessment: 0-10  Pain Score:  (did not report pain when asked)  Cognition:  Cognition  Overall Cognitive Status: Impaired  Arousal/Alertness: Inconsistent responses to stimuli  Orientation Level:  (able to state name, stated location as \"[facility]\", stated March as month, with cues stated April, stated year as 2024. Re-orientation provided. CAM-ICU (+))  Following Commands: Follows one step commands with repetition (90%, increase time and cues)    General Assessments:      Activity Tolerance  Early Mobility/Exercise Safety Screen: " "Proceed with mobilization - No exclusion criteria met  Activity Tolerance Comments: reported mild SOB sitting EOB, VSS    Sensation  Sensation Comment: patient reports numbness/tingling \"all over\" patient unable to elaborate.    Strength  Strength Comments: BUE:  4-/5, elbow flexion/ext 3/5, shoulder flexion 3-/5 (RLE: recent BKA, knee ext 3/5; LLE: recent AKA)  Postural Control  Postural Control: Impaired  Head Control: favors increase cervical flexion, required cueing to keep head in neutral position  Trunk Control: retrolean, difficulty completing anterior lean over seated EVA to reduce retrolean.  Righting Reactions: reduced    Static Sitting Balance  Static Sitting-Balance Support: Bilateral upper extremity supported  Static Sitting-Level of Assistance: Moderate assistance, Maximum assistance  Static Sitting-Comment/Number of Minutes: x1 with increase cueing to shift COM more anterior over EVA       Functional Assessments:  Bed Mobility  Bed Mobility: Yes  Bed Mobility 1  Bed Mobility 1: Supine to sitting, Sitting to supine  Level of Assistance 1: Dependent, Moderate tactile cues, Moderate verbal cues  Bed Mobility Comments 1: x2 with HOB elevated for supine-> sit task, draw sheet/    Transfers  Transfer: No    Ambulation/Gait Training  Ambulation/Gait Training Performed: No  Extremity/Trunk Assessments:  RUE   RUE :  (AAROM shoulder flexion WFL, remainder WFL AROM)  LUE   LUE:  (AAROM shoulder flexion WFL, remainder WFL AROM)  RLE   RLE :  (recent BKA, knee ext to neutral AAROM)  LLE   LLE :  (recent AKA)  Outcome Measures:  [facility] Basic Mobility  Turning from your back to your side while in a flat bed without using bedrails: A lot  Moving from lying on your back to sitting on the side of a flat bed without using bedrails: A lot  Moving to and from bed to chair (including a wheelchair): Total  Standing up from a chair using your arms (e.g. wheelchair or bedside chair): Total  To walk in hospital room: " Total  Climbing 3-5 steps with railing: Total  Basic Mobility - Total Score: 8    FSS-ICU  Ambulation: Unable to attempt due to weakness  Rolling: Maximal assistance (performs 25% - 49% of task)  Sitting: Maximal assistance (performs 25% - 49% of task)  Transfer Sit-to-Stand: Unable to perform  Transfer Supine-to-Sit: Total assistance (performs 25% or requires another person)  Total Score: 5      E = Exercise and Early Mobility  Early Mobility/Exercise Safety Screen: Proceed with mobilization - No exclusion criteria met  Current Activity: Sitting at edge of bed    Encounter Problems       Encounter Problems (Active)       PT Problem       Patient will complete bed mobility with MINx1 with HOB elevated, use of bedrail         Start:  04/09/24    Expected End:  04/30/24            Patient will complete bed<->chair lateral slide transfer with OD x1 using LRD as needed without acute LOB        Start:  04/09/24    Expected End:  04/30/24            Patient will complete static (contact guard assist x1) and dynamic (minimal assist x1) sitting balance activities using UE support as needed in order to maintain midline without acute LOB.        Start:  04/09/24    Expected End:  04/30/24            Patient will participate in BLE there-ex program in order to assist in improving strength and to assist with the completion of functional mobility tasks.        Start:  04/09/24    Expected End:  04/30/24                   Education Documentation  Mobility Training, taught by Wanda Mcfarladn PT at 4/9/2024  3:36 PM.  Learner: Patient  Readiness: Acceptance  Method: Explanation  Response: Needs Reinforcement  Comment: mobility progression    Education Comments  No comments found.      Wanda Mcfarland PT, DPT

## 2024-04-09 NOTE — PROGRESS NOTES
"Vancomycin Dosing by Pharmacy- FOLLOW UP    Ry Sandhu is a 74 y.o. year old male who Pharmacy has been consulted for vancomycin dosing for pneumonia. Based on the patient's indication and renal status this patient is being dosed based on a goal AUC of 400-600.     Renal function is currently stable.    Current vancomycin dose: 1000 mg given every 12 hours    Estimated vancomycin AUC on current dose: 630 mg/L.hr     Visit Vitals  /57   Pulse 109   Temp 36.5 °C (97.7 °F) (Temporal)   Resp 23        Lab Results   Component Value Date    CREATININE 0.45 (L) 04/09/2024    CREATININE 0.41 (L) 04/08/2024    CREATININE 0.35 (L) 04/07/2024    CREATININE 0.41 (L) 04/06/2024        Patient weight is No results found for: \"PTWEIGHT\"    No results found for: \"CULTURE\"     I/O last 3 completed shifts:  In: 2827.7 (53.8 mL/kg) [I.V.:225.7 (4.3 mL/kg); NG/GT:945; IV Piggyback:1657]  Out: 1910 (36.3 mL/kg) [Urine:1910 (1 mL/kg/hr)]  Weight: 52.6 kg   [unfilled]    Lab Results   Component Value Date    PATIENTTEMP 37.0 04/09/2024    PATIENTTEMP 37.0 04/07/2024    PATIENTTEMP 37.0 04/06/2024        Assessment/Plan    Above goal AUC. Orders placed for new vancomcyin regimen of 750 every 12 hours to begin at 4/9/24 at 10:00.    Loading dose: N/A  Regimen: 750 mg IV every 12 hours.  Start time: 10:18 on 04/09/2024  Exposure target: AUC24 (range)400-600 mg/L.hr   AUC24,ss: 477 mg/L.hr  Probability of AUC24 > 400: 92 %  Ctrough,ss: 14.5 mg/L  Probability of Ctrough,ss > 20: 4 %  Probability of nephrotoxicity (Lodise GONZALO 2009): 10 %    The next level will be obtained on 4/10/24 at AM labs. May be obtained sooner if clinically indicated.   Will continue to monitor renal function daily while on vancomycin and order serum creatinine at least every 48 hours if not already ordered.  Follow for continued vancomycin needs, clinical response, and signs/symptoms of toxicity.       Yoly Wheeler, PharmD           "

## 2024-04-09 NOTE — PROGRESS NOTES
SOCIAL WORK NOTE   SW met with team for interdisciplinary rounds.  Patient presents with concerns for bleeding from stump following amputation. He has been extubated. Patient is currently recommended for moderate intensity therapy.  Social work to follow.  TERENCE Tucker, [place] ([id number])

## 2024-04-09 NOTE — CARE PLAN
The clinical goals for the shift include Patient remains hemodynamically stable through the shift      Problem: Pain  Goal: My pain/discomfort is manageable  Outcome: Progressing     Problem: Safety  Goal: Patient will be injury free during hospitalization  Outcome: Progressing  Goal: I will remain free of falls  Outcome: Progressing     Problem: Daily Care  Goal: Daily care needs are met  Outcome: Progressing     Problem: Psychosocial Needs  Goal: Demonstrates ability to cope with hospitalization/illness  Outcome: Progressing  Goal: Collaborate with me, my family, and caregiver to identify my specific goals  Outcome: Progressing     Problem: Discharge Barriers  Goal: My discharge needs are met  Outcome: Progressing     Problem: Safety - Medical Restraint  Goal: Remains free of injury from restraints (Restraint for Interference with Medical Device)  Outcome: Progressing  Goal: Free from restraint(s) (Restraint for Interference with Medical Device)  Outcome: Progressing     Problem: Respiratory  Goal: Clear secretions with interventions this shift  Outcome: Progressing  Goal: Minimize anxiety/maximize coping throughout shift  Outcome: Progressing  Goal: Minimal/no exertional discomfort or dyspnea this shift  Outcome: Progressing  Goal: No signs of respiratory distress (eg. Use of accessory muscles. Peds grunting)  Outcome: Progressing  Goal: Patent airway maintained this shift  Outcome: Progressing  Goal: Tolerate mechanical ventilation evidenced by VS/agitation level this shift  Outcome: Progressing  Goal: Tolerate pulmonary toileting this shift  Outcome: Progressing  Goal: Verbalize decreased shortness of breath this shift  Outcome: Progressing  Goal: Wean oxygen to maintain O2 saturation per order/standard this shift  Outcome: Progressing  Goal: Increase self care and/or family involvement in next 24 hours  Outcome: Progressing     Problem: Skin  Goal: Decreased wound size/increased tissue granulation at next  dressing change  Outcome: Progressing  Flowsheets (Taken 4/8/2024 2230)  Decreased wound size/increased tissue granulation at next dressing change:   Promote sleep for wound healing   Protective dressings over bony prominences   Utilize specialty bed per algorithm  Goal: Participates in plan/prevention/treatment measures  Outcome: Progressing  Flowsheets (Taken 4/8/2024 2230)  Participates in plan/prevention/treatment measures:   Discuss with provider PT/OT consult   Elevate heels   Increase activity/out of bed for meals  Goal: Prevent/manage excess moisture  Outcome: Progressing  Flowsheets (Taken 4/8/2024 2230)  Prevent/manage excess moisture:   Cleanse incontinence/protect with barrier cream   Monitor for/manage infection if present   Follow provider orders for dressing changes   Use wicking fabric (obtain order)   Moisturize dry skin  Goal: Prevent/minimize sheer/friction injuries  Outcome: Progressing  Flowsheets (Taken 4/8/2024 2230)  Prevent/minimize sheer/friction injuries:   Use pull sheet   HOB 30 degrees or less   Turn/reposition every 2 hours/use positioning/transfer devices   Utilize specialty bed per algorithm   Complete micro-shifts as needed if patient unable. Adjust patient position to relieve pressure points, not a full turn   Increase activity/out of bed for meals  Goal: Promote/optimize nutrition  Outcome: Progressing  Flowsheets (Taken 4/8/2024 2230)  Promote/optimize nutrition:   Offer water/supplements/favorite foods   Discuss with provider if NPO > 2 days  Goal: Promote skin healing  Outcome: Progressing  Flowsheets (Taken 4/8/2024 2230)  Promote skin healing:   Turn/reposition every 2 hours/use positioning/transfer devices   Protective dressings over bony prominences   Assess skin/pad under line(s)/device(s)   Ensure correct size (line/device) and apply per  instructions   Rotate device position/do not position patient on device

## 2024-04-09 NOTE — SIGNIFICANT EVENT
Called overnight for bleeding from RLE amp. Applied silver nitrate to small area of venous oozing and redressed with surgicel, betadine, abd, kerlix, and ace wrap.    Lala Lassiter MD  Vascular Surgery  [id number]

## 2024-04-09 NOTE — PROGRESS NOTES
Ry Sandhu is a 74 y.o. male on day 5 of admission presenting with Pneumonia.    Subjective   Weaned off pressors yesterday morning and extubated. Some additional bleeding from right stump which was stopped with surgicel and silver nitrate. Hemoglobin drop to 5.1 from 8.8. Received 1 unit of blood. Tachycardic and hypotensive requiring levophed but improved after blood.      Objective   Last Recorded Vitals  Heart Rate:  []   Temp:  [36.2 °C (97.2 °F)-36.8 °C (98.2 °F)]   Resp:  [15-46]   BP: (113-133)/(52-57)   Weight:  [52.6 kg (115 lb 15.4 oz)]   SpO2:  [85 %-100 %]     Intake/Output last 3 Shifts:  I/O last 3 completed shifts:  In: 2827.7 (53.8 mL/kg) [I.V.:225.7 (4.3 mL/kg); NG/GT:945; IV Piggyback:1657]  Out: 1915 (36.4 mL/kg) [Urine:1915 (1 mL/kg/hr)]  Weight: 52.6 kg     Awake, conversive yet confused  Mildly tachycardic on monitor  Satting well on nasal cannula  Abdomen flat, soft, nontender  Palpable femoral pulses  L AKA and R BKA guillotine amputation stumps dressed. Right stump hemostatic with brown staining from surgicel. No drainage, no bogginess    Relevant Results  Lab Results   Component Value Date    WBC 13.3 (H) 04/09/2024    HGB 5.1 (LL) 04/09/2024    HCT 15.8 (L) 04/09/2024    MCV 99 04/09/2024     04/09/2024     Lab Results   Component Value Date    GLUCOSE 137 (H) 04/09/2024    CALCIUM 7.4 (L) 04/09/2024     04/09/2024    K 3.8 04/09/2024    CO2 32 04/09/2024     04/09/2024    BUN 20 04/09/2024    CREATININE 0.45 (L) 04/09/2024       Active Medications  Scheduled medications  atorvastatin, 40 mg, oral, Daily  clopidogrel, 75 mg, oral, Daily  enoxaparin, 40 mg, subcutaneous, q24h  folic acid, 1 mg, oral, Daily  hydrocortisone sodium succinate, 50 mg, intravenous, q8h  insulin lispro, 0-10 Units, subcutaneous, q4h  oxygen, , inhalation, Continuous - Inhalation  pantoprazole, 40 mg, intravenous, Daily before breakfast  pentoxifylline, 400 mg, oral,  Daily  piperacillin-tazobactam, 4.5 g, intravenous, q6h  polyethylene glycol, 17 g, oral, Daily  vancomycin, 750 mg, intravenous, q12h      Continuous medications  norepinephrine, 0.01-1 mcg/kg/min, Last Rate: 0.02 mcg/kg/min (04/09/24 0715)  oxygen,   vasopressin, 0.03 Units/min, Last Rate: Stopped (04/07/24 1130)      PRN medications  PRN medications: dextrose, dextrose, glucagon, glucagon, ipratropium-albuteroL, oxygen, oxygen, silver nitrate applicators, vancomycin     Assessment/Plan   Principal Problem:    Pneumonia  Active Problems:    CAD (coronary artery disease)    CHF (congestive heart failure) (CMS/[facility])    HTN (hypertension)    Hyperlipidemia    Chronic obstructive pulmonary disease (CMS/[facility])    Peripheral vascular disease (CMS/[facility])    Gastroesophageal reflux disease    Anemia    Septicemia (CMS/[facility])    74M admitted for care of septic shock secondary to pneumonia; consequently developed ischemic necrosis of b/l LE in context of shock and pre-existing occlusive disease requiring bilateral major guillotine amputations. Improving sepsis picture, stumps clean. Stumps hemostatic.    Recommendations:  - Dressing to be changed daily (nursing can do - Vascular can be alerted of concerns about wound) - betadine, kerlix, abd, kerlix, ace  - Pending plan for formalization - waiting for normalization of white count and no pressor requirement  - No indication for plavix from vascular surgery perspective given major amputations (this was added by our team following SFA stenting in August 2023. Now patient has leg amputated, further use not of much benefit). Unless patient has other indication for plavix, [id number] is acceptable for the purposes of secondary CVD prevention     Discussed with Dr. Conchita Arboleda MD  [id number]   General Surgery   [id number]

## 2024-04-09 NOTE — PROGRESS NOTES
Speech-Language Pathology    SLP Adult Inpatient Speech-Language Pathology Clinical Swallow Evaluation    Patient Name: Ry Sandhu  MRN: [id number]  Today's Date: 4/9/2024   Time Calculation  Start Time: 1546  Stop Time: 1600  Time Calculation (min): 14 min       Assessment:  Suspect severe pharyngeal dysphagia    Swallowing function likely impacted by intubation and generalized weakness r/t critical illness       Recommendations:  NPO  Continued short term alternative means of nutrition/hydration/medication support.    Frequent, aggressive oral care as tolerated to improve infection control, as well as to reduce dental plaque and bacteria on oropharyngeal surfaces which may increase the risk nosocomial infections, including pneumonia.     OK for small amounts of ice chips (one at a time, 1-2x/hour) for oral comfort and to prevent swallow disuse atrophy, but only after aggressive oral care to avoid colonization of bacteria within the oral cavity.     Pt will likely require instrumental assessment of swallowing prior to initiation of PO diet.  Not yet appropriate given current severity of swallowing deficits.  Will continue to monitor to determine readiness.          Plan:  SLP Plan: Skilled SLP  SLP Frequency: 2x per week  Duration:  4 weeks  Discussed POC: Pt, RN      Goals:  Pt will consistently form labial/lingual seal and initiate swallow response with presentations of moist toothettes x10 reps.      Pt will demonstrate adequate oral phase and initiate effortful swallows with small ice chips x10 reps.    Pt will tolerate least restrictive diet with adequate oral phase and no s/s of aspiration.           Subjective   RN cleared pt for evaluation.  Pt properly identified and evaluated bedside.  Resting, but able to alert for participation.  O2 via NC.  DHT in place.  No family present.  Perseverating on locating a cigarette- SLP redirected as able.     Pt admitted from an OSH w/ septic shock r/t PNA.  Intubated  4/5-4/8.  Also found to have ischemic necrosis s/p BL LE amputations.      Pt reports baseline dysphagia with solids and liquids, but is unable to elaborate further.         Objective     Cognition:  Command Following: WFL simple 1-step   Attention: Reduced   Orientation: Oriented x3 with min cues       Oral/Motor Assessment:  Oral Hygiene: WFL  Dentition: Edentulous; has dentures, typically wears them to eat   Oral Motor: Generalized weakness   Voice (Perceptually): Dysphonic  Volitional Cough (Perceptually): Weak   Volitional Swallow: Delayed            Consistencies Trialed:  Ice chips.  Not appropriate for additional trials given apparent severity of swallowing deficits.         Clinical Observations:  Patient Positioning: Upright in Bed  Management of Oral Secretions: Impaired; required yankauer suctioning   Was The 3 oz Swallow Protocol Completed: No (Deferred at this time 2' to pt performance  w/ single ice chips)       Clinical bedside swallow evaluation completed.  Pt presented with small, single ice chips x3.  Reduced, but functional oral phase overall.  Multiple swallows per ice chip.  Overt s/s of aspiration in 3/3 trials; weak cough response.  Able to expectorate mod thick secretions x1.  PO trials ceased due to concern for pharyngeal dysphagia/aspiration.      Education:  Education provided to patient regarding NPO recommendations, allowance of ice chips (1-2/ hr), importance of oral care, and overall dysphagia plan of care.  Understanding indicated.  Discussed pt status w/ RN.          04/09/24 at 4:40 PM - Nadia Haynes, SLP

## 2024-04-10 ENCOUNTER — APPOINTMENT (OUTPATIENT)
Dept: RADIOLOGY | Facility: HOSPITAL | Age: 74
DRG: 853 | End: 2024-04-10
Payer: MEDICARE

## 2024-04-10 LAB
ALBUMIN SERPL BCP-MCNC: 1.5 G/DL (ref 3.4–5)
ALBUMIN SERPL BCP-MCNC: 1.6 G/DL (ref 3.4–5)
ALBUMIN SERPL BCP-MCNC: 1.7 G/DL (ref 3.4–5)
ALP SERPL-CCNC: 843 U/L (ref 33–136)
ALT SERPL W P-5'-P-CCNC: 68 U/L (ref 10–52)
ANION GAP BLDA CALCULATED.4IONS-SCNC: 2 MMO/L (ref 10–25)
ANION GAP SERPL CALC-SCNC: 7 MMOL/L
ANION GAP SERPL CALC-SCNC: 7 MMOL/L (ref 10–20)
ANION GAP SERPL CALC-SCNC: 7 MMOL/L (ref 10–20)
AST SERPL W P-5'-P-CCNC: 75 U/L (ref 9–39)
BASE EXCESS BLDA CALC-SCNC: 10.3 MMOL/L (ref -2–3)
BASOPHILS # BLD AUTO: 0.01 X10*3/UL (ref 0–0.1)
BASOPHILS NFR BLD AUTO: 0.1 %
BILIRUB SERPL-MCNC: 1 MG/DL (ref 0–1.2)
BODY TEMPERATURE: 37 DEGREES CELSIUS
BUN SERPL-MCNC: 22 MG/DL (ref 6–23)
BUN SERPL-MCNC: 23 MG/DL (ref 6–23)
BUN SERPL-MCNC: 24 MG/DL (ref 6–23)
CA-I BLDA-SCNC: 1.12 MMOL/L (ref 1.1–1.33)
CALCIUM SERPL-MCNC: 6.8 MG/DL (ref 8.6–10.6)
CALCIUM SERPL-MCNC: 7.1 MG/DL (ref 8.6–10.6)
CALCIUM SERPL-MCNC: 7.1 MG/DL (ref 8.6–10.6)
CHLORIDE BLDA-SCNC: 108 MMOL/L (ref 98–107)
CHLORIDE SERPL-SCNC: 106 MMOL/L (ref 98–107)
CHLORIDE SERPL-SCNC: 108 MMOL/L (ref 98–107)
CHLORIDE SERPL-SCNC: 108 MMOL/L (ref 98–107)
CHLORIDE UR-SCNC: 45 MMOL/L
CHLORIDE/CREATININE (MMOL/G) IN URINE: 157 MMOL/G CREAT (ref 23–275)
CO2 SERPL-SCNC: 34 MMOL/L (ref 21–32)
CO2 SERPL-SCNC: 35 MMOL/L (ref 21–32)
CO2 SERPL-SCNC: 35 MMOL/L (ref 21–32)
CREAT SERPL-MCNC: 0.42 MG/DL (ref 0.5–1.3)
CREAT SERPL-MCNC: 0.47 MG/DL (ref 0.5–1.3)
CREAT SERPL-MCNC: 0.49 MG/DL (ref 0.5–1.3)
CREAT UR-MCNC: 28.7 MG/DL (ref 20–370)
EGFRCR SERPLBLD CKD-EPI 2021: >90 ML/MIN/1.73M*2
EOSINOPHIL # BLD AUTO: 0.06 X10*3/UL (ref 0–0.4)
EOSINOPHIL NFR BLD AUTO: 0.3 %
ERYTHROCYTE [DISTWIDTH] IN BLOOD BY AUTOMATED COUNT: 16.9 % (ref 11.5–14.5)
FOLATE SERPL-MCNC: 11.9 NG/ML
GGT SERPL-CCNC: 1358 U/L (ref 5–64)
GLUCOSE BLD MANUAL STRIP-MCNC: 107 MG/DL (ref 74–99)
GLUCOSE BLD MANUAL STRIP-MCNC: 116 MG/DL (ref 74–99)
GLUCOSE BLD MANUAL STRIP-MCNC: 120 MG/DL (ref 74–99)
GLUCOSE BLD MANUAL STRIP-MCNC: 123 MG/DL (ref 74–99)
GLUCOSE BLD MANUAL STRIP-MCNC: 131 MG/DL (ref 74–99)
GLUCOSE BLD MANUAL STRIP-MCNC: 98 MG/DL (ref 74–99)
GLUCOSE BLDA-MCNC: 99 MG/DL (ref 74–99)
GLUCOSE SERPL-MCNC: 100 MG/DL (ref 74–99)
GLUCOSE SERPL-MCNC: 133 MG/DL (ref 74–99)
GLUCOSE SERPL-MCNC: 137 MG/DL (ref 74–99)
HAV IGM SER QL: NONREACTIVE
HBV CORE IGM SER QL: NONREACTIVE
HBV SURFACE AG SERPL QL IA: NONREACTIVE
HCO3 BLDA-SCNC: 34.3 MMOL/L (ref 22–26)
HCT VFR BLD AUTO: 22.5 % (ref 41–52)
HCT VFR BLD EST: 24 % (ref 41–52)
HCV AB SER QL: NONREACTIVE
HGB BLD-MCNC: 7.6 G/DL (ref 13.5–17.5)
HGB BLDA-MCNC: 7.9 G/DL (ref 13.5–17.5)
IMM GRANULOCYTES # BLD AUTO: 0.45 X10*3/UL (ref 0–0.5)
IMM GRANULOCYTES NFR BLD AUTO: 2.6 % (ref 0–0.9)
INHALED O2 CONCENTRATION: 0 %
LACTATE BLDA-SCNC: 0.6 MMOL/L (ref 0.4–2)
LIPASE SERPL-CCNC: 43 U/L (ref 9–82)
LYMPHOCYTES # BLD AUTO: 0.89 X10*3/UL (ref 0.8–3)
LYMPHOCYTES NFR BLD AUTO: 5.1 %
MAGNESIUM SERPL-MCNC: 1.77 MG/DL (ref 1.6–2.4)
MAGNESIUM SERPL-MCNC: 2.05 MG/DL (ref 1.6–2.4)
MAGNESIUM SERPL-MCNC: 2.08 MG/DL (ref 1.6–2.4)
MCH RBC QN AUTO: 31.3 PG (ref 26–34)
MCHC RBC AUTO-ENTMCNC: 33.8 G/DL (ref 32–36)
MCV RBC AUTO: 93 FL (ref 80–100)
MONOCYTES # BLD AUTO: 0.79 X10*3/UL (ref 0.05–0.8)
MONOCYTES NFR BLD AUTO: 4.5 %
NEUTROPHILS # BLD AUTO: 15.39 X10*3/UL (ref 1.6–5.5)
NEUTROPHILS NFR BLD AUTO: 87.4 %
NRBC BLD-RTO: 0.2 /100 WBCS (ref 0–0)
OXYHGB MFR BLDA: 97.4 % (ref 94–98)
PCO2 BLDA: 43 MM HG (ref 38–42)
PH BLDA: 7.51 PH (ref 7.38–7.42)
PHOSPHATE SERPL-MCNC: 1.6 MG/DL (ref 2.5–4.9)
PHOSPHATE SERPL-MCNC: 2.3 MG/DL (ref 2.5–4.9)
PHOSPHATE SERPL-MCNC: 3.4 MG/DL (ref 2.5–4.9)
PLATELET # BLD AUTO: 255 X10*3/UL (ref 150–450)
PO2 BLDA: 135 MM HG (ref 85–95)
POTASSIUM BLDA-SCNC: 3 MMOL/L (ref 3.5–5.3)
POTASSIUM SERPL-SCNC: 3.1 MMOL/L (ref 3.5–5.3)
POTASSIUM SERPL-SCNC: 4 MMOL/L (ref 3.5–5.3)
POTASSIUM SERPL-SCNC: 4 MMOL/L (ref 3.5–5.3)
POTASSIUM UR-SCNC: 35 MMOL/L
POTASSIUM/CREAT UR-RTO: 122 MMOL/G CREAT
PROT SERPL-MCNC: 3.3 G/DL (ref 6.4–8.2)
RBC # BLD AUTO: 2.43 X10*6/UL (ref 4.5–5.9)
SAO2 % BLDA: 100 % (ref 94–100)
SODIUM BLDA-SCNC: 141 MMOL/L (ref 136–145)
SODIUM SERPL-SCNC: 145 MMOL/L (ref 136–145)
SODIUM SERPL-SCNC: 145 MMOL/L (ref 136–145)
SODIUM SERPL-SCNC: 146 MMOL/L (ref 136–145)
SODIUM UR-SCNC: 26 MMOL/L
SODIUM/CREAT UR-RTO: 91 MMOL/G CREAT
T4 FREE SERPL-MCNC: 0.96 NG/DL (ref 0.78–1.48)
TSH SERPL-ACNC: 7.61 MIU/L (ref 0.44–3.98)
VANCOMYCIN SERPL-MCNC: 24.4 UG/ML (ref 5–20)
VIT B12 SERPL-MCNC: 881 PG/ML (ref 211–911)
WBC # BLD AUTO: 17.6 X10*3/UL (ref 4.4–11.3)

## 2024-04-10 PROCEDURE — 86705 HEP B CORE ANTIBODY IGM: CPT | Performed by: STUDENT IN AN ORGANIZED HEALTH CARE EDUCATION/TRAINING PROGRAM

## 2024-04-10 PROCEDURE — 84132 ASSAY OF SERUM POTASSIUM: CPT | Performed by: STUDENT IN AN ORGANIZED HEALTH CARE EDUCATION/TRAINING PROGRAM

## 2024-04-10 PROCEDURE — 2500000004 HC RX 250 GENERAL PHARMACY W/ HCPCS (ALT 636 FOR OP/ED)

## 2024-04-10 PROCEDURE — 71045 X-RAY EXAM CHEST 1 VIEW: CPT | Performed by: RADIOLOGY

## 2024-04-10 PROCEDURE — 83735 ASSAY OF MAGNESIUM: CPT

## 2024-04-10 PROCEDURE — 2500000001 HC RX 250 WO HCPCS SELF ADMINISTERED DRUGS (ALT 637 FOR MEDICARE OP): Performed by: STUDENT IN AN ORGANIZED HEALTH CARE EDUCATION/TRAINING PROGRAM

## 2024-04-10 PROCEDURE — 83735 ASSAY OF MAGNESIUM: CPT | Performed by: STUDENT IN AN ORGANIZED HEALTH CARE EDUCATION/TRAINING PROGRAM

## 2024-04-10 PROCEDURE — 84132 ASSAY OF SERUM POTASSIUM: CPT

## 2024-04-10 PROCEDURE — 82436 ASSAY OF URINE CHLORIDE: CPT

## 2024-04-10 PROCEDURE — 99024 POSTOP FOLLOW-UP VISIT: CPT | Performed by: SURGERY

## 2024-04-10 PROCEDURE — C9113 INJ PANTOPRAZOLE SODIUM, VIA: HCPCS

## 2024-04-10 PROCEDURE — 2500000001 HC RX 250 WO HCPCS SELF ADMINISTERED DRUGS (ALT 637 FOR MEDICARE OP)

## 2024-04-10 PROCEDURE — 84439 ASSAY OF FREE THYROXINE: CPT

## 2024-04-10 PROCEDURE — 70450 CT HEAD/BRAIN W/O DYE: CPT | Performed by: RADIOLOGY

## 2024-04-10 PROCEDURE — 2500000004 HC RX 250 GENERAL PHARMACY W/ HCPCS (ALT 636 FOR OP/ED): Performed by: STUDENT IN AN ORGANIZED HEALTH CARE EDUCATION/TRAINING PROGRAM

## 2024-04-10 PROCEDURE — 37799 UNLISTED PX VASCULAR SURGERY: CPT | Performed by: STUDENT IN AN ORGANIZED HEALTH CARE EDUCATION/TRAINING PROGRAM

## 2024-04-10 PROCEDURE — 97166 OT EVAL MOD COMPLEX 45 MIN: CPT | Mod: GO

## 2024-04-10 PROCEDURE — 85025 COMPLETE CBC W/AUTO DIFF WBC: CPT

## 2024-04-10 PROCEDURE — 76705 ECHO EXAM OF ABDOMEN: CPT

## 2024-04-10 PROCEDURE — 37799 UNLISTED PX VASCULAR SURGERY: CPT

## 2024-04-10 PROCEDURE — 2020000001 HC ICU ROOM DAILY

## 2024-04-10 PROCEDURE — 87205 SMEAR GRAM STAIN: CPT | Performed by: STUDENT IN AN ORGANIZED HEALTH CARE EDUCATION/TRAINING PROGRAM

## 2024-04-10 PROCEDURE — 70450 CT HEAD/BRAIN W/O DYE: CPT

## 2024-04-10 PROCEDURE — 82947 ASSAY GLUCOSE BLOOD QUANT: CPT

## 2024-04-10 PROCEDURE — 99291 CRITICAL CARE FIRST HOUR: CPT

## 2024-04-10 PROCEDURE — 2500000005 HC RX 250 GENERAL PHARMACY W/O HCPCS: Performed by: STUDENT IN AN ORGANIZED HEALTH CARE EDUCATION/TRAINING PROGRAM

## 2024-04-10 PROCEDURE — 76705 ECHO EXAM OF ABDOMEN: CPT | Performed by: RADIOLOGY

## 2024-04-10 PROCEDURE — 80053 COMPREHEN METABOLIC PANEL: CPT

## 2024-04-10 PROCEDURE — 2500000005 HC RX 250 GENERAL PHARMACY W/O HCPCS: Performed by: HOSPITALIST

## 2024-04-10 PROCEDURE — 71045 X-RAY EXAM CHEST 1 VIEW: CPT

## 2024-04-10 PROCEDURE — 83690 ASSAY OF LIPASE: CPT | Performed by: STUDENT IN AN ORGANIZED HEALTH CARE EDUCATION/TRAINING PROGRAM

## 2024-04-10 PROCEDURE — 82977 ASSAY OF GGT: CPT | Performed by: STUDENT IN AN ORGANIZED HEALTH CARE EDUCATION/TRAINING PROGRAM

## 2024-04-10 PROCEDURE — 86780 TREPONEMA PALLIDUM: CPT

## 2024-04-10 PROCEDURE — 82947 ASSAY GLUCOSE BLOOD QUANT: CPT | Mod: MUE

## 2024-04-10 PROCEDURE — 84100 ASSAY OF PHOSPHORUS: CPT

## 2024-04-10 PROCEDURE — 82746 ASSAY OF FOLIC ACID SERUM: CPT

## 2024-04-10 PROCEDURE — 94660 CPAP INITIATION&MGMT: CPT

## 2024-04-10 PROCEDURE — 84443 ASSAY THYROID STIM HORMONE: CPT

## 2024-04-10 PROCEDURE — 80202 ASSAY OF VANCOMYCIN: CPT | Performed by: STUDENT IN AN ORGANIZED HEALTH CARE EDUCATION/TRAINING PROGRAM

## 2024-04-10 RX ORDER — POTASSIUM CHLORIDE 14.9 MG/ML
20 INJECTION INTRAVENOUS
Status: DISCONTINUED | OUTPATIENT
Start: 2024-04-10 | End: 2024-04-10

## 2024-04-10 RX ORDER — POTASSIUM CHLORIDE 14.9 MG/ML
20 INJECTION INTRAVENOUS
Qty: 200 ML | Refills: 0 | Status: DISCONTINUED | OUTPATIENT
Start: 2024-04-10 | End: 2024-04-10

## 2024-04-10 RX ORDER — MAGNESIUM SULFATE HEPTAHYDRATE 40 MG/ML
2 INJECTION, SOLUTION INTRAVENOUS ONCE
Status: COMPLETED | OUTPATIENT
Start: 2024-04-10 | End: 2024-04-10

## 2024-04-10 RX ORDER — POTASSIUM CHLORIDE 29.8 MG/ML
40 INJECTION INTRAVENOUS ONCE
Status: COMPLETED | OUTPATIENT
Start: 2024-04-10 | End: 2024-04-10

## 2024-04-10 RX ORDER — POLYETHYLENE GLYCOL 3350 17 G/17G
17 POWDER, FOR SOLUTION ORAL DAILY PRN
Status: DISCONTINUED | OUTPATIENT
Start: 2024-04-10 | End: 2024-04-17

## 2024-04-10 RX ADMIN — ATORVASTATIN CALCIUM 40 MG: 40 TABLET, FILM COATED ORAL at 08:33

## 2024-04-10 RX ADMIN — SODIUM CHLORIDE, POTASSIUM CHLORIDE, SODIUM LACTATE AND CALCIUM CHLORIDE 1000 ML: 600; 310; 30; 20 INJECTION, SOLUTION INTRAVENOUS at 08:34

## 2024-04-10 RX ADMIN — SODIUM CHLORIDE, POTASSIUM CHLORIDE, SODIUM LACTATE AND CALCIUM CHLORIDE 1000 ML: 600; 310; 30; 20 INJECTION, SOLUTION INTRAVENOUS at 23:54

## 2024-04-10 RX ADMIN — PIPERACILLIN SODIUM AND TAZOBACTAM SODIUM 3.38 G: 3; .375 INJECTION, SOLUTION INTRAVENOUS at 23:11

## 2024-04-10 RX ADMIN — MAGNESIUM SULFATE HEPTAHYDRATE 2 G: 40 INJECTION, SOLUTION INTRAVENOUS at 08:33

## 2024-04-10 RX ADMIN — Medication 4 L/MIN: at 08:00

## 2024-04-10 RX ADMIN — FOLIC ACID 1 MG: 1 TABLET ORAL at 08:34

## 2024-04-10 RX ADMIN — PIPERACILLIN SODIUM AND TAZOBACTAM SODIUM 3.38 G: 3; .375 INJECTION, SOLUTION INTRAVENOUS at 17:00

## 2024-04-10 RX ADMIN — OXYCODONE HYDROCHLORIDE 5 MG: 5 TABLET ORAL at 09:32

## 2024-04-10 RX ADMIN — POTASSIUM PHOSPHATE, MONOBASIC POTASSIUM PHOSPHATE, DIBASIC 21 MMOL: 224; 236 INJECTION, SOLUTION, CONCENTRATE INTRAVENOUS at 11:58

## 2024-04-10 RX ADMIN — VANCOMYCIN HYDROCHLORIDE 750 MG: 750 INJECTION, SOLUTION INTRAVENOUS at 10:44

## 2024-04-10 RX ADMIN — PANTOPRAZOLE SODIUM 40 MG: 40 INJECTION, POWDER, FOR SOLUTION INTRAVENOUS at 08:34

## 2024-04-10 RX ADMIN — PIPERACILLIN SODIUM AND TAZOBACTAM SODIUM 3.38 G: 3; .375 INJECTION, SOLUTION INTRAVENOUS at 11:37

## 2024-04-10 RX ADMIN — POTASSIUM CHLORIDE 40 MEQ: 29.8 INJECTION, SOLUTION INTRAVENOUS at 08:33

## 2024-04-10 RX ADMIN — VANCOMYCIN HYDROCHLORIDE 750 MG: 750 INJECTION, SOLUTION INTRAVENOUS at 22:11

## 2024-04-10 ASSESSMENT — ACTIVITIES OF DAILY LIVING (ADL)
ADL_ASSISTANCE: INDEPENDENT
BATHING_ASSISTANCE: MAXIMAL

## 2024-04-10 ASSESSMENT — PAIN - FUNCTIONAL ASSESSMENT
PAIN_FUNCTIONAL_ASSESSMENT: CPOT (CRITICAL CARE PAIN OBSERVATION TOOL)
PAIN_FUNCTIONAL_ASSESSMENT: 0-10
PAIN_FUNCTIONAL_ASSESSMENT: CPOT (CRITICAL CARE PAIN OBSERVATION TOOL)
PAIN_FUNCTIONAL_ASSESSMENT: 0-10
PAIN_FUNCTIONAL_ASSESSMENT: 0-10

## 2024-04-10 ASSESSMENT — COGNITIVE AND FUNCTIONAL STATUS - GENERAL
DRESSING REGULAR LOWER BODY CLOTHING: TOTAL
PERSONAL GROOMING: A LITTLE
DRESSING REGULAR UPPER BODY CLOTHING: A LOT
TOILETING: A LOT
HELP NEEDED FOR BATHING: A LOT
DAILY ACTIVITIY SCORE: 13
EATING MEALS: A LITTLE

## 2024-04-10 ASSESSMENT — PAIN DESCRIPTION - ORIENTATION: ORIENTATION: RIGHT;LEFT

## 2024-04-10 ASSESSMENT — PAIN SCALES - GENERAL
PAINLEVEL_OUTOF10: 0 - NO PAIN
PAINLEVEL_OUTOF10: 5 - MODERATE PAIN
PAINLEVEL_OUTOF10: 7
PAINLEVEL_OUTOF10: 5 - MODERATE PAIN
PAINLEVEL_OUTOF10: 0 - NO PAIN

## 2024-04-10 ASSESSMENT — PAIN DESCRIPTION - LOCATION: LOCATION: LEG

## 2024-04-10 NOTE — PROGRESS NOTES
Occupational Therapy    Evaluation    Patient Name: Ry Sandhu  MRN: [id number]  Today's Date: 4/10/2024  Time Calculation  Start Time: 1201  Stop Time: 1224  Time Calculation (min): 23 min    Assessment  IP OT Assessment  OT Assessment: impaired ADLs/transfers  Prognosis: Good  End of Session Communication: Bedside nurse  End of Session Patient Position: Bed, 3 rail up, Alarm off, not on at start of session  Plan:  Treatment Interventions: ADL retraining, Functional transfer training, UE strengthening/ROM, Endurance training, Cognitive reorientation, Patient/family training, Equipment evaluation/education, Neuromuscular reeducation, Compensatory technique education  OT Frequency: 3 times per week  OT Discharge Recommendations: Moderate intensity level of continued care  OT - OK to Discharge: Yes    Subjective   General:  General  Reason for Referral: presenting to the MICU from OSH for acute hypoxic respiratory failure secondary to acute community-acquired pneumonia and septic shock secondary to community-acquired pneumonia vs colitis, intubated (now extubated); s/p L AKA and R BKA due to severe peripheral arterial disease and increased pressor requirements.  Past Medical History Relevant to Rehab: COPD, CAD, PAD  Family/Caregiver Present: No  Prior to Session Communication: Bedside nurse  Patient Position Received: Bed, 3 rail up, Alarm off, not on at start of session  General Comment: patient lethargic, increased alertness with increased and constant stim; tele, NG tube, a-line, renteria, 0.02 Levo, 6L NC  Precautions:  Hearing/Visual Limitations: ? [place]  LE Weight Bearing Status:  (anticipate NWB (B)LEs)  Medical Precautions: Fall precautions, Oxygen therapy device and L/min  Vital Signs:  Heart Rate:  (pre 86, post 88)  SpO2:  (pre 100%; post- patient's SpO2 monitor not reading, RN in room to assess)  BP:  (pre 119/45 (70), post 112/37 (62), RN aware of low MAP)  Pain:  Pain Assessment  Pain Assessment:  "0-10  Pain Score:  (patient nodded \"yes\" when asked if he was having pain in his LEs, did not rate)    Objective   Cognition:  Overall Cognitive Status: Impaired  Arousal/Alertness: Inconsistent responses to stimuli  Orientation Level:  (oriented to self, \"hospital\" with choices, month and year)  Following Commands:  (follows ~50-75% of one step commands with increased repetition)  Attention:  (limited sustained attention)     Home Living:  Type of Home: Apartment  Lives With:  (wife and wife's mother)  Home Adaptive Equipment:  (cane, walker)  Home Access:  (patient did not report)  Bathroom Shower/Tub: Walk-in shower  Bathroom Equipment: None   Prior Function:  Level of [place]: Independent with ADLs and functional transfers, Needs assistance with homemaking  Receives Help From: Family  ADL Assistance: Independent  Homemaking Assistance:  (when asked who does the cooking and cleaning at home patient attempting to respond however unable to determine what patient was attempting to say)  Ambulatory Assistance:  (uses walker)  Hand Dominance: Right  Prior Function Comments: PLOF information gathered from patient as able as well as PT eval; patient with difficulty phonating, appears to not have dentures in place and mouth appears dry     ADL:  Eating Assistance: Minimal  Eating Deficit:  (anticipated)  Grooming Assistance: Minimal  Grooming Deficit:  (to use yankauer)  Bathing Assistance: Maximal  Bathing Deficit:  (anticipated)  UE Dressing Assistance: Moderate  UE Dressing Deficit:  (anticipated)  LE Dressing Assistance: Total  LE Dressing Deficit:  (anticipated)  Toileting Assistance with Device: Maximal  Toileting Deficit:  (anticipated)  Activity Tolerance:  Early Mobility/Exercise Safety Screen: Proceed with mobilization - No exclusion criteria met  Bed Mobility/Transfers: Bed Mobility  Bed Mobility: Yes  Bed Mobility 1  Bed Mobility 1: Supine to sitting, Sitting to supine  Level of Assistance 1: Dependent, " "Maximum verbal cues, Maximum tactile cues  Bed Mobility Comments 1: x1 assist, HOB elevated, use of draw sheet  Bed Mobility 2  Bed Mobility  2: Rolling right, Rolling left  Level of Assistance 2: Dependent, Maximum verbal cues, Moderate tactile cues    Transfers  Transfer: No    Sitting Balance:  Static Sitting Balance  Static Sitting-Level of Assistance: Dependent  Static Sitting-Comment/Number of Minutes: posterior lean  Dynamic Sitting Balance  Dynamic Sitting-Comments: dependent     Vision: Vision - Basic Assessment  Current Vision:  (patient reports \"yes\" to wearing glasses)  Sensation:  Light Touch:  (patient did not report having numbness/tingling)  Strength:  Strength Comments: (B)  3+/5, (B) elbows >/= 3/5, (B) shoulders ~3-/5     Extremities: RUE   RUE :  (AAROM shoulder WFL, distal joints grossly WFL) and LUE   LUE:  (AAROM shoulder WFL, distal joints grossly WFL)    Outcome Measures: [facility] Daily Activity  Putting on and taking off regular lower body clothing: Total  Bathing (including washing, rinsing, drying): A lot  Putting on and taking off regular upper body clothing: A lot  Toileting, which includes using toilet, bedpan or urinal: A lot  Taking care of personal grooming such as brushing teeth: A little  Eating Meals: A little  Daily Activity - Total Score: 13    , Confusion Assessment Method-ICU (CAM-ICU)  Feature 1: Acute Onset or Fluctuating Course: Positive  Feature 2: Inattention: Negative  Overall CAM-ICU: Negative  , and E = Exercise and Early Mobility  Early Mobility/Exercise Safety Screen: Proceed with mobilization - No exclusion criteria met  Current Activity: Sitting at edge of bed  Education Documentation  Body Mechanics, taught by Melissa Gonzalez OT at 4/10/2024  1:54 PM.  Learner: Patient  Readiness: Acceptance  Method: Explanation  Response: Needs Reinforcement    ADL Training, taught by Melissa Gonzalez OT at 4/10/2024  1:54 PM.  Learner: Patient  Readiness: " Acceptance  Method: Explanation  Response: Needs Reinforcement    Education Comments  No comments found.    Goals:   Encounter Problems       Encounter Problems (Active)       ADLs       Patient with complete upper body dressing with stand by assist level of assistance donning and doffing all UE clothes with no adaptive equipment. (Progressing)       Start:  04/10/24    Expected End:  04/24/24            Patient with complete lower body dressing with minimal assist  level of assistance donning and doffing all LE clothes  with PRN adaptive equipment. (Progressing)       Start:  04/10/24    Expected End:  04/24/24            Patient will feed self with supervision level of assistance using PRN adaptive equipment. (Progressing)       Start:  04/10/24    Expected End:  04/24/24            Patient will complete daily grooming tasks with supervision level of assistance and PRN adaptive equipment. (Progressing)       Start:  04/10/24    Expected End:  04/24/24            Patient will complete toileting including hygiene clothing management/hygiene with minimal assist  level of assistance. (Progressing)       Start:  04/10/24    Expected End:  04/24/24               BALANCE       Pt will maintain dynamic sitting balance during ADL task with minimal assist level of assistance in order to demonstrate decreased risk of falling and improved postural control. (Progressing)       Start:  04/10/24    Expected End:  04/24/24               COGNITION/SAFETY       Patient will follow 100% Simple commands to allow improved ADL performance. (Progressing)       Start:  04/10/24    Expected End:  04/24/24               EXERCISE/STRENGTHENING       Patient will complete BUE exercises in order to improve strength and activity tolerance for ADL performance.  (Progressing)       Start:  04/10/24    Expected End:  04/24/24               TRANSFERS       Patient will perform bed mobility moderate assist level of assistance in order to improve  safety and independence with mobility (Progressing)       Start:  04/10/24    Expected End:  04/24/24            Patient will complete functional transfers with slideboard/LRD with moderate assist level of assistance. (Progressing)       Start:  04/10/24    Expected End:  04/24/24               Melissa Gonzalez OTR/L  Inpatient Occupational Therapist   Rehab Office: [phone]

## 2024-04-10 NOTE — CARE PLAN
Problem: Pain  Goal: My pain/discomfort is manageable  Outcome: Progressing     Problem: Safety  Goal: Patient will be injury free during hospitalization  Outcome: Progressing  Goal: I will remain free of falls  Outcome: Progressing     Problem: Daily Care  Goal: Daily care needs are met  Outcome: Progressing     Problem: Psychosocial Needs  Goal: Demonstrates ability to cope with hospitalization/illness  Outcome: Progressing  Goal: Collaborate with me, my family, and caregiver to identify my specific goals  Outcome: Progressing     Problem: Discharge Barriers  Goal: My discharge needs are met  Outcome: Progressing     Problem: Skin  Goal: Decreased wound size/increased tissue granulation at next dressing change  Outcome: Progressing  Flowsheets (Taken 4/8/2024 2230 by Lonnie Uribe, RN)  Decreased wound size/increased tissue granulation at next dressing change:   Promote sleep for wound healing   Protective dressings over bony prominences   Utilize specialty bed per algorithm  Goal: Participates in plan/prevention/treatment measures  Outcome: Progressing  Flowsheets (Taken 4/8/2024 2230 by Lonnie Uribe RN)  Participates in plan/prevention/treatment measures:   Discuss with provider PT/OT consult   Elevate heels   Increase activity/out of bed for meals  Goal: Prevent/manage excess moisture  Outcome: Progressing  Flowsheets (Taken 4/8/2024 2230 by Lonnie Uribe RN)  Prevent/manage excess moisture:   Cleanse incontinence/protect with barrier cream   Monitor for/manage infection if present   Follow provider orders for dressing changes   Use wicking fabric (obtain order)   Moisturize dry skin  Goal: Prevent/minimize sheer/friction injuries  Outcome: Progressing  Flowsheets (Taken 4/8/2024 2230 by Lonnie Uribe RN)  Prevent/minimize sheer/friction injuries:   Use pull sheet   HOB 30 degrees or less   Turn/reposition every 2 hours/use positioning/transfer devices   Utilize  specialty bed per algorithm   Complete micro-shifts as needed if patient unable. Adjust patient position to relieve pressure points, not a full turn   Increase activity/out of bed for meals  Goal: Promote/optimize nutrition  Outcome: Progressing  Flowsheets (Taken 4/9/2024 2302)  Promote/optimize nutrition: Offer water/supplements/favorite foods  Goal: Promote skin healing  Outcome: Progressing  Flowsheets (Taken 4/8/2024 2230 by Lonnie Uribe RN)  Promote skin healing:   Turn/reposition every 2 hours/use positioning/transfer devices   Protective dressings over bony prominences   Assess skin/pad under line(s)/device(s)   Ensure correct size (line/device) and apply per  instructions   Rotate device position/do not position patient on device       The clinical goals for the shift include Pt hemoglobin will remain stable through the shift.

## 2024-04-10 NOTE — PROGRESS NOTES
Vancomycin Dosing by Pharmacy  FOLLOW UP    Ry Sandhu is a 74 y.o. male who Pharmacy is consulted to dose vancomycin for pneumonia.     Based on the patient's indication and renal status, this patient is being dosed based on a goal vancomycin AUC of 400-600.     Current vancomycin dose: 750 mg every 12 hours    Estimated vancomycin AUC on current dose: 476 mg/L.hr    Renal function is currently stable.    Estimated Creatinine Clearance: 116.9 mL/min (A) (by C-G formula based on SCr of 0.42 mg/dL (L)).    Vancomycin   Date Value Ref Range Status   04/10/2024 24.4 (H) 5.0 - 20.0 ug/mL Final   04/09/2024 25.7 (H) 5.0 - 20.0 ug/mL Final   04/06/2024 13.6 5.0 - 20.0 ug/mL Final       Results from last 7 days   Lab Units 04/10/24  0359 04/09/24  1356 04/09/24  1041 04/09/24  0406 04/08/24  0430 04/07/24  1806 04/07/24  0309   CREATININE mg/dL 0.42*  --   --  0.45* 0.41*  --  0.35*   BUN mg/dL 24*  --   --  20 14  --  11   WBC AUTO x10*3/uL 17.6* 13.9* 12.3* 13.3* 7.4   < > 10.6    < > = values in this interval not displayed.        Visit Vitals  /51   Pulse 94   Temp 36.4 °C (97.5 °F) (Temporal)   Resp 24       Gram Stain   Date/Time Value Ref Range Status   04/05/2024 01:51 AM No polymorphonuclear leukocytes seen (A)  Final   04/05/2024 01:51 AM (4+) Abundant Gram positive pleomorphic bacilli (A)  Final     Urine Culture   Date/Time Value Ref Range Status   04/05/2024 04:02 AM No growth  Final     Blood Culture   Date/Time Value Ref Range Status   04/05/2024 08:38 AM No growth at 4 days -  FINAL REPORT  Final        Assessment/Plan    AUC is within goal range. Continue current vancomycin regimen. This dosing regimen is predicted by [employer] to result in the following pharmacokinetic parameters:   AUC24,ss: 476 mg/L.hr  Probability of AUC24 > 400: 97 %  Ctrough,ss: 14.9 mg/L  Probability of Ctrough,ss > 20: 2 %  Probability of nephrotoxicity (Lodise GONZALO 2009): 10 %  The next vancomycin level will be ordered  for 4/12 AM, unless clinically indicated sooner.  Will continue to monitor renal function daily while on vancomycin and order serum creatinine at least every 48 hours if not already ordered.  Will follow for continued vancomycin needs, clinical response, and signs/symptoms of toxicity.       Karine Weems, PharmD

## 2024-04-10 NOTE — PROGRESS NOTES
Ry Sandhu is a 74 y.o. male on day 6 of admission presenting with Pneumonia.    Subjective   No acute overnight events. Increasing leukocytosis to 17 this morning. Remains on low dose levophed. Hemoglobin remains stable after blood yesterday with no further concern about bleeding from wound.     Objective   Last Recorded Vitals  Heart Rate:  []   Temp:  [36.4 °C (97.5 °F)-36.9 °C (98.4 °F)]   Resp:  [17-32]   Weight:  [54.4 kg (119 lb 14.9 oz)]   SpO2:  [92 %-100 %]     Intake/Output last 3 Shifts:  I/O last 3 completed shifts:  In: 4582.8 (84.2 mL/kg) [I.V.:110.8 (2 mL/kg); Blood:700; NG/GT:1015; IV Piggyback:2757]  Out: 460 (8.5 mL/kg) [Urine:460 (0.2 mL/kg/hr)]  Weight: 54.4 kg     Awake, conversive yet confused  Mildly tachycardic on monitor  Satting well on CPAP  Abdomen flat, soft, nontender  Palpable femoral pulses  L AKA and R BKA guillotine amputation dressings in place with no strikethrough    Relevant Results  Lab Results   Component Value Date    WBC 17.6 (H) 04/10/2024    HGB 7.6 (L) 04/10/2024    HCT 22.5 (L) 04/10/2024    MCV 93 04/10/2024     04/10/2024     Lab Results   Component Value Date    GLUCOSE 100 (H) 04/10/2024    CALCIUM 7.1 (L) 04/10/2024     04/10/2024    K 3.1 (L) 04/10/2024    CO2 35 (H) 04/10/2024     04/10/2024    BUN 24 (H) 04/10/2024    CREATININE 0.42 (L) 04/10/2024       Active Medications  Scheduled medications  atorvastatin, 40 mg, oral, Daily  [Held by provider] enoxaparin, 40 mg, subcutaneous, q24h  folic acid, 1 mg, oral, Daily  insulin lispro, 0-10 Units, subcutaneous, q4h  oxygen, , inhalation, Continuous - Inhalation  pantoprazole, 40 mg, intravenous, Daily before breakfast  piperacillin-tazobactam, 3.375 g, intravenous, q6h  potassium chloride, 40 mEq, intravenous, Once  potassium phosphate, 21 mmol, intravenous, Once  vancomycin, 750 mg, intravenous, q12h      Continuous medications  norepinephrine, 0.01-1 mcg/kg/min, Last Rate: 0.03  mcg/kg/min (04/10/24 1000)  oxygen, , Last Rate: 4 L/min (04/09/24 2050)      PRN medications  PRN medications: acetaminophen, dextrose, dextrose, glucagon, glucagon, ipratropium-albuteroL, oxyCODONE, oxygen, oxygen, polyethylene glycol, silver nitrate applicators, vancomycin     Assessment/Plan   Principal Problem:    Pneumonia  Active Problems:    CAD (coronary artery disease)    CHF (congestive heart failure) (CMS/[facility])    HTN (hypertension)    Hyperlipidemia    Chronic obstructive pulmonary disease (CMS/[facility])    Peripheral vascular disease (CMS/[facility])    Gastroesophageal reflux disease    Anemia    Septicemia (CMS/[facility])    74M admitted for care of septic shock secondary to pneumonia; consequently developed ischemic necrosis of b/l LE in context of shock and pre-existing occlusive disease requiring bilateral major guillotine amputations. Improving sepsis picture, stumps clean. Stumps hemostatic.    Recommendations:  - Dressing to be changed daily (nursing can do - Vascular can be alerted of concerns about wound) - betadine, kerlix, abd, kerlix, ace  - Pending plan for formalization - waiting for normalization of white count and no pressor requirement  - No indication for plavix from vascular surgery perspective given major amputations (this was added by our team following SFA stenting in August 2023. Now patient has leg amputated, further use not of much benefit). Unless patient has other indication for plavix, [id number] is acceptable for the purposes of secondary CVD prevention     Discussed with Dr. Conchita Arboleda MD  PGY3   General Surgery   [id number]

## 2024-04-10 NOTE — PROGRESS NOTES
Critical Care Daily Progress      INTERVAL HISTORY:  NAEON. Lactic acidosis resolved, Hgb stayed stable overnight.    Subjective :  Patient evaluated at bedside this AM, difficult to wake up, opened eyes to sternal rub, does not track. UE Strength intact. Patient unable to answer questions.     Objective :  VITALS:  Vitals:    04/10/24 0400 04/10/24 0500 04/10/24 0600 04/10/24 0615   BP:       BP Location:       Pulse: 92 101 94 94   Resp: (!) 32 (!) 27 25 24   Temp:       TempSrc:       SpO2: 100% 100% 100% 100%   Weight:   54.4 kg (119 lb 14.9 oz)    Height:            FiO2 (%):  [40 %] 40 %:     24hr Min/Max:  Temp  Min: 36.4 °C (97.5 °F)  Max: 36.9 °C (98.4 °F)  Pulse  Min: 77  Max: 105  BP  Min: 106/43  Max: 132/51  Resp  Min: 17  Max: 32  SpO2  Min: 89 %  Max: 100 %      PHYSICAL EXAM:  Physical Exam  Constitutional:       General: He is not in acute distress.     Appearance: He is cachectic. He is not ill-appearing.      Comments: Awakens to sternal rub.   HENT:      Head: Normocephalic.      Mouth/Throat:      Mouth: Mucous membranes are dry.      Pharynx: Oropharynx is clear.   Eyes:      Extraocular Movements: Extraocular movements intact.      Pupils: Pupils are equal, round, and reactive to light.   Cardiovascular:      Rate and Rhythm: Normal rate and regular rhythm.      Pulses: Normal pulses.      Heart sounds: No murmur heard.  Pulmonary:      Effort: Pulmonary effort is normal. No respiratory distress.      Breath sounds: Normal breath sounds. No wheezing or rales.   Abdominal:      General: Bowel sounds are normal. There is no distension.      Palpations: Abdomen is soft.      Tenderness: There is no abdominal tenderness. There is no guarding or rebound.   Musculoskeletal:      Comments: Palpable femoral pulses. L AKA and R BKA guillotine amputation stumps dressed.    Skin:     General: Skin is warm and dry.   Neurological:      General: No focal deficit present.      Mental Status: He is oriented  to person, place, and time. He is lethargic.   Psychiatric:         Mood and Affect: Mood normal.        Scheduled Medications:   atorvastatin, 40 mg, oral, Daily  [Held by provider] enoxaparin, 40 mg, subcutaneous, q24h  folic acid, 1 mg, oral, Daily  insulin lispro, 0-10 Units, subcutaneous, q4h  lactated Ringer's, 500 mL, intravenous, Once  oxygen, , inhalation, Continuous - Inhalation  pantoprazole, 40 mg, intravenous, Daily before breakfast  pentoxifylline, 400 mg, oral, Daily  polyethylene glycol, 17 g, oral, Daily  potassium chloride, 20 mEq, intravenous, q2h  potassium chloride, 20 mEq, intravenous, q2h  vancomycin, 750 mg, intravenous, q12h         Continuous Medications:   norepinephrine, 0.01-1 mcg/kg/min, Last Rate: 0.02 mcg/kg/min (04/10/24 0716)  oxygen, , Last Rate: 4 L/min (04/09/24 2050)  vasopressin, 0.03 Units/min, Last Rate: Stopped (04/07/24 1130)         PRN Medications:   PRN medications: acetaminophen, dextrose, dextrose, glucagon, glucagon, ipratropium-albuteroL, oxyCODONE, oxygen, oxygen, silver nitrate applicators, vancomycin    LDA:  CVC, pIV, A-line (L radial)  Urethral catheter        LABS:  Results from last 7 days   Lab Units 04/10/24  0359 04/09/24  1356 04/09/24  1041   WBC AUTO x10*3/uL 17.6* 13.9* 12.3*   HEMOGLOBIN g/dL 7.6* 7.9* 8.9*   HEMATOCRIT % 22.5* 22.4* 24.8*   PLATELETS AUTO x10*3/uL 255 164 138*            Results from last 7 days   Lab Units 04/10/24  0359 04/09/24  0406 04/08/24  0430   SODIUM mmol/L 145 143 142   POTASSIUM mmol/L 3.1* 3.8 4.0   CHLORIDE mmol/L 106 103 105   CO2 mmol/L 35* 32 34*   BUN mg/dL 24* 20 14   CREATININE mg/dL 0.42* 0.45* 0.41*   CALCIUM mg/dL 7.1* 7.4* 6.9*     Results from last 7 days   Lab Units 04/10/24  0359 04/09/24  0406 04/08/24  0430 04/06/24  0504 04/05/24  0838   ALK PHOS U/L 843* 917* 685*   < > 251*   BILIRUBIN TOTAL mg/dL 1.0 0.9 0.6   < > 1.0   BILIRUBIN DIRECT mg/dL  --   --   --   --  0.8*   PROTEIN TOTAL g/dL 3.3* 3.8* 3.5*    < > 3.4*   ALT U/L 68* 61* 48   < > 114*   AST U/L 75* 74* 39   < > 150*    < > = values in this interval not displayed.      Results from last 7 days   Lab Units 04/06/24  1452 04/05/24  0138   APTT seconds 36 46*   INR  1.1 1.8*        IMAGING:  === 04/04/24 ===    US RIGHT UPPER QUADRANT    - Impression -  1. Diffusely echogenic liver parenchyma consistent with steatosis.  2. Thickened/edematous gallbladder wall with no evidence of  hyperemia, gallbladder wall distention or cholelithiasis. Findings  are equivocal and most likely due to fluid overload.      I personally reviewed the images/study and I agree with the findings  as stated by Resident Dalton Bernal MD. This study was interpreted  at [place], [place].    MACRO:  None    Signed by: Daniel Shahid 4/5/2024 6:56 PM  Dictation workstation:   [id number]  === 04/04/24 ===    CT HEAD WO IV CONTRAST    - Impression -  No evidence of acute intracranial abnormality. If concerns for an  acute stroke may consider brain MRI for further evaluation.    I personally reviewed the images/study and I agree with the findings  as stated by resident physician Dr. Evan Lai . This study  was interpreted at [place], [place].    MACRO:  None    Signed by: Rashi Michael 4/5/2024 6:36 PM  Dictation workstation:   [id number]    Assessment/Plan :  Ry Sandhu is a 74-year-old M w/PMHx of COPD, CAD, PAD who is presenting to the MICU from outside hospital for acute hypoxic respiratory failure secondary to acute community-acquired pneumonia and septic shock secondary to community-acquired pneumonia versus colitis now requiring bilateral below the knee amputations due to severe peripheral arterial disease and increased pressor requirements.     UPDATES 4/10:  -1L LR   -RUQ US  -Vancomycin restarted given worsening leukocytosis   -K phos, Mg repleted      NEURO:  #Acute Encephalopathy   Worsening leukocytosis in the setting of known CAP infection. Etiology unclear at this time. There is a component of metabolic given electrolyte derangement, and ongoing CAP infection. Less likely toxins given no sedating medication on file. Structural causes can not be excluded.  PLAN:  - Vitamin B12, Folate, TSH w/reflex T4 pending   - Delirium precautions  - Continue to monitor      #C/F right basal ganglia ischemia  CT head 4/5 nonacute  - NTD  - Will repeat CTH if other causes AMS ruled out      CARDIO:  #Undifferentiated shock, likely septic, resolving  #Lactic Acidosis-resolved  :: Septic shock likely 2/2 CAP. Weaning off pressors as able   :: VBG: pH 7.45, pC02 42, HC03 32  :: Lactate 6.7->1.6  PLAN:  - Continue to wean off pressors     #CAD  #HTN  #PAD  #L AKA and R BKA   PLAN:  - Vascular surgery consulted, appreciate recs  - Continue Lipitor  - Discontinued clopidogrel  - Discontinue pentoxifylline  - Holding antihypertensives in setting of requiring pressors     PULM:  #COPD-not in exacerbation   Current everyday tobacco smoker  No pfts on file  -on 6L NC      #CAP  #AHRF 2/2 CAP   #Pleural Effusion  :: CXR 4/7 showing similar airspace opacities with improving pulmonary edema   Sputum culture growing Corynebacterium striatum group   -Zosyn discontinued (4/9), then restarted 4/10  PLAN:  -C/w Vancomycin/Zosyn (4/5-*),      GI:  #Elevated Liver Enzymes   #Fatty infiltration of liver on CT abdomen  #Colitis  #GERD  #Concern for GI bleed  RUQ showing evidence of hepatic steatosis and edematous gallbladder.  PLAN:  - C/w PPI  - C/w Vancomycin (4/5-*), discontinued zosyn (4/5-4/9)    RENAL/:  #Metabolic Alkalosis w/Respiratory Acidosis   :: Etiology likely multifactorial ( hypovolemia, NGT losses )  PLAN:  - Urine lytes pending   - RFP, Mg   - Continue to monitor      HEME/ONC  #Chronic Normocytic Anemia  #Coagulopathy  Likely due to liver injury in the setting of heavy  alcohol history versus sepsis  :: INR 1.8, was 1.0 in August  :: 4/8: Hgb dropped to 5.5, received 2 units pRBCs  PLAN:  -Keep active type and screen  -Transfuse for Hgb < 7  -Maintain 2 large bore pIVs      ENDO:  -No active issues to address      MSK/Rheum:  -No active issues to address     ID:  #C/f Septic Shock 2/2 CAP  #Leukocytosis   Azithromycin 500 mg daily (4/1 - 4/4)  Ceftriaxone (3/31 - 4/5)  Doxycycline (4/4 - 4/5)  Flagyl (3/25 - 4/5)  -WBC 17.6<13.9<12.3  PLAN:  -C/w Vancomycin and Zosyn (4/5-*)       Patient staffed with the attending physician.  Rinku Saenz MD, MPH  PGY-1 Internal Medicine

## 2024-04-11 ENCOUNTER — APPOINTMENT (OUTPATIENT)
Dept: RADIOLOGY | Facility: HOSPITAL | Age: 74
DRG: 853 | End: 2024-04-11
Payer: MEDICARE

## 2024-04-11 LAB
ABO GROUP (TYPE) IN BLOOD: NORMAL
ALBUMIN SERPL BCP-MCNC: 1.6 G/DL (ref 3.4–5)
ALP SERPL-CCNC: 768 U/L (ref 33–136)
ALT SERPL W P-5'-P-CCNC: 55 U/L (ref 10–52)
ANION GAP BLDA CALCULATED.4IONS-SCNC: 4 MMO/L (ref 10–25)
ANION GAP SERPL CALC-SCNC: 9 MMOL/L (ref 10–20)
ANTIBODY SCREEN: NORMAL
AST SERPL W P-5'-P-CCNC: 60 U/L (ref 9–39)
BASE EXCESS BLDA CALC-SCNC: 10.6 MMOL/L (ref -2–3)
BASOPHILS # BLD AUTO: 0.01 X10*3/UL (ref 0–0.1)
BASOPHILS # BLD AUTO: 0.01 X10*3/UL (ref 0–0.1)
BASOPHILS NFR BLD AUTO: 0.1 %
BASOPHILS NFR BLD AUTO: 0.1 %
BILIRUB SERPL-MCNC: 0.7 MG/DL (ref 0–1.2)
BLOOD EXPIRATION DATE: NORMAL
BODY TEMPERATURE: 37 DEGREES CELSIUS
BUN SERPL-MCNC: 21 MG/DL (ref 6–23)
CA-I BLDA-SCNC: 1.11 MMOL/L (ref 1.1–1.33)
CALCIUM SERPL-MCNC: 7.1 MG/DL (ref 8.6–10.6)
CHLORIDE BLDA-SCNC: 108 MMOL/L (ref 98–107)
CHLORIDE SERPL-SCNC: 109 MMOL/L (ref 98–107)
CO2 SERPL-SCNC: 34 MMOL/L (ref 21–32)
CREAT SERPL-MCNC: 0.53 MG/DL (ref 0.5–1.3)
DISPENSE STATUS: NORMAL
EGFRCR SERPLBLD CKD-EPI 2021: >90 ML/MIN/1.73M*2
EOSINOPHIL # BLD AUTO: 0.05 X10*3/UL (ref 0–0.4)
EOSINOPHIL # BLD AUTO: 0.07 X10*3/UL (ref 0–0.4)
EOSINOPHIL NFR BLD AUTO: 0.3 %
EOSINOPHIL NFR BLD AUTO: 0.5 %
ERYTHROCYTE [DISTWIDTH] IN BLOOD BY AUTOMATED COUNT: 15.6 % (ref 11.5–14.5)
ERYTHROCYTE [DISTWIDTH] IN BLOOD BY AUTOMATED COUNT: 16.6 % (ref 11.5–14.5)
GLUCOSE BLD MANUAL STRIP-MCNC: 101 MG/DL (ref 74–99)
GLUCOSE BLD MANUAL STRIP-MCNC: 115 MG/DL (ref 74–99)
GLUCOSE BLD MANUAL STRIP-MCNC: 92 MG/DL (ref 74–99)
GLUCOSE BLD MANUAL STRIP-MCNC: 98 MG/DL (ref 74–99)
GLUCOSE BLDA-MCNC: 95 MG/DL (ref 74–99)
GLUCOSE SERPL-MCNC: 97 MG/DL (ref 74–99)
HCO3 BLDA-SCNC: 34.3 MMOL/L (ref 22–26)
HCT VFR BLD AUTO: 20 % (ref 41–52)
HCT VFR BLD AUTO: 23.5 % (ref 41–52)
HCT VFR BLD EST: 26 % (ref 41–52)
HGB BLD-MCNC: 6.2 G/DL (ref 13.5–17.5)
HGB BLD-MCNC: 8 G/DL (ref 13.5–17.5)
HGB BLDA-MCNC: 8.7 G/DL (ref 13.5–17.5)
HIV 1+2 AB+HIV1 P24 AG SERPL QL IA: NONREACTIVE
IMM GRANULOCYTES # BLD AUTO: 0.22 X10*3/UL (ref 0–0.5)
IMM GRANULOCYTES # BLD AUTO: 0.24 X10*3/UL (ref 0–0.5)
IMM GRANULOCYTES NFR BLD AUTO: 1.4 % (ref 0–0.9)
IMM GRANULOCYTES NFR BLD AUTO: 1.7 % (ref 0–0.9)
INHALED O2 CONCENTRATION: 40 %
LACTATE BLDA-SCNC: 0.9 MMOL/L (ref 0.4–2)
LYMPHOCYTES # BLD AUTO: 0.45 X10*3/UL (ref 0.8–3)
LYMPHOCYTES # BLD AUTO: 0.52 X10*3/UL (ref 0.8–3)
LYMPHOCYTES NFR BLD AUTO: 3 %
LYMPHOCYTES NFR BLD AUTO: 3.7 %
MAGNESIUM SERPL-MCNC: 1.97 MG/DL (ref 1.6–2.4)
MCH RBC QN AUTO: 30.4 PG (ref 26–34)
MCH RBC QN AUTO: 31.4 PG (ref 26–34)
MCHC RBC AUTO-ENTMCNC: 31 G/DL (ref 32–36)
MCHC RBC AUTO-ENTMCNC: 34 G/DL (ref 32–36)
MCV RBC AUTO: 92 FL (ref 80–100)
MCV RBC AUTO: 98 FL (ref 80–100)
MONOCYTES # BLD AUTO: 0.48 X10*3/UL (ref 0.05–0.8)
MONOCYTES # BLD AUTO: 0.53 X10*3/UL (ref 0.05–0.8)
MONOCYTES NFR BLD AUTO: 3.2 %
MONOCYTES NFR BLD AUTO: 3.7 %
NEUTROPHILS # BLD AUTO: 12.86 X10*3/UL (ref 1.6–5.5)
NEUTROPHILS # BLD AUTO: 14.02 X10*3/UL (ref 1.6–5.5)
NEUTROPHILS NFR BLD AUTO: 90.3 %
NEUTROPHILS NFR BLD AUTO: 92 %
NRBC BLD-RTO: 0 /100 WBCS (ref 0–0)
NRBC BLD-RTO: 0 /100 WBCS (ref 0–0)
OXYHGB MFR BLDA: 85.8 % (ref 94–98)
PCO2 BLDA: 41 MM HG (ref 38–42)
PH BLDA: 7.53 PH (ref 7.38–7.42)
PHOSPHATE SERPL-MCNC: 3.3 MG/DL (ref 2.5–4.9)
PLATELET # BLD AUTO: 230 X10*3/UL (ref 150–450)
PLATELET # BLD AUTO: 235 X10*3/UL (ref 150–450)
PO2 BLDA: 53 MM HG (ref 85–95)
POTASSIUM BLDA-SCNC: 3.8 MMOL/L (ref 3.5–5.3)
POTASSIUM SERPL-SCNC: 3.7 MMOL/L (ref 3.5–5.3)
PRODUCT BLOOD TYPE: 9500
PRODUCT CODE: NORMAL
PROT SERPL-MCNC: 3.3 G/DL (ref 6.4–8.2)
RBC # BLD AUTO: 2.04 X10*6/UL (ref 4.5–5.9)
RBC # BLD AUTO: 2.55 X10*6/UL (ref 4.5–5.9)
RH FACTOR (ANTIGEN D): NORMAL
SAO2 % BLDA: 89 % (ref 94–100)
SODIUM BLDA-SCNC: 142 MMOL/L (ref 136–145)
SODIUM SERPL-SCNC: 148 MMOL/L (ref 136–145)
TREPONEMA PALLIDUM IGG+IGM AB [PRESENCE] IN SERUM OR PLASMA BY IMMUNOASSAY: NONREACTIVE
UNIT ABO: NORMAL
UNIT NUMBER: NORMAL
UNIT RH: NORMAL
UNIT VOLUME: 278
WBC # BLD AUTO: 14.2 X10*3/UL (ref 4.4–11.3)
WBC # BLD AUTO: 15.2 X10*3/UL (ref 4.4–11.3)
XM INTEP: NORMAL

## 2024-04-11 PROCEDURE — 71045 X-RAY EXAM CHEST 1 VIEW: CPT

## 2024-04-11 PROCEDURE — 87389 HIV-1 AG W/HIV-1&-2 AB AG IA: CPT

## 2024-04-11 PROCEDURE — 2500000001 HC RX 250 WO HCPCS SELF ADMINISTERED DRUGS (ALT 637 FOR MEDICARE OP)

## 2024-04-11 PROCEDURE — C9113 INJ PANTOPRAZOLE SODIUM, VIA: HCPCS

## 2024-04-11 PROCEDURE — 2500000004 HC RX 250 GENERAL PHARMACY W/ HCPCS (ALT 636 FOR OP/ED)

## 2024-04-11 PROCEDURE — 2020000001 HC ICU ROOM DAILY

## 2024-04-11 PROCEDURE — 82024 ASSAY OF ACTH: CPT

## 2024-04-11 PROCEDURE — 86901 BLOOD TYPING SEROLOGIC RH(D): CPT

## 2024-04-11 PROCEDURE — P9016 RBC LEUKOCYTES REDUCED: HCPCS

## 2024-04-11 PROCEDURE — 71045 X-RAY EXAM CHEST 1 VIEW: CPT | Performed by: RADIOLOGY

## 2024-04-11 PROCEDURE — 84425 ASSAY OF VITAMIN B-1: CPT

## 2024-04-11 PROCEDURE — 86920 COMPATIBILITY TEST SPIN: CPT

## 2024-04-11 PROCEDURE — 2500000005 HC RX 250 GENERAL PHARMACY W/O HCPCS: Performed by: HOSPITALIST

## 2024-04-11 PROCEDURE — 99024 POSTOP FOLLOW-UP VISIT: CPT | Performed by: SURGERY

## 2024-04-11 PROCEDURE — 85025 COMPLETE CBC W/AUTO DIFF WBC: CPT

## 2024-04-11 PROCEDURE — 2500000004 HC RX 250 GENERAL PHARMACY W/ HCPCS (ALT 636 FOR OP/ED): Performed by: STUDENT IN AN ORGANIZED HEALTH CARE EDUCATION/TRAINING PROGRAM

## 2024-04-11 PROCEDURE — 84100 ASSAY OF PHOSPHORUS: CPT

## 2024-04-11 PROCEDURE — 83735 ASSAY OF MAGNESIUM: CPT

## 2024-04-11 PROCEDURE — 94660 CPAP INITIATION&MGMT: CPT

## 2024-04-11 PROCEDURE — 37799 UNLISTED PX VASCULAR SURGERY: CPT

## 2024-04-11 PROCEDURE — 36430 TRANSFUSION BLD/BLD COMPNT: CPT

## 2024-04-11 PROCEDURE — 82947 ASSAY GLUCOSE BLOOD QUANT: CPT

## 2024-04-11 PROCEDURE — 99291 CRITICAL CARE FIRST HOUR: CPT

## 2024-04-11 PROCEDURE — 94668 MNPJ CHEST WALL SBSQ: CPT

## 2024-04-11 PROCEDURE — 92526 ORAL FUNCTION THERAPY: CPT | Mod: GN | Performed by: SPEECH-LANGUAGE PATHOLOGIST

## 2024-04-11 PROCEDURE — 84132 ASSAY OF SERUM POTASSIUM: CPT

## 2024-04-11 PROCEDURE — 84520 ASSAY OF UREA NITROGEN: CPT

## 2024-04-11 RX ORDER — LIDOCAINE HYDROCHLORIDE 20 MG/ML
1 JELLY TOPICAL ONCE
Status: DISCONTINUED | OUTPATIENT
Start: 2024-04-11 | End: 2024-04-21

## 2024-04-11 RX ORDER — INSULIN LISPRO 100 [IU]/ML
0-10 INJECTION, SOLUTION INTRAVENOUS; SUBCUTANEOUS EVERY 6 HOURS PRN
Status: DISCONTINUED | OUTPATIENT
Start: 2024-04-11 | End: 2024-04-19

## 2024-04-11 RX ADMIN — PIPERACILLIN SODIUM AND TAZOBACTAM SODIUM 3.38 G: 3; .375 INJECTION, SOLUTION INTRAVENOUS at 10:33

## 2024-04-11 RX ADMIN — PIPERACILLIN SODIUM AND TAZOBACTAM SODIUM 3.38 G: 3; .375 INJECTION, SOLUTION INTRAVENOUS at 05:04

## 2024-04-11 RX ADMIN — ATORVASTATIN CALCIUM 40 MG: 40 TABLET, FILM COATED ORAL at 08:14

## 2024-04-11 RX ADMIN — PIPERACILLIN SODIUM AND TAZOBACTAM SODIUM 3.38 G: 3; .375 INJECTION, SOLUTION INTRAVENOUS at 23:00

## 2024-04-11 RX ADMIN — PIPERACILLIN SODIUM AND TAZOBACTAM SODIUM 3.38 G: 3; .375 INJECTION, SOLUTION INTRAVENOUS at 17:15

## 2024-04-11 RX ADMIN — PANTOPRAZOLE SODIUM 40 MG: 40 INJECTION, POWDER, FOR SOLUTION INTRAVENOUS at 08:14

## 2024-04-11 RX ADMIN — FOLIC ACID 1 MG: 1 TABLET ORAL at 08:14

## 2024-04-11 RX ADMIN — SODIUM CHLORIDE, POTASSIUM CHLORIDE, SODIUM LACTATE AND CALCIUM CHLORIDE 500 ML: 600; 310; 30; 20 INJECTION, SOLUTION INTRAVENOUS at 07:40

## 2024-04-11 RX ADMIN — Medication: at 08:00

## 2024-04-11 RX ADMIN — VANCOMYCIN HYDROCHLORIDE 750 MG: 750 INJECTION, SOLUTION INTRAVENOUS at 09:44

## 2024-04-11 ASSESSMENT — PAIN - FUNCTIONAL ASSESSMENT
PAIN_FUNCTIONAL_ASSESSMENT: CPOT (CRITICAL CARE PAIN OBSERVATION TOOL)

## 2024-04-11 ASSESSMENT — PAIN SCALES - GENERAL
PAINLEVEL_OUTOF10: 0 - NO PAIN

## 2024-04-11 NOTE — PROGRESS NOTES
Ry Sandhu is a 74 y.o. male on day 7 of admission presenting with Pneumonia.    Subjective   No acute overnight events. Nursing changing dressing. White count improved from 17.6 to 14.2. Hemoglobin 6.2 from 7.6 yesterday.      Objective   Last Recorded Vitals  Heart Rate:  [76-98]   Temp:  [36.1 °C (97 °F)-36.9 °C (98.4 °F)]   Resp:  [14-31]   BP: (104)/(41)   Weight:  [55.7 kg (122 lb 12.7 oz)]   SpO2:  [86 %-100 %]     Intake/Output last 3 Shifts:  I/O last 3 completed shifts:  In: 3579.5 (64.3 mL/kg) [I.V.:109.5 (2 mL/kg); NG/GT:820; IV Piggyback:2650]  Out: 1445 (25.9 mL/kg) [Urine:1445 (0.7 mL/kg/hr)]  Weight: 55.7 kg     Awake, no acute distress  Regular rate on monitor  Satting well on CPAP  Abdomen flat, soft, nontender  Palpable femoral pulses  L AKA and R BKA guillotine amputation dressings in place with no strikethrough    Relevant Results  Lab Results   Component Value Date    WBC 14.2 (H) 04/11/2024    HGB 6.2 (LL) 04/11/2024    HCT 20.0 (L) 04/11/2024    MCV 98 04/11/2024     04/11/2024     Lab Results   Component Value Date    GLUCOSE 97 04/11/2024    CALCIUM 7.1 (L) 04/11/2024     (H) 04/11/2024    K 3.7 04/11/2024    CO2 34 (H) 04/11/2024     (H) 04/11/2024    BUN 21 04/11/2024    CREATININE 0.53 04/11/2024     Active Medications  Scheduled medications  atorvastatin, 40 mg, oral, Daily  enoxaparin, 40 mg, subcutaneous, q24h  folic acid, 1 mg, oral, Daily  insulin lispro, 0-10 Units, subcutaneous, q4h  lactated Ringer's, 500 mL, intravenous, Once  oxygen, , inhalation, Continuous - Inhalation  pantoprazole, 40 mg, intravenous, Daily before breakfast  piperacillin-tazobactam, 3.375 g, intravenous, q6h  vancomycin, 750 mg, intravenous, q12h      Continuous medications  oxygen, , Last Rate: 4 L/min (04/09/24 2050)      PRN medications  PRN medications: acetaminophen, dextrose, dextrose, glucagon, glucagon, ipratropium-albuteroL, oxyCODONE, oxygen, oxygen, polyethylene glycol,  silver nitrate applicators, vancomycin     Assessment/Plan   Principal Problem:    Pneumonia  Active Problems:    CAD (coronary artery disease)    CHF (congestive heart failure) (CMS/[facility])    HTN (hypertension)    Hyperlipidemia    Chronic obstructive pulmonary disease (CMS/[facility])    Peripheral vascular disease (CMS/[facility])    Gastroesophageal reflux disease    Anemia    Septicemia (CMS/[facility])    74M admitted for care of septic shock secondary to pneumonia; consequently developed ischemic necrosis of b/l LE in context of shock and pre-existing occlusive disease requiring bilateral major guillotine amputations. Improving sepsis picture, stumps clean. Stumps hemostatic.    Recommendations:  - Dressing to be changed daily (nursing can do - Vascular can be alerted of concerns about wound) - betadine, kerlix, abd, kerlix, ace  - Pending plan for formalization - waiting for normalization of white count   - No indication for plavix from vascular surgery perspective given major amputations (this was added by our team following SFA stenting in August 2023. Now patient has leg amputated, further use not of much benefit). Unless patient has other indication for plavix, [id number] is acceptable for the purposes of secondary CVD prevention   - Antibiotics per primary team    Discussed with Dr. Conchita Arboleda MD  PGY3   General Surgery   [id number]

## 2024-04-11 NOTE — PROGRESS NOTES
Critical Care Daily Progress      INTERVAL HISTORY:  NAEON.   Subjective :  Patient evaluated at bedside this AM, difficult to wake up, opened eyes to sternal rub, does not track. UE Strength intact. Patient unable to answer questions.     Objective :  VITALS:  Vitals:    04/11/24 0300 04/11/24 0400 04/11/24 0500 04/11/24 0542   BP:       BP Location:       Pulse: 82 90 76    Resp: 21 25 22    Temp:  36.2 °C (97.2 °F)     TempSrc:  Temporal     SpO2: 100% 100% 94%    Weight:    55.7 kg (122 lb 12.7 oz)   Height:             :     24hr Min/Max:  Temp  Min: 36.1 °C (97 °F)  Max: 36.9 °C (98.4 °F)  Pulse  Min: 76  Max: 98  Resp  Min: 14  Max: 31  SpO2  Min: 94 %  Max: 100 %      PHYSICAL EXAM:  Physical Exam  Constitutional:       General: He is not in acute distress.     Appearance: He is cachectic. He is not ill-appearing.      Comments: Awakens to sternal rub.   HENT:      Head: Normocephalic.      Mouth/Throat:      Mouth: Mucous membranes are dry.      Pharynx: Oropharynx is clear.   Eyes:      Extraocular Movements: Extraocular movements intact.      Pupils: Pupils are equal, round, and reactive to light.   Cardiovascular:      Rate and Rhythm: Normal rate and regular rhythm.      Pulses: Normal pulses.      Heart sounds: No murmur heard.  Pulmonary:      Effort: Pulmonary effort is normal. No respiratory distress.      Breath sounds: Normal breath sounds. No wheezing or rales.   Abdominal:      General: Bowel sounds are normal. There is no distension.      Palpations: Abdomen is soft.      Tenderness: There is no abdominal tenderness. There is no guarding or rebound.   Musculoskeletal:      Comments: Palpable femoral pulses. L AKA and R BKA guillotine amputation stumps dressed.    Skin:     General: Skin is warm and dry.   Neurological:      General: No focal deficit present.      Mental Status: He is oriented to person, place, and time. He is lethargic.   Psychiatric:         Mood and Affect: Mood normal.         Scheduled Medications:   atorvastatin, 40 mg, oral, Daily  [Held by provider] enoxaparin, 40 mg, subcutaneous, q24h  folic acid, 1 mg, oral, Daily  insulin lispro, 0-10 Units, subcutaneous, q4h  oxygen, , inhalation, Continuous - Inhalation  pantoprazole, 40 mg, intravenous, Daily before breakfast  piperacillin-tazobactam, 3.375 g, intravenous, q6h  vancomycin, 750 mg, intravenous, q12h         Continuous Medications:   norepinephrine, 0.01-1 mcg/kg/min, Last Rate: Stopped (04/10/24 1630)  oxygen, , Last Rate: 4 L/min (04/09/24 2050)         PRN Medications:   PRN medications: acetaminophen, dextrose, dextrose, glucagon, glucagon, ipratropium-albuteroL, oxyCODONE, oxygen, oxygen, polyethylene glycol, silver nitrate applicators, vancomycin    LDA:  CVC, pIV, A-line (L radial)  Urethral catheter        LABS:  Results from last 7 days   Lab Units 04/11/24  0504 04/10/24  0359 04/09/24  1356   WBC AUTO x10*3/uL 14.2* 17.6* 13.9*   HEMOGLOBIN g/dL 6.2* 7.6* 7.9*   HEMATOCRIT % 20.0* 22.5* 22.4*   PLATELETS AUTO x10*3/uL 235 255 164            Results from last 7 days   Lab Units 04/11/24  0504 04/10/24  1844 04/10/24  1352   SODIUM mmol/L 148* 146* 145   POTASSIUM mmol/L 3.7 4.0 4.0   CHLORIDE mmol/L 109* 108* 108*   CO2 mmol/L 34* 35* 34*   BUN mg/dL 21 22 23   CREATININE mg/dL 0.53 0.49* 0.47*   CALCIUM mg/dL 7.1* 6.8* 7.1*     Results from last 7 days   Lab Units 04/11/24  0504 04/10/24  0359 04/09/24  0406 04/06/24  0504 04/05/24  0838   ALK PHOS U/L 768* 843* 917*   < > 251*   BILIRUBIN TOTAL mg/dL 0.7 1.0 0.9   < > 1.0   BILIRUBIN DIRECT mg/dL  --   --   --   --  0.8*   PROTEIN TOTAL g/dL 3.3* 3.3* 3.8*   < > 3.4*   ALT U/L 55* 68* 61*   < > 114*   AST U/L 60* 75* 74*   < > 150*    < > = values in this interval not displayed.      Results from last 7 days   Lab Units 04/06/24  1452 04/05/24  0138   APTT seconds 36 46*   INR  1.1 1.8*        IMAGING:  === 04/04/24 ===    US RIGHT UPPER QUADRANT    - Impression  -  1. Diffusely echogenic liver parenchyma consistent with steatosis.  2. Thickened/edematous gallbladder wall with no evidence of  hyperemia, gallbladder wall distention or cholelithiasis. Findings  are equivocal and most likely due to fluid overload.      I personally reviewed the images/study and I agree with the findings  as stated by Resident Dalton Bernal MD. This study was interpreted  at [place], [place].    MACRO:  None    Signed by: Daniel Shahid 4/5/2024 6:56 PM  Dictation workstation:   [id number]  === 04/04/24 ===    CT HEAD WO IV CONTRAST    - Impression -  No evidence of acute intracranial abnormality. If concerns for an  acute stroke may consider brain MRI for further evaluation.    I personally reviewed the images/study and I agree with the findings  as stated by resident physician Dr. Evan Lai . This study  was interpreted at [place], [place].    MACRO:  None    Signed by: Rashi Michael 4/5/2024 6:36 PM  Dictation workstation:   [id number]    Assessment/Plan :  Ry Sandhu is a 74-year-old M w/PMHx of COPD, CAD, PAD who is presenting to the MICU from outside hospital for acute hypoxic respiratory failure secondary to acute community-acquired pneumonia and septic shock secondary to community-acquired pneumonia versus colitis now requiring bilateral below the knee amputations due to severe peripheral arterial disease and increased pressor requirements.     UPDATES 4/11:  -500 cc LR   -Lovenox restarted   -Vancomycin discontinued     NEURO:  #Acute Encephalopathy   Worsening leukocytosis in the setting of known CAP infection. Etiology unclear at this time. There is a component of metabolic given electrolyte derangement, and ongoing CAP infection. Less likely toxins given no sedating medication on file. Structural causes can not be excluded.  PLAN:  - Vitamin B12, Folate, TSH  w/reflex T4 pending   - Delirium precautions  - Continue to monitor      #C/F right basal ganglia ischemia  CT head 4/5 nonacute  - NTD  - Will repeat CTH if other causes AMS ruled out      CARDIO:  #Undifferentiated shock, likely septic, resolving  #Lactic Acidosis-resolved  :: Septic shock likely 2/2 CAP. Weaning off pressors as able   :: VBG: pH 7.45, pC02 42, HC03 32  :: Lactate 6.7->1.6  PLAN:  - Continue to wean off pressors     #CAD  #HTN  #PAD  #L AKA and R BKA   PLAN:  - Vascular surgery consulted, appreciate recs  - Continue Lipitor  - Discontinued clopidogrel  - Discontinue pentoxifylline  - Holding antihypertensives in setting of requiring pressors     PULM:  #COPD-not in exacerbation   Current everyday tobacco smoker  No pfts on file  -on 6L NC      #CAP  #AHRF 2/2 CAP   #Pleural Effusion  :: CXR 4/7 showing similar airspace opacities with improving pulmonary edema   Sputum culture growing Corynebacterium striatum group   -Zosyn discontinued (4/9), then restarted 4/10  PLAN:  -C/w Vancomycin/Zosyn (4/5-*),      GI:  #Elevated Liver Enzymes   #Fatty infiltration of liver on CT abdomen  #Colitis  #GERD  #Concern for GI bleed  RUQ showing evidence of hepatic steatosis and edematous gallbladder.  PLAN:  - C/w PPI  - C/w Vancomycin (4/5-*), discontinued zosyn (4/5-4/9)    RENAL/:  #Metabolic Alkalosis w/Respiratory Acidosis   :: Etiology likely multifactorial ( hypovolemia, NGT losses )  PLAN:  - Urine lytes pending   - RFP, Mg   - Continue to monitor      HEME/ONC  #Chronic Normocytic Anemia  #Coagulopathy  Likely due to liver injury in the setting of heavy alcohol history versus sepsis  :: INR 1.8, was 1.0 in August  :: 4/8: Hgb dropped to 5.5, received 2 units pRBCs  PLAN:  -Keep active type and screen  -Transfuse for Hgb < 7  -Maintain 2 large bore pIVs      ENDO:  -No active issues to address      MSK/Rheum:  -No active issues to address     ID:  #C/f Septic Shock 2/2 CAP  #Leukocytosis   Azithromycin  500 mg daily (4/1 - 4/4)  Ceftriaxone (3/31 - 4/5)  Doxycycline (4/4 - 4/5)  Flagyl (3/25 - 4/5)  -WBC 17.6<13.9<12.3  PLAN:  -C/w Vancomycin and Zosyn (4/5-*)       F: PRN  E: PRN   N: NPO  A: LIJ, A line, PIV  Drains: Tucker, NG  Bowel regimen: Miralax & Senna  Drips: None  DVT ppx: Lovenox  GI ppx: PPI     Code Status: Full    LAYO RIVERA (Spouse)  [phone] (Mobile)       Patient staffed with the attending physician.  Rinku Saenz MD, MPH  PGY-1 Internal Medicine

## 2024-04-11 NOTE — PROGRESS NOTES
SOCIAL WORK NOTE   SW met with team for updates. Patient is currently on bipap. GOC conversation with wife to discuss possibility of reintubation. Patient is currently recommended for moderate intensity therapy.    TERENCE Tucker, [place] ([id number])

## 2024-04-11 NOTE — PROGRESS NOTES
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 Pt continued to present with s/s of aspiration as indicated by wet cough. Pt's drowsiness and lethargy suspected to impact swallow and airway protection.      Recommendations:  NPO  Continued short term alternative means of nutrition/hydration/medication support.     Frequent, aggressive oral care as tolerated to improve infection control, as well as to reduce dental plaque and bacteria on oropharyngeal surfaces which may increase the risk nosocomial infections, including pneumonia.      OK for small amounts of ice chips (one at a time, 1-2x/hour) for oral comfort and to prevent swallow disuse atrophy, but only after aggressive oral care to avoid colonization of bacteria within the oral cavity.     Pt will likely require instrumental assessment of swallowing prior to initiation of PO diet.  Not yet appropriate given current severity of swallowing deficits and lethargic presentation.  Will continue to monitor to determine readiness.        Plan:  Patient/Caregiver Agreeable: Yes  SLP - OK to Discharge: Yes      Subjective   Pt A&O to self, and place/month/year when given multiple choices; delay in responses and following one-step directions noted. Pt was resting in bed upon arrival. O2 via NC. Spouse at bedside upon arrival. Pt continues to rely on large bore NGT.   Patient Seen During This Visit: Yes        Objective       Therapeutic Swallow:  Therapeutic Swallow Intervention : PO Trials  Trialed ~4 ice chips at bedside. Oral care provided during session.    Oral phase:  Oral cavity dry. Delay in swallow initiation. Held bolus in oral cavity for extended period of time.    Pharyngeal Phase: Swallow appreciated, subjectively. Functional, wet cough noted after  trials indicating s/s of aspiration. Suction provided as needed. Evacuated secretions via suction that were coughed by pt.         Inpatient Education:  Adult Outpatient Education  Individual(s) Educated: Patient, Spouse  Verbal Education : yes

## 2024-04-11 NOTE — CARE PLAN
The patient's goals for the shift include      The clinical goals for the shift include Pt hempglobin will remain stable through shift      Problem: Respiratory  Goal: Clear secretions with interventions this shift  Outcome: Progressing  Goal: Minimize anxiety/maximize coping throughout shift  Outcome: Progressing     Problem: Skin  Goal: Prevent/manage excess moisture  Outcome: Progressing  Goal: Prevent/minimize sheer/friction injuries  Outcome: Progressing  Goal: Promote/optimize nutrition  Outcome: Progressing  Goal: Promote skin healing  Outcome: Progressing  Flowsheets (Taken 4/11/2024 1322)  Promote skin healing:   Protective dressings over bony prominences   Turn/reposition every 2 hours/use positioning/transfer devices

## 2024-04-11 NOTE — PROGRESS NOTES
Vancomycin Dosing by Pharmacy- Cessation of Therapy    Consult to pharmacy for vancomycin dosing has been discontinued by the prescriber, pharmacy will sign off at this time.    Please call pharmacy if there are further questions or re-enter a consult if vancomycin is resumed.     CELI MENDOZA, PharmD

## 2024-04-11 NOTE — CARE PLAN
Problem: Pain  Goal: My pain/discomfort is manageable  Outcome: Progressing     Problem: Safety  Goal: Patient will be injury free during hospitalization  Outcome: Progressing  Goal: I will remain free of falls  Outcome: Progressing     Problem: Psychosocial Needs  Goal: Demonstrates ability to cope with hospitalization/illness  Outcome: Progressing  Goal: Collaborate with me, my family, and caregiver to identify my specific goals  Outcome: Progressing     Problem: Respiratory  Goal: Clear secretions with interventions this shift  Outcome: Progressing     Problem: Skin  Goal: Decreased wound size/increased tissue granulation at next dressing change  Outcome: Progressing  Decreased wound size/increased tissue granulation at next dressing change:   Promote sleep for wound healing   Protective dressings over bony prominences   Utilize specialty bed per algorithm  Goal: Participates in plan/prevention/treatment measures  Outcome: Progressing  Participates in plan/prevention/treatment measures:   Discuss with provider PT/OT consult   Elevate heels   Increase activity/out of bed for meals  Goal: Prevent/manage excess moisture  Outcome: Progressing  Prevent/manage excess moisture:   Cleanse incontinence/protect with barrier cream   Monitor for/manage infection if present   Follow provider orders for dressing changes   Use wicking fabric (obtain order)   Moisturize dry skin  Goal: Prevent/minimize sheer/friction injuries  Outcome: Progressing  Prevent/minimize sheer/friction injuries:   Use pull sheet   HOB 30 degrees or less   Turn/reposition every 2 hours/use positioning/transfer devices   Utilize specialty bed per algorithm   Complete micro-shifts as needed if patient unable. Adjust patient position to relieve pressure points, not a full turn   Increase activity/out of bed for meals  Goal: Promote/optimize nutrition  Outcome: Progressing  Promote/optimize nutrition: Offer water/supplements/favorite foods  Goal: Promote  skin healing  Outcome: Progressing  Promote skin healing:   Turn/reposition every 2 hours/use positioning/transfer devices   Protective dressings over bony prominences   Assess skin/pad under line(s)/device(s)   Ensure correct size (line/device) and apply per  instructions   Rotate device position/do not position patient on device       The clinical goals for the shift include Pt hemoglobin will remain stable through shift

## 2024-04-12 LAB
ACTH PLAS-MCNC: 15.4 PG/ML (ref 7.2–63.3)
ALBUMIN SERPL BCP-MCNC: 1.6 G/DL (ref 3.4–5)
ALBUMIN SERPL BCP-MCNC: 1.7 G/DL (ref 3.4–5)
ALP SERPL-CCNC: 706 U/L (ref 33–136)
ALT SERPL W P-5'-P-CCNC: 55 U/L (ref 10–52)
ANION GAP BLDA CALCULATED.4IONS-SCNC: 2 MMO/L (ref 10–25)
ANION GAP SERPL CALC-SCNC: 11 MMOL/L (ref 10–20)
ANION GAP SERPL CALC-SCNC: 9 MMOL/L (ref 10–20)
AST SERPL W P-5'-P-CCNC: 61 U/L (ref 9–39)
BACTERIA SPEC RESP CULT: NO GROWTH
BASE EXCESS BLDA CALC-SCNC: 9.8 MMOL/L (ref -2–3)
BASOPHILS # BLD AUTO: 0.01 X10*3/UL (ref 0–0.1)
BASOPHILS NFR BLD AUTO: 0.1 %
BILIRUB SERPL-MCNC: 0.9 MG/DL (ref 0–1.2)
BODY TEMPERATURE: 37 DEGREES CELSIUS
BUN SERPL-MCNC: 17 MG/DL (ref 6–23)
BUN SERPL-MCNC: 18 MG/DL (ref 6–23)
CA-I BLDA-SCNC: 1.11 MMOL/L (ref 1.1–1.33)
CALCIUM SERPL-MCNC: 7.2 MG/DL (ref 8.6–10.6)
CALCIUM SERPL-MCNC: 7.6 MG/DL (ref 8.6–10.6)
CHLORIDE BLDA-SCNC: 112 MMOL/L (ref 98–107)
CHLORIDE SERPL-SCNC: 109 MMOL/L (ref 98–107)
CHLORIDE SERPL-SCNC: 110 MMOL/L (ref 98–107)
CO2 SERPL-SCNC: 32 MMOL/L (ref 21–32)
CO2 SERPL-SCNC: 34 MMOL/L (ref 21–32)
CORTIS AM PEAK SERPL-MSCNC: 25 UG/DL (ref 5–20)
CREAT SERPL-MCNC: 0.55 MG/DL (ref 0.5–1.3)
CREAT SERPL-MCNC: 0.58 MG/DL (ref 0.5–1.3)
EGFRCR SERPLBLD CKD-EPI 2021: >90 ML/MIN/1.73M*2
EGFRCR SERPLBLD CKD-EPI 2021: >90 ML/MIN/1.73M*2
EOSINOPHIL # BLD AUTO: 0.03 X10*3/UL (ref 0–0.4)
EOSINOPHIL NFR BLD AUTO: 0.2 %
ERYTHROCYTE [DISTWIDTH] IN BLOOD BY AUTOMATED COUNT: 16.2 % (ref 11.5–14.5)
GLUCOSE BLD MANUAL STRIP-MCNC: 127 MG/DL (ref 74–99)
GLUCOSE BLD MANUAL STRIP-MCNC: 93 MG/DL (ref 74–99)
GLUCOSE BLD MANUAL STRIP-MCNC: 97 MG/DL (ref 74–99)
GLUCOSE BLDA-MCNC: 100 MG/DL (ref 74–99)
GLUCOSE SERPL-MCNC: 118 MG/DL (ref 74–99)
GLUCOSE SERPL-MCNC: 77 MG/DL (ref 74–99)
GRAM STN SPEC: NORMAL
GRAM STN SPEC: NORMAL
HCO3 BLDA-SCNC: 33.5 MMOL/L (ref 22–26)
HCT VFR BLD AUTO: 23.1 % (ref 41–52)
HCT VFR BLD EST: 23 % (ref 41–52)
HGB BLD-MCNC: 7.6 G/DL (ref 13.5–17.5)
HGB BLDA-MCNC: 7.7 G/DL (ref 13.5–17.5)
IMM GRANULOCYTES # BLD AUTO: 0.13 X10*3/UL (ref 0–0.5)
IMM GRANULOCYTES NFR BLD AUTO: 0.8 % (ref 0–0.9)
INHALED O2 CONCENTRATION: 28 %
LACTATE BLDA-SCNC: 0.6 MMOL/L (ref 0.4–2)
LYMPHOCYTES # BLD AUTO: 0.48 X10*3/UL (ref 0.8–3)
LYMPHOCYTES NFR BLD AUTO: 3 %
MAGNESIUM SERPL-MCNC: 1.87 MG/DL (ref 1.6–2.4)
MAGNESIUM SERPL-MCNC: 1.95 MG/DL (ref 1.6–2.4)
MCH RBC QN AUTO: 31.5 PG (ref 26–34)
MCHC RBC AUTO-ENTMCNC: 32.9 G/DL (ref 32–36)
MCV RBC AUTO: 96 FL (ref 80–100)
MONOCYTES # BLD AUTO: 0.59 X10*3/UL (ref 0.05–0.8)
MONOCYTES NFR BLD AUTO: 3.7 %
NEUTROPHILS # BLD AUTO: 14.59 X10*3/UL (ref 1.6–5.5)
NEUTROPHILS NFR BLD AUTO: 92.2 %
NRBC BLD-RTO: 0 /100 WBCS (ref 0–0)
OXYHGB MFR BLDA: 84.6 % (ref 94–98)
PCO2 BLDA: 41 MM HG (ref 38–42)
PH BLDA: 7.52 PH (ref 7.38–7.42)
PHOSPHATE SERPL-MCNC: 2.9 MG/DL (ref 2.5–4.9)
PHOSPHATE SERPL-MCNC: 3.9 MG/DL (ref 2.5–4.9)
PLATELET # BLD AUTO: 284 X10*3/UL (ref 150–450)
PO2 BLDA: 52 MM HG (ref 85–95)
POTASSIUM BLDA-SCNC: 3.1 MMOL/L (ref 3.5–5.3)
POTASSIUM SERPL-SCNC: 3.4 MMOL/L (ref 3.5–5.3)
POTASSIUM SERPL-SCNC: 3.9 MMOL/L (ref 3.5–5.3)
PROT SERPL-MCNC: 3.6 G/DL (ref 6.4–8.2)
RBC # BLD AUTO: 2.41 X10*6/UL (ref 4.5–5.9)
SAO2 % BLDA: 87 % (ref 94–100)
SODIUM BLDA-SCNC: 144 MMOL/L (ref 136–145)
SODIUM SERPL-SCNC: 148 MMOL/L (ref 136–145)
SODIUM SERPL-SCNC: 150 MMOL/L (ref 136–145)
WBC # BLD AUTO: 15.8 X10*3/UL (ref 4.4–11.3)

## 2024-04-12 PROCEDURE — 2500000004 HC RX 250 GENERAL PHARMACY W/ HCPCS (ALT 636 FOR OP/ED)

## 2024-04-12 PROCEDURE — 2500000001 HC RX 250 WO HCPCS SELF ADMINISTERED DRUGS (ALT 637 FOR MEDICARE OP)

## 2024-04-12 PROCEDURE — 97110 THERAPEUTIC EXERCISES: CPT | Mod: GP

## 2024-04-12 PROCEDURE — 83735 ASSAY OF MAGNESIUM: CPT

## 2024-04-12 PROCEDURE — 83735 ASSAY OF MAGNESIUM: CPT | Performed by: STUDENT IN AN ORGANIZED HEALTH CARE EDUCATION/TRAINING PROGRAM

## 2024-04-12 PROCEDURE — 82533 TOTAL CORTISOL: CPT

## 2024-04-12 PROCEDURE — 94660 CPAP INITIATION&MGMT: CPT

## 2024-04-12 PROCEDURE — 37799 UNLISTED PX VASCULAR SURGERY: CPT

## 2024-04-12 PROCEDURE — 99291 CRITICAL CARE FIRST HOUR: CPT | Performed by: STUDENT IN AN ORGANIZED HEALTH CARE EDUCATION/TRAINING PROGRAM

## 2024-04-12 PROCEDURE — 85025 COMPLETE CBC W/AUTO DIFF WBC: CPT

## 2024-04-12 PROCEDURE — 99024 POSTOP FOLLOW-UP VISIT: CPT | Performed by: SURGERY

## 2024-04-12 PROCEDURE — 97530 THERAPEUTIC ACTIVITIES: CPT | Mod: GP

## 2024-04-12 PROCEDURE — 2060000001 HC INTERMEDIATE ICU ROOM DAILY

## 2024-04-12 PROCEDURE — 2500000004 HC RX 250 GENERAL PHARMACY W/ HCPCS (ALT 636 FOR OP/ED): Performed by: STUDENT IN AN ORGANIZED HEALTH CARE EDUCATION/TRAINING PROGRAM

## 2024-04-12 PROCEDURE — 84132 ASSAY OF SERUM POTASSIUM: CPT | Performed by: STUDENT IN AN ORGANIZED HEALTH CARE EDUCATION/TRAINING PROGRAM

## 2024-04-12 PROCEDURE — 2500000001 HC RX 250 WO HCPCS SELF ADMINISTERED DRUGS (ALT 637 FOR MEDICARE OP): Performed by: STUDENT IN AN ORGANIZED HEALTH CARE EDUCATION/TRAINING PROGRAM

## 2024-04-12 PROCEDURE — 2500000005 HC RX 250 GENERAL PHARMACY W/O HCPCS

## 2024-04-12 PROCEDURE — 83010 ASSAY OF HAPTOGLOBIN QUANT: CPT | Mod: WESLAB | Performed by: STUDENT IN AN ORGANIZED HEALTH CARE EDUCATION/TRAINING PROGRAM

## 2024-04-12 PROCEDURE — 84100 ASSAY OF PHOSPHORUS: CPT

## 2024-04-12 PROCEDURE — 97530 THERAPEUTIC ACTIVITIES: CPT | Mod: GO

## 2024-04-12 PROCEDURE — C9113 INJ PANTOPRAZOLE SODIUM, VIA: HCPCS

## 2024-04-12 PROCEDURE — 84132 ASSAY OF SERUM POTASSIUM: CPT

## 2024-04-12 PROCEDURE — 80053 COMPREHEN METABOLIC PANEL: CPT

## 2024-04-12 PROCEDURE — 82947 ASSAY GLUCOSE BLOOD QUANT: CPT

## 2024-04-12 PROCEDURE — 37799 UNLISTED PX VASCULAR SURGERY: CPT | Performed by: STUDENT IN AN ORGANIZED HEALTH CARE EDUCATION/TRAINING PROGRAM

## 2024-04-12 PROCEDURE — 94668 MNPJ CHEST WALL SBSQ: CPT

## 2024-04-12 PROCEDURE — 97535 SELF CARE MNGMENT TRAINING: CPT | Mod: GO

## 2024-04-12 RX ORDER — TRAZODONE HYDROCHLORIDE 50 MG/1
25 TABLET ORAL ONCE
Status: COMPLETED | OUTPATIENT
Start: 2024-04-12 | End: 2024-04-12

## 2024-04-12 RX ORDER — MIDODRINE HYDROCHLORIDE 5 MG/1
5 TABLET ORAL 3 TIMES DAILY
Status: DISCONTINUED | OUTPATIENT
Start: 2024-04-12 | End: 2024-04-17

## 2024-04-12 RX ORDER — ACETAMINOPHEN 500 MG
5 TABLET ORAL DAILY
Status: DISCONTINUED | OUTPATIENT
Start: 2024-04-13 | End: 2024-04-12

## 2024-04-12 RX ORDER — PANTOPRAZOLE SODIUM 40 MG/1
40 TABLET, DELAYED RELEASE ORAL
Status: DISCONTINUED | OUTPATIENT
Start: 2024-04-13 | End: 2024-04-13

## 2024-04-12 RX ORDER — NOREPINEPHRINE BITARTRATE/D5W 8 MG/250ML
0-3 PLASTIC BAG, INJECTION (ML) INTRAVENOUS CONTINUOUS
Status: DISCONTINUED | OUTPATIENT
Start: 2024-04-12 | End: 2024-04-15

## 2024-04-12 RX ORDER — ACETAMINOPHEN 500 MG
5 TABLET ORAL DAILY
Status: DISCONTINUED | OUTPATIENT
Start: 2024-04-12 | End: 2024-04-17

## 2024-04-12 RX ORDER — POTASSIUM CHLORIDE 29.8 MG/ML
40 INJECTION INTRAVENOUS ONCE
Status: COMPLETED | OUTPATIENT
Start: 2024-04-12 | End: 2024-04-12

## 2024-04-12 RX ADMIN — ENOXAPARIN SODIUM 40 MG: 100 INJECTION SUBCUTANEOUS at 00:54

## 2024-04-12 RX ADMIN — POTASSIUM CHLORIDE 40 MEQ: 29.8 INJECTION, SOLUTION INTRAVENOUS at 08:57

## 2024-04-12 RX ADMIN — TRAZODONE HYDROCHLORIDE 25 MG: 50 TABLET ORAL at 20:18

## 2024-04-12 RX ADMIN — ATORVASTATIN CALCIUM 40 MG: 40 TABLET, FILM COATED ORAL at 08:57

## 2024-04-12 RX ADMIN — FOLIC ACID 1 MG: 1 TABLET ORAL at 08:59

## 2024-04-12 RX ADMIN — MIDODRINE HYDROCHLORIDE 5 MG: 5 TABLET ORAL at 20:18

## 2024-04-12 RX ADMIN — MIDODRINE HYDROCHLORIDE 5 MG: 5 TABLET ORAL at 12:10

## 2024-04-12 RX ADMIN — Medication 0.04 MCG/KG/MIN: at 11:45

## 2024-04-12 RX ADMIN — PANTOPRAZOLE SODIUM 40 MG: 40 INJECTION, POWDER, FOR SOLUTION INTRAVENOUS at 08:57

## 2024-04-12 RX ADMIN — Medication 5 MG: at 20:18

## 2024-04-12 RX ADMIN — TRAZODONE HYDROCHLORIDE 25 MG: 50 TABLET ORAL at 01:03

## 2024-04-12 RX ADMIN — PIPERACILLIN SODIUM AND TAZOBACTAM SODIUM 3.38 G: 3; .375 INJECTION, SOLUTION INTRAVENOUS at 05:25

## 2024-04-12 RX ADMIN — MIDODRINE HYDROCHLORIDE 5 MG: 5 TABLET ORAL at 15:32

## 2024-04-12 ASSESSMENT — COGNITIVE AND FUNCTIONAL STATUS - GENERAL
MOVING TO AND FROM BED TO CHAIR: TOTAL
DAILY ACTIVITIY SCORE: 10
TURNING FROM BACK TO SIDE WHILE IN FLAT BAD: A LOT
HELP NEEDED FOR BATHING: A LOT
CLIMB 3 TO 5 STEPS WITH RAILING: TOTAL
DRESSING REGULAR UPPER BODY CLOTHING: A LOT
MOVING FROM LYING ON BACK TO SITTING ON SIDE OF FLAT BED WITH BEDRAILS: A LOT
TOILETING: TOTAL
STANDING UP FROM CHAIR USING ARMS: TOTAL
PERSONAL GROOMING: A LOT
MOBILITY SCORE: 8
DRESSING REGULAR LOWER BODY CLOTHING: TOTAL
EATING MEALS: A LOT
WALKING IN HOSPITAL ROOM: TOTAL

## 2024-04-12 ASSESSMENT — PAIN SCALES - GENERAL: PAINLEVEL_OUTOF10: 10 - WORST POSSIBLE PAIN

## 2024-04-12 ASSESSMENT — PAIN - FUNCTIONAL ASSESSMENT
PAIN_FUNCTIONAL_ASSESSMENT: CPOT (CRITICAL CARE PAIN OBSERVATION TOOL)
PAIN_FUNCTIONAL_ASSESSMENT: CPOT (CRITICAL CARE PAIN OBSERVATION TOOL)
PAIN_FUNCTIONAL_ASSESSMENT: 0-10
PAIN_FUNCTIONAL_ASSESSMENT: 0-10
PAIN_FUNCTIONAL_ASSESSMENT: CPOT (CRITICAL CARE PAIN OBSERVATION TOOL)
PAIN_FUNCTIONAL_ASSESSMENT: 0-10

## 2024-04-12 ASSESSMENT — ACTIVITIES OF DAILY LIVING (ADL): HOME_MANAGEMENT_TIME_ENTRY: 15

## 2024-04-12 NOTE — CARE PLAN
Problem: Pain  Goal: My pain/discomfort is manageable  Outcome: Progressing  Flowsheets (Taken 4/12/2024 0815)  Resident's pain/discomfort is manageable: Include resident/family/caregiver in decisions related to pain management     Problem: Skin  Goal: Decreased wound size/increased tissue granulation at next dressing change  Outcome: Progressing  Flowsheets (Taken 4/12/2024 0815)  Decreased wound size/increased tissue granulation at next dressing change: Promote sleep for wound healing  Goal: Participates in plan/prevention/treatment measures  Outcome: Progressing  Flowsheets (Taken 4/12/2024 0815)  Participates in plan/prevention/treatment measures: Discuss with provider PT/OT consult  Goal: Prevent/manage excess moisture  Outcome: Progressing  Flowsheets (Taken 4/12/2024 0815)  Prevent/manage excess moisture: Monitor for/manage infection if present  Goal: Prevent/minimize sheer/friction injuries  Outcome: Progressing  Flowsheets (Taken 4/12/2024 0815)  Prevent/minimize sheer/friction injuries: Use pull sheet  Goal: Promote/optimize nutrition  Outcome: Progressing  Flowsheets (Taken 4/12/2024 0815)  Promote/optimize nutrition: Monitor/record intake including meals  Goal: Promote skin healing  Outcome: Progressing  Flowsheets (Taken 4/12/2024 0815)  Promote skin healing: Turn/reposition every 2 hours/use positioning/transfer devices

## 2024-04-12 NOTE — PROGRESS NOTES
Physical Therapy    Physical Therapy Treatment    Patient Name: Ry Sandhu  MRN: [id number]  Today's Date: 4/12/2024  Time Calculation  Start Time: 1048  Stop Time: 1120  Time Calculation (min): 32 min       Assessment/Plan   PT Assessment  Rehab Prognosis: Fair  End of Session Communication: Bedside nurse  End of Session Patient Position: Bed, 3 rail up, Alarm off, not on at start of session     PT Plan  Treatment/Interventions: Bed mobility, Transfer training, Gait training, Balance training, Strengthening, Neuromuscular re-education, Therapeutic exercise, Therapeutic activity, Positioning, Postural re-education  PT Plan: Skilled PT  PT Frequency: 4 times per week  PT Discharge Recommendations: Moderate intensity level of continued care  PT Recommended Transfer Status: Assist x2 (to EOB)  PT - OK to Discharge: Yes      General Visit Information:   PT  Visit  PT Received On: 04/12/24  General  Family/Caregiver Present: No  Co-Treatment: OT  Co-Treatment Reason: to maximize patient safety and mobility 2/2 patient is total assist to sit to EOB  Prior to Session Communication: Bedside nurse, Physician  Patient Position Received: Bed, 3 rail up, Alarm off, not on at start of session  General Comment: patient appeared to have increase alertness this date, however, continues no only say 1-2 word answers when a question was asked to the patient. tele, triple lumen, cory, B mitmiya, 9L NC, myra.    Subjective   Precautions:  Precautions  LE Weight Bearing Status:  (anticipating NWB BLE (AKA and BKA))  Medical Precautions: Fall precautions, Oxygen therapy device and L/min  Precautions Comment: MAP >60 as long as patient's mentation remains stable per MD  Vital Signs:  Vital Signs  Heart Rate:  (pre: 79 post: 84)  SpO2:  (pre: 92% post: 98%)  BP:  (pre 96/45 (60), while EOB MAP >60, post 95/42 (57); RN in room and aware of patient's MAP at end of session)    Objective   Pain:  Pain Assessment  Pain Assessment:  0-10  Pain Score:  (patient did not report pain)  Cognition:  Cognition  Overall Cognitive Status: Impaired  Arousal/Alertness: Delayed responses to stimuli  Orientation Level:  (oriented to self, place and month/year; re-orientation provided.)  Following Commands:  (follows 50-75% of one step commands with increased time and repetition)  Postural Control:  Postural Control  Postural Control: Impaired  Head Control: favors increase cervical flexion, required cueing and assist to keep head in neutral position  Trunk Control: retrolean and lateral lean L, difficulty completing anterior lean over seated EVA to reduce retrolean.  Righting Reactions: reduced  Static Sitting Balance  Static Sitting-Balance Support: Bilateral upper extremity supported, Feet unsupported  Static Sitting-Level of Assistance:  (brief MODx1, however, mostly MAXx1)  Static Sitting-Comment/Number of Minutes: x1 assist with retrolateral lean L  Dynamic Sitting Balance  Dynamic Sitting-Balance Support: Feet unsupported  Dynamic Sitting-Comments: DEP x1    Activity Tolerance:  Activity Tolerance  Early Mobility/Exercise Safety Screen: Proceed with mobilization - No exclusion criteria met  Treatments:  Therapeutic Exercise  Therapeutic Exercise Performed: Yes  Therapeutic Exercise Activity 1: LAQ 1x5 RLE    Therapeutic Activity  Therapeutic Activity Performed: Yes  Therapeutic Activity 1: EOB x10 minutes; mostly DEP-MAXx1, brief MODx1 after increase assist to shift COM more anterior over seated EVA.       Bed Mobility  Bed Mobility: Yes  Bed Mobility 1  Bed Mobility 1: Supine to sitting, Sitting to supine  Level of Assistance 1: Dependent, Moderate verbal cues, Moderate tactile cues  Bed Mobility Comments 1: x2 assist with HOB elevated, use of draw sheet for additional assistance.        Outcome Measures:  [facility] Basic Mobility  Turning from your back to your side while in a flat bed without using bedrails: A lot  Moving from lying on your back to  sitting on the side of a flat bed without using bedrails: A lot  Moving to and from bed to chair (including a wheelchair): Total  Standing up from a chair using your arms (e.g. wheelchair or bedside chair): Total  To walk in hospital room: Total  Climbing 3-5 steps with railing: Total  Basic Mobility - Total Score: 8    FSS-ICU  Ambulation: Unable to attempt due to weakness  Rolling: Maximal assistance (performs 25% - 49% of task)  Sitting: Maximal assistance (performs 25% - 49% of task)  Transfer Sit-to-Stand: Unable to perform  Transfer Supine-to-Sit: Total assistance (performs 25% or requires another person)  Total Score: 5      E = Exercise and Early Mobility  Early Mobility/Exercise Safety Screen: Proceed with mobilization - No exclusion criteria met  Current Activity: Sitting at edge of bed    Education Documentation  Mobility Training, taught by Wanda Mcfarland, PT at 4/12/2024  1:49 PM.  Learner: Patient  Readiness: Acceptance  Method: Explanation  Response: Needs Reinforcement  Comment: mobility progression    Education Comments  No comments found.           Encounter Problems       Encounter Problems (Active)       PT Problem       Patient will complete bed mobility with MINx1 with HOB elevated, use of bedrail   (Progressing)       Start:  04/09/24    Expected End:  04/30/24            Patient will complete bed<->chair lateral slide transfer with OD x1 using LRD as needed without acute LOB  (Not Progressing)       Start:  04/09/24    Expected End:  04/30/24            Patient will complete static (contact guard assist x1) and dynamic (minimal assist x1) sitting balance activities using UE support as needed in order to maintain midline without acute LOB.  (Progressing)       Start:  04/09/24    Expected End:  04/30/24            Patient will participate in BLE there-ex program in order to assist in improving strength and to assist with the completion of functional mobility tasks.  (Progressing)       Start:   04/09/24    Expected End:  04/30/24                 Wanda Mcfarland, PT, DPT

## 2024-04-12 NOTE — PROGRESS NOTES
Occupational Therapy    Occupational Therapy Treatment    Name: Ry Sandhu  MRN: [id number]  : 1950  Date: 24  Time Calculation  Start Time: 1048  Stop Time: 1121  Time Calculation (min): 33 min    Assessment:  End of Session Communication: Bedside nurse  End of Session Patient Position: Bed, 3 rail up, Alarm off, not on at start of session  Plan:  Treatment Interventions: ADL retraining, Functional transfer training, UE strengthening/ROM, Endurance training, Cognitive reorientation, Patient/family training, Equipment evaluation/education, Neuromuscular reeducation, Compensatory technique education  OT Frequency: 3 times per week  OT Discharge Recommendations: Moderate intensity level of continued care  OT - OK to Discharge: Yes    Subjective   Previous Visit Info:  OT Last Visit  OT Received On: 24  General:  General  Family/Caregiver Present: No  Co-Treatment: PT  Co-Treatment Reason: to maximize patient safety and mobility 2/2 patient is total assist to sit to EOB  Prior to Session Communication: Bedside nurse  Patient Position Received: Bed, 3 rail up, Alarm off, not on at start of session  General Comment: patient with increased alertness with increased stim; tele, (B) mitts, IV, NG, 9L NC  Precautions:  LE Weight Bearing Status:  (anticipate NWB (B)LEs)  Medical Precautions: Fall precautions, Oxygen therapy device and L/min  Precautions Comment: MAP >60 as long as patient's mentation remains stable per MD  Vitals:  Vital Signs  Heart Rate:  (pre 79, post 86)  SpO2:  (pre 92%, post 98%)  BP:  (pre 96/45 (60), while EOB MAP >60, post 95/42 (57); RN in room and aware of patient's MAP at end of session)  Pain Assessment:  Pain Assessment  Pain Assessment: 0-10  Pain Score:  (patient denied pain)     Objective   Activities of Daily Living: Grooming  Grooming Comments: dependent A for combing hair while sitting EOB with hand over hand assistance, patient minimally actively attempting to hold  comb, max A for washing face while sitting EOB with hand over hand assistance    LE Dressing  LE Dressing: Yes  Sock Level of Assistance: Dependent     Bed Mobility/Transfers: Bed Mobility  Bed Mobility: Yes  Bed Mobility 1  Bed Mobility 1: Supine to sitting, Sitting to supine  Level of Assistance 1: Dependent, Moderate verbal cues, Moderate tactile cues  Bed Mobility Comments 1: x2 assist; HOB elevated for supine to sit; use of draw sheet     Therapy/Activity: Therapeutic Activity  Therapeutic Activity Performed: Yes  Therapeutic Activity 1: patient sat EOB x10 minutes; required max A for static balance with brief improvement to mod A with patient minimally pulling self forward using UEs holding OT's hands; during dynamic tasks patient required dependent A for balance, patient with (L) lateral/posterior lean and poor ability to correct posture; kyphotic position at EOB, unable to maintain head/neck in neutral positioning for >20 seconds    Outcome Measures:  [facility] Daily Activity  Putting on and taking off regular lower body clothing: Total  Bathing (including washing, rinsing, drying): A lot  Putting on and taking off regular upper body clothing: A lot  Toileting, which includes using toilet, bedpan or urinal: Total  Taking care of personal grooming such as brushing teeth: A lot  Eating Meals: A lot  Daily Activity - Total Score: 10     and E = Exercise and Early Mobility  Current Activity: Sitting at edge of bed    Education Documentation  ADL Training, taught by Melissa Gonzalez OT at 4/12/2024 12:17 PM.  Learner: Patient  Readiness: Acceptance  Method: Explanation  Response: Needs Reinforcement    Education Comments  No comments found.    Goals:  Encounter Problems       Encounter Problems (Active)       ADLs       Patient with complete upper body dressing with stand by assist level of assistance donning and doffing all UE clothes with no adaptive equipment. (Not Progressing)       Start:  04/10/24    Expected  End:  04/24/24            Patient with complete lower body dressing with minimal assist  level of assistance donning and doffing all LE clothes  with PRN adaptive equipment. (Not Progressing)       Start:  04/10/24    Expected End:  04/24/24            Patient will feed self with supervision level of assistance using PRN adaptive equipment. (Not Progressing)       Start:  04/10/24    Expected End:  04/24/24            Patient will complete daily grooming tasks with supervision level of assistance and PRN adaptive equipment. (Progressing)       Start:  04/10/24    Expected End:  04/24/24            Patient will complete toileting including hygiene clothing management/hygiene with minimal assist  level of assistance. (Not Progressing)       Start:  04/10/24    Expected End:  04/24/24               BALANCE       Pt will maintain dynamic sitting balance during ADL task with minimal assist level of assistance in order to demonstrate decreased risk of falling and improved postural control. (Progressing)       Start:  04/10/24    Expected End:  04/24/24               COGNITION/SAFETY       Patient will follow 100% Simple commands to allow improved ADL performance. (Progressing)       Start:  04/10/24    Expected End:  04/24/24               EXERCISE/STRENGTHENING       Patient will complete BUE exercises in order to improve strength and activity tolerance for ADL performance.  (Not Progressing)       Start:  04/10/24    Expected End:  04/24/24               TRANSFERS       Patient will perform bed mobility moderate assist level of assistance in order to improve safety and independence with mobility (Progressing)       Start:  04/10/24    Expected End:  04/24/24            Patient will complete functional transfers with slideboard/LRD with moderate assist level of assistance. (Not Progressing)       Start:  04/10/24    Expected End:  04/24/24               Melissa Gonzalez OTR/L  Inpatient Occupational Therapist   Rehab  Office: [phone]

## 2024-04-12 NOTE — PROGRESS NOTES
"Nutrition Follow Up Assessment:   Nutrition Assessment         Patient is a 74 y.o. male presenting with hypoxic respiratory failure, CAP, septic shock and encephalopathy. Noted that he had 3-4 days of N/V/D and weakness. Treated for colitis. Concern for B/L acute on chronic limb ischemia. 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  He was able to be extubated and is on HFNC for support.    Pt with an NGT in place for enteral access.  He is on Pivot 1.5@ 35mls/hr at visit.  He indicates he is not having and N/V issues.  He would like to eat but has failed his SLP eval twice since being extubated.        Anthropometrics:  Height: 188 cm (6' 2\")   Weight: 57.4 kg (126 lb 8.7 oz)   BMI (Calculated): 16.24  IBW/kg (Dietitian Calculated):  (Daughter reports that pt was closer to 170cm and not his current height of 188cm.  Will use 170cm for calculations.)  Percent of IBW:  (67.3kg pre amputations/61.3kg post amputations)     Weight History:     Weight Change %:     Nutrition Focused Physical Exam Findings:  Defer-- remains severely wasted per initial assessment on 4/5/24    Subcutaneous Fat Loss:      Muscle Wasting:     Edema:  Edema: +1 trace  Edema Location: generalized  Physical Findings:  Skin: Positive (wounds to L axilla, penis, R dorsal elbow, perineum, coccyx, R pretibial, L knee)    Nutrition Significant Labs:  BMP Trend:   Results from last 7 days   Lab Units 04/12/24  0357 04/11/24  0504 04/10/24  1844 04/10/24  1352   GLUCOSE mg/dL 77 97 133* 137*   CALCIUM mg/dL 7.6* 7.1* 6.8* 7.1*   SODIUM mmol/L 150* 148* 146* 145   POTASSIUM mmol/L 3.4* 3.7 4.0 4.0   CO2 mmol/L 32 34* 35* 34*   CHLORIDE mmol/L 110* 109* 108* 108*   BUN mg/dL 18 21 22 23   CREATININE mg/dL 0.58 0.53 0.49* 0.47*    , A1C:No results found for: \"HGBA1C\", BG POCT trend:   Results from last 7 days   Lab Units 04/12/24  0055 04/11/24  1755 04/11/24  1147 04/11/24  0735 04/11/24  0442   POCT GLUCOSE mg/dL 97 92 115* 101* 98    , Renal " Lab Trend:   Results from last 7 days   Lab Units 04/12/24  0357 04/11/24  0504 04/10/24  1844 04/10/24  1352   POTASSIUM mmol/L 3.4* 3.7 4.0 4.0   PHOSPHORUS mg/dL 3.9 3.3 3.4 2.3*   SODIUM mmol/L 150* 148* 146* 145   MAGNESIUM mg/dL 1.95 1.97 2.05 2.08   EGFR mL/min/1.73m*2 >90 >90 >90 >90   BUN mg/dL 18 21 22 23   CREATININE mg/dL 0.58 0.53 0.49* 0.47*    , Vit D:   Lab Results   Component Value Date    VITD25 18 (L) 04/05/2024        Nutrition Specific Medications:  Scheduled medications  atorvastatin, 40 mg, oral, Daily  enoxaparin, 40 mg, subcutaneous, q24h  folic acid, 1 mg, oral, Daily  lidocaine, 1 Application, Topical, Once  [START ON 4/13/2024] pantoprazole, 40 mg, oral, Daily before breakfast  potassium chloride, 40 mEq, intravenous, Once      Continuous medications     PRN medications  PRN medications: acetaminophen, dextrose, dextrose, glucagon, glucagon, insulin lispro, ipratropium-albuteroL, oxyCODONE, oxygen, oxygen, polyethylene glycol, silver nitrate applicators     I/O:   Last BM Date: 04/11/24; Stool Appearance: Loose (04/11/24 1600)      Dietary Orders (From admission, onward)       Start     Ordered    04/12/24 0746  Enteral feeding with NPO Pivot 1.5; 35 (Start at 15mls/hr and increase by 10mls q8hrs until goal rate reached); 500 (every 4 hours)  Diet effective now        Question Answer Comment   Tube feeding formula: Pivot 1.5    Tube feeding continuous rate (mL/hr): 35 Start at 15mls/hr and increase by 10mls q8hrs until goal rate reached   Tube feeding flush (mL): 500 every 4 hours       04/12/24 0745                     Estimated Needs:   Total Energy Estimated Needs (kCal):  (2791-2515)  Method for Estimating Needs: MSJ= 1384  Total Protein Estimated Needs (g):  (85+)  Method for Estimating Needs: 1.5 x 57.4kg            Nutrition Diagnosis   Malnutrition Diagnosis  Patient has Malnutrition Diagnosis: Yes  Diagnosis Status: Ongoing  Malnutrition Diagnosis: Severe malnutrition related to  chronic disease or condition  As Evidenced by: family reports a terrible appetite and intake for months along with a 22% weight loss in 7 months and severe fat and muscle wasting noted all over his body        Nutrition Interventions/Recommendations         Nutrition Prescription:  For tube feed: change to Isosource 1.5@ 40mls/hr. Increase once after 8hrs to goal rate of 50mls/hr. Also order one packet once daily.   Pt's Vitamin D is deficient.  Would order 8,000IUs liquid Vitamin D given once daily for 6-8 weeks.  For pt's comfort, may benefit from dobhoff in place of NGT.        Nutrition Interventions:   Food and/or Nutrient Delivery Interventions  Interventions: Enteral intake  Goal: TF at goal with prostat= 1900kcals, 97grams protein       Nutrition Education:   Not appropriate       Nutrition Monitoring and Evaluation   Food/Nutrient Related History Monitoring  Monitoring and Evaluation Plan: Enteral and parenteral nutrition intake  Criteria: TF to meet 100% estimated needs       Time Spent/Follow-up Reminder:   Time Spent (min): 60 minutes  Last Date of Nutrition Visit: 04/12/24  Nutrition Follow-Up Needed?: Dietitian to reassess per policy  Follow up Comment: TF via NGT

## 2024-04-12 NOTE — PROGRESS NOTES
Critical Care Daily Progress      INTERVAL HISTORY:  NAEON.   Subjective :  Patient evaluated at bedside this AM, awake and A&O x2-3. Following commands.     Objective :  VITALS:  Vitals:    04/12/24 0900 04/12/24 1000 04/12/24 1100 04/12/24 1200   BP:   (!) 86/47    Patient Position:       Pulse: 96 95 90    Resp: 22 22 24    Temp:       TempSrc:       SpO2: 98% 100%  99%   Weight:       Height:             :     24hr Min/Max:  Temp  Min: 36.2 °C (97.2 °F)  Max: 36.7 °C (98.1 °F)  Pulse  Min: 89  Max: 102  BP  Min: 86/47  Max: 86/47  Resp  Min: 16  Max: 31  SpO2  Min: 79 %  Max: 100 %      PHYSICAL EXAM:  Physical Exam  Constitutional:       General: He is not in acute distress.     Appearance: He is cachectic. He is not ill-appearing.   HENT:      Head: Normocephalic.      Mouth/Throat:      Mouth: Mucous membranes are dry.      Pharynx: Oropharynx is clear.   Eyes:      Extraocular Movements: Extraocular movements intact.      Pupils: Pupils are equal, round, and reactive to light.   Cardiovascular:      Rate and Rhythm: Normal rate and regular rhythm.      Pulses: Normal pulses.      Heart sounds: No murmur heard.  Pulmonary:      Effort: Pulmonary effort is normal. No respiratory distress.      Breath sounds: Rales present. No wheezing.   Abdominal:      General: Bowel sounds are normal. There is no distension.      Palpations: Abdomen is soft.      Tenderness: There is no abdominal tenderness. There is no guarding or rebound.   Musculoskeletal:      Comments: Palpable femoral pulses. L AKA and R BKA guillotine amputation stumps dressed.    Skin:     General: Skin is warm and dry.   Neurological:      General: No focal deficit present.      Mental Status: He is alert and oriented to person, place, and time.   Psychiatric:         Mood and Affect: Mood normal.          Scheduled Medications:   atorvastatin, 40 mg, oral, Daily  enoxaparin, 40 mg, subcutaneous, q24h  folic acid, 1 mg, oral, Daily  lidocaine, 1  Application, Topical, Once  midodrine, 5 mg, oral, TID  [START ON 4/13/2024] pantoprazole, 40 mg, oral, Daily before breakfast  potassium chloride, 40 mEq, intravenous, Once         Continuous Medications:   norepinephrine, 0-3 mcg/kg/min, Last Rate: 0.04 mcg/kg/min (04/12/24 1145)         PRN Medications:   PRN medications: acetaminophen, dextrose, dextrose, glucagon, glucagon, insulin lispro, ipratropium-albuteroL, oxyCODONE, oxygen, oxygen, polyethylene glycol, silver nitrate applicators    LDA:  CVC, pIV, A-line (L radial)  Urethral catheter        LABS:  Results from last 7 days   Lab Units 04/12/24  0357 04/11/24  0857 04/11/24  0504   WBC AUTO x10*3/uL 15.8* 15.2* 14.2*   HEMOGLOBIN g/dL 7.6* 8.0* 6.2*   HEMATOCRIT % 23.1* 23.5* 20.0*   PLATELETS AUTO x10*3/uL 284 230 235            Results from last 7 days   Lab Units 04/12/24  0357 04/11/24  0504 04/10/24  1844   SODIUM mmol/L 150* 148* 146*   POTASSIUM mmol/L 3.4* 3.7 4.0   CHLORIDE mmol/L 110* 109* 108*   CO2 mmol/L 32 34* 35*   BUN mg/dL 18 21 22   CREATININE mg/dL 0.58 0.53 0.49*   CALCIUM mg/dL 7.6* 7.1* 6.8*     Results from last 7 days   Lab Units 04/12/24  0357 04/11/24  0504 04/10/24  0359   ALK PHOS U/L 706* 768* 843*   BILIRUBIN TOTAL mg/dL 0.9 0.7 1.0   PROTEIN TOTAL g/dL 3.6* 3.3* 3.3*   ALT U/L 55* 55* 68*   AST U/L 61* 60* 75*      Results from last 7 days   Lab Units 04/06/24  1452   APTT seconds 36   INR  1.1        IMAGING:  === 04/04/24 ===    US RIGHT UPPER QUADRANT    - Impression -  1. Diffusely echogenic liver parenchyma consistent with steatosis.  2. Thickened/edematous gallbladder wall with no evidence of  hyperemia, gallbladder wall distention or cholelithiasis. Findings  are equivocal and most likely due to fluid overload.      I personally reviewed the images/study and I agree with the findings  as stated by Resident Dalton Bernal MD. This study was interpreted  at [facility],  [place].    MACRO:  None    Signed by: Daniel Shahid 4/5/2024 6:56 PM  Dictation workstation:   [id number]  === 04/04/24 ===    CT HEAD WO IV CONTRAST    - Impression -  No evidence of acute intracranial abnormality. If concerns for an  acute stroke may consider brain MRI for further evaluation.    I personally reviewed the images/study and I agree with the findings  as stated by resident physician Dr. Evan Lai . This study  was interpreted at [facility],  [place], [place].    MACRO:  None    Signed by: Rashi Michael 4/5/2024 6:36 PM  Dictation workstation:   [id number]    Assessment/Plan :  Ry Sandhu is a 74-year-old M w/PMHx of COPD, CAD, PAD who is presenting to the MICU from outside hospital for acute hypoxic respiratory failure secondary to acute community-acquired pneumonia and septic shock secondary to community-acquired pneumonia versus colitis now requiring bilateral below the knee amputations due to severe peripheral arterial disease and increased pressor requirements.     UPDATES 04/12/24   -Zosyn discontinued   -free water deficit 1.7L starting  q4  -PM RFP  -Bronchopulmonary hygiene  -switch to PO PPI  -starting midodrine 5mg TID     NEURO:  #Acute Encephalopathy   Worsening leukocytosis in the setting of known CAP infection. Etiology unclear at this time. There is a component of metabolic given electrolyte derangement, and ongoing CAP infection. Less likely toxins given no sedating medication on file. Structural causes can not be excluded.  PLAN:  - Vitamin B12, Folate, TSH w/reflex T4 pending   - Delirium precautions  - Continue to monitor      #C/F right basal ganglia ischemia  CT head 4/5 nonacute  - NTD  - Will repeat CTH if other causes AMS ruled out      CARDIO:  #Undifferentiated shock, likely septic, resolving  #Lactic Acidosis-resolved  :: Septic shock likely 2/2 CAP. Weaning off pressors as able   :: VBG: pH 7.45, pC02 42,  HC03 32  :: Lactate 6.7->1.6  PLAN:  - Continue to wean off pressors  -started midodrine 5mg TID (given bilateral amputation expect baseline BP to be lower) goal MAP >60 if mentation well and no s/s of malperfusion     #CAD  #HTN  #PAD  #L AKA and R BKA   PLAN:  - Vascular surgery consulted, appreciate recs  - Continue Lipitor  - Discontinued clopidogrel  - Discontinue pentoxifylline  - Holding antihypertensives in setting of requiring pressors    PULM:  #COPD-not in exacerbation   Current everyday tobacco smoker  No pfts on file  -on 6L NC      #CAP  #AHRF 2/2 CAP   #Pleural Effusion  :: CXR 4/7 showing similar airspace opacities with improving pulmonary edema   Sputum culture growing Corynebacterium striatum group   -Zosyn discontinued (4/9), then restarted 4/10  PLAN:  -C/w Vancomycin/Zosyn (4/5-4/12),      GI:  #Elevated Liver Enzymes   #Fatty infiltration of liver on CT abdomen  #Colitis  #GERD  #Concern for GI bleed  RUQ showing evidence of hepatic steatosis and edematous gallbladder.  PLAN:  - C/w PPI PO   - C/w Vancomycin (4/5-4/11), discontinued zosyn (4/5-4/9; 4/10-4/12)    RENAL/:  #Hypernatremia   ::Na 151 deficit 1.7L   -500q6 FWF   -pm RFP     #Metabolic Alkalosis w/Respiratory Acidosis   :: Etiology likely multifactorial ( hypovolemia, NGT losses )  PLAN:  - RFP, Mg   - Continue to monitor      HEME/ONC  #Chronic Normocytic Anemia  #Coagulopathy  Likely due to liver injury in the setting of heavy alcohol history versus sepsis  :: INR 1.8, was 1.0 in August  :: 4/8: Hgb dropped to 5.5, received 2 units pRBCs  PLAN:  -Keep active type and screen  -Transfuse for Hgb < 7  -Maintain 2 large bore pIVs      ENDO:  -No active issues to address      MSK/Rheum:  -No active issues to address     ID:  #C/f Septic Shock 2/2 CAP  #Leukocytosis   Azithromycin 500 mg daily (4/1 - 4/4)  Ceftriaxone (3/31 - 4/5)  Doxycycline (4/4 - 4/5)  Flagyl (3/25 - 4/5)  -WBC 17.6<13.9<12.3  PLAN:  -C/w Vancomycin and Zosyn  (4/5-4/12)       F: PRN  E: PRN   N: NPO; TF  A: LIJ, A line, PIV  Drains: Tucker, NG  Bowel regimen: Miralax & Senna  Drips: Norepinephrine   DVT ppx: Lovenox  GI ppx: PPI PO      Code Status: Full    LAYO RIVERA (Spouse)  [phone] (Mobile)       Patient staffed with the attending physician.

## 2024-04-12 NOTE — CARE PLAN
Problem: Pain  Goal: My pain/discomfort is manageable  Outcome: Progressing     Problem: Safety  Goal: Patient will be injury free during hospitalization  Outcome: Progressing  Goal: I will remain free of falls  Outcome: Progressing     Problem: Daily Care  Goal: Daily care needs are met  Outcome: Progressing     Problem: Psychosocial Needs  Goal: Demonstrates ability to cope with hospitalization/illness  Outcome: Progressing  Goal: Collaborate with me, my family, and caregiver to identify my specific goals  Outcome: Progressing     Problem: Respiratory  Goal: Clear secretions with interventions this shift  Outcome: Progressing     Problem: Skin  Goal: Decreased wound size/increased tissue granulation at next dressing change  Outcome: Progressing  Flowsheets (Taken 4/11/2024 2021)  Decreased wound size/increased tissue granulation at next dressing change:   Promote sleep for wound healing   Utilize specialty bed per algorithm   Protective dressings over bony prominences  Goal: Participates in plan/prevention/treatment measures  Outcome: Progressing  Flowsheets (Taken 4/11/2024 2021)  Participates in plan/prevention/treatment measures: Discuss with provider PT/OT consult  Goal: Prevent/manage excess moisture  Outcome: Progressing  Flowsheets (Taken 4/11/2024 2021)  Prevent/manage excess moisture:   Cleanse incontinence/protect with barrier cream   Use wicking fabric (obtain order)   Follow provider orders for dressing changes   Monitor for/manage infection if present  Goal: Prevent/minimize sheer/friction injuries  Outcome: Progressing  Flowsheets (Taken 4/11/2024 2021)  Prevent/minimize sheer/friction injuries:   Complete micro-shifts as needed if patient unable. Adjust patient position to relieve pressure points, not a full turn   HOB 30 degrees or less   Turn/reposition every 2 hours/use positioning/transfer devices   Use pull sheet   Utilize specialty bed per algorithm  Goal: Promote/optimize  nutrition  Outcome: Progressing  Flowsheets (Taken 4/11/2024 2021)  Promote/optimize nutrition: Offer water/supplements/favorite foods  Goal: Promote skin healing  Outcome: Progressing  Flowsheets (Taken 4/11/2024 2021)  Promote skin healing:   Protective dressings over bony prominences   Turn/reposition every 2 hours/use positioning/transfer devices       The clinical goals for the shift include Pt hemoglobin will remain stable through shift

## 2024-04-12 NOTE — PROGRESS NOTES
Ry Sandhu is a 74 y.o. male on day 8 of admission presenting with Pneumonia.    Subjective   No acute overnight events. Nursing changing dressing. On low dose levo but MAPs 80.     Objective   Last Recorded Vitals  Heart Rate:  []   Temp:  [36.2 °C (97.2 °F)-36.7 °C (98.1 °F)]   Resp:  [16-29]   BP: ()/()   Weight:  [57.4 kg (126 lb 8.7 oz)]   SpO2:  [79 %-100 %]     Intake/Output last 3 Shifts:  I/O last 3 completed shifts:  In: 2923 (50.9 mL/kg) [Blood:278; NG/GT:545; IV Piggyback:2100]  Out: 1625 (28.3 mL/kg) [Urine:1625 (0.8 mL/kg/hr)]  Weight: 57.4 kg     Awake, no acute distress  Regular rate on monitor  Satting well on CPAP  Abdomen flat, soft, nontender  Palpable femoral pulses  L AKA and R BKA guillotine amputation dressings in place with no strikethrough    Relevant Results  Lab Results   Component Value Date    WBC 15.8 (H) 04/12/2024    HGB 7.6 (L) 04/12/2024    HCT 23.1 (L) 04/12/2024    MCV 96 04/12/2024     04/12/2024     Lab Results   Component Value Date    GLUCOSE 77 04/12/2024    CALCIUM 7.6 (L) 04/12/2024     (H) 04/12/2024    K 3.4 (L) 04/12/2024    CO2 32 04/12/2024     (H) 04/12/2024    BUN 18 04/12/2024    CREATININE 0.58 04/12/2024     Active Medications  Scheduled medications  atorvastatin, 40 mg, oral, Daily  enoxaparin, 40 mg, subcutaneous, q24h  folic acid, 1 mg, oral, Daily  lidocaine, 1 Application, Topical, Once  midodrine, 5 mg, oral, TID  [START ON 4/13/2024] pantoprazole, 40 mg, oral, Daily before breakfast      Continuous medications  norepinephrine, 0-3 mcg/kg/min, Last Rate: 0.04 mcg/kg/min (04/12/24 1400)      PRN medications  PRN medications: acetaminophen, dextrose, dextrose, glucagon, glucagon, insulin lispro, ipratropium-albuteroL, oxyCODONE, oxygen, oxygen, polyethylene glycol, silver nitrate applicators     Assessment/Plan   Principal Problem:    Pneumonia  Active Problems:    CAD (coronary artery disease)    CHF (congestive  heart failure) (Multi)    HTN (hypertension)    Hyperlipidemia    Chronic obstructive pulmonary disease (Multi)    Peripheral vascular disease (CMS-HCC)    Gastroesophageal reflux disease    Anemia    Septicemia (Multi)    74M admitted for care of septic shock secondary to pneumonia; consequently developed ischemic necrosis of b/l LE in context of shock and pre-existing occlusive disease requiring bilateral major guillotine amputations. Improving sepsis picture, stumps clean. Stumps hemostatic.    Recommendations:  - Dressing to be changed daily (nursing can do - Vascular can be alerted of concerns about wound) - betadine, kerlix, abd, kerlix, ace  - Plans to formalize amputations on Tuesday  - Please transfuse patient to goal of >8 prior to surgery - anticipate possible clinically significant blood loss as this will be bilateral  - No indication for plavix from vascular surgery perspective given major amputations (this was added by our team following SFA stenting in August 2023. Now patient has leg amputated, further use not of much benefit). Unless patient has other indication for plavix, [id number] is acceptable for the purposes of secondary CVD prevention   - Antibiotics per primary team    Discussed with Dr. Conchita Arboleda MD  PGY3   General Surgery   [phone]

## 2024-04-13 ENCOUNTER — APPOINTMENT (OUTPATIENT)
Dept: RADIOLOGY | Facility: HOSPITAL | Age: 74
DRG: 853 | End: 2024-04-13
Payer: MEDICARE

## 2024-04-13 LAB
ALBUMIN SERPL BCP-MCNC: 1.6 G/DL (ref 3.4–5)
ALBUMIN SERPL BCP-MCNC: 1.6 G/DL (ref 3.4–5)
ALP SERPL-CCNC: 481 U/L (ref 33–136)
ALT SERPL W P-5'-P-CCNC: 41 U/L (ref 10–52)
ANION GAP SERPL CALC-SCNC: 8 MMOL/L (ref 10–20)
ANION GAP SERPL CALC-SCNC: 9 MMOL/L
APTT PPP: 31 SECONDS (ref 27–38)
AST SERPL W P-5'-P-CCNC: 36 U/L (ref 9–39)
BASOPHILS # BLD AUTO: 0.01 X10*3/UL (ref 0–0.1)
BASOPHILS # BLD AUTO: 0.02 X10*3/UL (ref 0–0.1)
BASOPHILS NFR BLD AUTO: 0.1 %
BASOPHILS NFR BLD AUTO: 0.1 %
BILIRUB DIRECT SERPL-MCNC: 0.2 MG/DL (ref 0–0.3)
BILIRUB SERPL-MCNC: 0.5 MG/DL (ref 0–1.2)
BLOOD EXPIRATION DATE: NORMAL
BUN SERPL-MCNC: 16 MG/DL (ref 6–23)
BUN SERPL-MCNC: 17 MG/DL (ref 6–23)
CALCIUM SERPL-MCNC: 7 MG/DL (ref 8.6–10.6)
CALCIUM SERPL-MCNC: 7.3 MG/DL (ref 8.6–10.6)
CHLORIDE SERPL-SCNC: 106 MMOL/L (ref 98–107)
CHLORIDE SERPL-SCNC: 109 MMOL/L (ref 98–107)
CO2 SERPL-SCNC: 33 MMOL/L (ref 21–32)
CO2 SERPL-SCNC: 34 MMOL/L (ref 21–32)
CREAT SERPL-MCNC: 0.46 MG/DL (ref 0.5–1.3)
CREAT SERPL-MCNC: 0.51 MG/DL (ref 0.5–1.3)
DISPENSE STATUS: NORMAL
EGFRCR SERPLBLD CKD-EPI 2021: >90 ML/MIN/1.73M*2
EGFRCR SERPLBLD CKD-EPI 2021: >90 ML/MIN/1.73M*2
EOSINOPHIL # BLD AUTO: 0.09 X10*3/UL (ref 0–0.4)
EOSINOPHIL # BLD AUTO: 0.1 X10*3/UL (ref 0–0.4)
EOSINOPHIL NFR BLD AUTO: 0.7 %
EOSINOPHIL NFR BLD AUTO: 0.7 %
ERYTHROCYTE [DISTWIDTH] IN BLOOD BY AUTOMATED COUNT: 16.2 % (ref 11.5–14.5)
ERYTHROCYTE [DISTWIDTH] IN BLOOD BY AUTOMATED COUNT: 16.7 % (ref 11.5–14.5)
FERRITIN SERPL-MCNC: 278 NG/ML (ref 20–300)
FIBRINOGEN PPP-MCNC: 550 MG/DL (ref 200–400)
GLUCOSE BLD MANUAL STRIP-MCNC: 116 MG/DL (ref 74–99)
GLUCOSE BLD MANUAL STRIP-MCNC: 122 MG/DL (ref 74–99)
GLUCOSE SERPL-MCNC: 123 MG/DL (ref 74–99)
GLUCOSE SERPL-MCNC: 126 MG/DL (ref 74–99)
HAPTOGLOB SERPL-MCNC: 179 MG/DL (ref 37–246)
HCT VFR BLD AUTO: 21.7 % (ref 41–52)
HCT VFR BLD AUTO: 24.6 % (ref 41–52)
HGB BLD-MCNC: 6.7 G/DL (ref 13.5–17.5)
HGB BLD-MCNC: 8.4 G/DL (ref 13.5–17.5)
HGB RETIC QN: 31 PG (ref 28–38)
IMM GRANULOCYTES # BLD AUTO: 0.09 X10*3/UL (ref 0–0.5)
IMM GRANULOCYTES # BLD AUTO: 0.12 X10*3/UL (ref 0–0.5)
IMM GRANULOCYTES NFR BLD AUTO: 0.7 % (ref 0–0.9)
IMM GRANULOCYTES NFR BLD AUTO: 0.8 % (ref 0–0.9)
IMMATURE RETIC FRACTION: 26 %
INR PPP: 1 (ref 0.9–1.1)
LDH SERPL L TO P-CCNC: 215 U/L (ref 84–246)
LYMPHOCYTES # BLD AUTO: 0.55 X10*3/UL (ref 0.8–3)
LYMPHOCYTES # BLD AUTO: 0.57 X10*3/UL (ref 0.8–3)
LYMPHOCYTES NFR BLD AUTO: 3.7 %
LYMPHOCYTES NFR BLD AUTO: 4.1 %
MAGNESIUM SERPL-MCNC: 1.84 MG/DL (ref 1.6–2.4)
MCH RBC QN AUTO: 31.1 PG (ref 26–34)
MCH RBC QN AUTO: 31.2 PG (ref 26–34)
MCHC RBC AUTO-ENTMCNC: 30.9 G/DL (ref 32–36)
MCHC RBC AUTO-ENTMCNC: 34.1 G/DL (ref 32–36)
MCV RBC AUTO: 101 FL (ref 80–100)
MCV RBC AUTO: 91 FL (ref 80–100)
MONOCYTES # BLD AUTO: 0.56 X10*3/UL (ref 0.05–0.8)
MONOCYTES # BLD AUTO: 0.65 X10*3/UL (ref 0.05–0.8)
MONOCYTES NFR BLD AUTO: 3.7 %
MONOCYTES NFR BLD AUTO: 4.8 %
NEUTROPHILS # BLD AUTO: 12.15 X10*3/UL (ref 1.6–5.5)
NEUTROPHILS # BLD AUTO: 13.94 X10*3/UL (ref 1.6–5.5)
NEUTROPHILS NFR BLD AUTO: 89.6 %
NEUTROPHILS NFR BLD AUTO: 91 %
NRBC BLD-RTO: 0 /100 WBCS (ref 0–0)
NRBC BLD-RTO: 0 /100 WBCS (ref 0–0)
PHOSPHATE SERPL-MCNC: 2.1 MG/DL (ref 2.5–4.9)
PHOSPHATE SERPL-MCNC: 2.7 MG/DL (ref 2.5–4.9)
PLATELET # BLD AUTO: 316 X10*3/UL (ref 150–450)
PLATELET # BLD AUTO: 328 X10*3/UL (ref 150–450)
POTASSIUM SERPL-SCNC: 3.6 MMOL/L (ref 3.5–5.3)
POTASSIUM SERPL-SCNC: 3.8 MMOL/L (ref 3.5–5.3)
PRODUCT BLOOD TYPE: 5100
PRODUCT CODE: NORMAL
PROT SERPL-MCNC: 3.5 G/DL (ref 6.4–8.2)
PROTHROMBIN TIME: 11.1 SECONDS (ref 9.8–12.8)
RBC # BLD AUTO: 2.15 X10*6/UL (ref 4.5–5.9)
RBC # BLD AUTO: 2.7 X10*6/UL (ref 4.5–5.9)
RETICS #: 0.09 X10*6/UL (ref 0.02–0.11)
RETICS/RBC NFR AUTO: 4.5 % (ref 0.5–2)
SODIUM SERPL-SCNC: 144 MMOL/L (ref 136–145)
SODIUM SERPL-SCNC: 147 MMOL/L (ref 136–145)
UNIT ABO: NORMAL
UNIT NUMBER: NORMAL
UNIT RH: NORMAL
UNIT VOLUME: 289
WBC # BLD AUTO: 13.5 X10*3/UL (ref 4.4–11.3)
WBC # BLD AUTO: 15.3 X10*3/UL (ref 4.4–11.3)
XM INTEP: NORMAL

## 2024-04-13 PROCEDURE — C9113 INJ PANTOPRAZOLE SODIUM, VIA: HCPCS | Performed by: STUDENT IN AN ORGANIZED HEALTH CARE EDUCATION/TRAINING PROGRAM

## 2024-04-13 PROCEDURE — 2500000004 HC RX 250 GENERAL PHARMACY W/ HCPCS (ALT 636 FOR OP/ED)

## 2024-04-13 PROCEDURE — 2500000001 HC RX 250 WO HCPCS SELF ADMINISTERED DRUGS (ALT 637 FOR MEDICARE OP)

## 2024-04-13 PROCEDURE — 2060000001 HC INTERMEDIATE ICU ROOM DAILY

## 2024-04-13 PROCEDURE — 82248 BILIRUBIN DIRECT: CPT | Performed by: STUDENT IN AN ORGANIZED HEALTH CARE EDUCATION/TRAINING PROGRAM

## 2024-04-13 PROCEDURE — 87081 CULTURE SCREEN ONLY: CPT

## 2024-04-13 PROCEDURE — 99291 CRITICAL CARE FIRST HOUR: CPT

## 2024-04-13 PROCEDURE — 71045 X-RAY EXAM CHEST 1 VIEW: CPT

## 2024-04-13 PROCEDURE — 85384 FIBRINOGEN ACTIVITY: CPT | Performed by: STUDENT IN AN ORGANIZED HEALTH CARE EDUCATION/TRAINING PROGRAM

## 2024-04-13 PROCEDURE — P9016 RBC LEUKOCYTES REDUCED: HCPCS

## 2024-04-13 PROCEDURE — 84100 ASSAY OF PHOSPHORUS: CPT | Performed by: STUDENT IN AN ORGANIZED HEALTH CARE EDUCATION/TRAINING PROGRAM

## 2024-04-13 PROCEDURE — 2500000001 HC RX 250 WO HCPCS SELF ADMINISTERED DRUGS (ALT 637 FOR MEDICARE OP): Performed by: STUDENT IN AN ORGANIZED HEALTH CARE EDUCATION/TRAINING PROGRAM

## 2024-04-13 PROCEDURE — 83615 LACTATE (LD) (LDH) ENZYME: CPT | Performed by: STUDENT IN AN ORGANIZED HEALTH CARE EDUCATION/TRAINING PROGRAM

## 2024-04-13 PROCEDURE — 83735 ASSAY OF MAGNESIUM: CPT | Performed by: STUDENT IN AN ORGANIZED HEALTH CARE EDUCATION/TRAINING PROGRAM

## 2024-04-13 PROCEDURE — 85025 COMPLETE CBC W/AUTO DIFF WBC: CPT | Performed by: STUDENT IN AN ORGANIZED HEALTH CARE EDUCATION/TRAINING PROGRAM

## 2024-04-13 PROCEDURE — 82728 ASSAY OF FERRITIN: CPT | Performed by: STUDENT IN AN ORGANIZED HEALTH CARE EDUCATION/TRAINING PROGRAM

## 2024-04-13 PROCEDURE — 85025 COMPLETE CBC W/AUTO DIFF WBC: CPT

## 2024-04-13 PROCEDURE — 2500000004 HC RX 250 GENERAL PHARMACY W/ HCPCS (ALT 636 FOR OP/ED): Performed by: STUDENT IN AN ORGANIZED HEALTH CARE EDUCATION/TRAINING PROGRAM

## 2024-04-13 PROCEDURE — 80069 RENAL FUNCTION PANEL: CPT | Mod: CCI

## 2024-04-13 PROCEDURE — 94668 MNPJ CHEST WALL SBSQ: CPT

## 2024-04-13 PROCEDURE — 85045 AUTOMATED RETICULOCYTE COUNT: CPT | Performed by: STUDENT IN AN ORGANIZED HEALTH CARE EDUCATION/TRAINING PROGRAM

## 2024-04-13 PROCEDURE — 94660 CPAP INITIATION&MGMT: CPT

## 2024-04-13 PROCEDURE — 85610 PROTHROMBIN TIME: CPT | Performed by: STUDENT IN AN ORGANIZED HEALTH CARE EDUCATION/TRAINING PROGRAM

## 2024-04-13 PROCEDURE — 37799 UNLISTED PX VASCULAR SURGERY: CPT | Performed by: STUDENT IN AN ORGANIZED HEALTH CARE EDUCATION/TRAINING PROGRAM

## 2024-04-13 PROCEDURE — 36430 TRANSFUSION BLD/BLD COMPNT: CPT

## 2024-04-13 PROCEDURE — 31720 CLEARANCE OF AIRWAYS: CPT

## 2024-04-13 PROCEDURE — 71045 X-RAY EXAM CHEST 1 VIEW: CPT | Performed by: RADIOLOGY

## 2024-04-13 PROCEDURE — 82947 ASSAY GLUCOSE BLOOD QUANT: CPT

## 2024-04-13 RX ORDER — DEXTROSE MONOHYDRATE 50 MG/ML
50 INJECTION, SOLUTION INTRAVENOUS CONTINUOUS
Status: DISCONTINUED | OUTPATIENT
Start: 2024-04-13 | End: 2024-04-13

## 2024-04-13 RX ORDER — VANCOMYCIN HYDROCHLORIDE 750 MG/150ML
750 INJECTION, SOLUTION INTRAVENOUS EVERY 12 HOURS
Status: DISCONTINUED | OUTPATIENT
Start: 2024-04-13 | End: 2024-04-15

## 2024-04-13 RX ORDER — MAGNESIUM SULFATE HEPTAHYDRATE 40 MG/ML
2 INJECTION, SOLUTION INTRAVENOUS ONCE
Status: COMPLETED | OUTPATIENT
Start: 2024-04-13 | End: 2024-04-13

## 2024-04-13 RX ORDER — POTASSIUM CHLORIDE 1.5 G/1.58G
20 POWDER, FOR SOLUTION ORAL ONCE
Status: COMPLETED | OUTPATIENT
Start: 2024-04-13 | End: 2024-04-13

## 2024-04-13 RX ORDER — VANCOMYCIN HYDROCHLORIDE 1 G/20ML
INJECTION, POWDER, LYOPHILIZED, FOR SOLUTION INTRAVENOUS DAILY PRN
Status: DISCONTINUED | OUTPATIENT
Start: 2024-04-13 | End: 2024-04-15

## 2024-04-13 RX ORDER — PANTOPRAZOLE SODIUM 40 MG/10ML
40 INJECTION, POWDER, LYOPHILIZED, FOR SOLUTION INTRAVENOUS
Status: DISCONTINUED | OUTPATIENT
Start: 2024-04-13 | End: 2024-04-26

## 2024-04-13 RX ADMIN — ENOXAPARIN SODIUM 40 MG: 100 INJECTION SUBCUTANEOUS at 01:15

## 2024-04-13 RX ADMIN — PIPERACILLIN SODIUM AND TAZOBACTAM SODIUM 4.5 G: 4; .5 INJECTION, SOLUTION INTRAVENOUS at 21:18

## 2024-04-13 RX ADMIN — MIDODRINE HYDROCHLORIDE 5 MG: 5 TABLET ORAL at 08:40

## 2024-04-13 RX ADMIN — MAGNESIUM SULFATE HEPTAHYDRATE 2 G: 40 INJECTION, SOLUTION INTRAVENOUS at 08:40

## 2024-04-13 RX ADMIN — VANCOMYCIN HYDROCHLORIDE 750 MG: 750 INJECTION, SOLUTION INTRAVENOUS at 16:51

## 2024-04-13 RX ADMIN — PANTOPRAZOLE SODIUM 40 MG: 40 INJECTION, POWDER, FOR SOLUTION INTRAVENOUS at 06:27

## 2024-04-13 RX ADMIN — FOLIC ACID 1 MG: 1 TABLET ORAL at 08:40

## 2024-04-13 RX ADMIN — ATORVASTATIN CALCIUM 40 MG: 40 TABLET, FILM COATED ORAL at 08:40

## 2024-04-13 RX ADMIN — Medication 5 MG: at 18:07

## 2024-04-13 RX ADMIN — POTASSIUM CHLORIDE 20 MEQ: 1.5 POWDER, FOR SOLUTION ORAL at 08:40

## 2024-04-13 RX ADMIN — DEXTROSE MONOHYDRATE 50 ML/HR: 50 INJECTION, SOLUTION INTRAVENOUS at 16:10

## 2024-04-13 RX ADMIN — PIPERACILLIN SODIUM AND TAZOBACTAM SODIUM 4.5 G: 4; .5 INJECTION, SOLUTION INTRAVENOUS at 16:02

## 2024-04-13 ASSESSMENT — PAIN SCALES - GENERAL
PAINLEVEL_OUTOF10: 0 - NO PAIN

## 2024-04-13 ASSESSMENT — PAIN - FUNCTIONAL ASSESSMENT
PAIN_FUNCTIONAL_ASSESSMENT: CPOT (CRITICAL CARE PAIN OBSERVATION TOOL)
PAIN_FUNCTIONAL_ASSESSMENT: 0-10
PAIN_FUNCTIONAL_ASSESSMENT: CPOT (CRITICAL CARE PAIN OBSERVATION TOOL)

## 2024-04-13 NOTE — PROGRESS NOTES
"Vancomycin Dosing by Pharmacy- INITIAL    Ry Sandhu is a 74 y.o. year old male who Pharmacy has been consulted for vancomycin dosing for pneumonia. Based on the patient's indication and renal status this patient will be dosed based on a goal AUC of 400-600.     Renal function is currently stable.    Visit Vitals  /52   Pulse 96   Temp 36.3 °C (97.3 °F)   Resp 20        Lab Results   Component Value Date    CREATININE 0.51 04/13/2024    CREATININE 0.55 04/12/2024    CREATININE 0.58 04/12/2024    CREATININE 0.53 04/11/2024        Patient weight is No results found for: \"PTWEIGHT\"    No results found for: \"CULTURE\"     I/O last 3 completed shifts:  In: 3957.8 (68.8 mL/kg) [I.V.:32.8 (0.6 mL/kg); NG/GT:3725; IV Piggyback:200]  Out: 1550 (27 mL/kg) [Urine:1550 (0.7 mL/kg/hr)]  Weight: 57.5 kg   [unfilled]    Lab Results   Component Value Date    PATIENTTEMP 37.0 04/12/2024    PATIENTTEMP 37.0 04/11/2024    PATIENTTEMP 37.0 04/10/2024          Assessment/Plan     Patient will not be given a loading dose.  Will initiate vancomycin maintenance,  750 mg every 12 hours.    This dosing regimen is predicted by [employer] to result in the following pharmacokinetic parameters:  Loading dose: N/A  Regimen: 750 mg IV every 12 hours.  Start time: 15:43 on 04/13/2024  Exposure target: AUC24 (range)400-600 mg/L.hr   AUC24,ss: 508 mg/L.hr  Probability of AUC24 > 400: 100 %  Ctrough,ss: 16.5 mg/L  Probability of Ctrough,ss > 20: 3 %  Probability of nephrotoxicity (Lodise GONZALO 2009): 12 %    Follow-up level will be ordered on 4/14 at 0500 unless clinically indicated sooner.  Will continue to monitor renal function daily while on vancomycin and order serum creatinine at least every 48 hours if not already ordered.  Follow for continued vancomycin needs, clinical response, and signs/symptoms of toxicity.       Parveen Melissa, PharmD       "

## 2024-04-13 NOTE — CARE PLAN
Problem: Pain  Goal: My pain/discomfort is manageable  Outcome: Progressing  Flowsheets (Taken 4/13/2024 0832)  Resident's pain/discomfort is manageable: Include resident/family/caregiver in decisions related to pain management     Problem: Skin  Goal: Decreased wound size/increased tissue granulation at next dressing change  Outcome: Progressing  Flowsheets (Taken 4/13/2024 0832)  Decreased wound size/increased tissue granulation at next dressing change: Promote sleep for wound healing  Goal: Participates in plan/prevention/treatment measures  Outcome: Progressing  Flowsheets (Taken 4/13/2024 0832)  Participates in plan/prevention/treatment measures: Discuss with provider PT/OT consult  Goal: Prevent/manage excess moisture  Outcome: Progressing  Flowsheets (Taken 4/13/2024 0832)  Prevent/manage excess moisture: Monitor for/manage infection if present  Goal: Prevent/minimize sheer/friction injuries  Outcome: Progressing  Flowsheets (Taken 4/13/2024 0832)  Prevent/minimize sheer/friction injuries: Increase activity/out of bed for meals  Goal: Promote/optimize nutrition  Outcome: Progressing  Flowsheets (Taken 4/13/2024 0832)  Promote/optimize nutrition: Monitor/record intake including meals  Goal: Promote skin healing  Outcome: Progressing  Flowsheets (Taken 4/13/2024 0832)  Promote skin healing: Assess skin/pad under line(s)/device(s)

## 2024-04-13 NOTE — PROGRESS NOTES
Critical Care Daily Progress      INTERVAL HISTORY:  No acute events overnight, Hgb 6.7 this AM, transfused with 1 unit pRBC  Subjective :  Patient seen and evaluated this morning. Denying chest pain, shortness of breath, and abdominal pain. Patient alert and oriented to person, place and year.     Objective :  VITALS:  Vitals:    04/13/24 0400 04/13/24 0430 04/13/24 0500 04/13/24 0600   BP:       BP Location:       Patient Position:       Pulse: 88 67 71 81   Resp: 15 24 24 22   Temp:       TempSrc:       SpO2: 93% 100% 100% 100%   Weight:    57.5 kg (126 lb 12.2 oz)   Height:            FiO2 (%):  [45 %-55 %] 45 %:     24hr Min/Max:  Temp  Min: 36 °C (96.8 °F)  Max: 36.8 °C (98.2 °F)  Pulse  Min: 67  Max: 104  BP  Min: 86/47  Max: 129/96  Resp  Min: 15  Max: 29  SpO2  Min: 79 %  Max: 100 %      PHYSICAL EXAM:  Physical Exam  Constitutional:       General: He is not in acute distress.     Appearance: He is cachectic. He is not ill-appearing.   HENT:      Head: Normocephalic.      Mouth/Throat:      Mouth: Mucous membranes are dry.      Pharynx: Oropharynx is clear.   Eyes:      Extraocular Movements: Extraocular movements intact.      Pupils: Pupils are equal, round, and reactive to light.   Cardiovascular:      Rate and Rhythm: Normal rate and regular rhythm.      Pulses: Normal pulses.      Heart sounds: No murmur heard.  Pulmonary:      Effort: Pulmonary effort is normal. No respiratory distress.      Breath sounds: Rales present. No wheezing.   Abdominal:      General: Bowel sounds are normal. There is no distension.      Palpations: Abdomen is soft.      Tenderness: There is no abdominal tenderness. There is no guarding or rebound.   Musculoskeletal:      Comments: Palpable femoral pulses. L AKA and R BKA guillotine amputation stumps dressed.    Skin:     General: Skin is warm and dry.   Neurological:      General: No focal deficit present.      Mental Status: He is alert and oriented to person, place, and  time.   Psychiatric:         Mood and Affect: Mood normal.          Scheduled Medications:   atorvastatin, 40 mg, oral, Daily  enoxaparin, 40 mg, subcutaneous, q24h  folic acid, 1 mg, oral, Daily  lidocaine, 1 Application, Topical, Once  melatonin, 5 mg, oral, Daily  midodrine, 5 mg, oral, TID  pantoprazole, 40 mg, intravenous, Daily before breakfast         Continuous Medications:   norepinephrine, 0-3 mcg/kg/min, Last Rate: 0.05 mcg/kg/min (04/13/24 0636)         PRN Medications:   PRN medications: acetaminophen, dextrose, dextrose, glucagon, glucagon, insulin lispro, ipratropium-albuteroL, oxyCODONE, oxygen, oxygen, polyethylene glycol, silver nitrate applicators    LDA:  CVC, pIV, A-line (L radial)  Urethral catheter        LABS:  Results from last 7 days   Lab Units 04/13/24  0409 04/12/24  0357 04/11/24  0857   WBC AUTO x10*3/uL 13.5* 15.8* 15.2*   HEMOGLOBIN g/dL 6.7* 7.6* 8.0*   HEMATOCRIT % 21.7* 23.1* 23.5*   PLATELETS AUTO x10*3/uL 328 284 230            Results from last 7 days   Lab Units 04/13/24  0409 04/12/24  1730 04/12/24  0357   SODIUM mmol/L 147* 148* 150*   POTASSIUM mmol/L 3.6 3.9 3.4*   CHLORIDE mmol/L 109* 109* 110*   CO2 mmol/L 33* 34* 32   BUN mg/dL 16 17 18   CREATININE mg/dL 0.51 0.55 0.58   CALCIUM mg/dL 7.3* 7.2* 7.6*     Results from last 7 days   Lab Units 04/13/24  0409 04/12/24  0357 04/11/24  0504   ALK PHOS U/L 481* 706* 768*   BILIRUBIN TOTAL mg/dL 0.5 0.9 0.7   BILIRUBIN DIRECT mg/dL 0.2  --   --    PROTEIN TOTAL g/dL 3.5* 3.6* 3.3*   ALT U/L 41 55* 55*   AST U/L 36 61* 60*      Results from last 7 days   Lab Units 04/13/24  0409 04/06/24  1452   APTT seconds 31 36   INR  1.0 1.1        IMAGING:  === 04/04/24 ===    US RIGHT UPPER QUADRANT    - Impression -  1. Diffusely echogenic liver parenchyma consistent with steatosis.  2. Thickened/edematous gallbladder wall with no evidence of  hyperemia, gallbladder wall distention or cholelithiasis. Findings  are equivocal and most  likely due to fluid overload.      I personally reviewed the images/study and I agree with the findings  as stated by Resident Dalton Bernal MD. This study was interpreted  at [facility], [place], [place].    MACRO:  None    Signed by: Daniel Shahid 4/5/2024 6:56 PM  Dictation workstation:   [id number]  === 04/04/24 ===    CT HEAD WO IV CONTRAST    - Impression -  No evidence of acute intracranial abnormality. If concerns for an  acute stroke may consider brain MRI for further evaluation.    I personally reviewed the images/study and I agree with the findings  as stated by resident physician Dr. Evan Lai . This study  was interpreted at [facility],  [place], [place].    MACRO:  None    Signed by: Rashi Michael 4/5/2024 6:36 PM  Dictation workstation:   [id number]    Assessment/Plan :  Ry Sandhu is a 74-year-old M w/PMHx of COPD, CAD, PAD who is presenting to the MICU from outside hospital for acute hypoxic respiratory failure secondary to acute community-acquired pneumonia and septic shock secondary to community-acquired pneumonia versus colitis now requiring bilateral below the knee amputations due to severe peripheral arterial disease and increased pressor requirements.     UPDATES 4/14   - Weaned off pressors    NEURO:  #Acute Encephalopathy, resolving   Mental status improving, patient alert and oriented to person, place and time.   PLAN:  - Vitamin B12, Folate, TSH w/reflex T4 pending   - Delirium precautions  - Continue to monitor   - Continue melatonin at nighttime      #C/F right basal ganglia ischemia  CT head 4/5 nonacute  - NTD  - Will repeat CTH if other causes AMS ruled out      CARDIO:  #Undifferentiated shock, likely septic, resolving  As of 4/13 AM, patient is off of pressors  PLAN:  - Continue to wean off pressors  - Continue midodrine 5mg TID (given bilateral amputation expect baseline BP to be  lower) goal MAP >60 if mentation well and no s/s of malperfusion     #CAD  #HTN  #PAD  #L AKA and R BKA   PLAN:  - Vascular surgery consulted, appreciate recs, planning for formalization of BKA to AKA on 4/16  - Continue Lipitor  - Holding antihypertensives in setting of requiring pressors    PULM:  #COPD-not in exacerbation   Current everyday tobacco smoker  No pfts on file  -on 10L NC      #CAP  #AHRF 2/2 CAP   #Pleural Effusion  Resp culture from 4/10 NGTD  -Vancomycin/Zosyn (4/5-4/12)  PLAN:  -CTM     GI:  #Elevated Liver Enzymes   #Fatty infiltration of liver on CT abdomen  #Colitis  #GERD  #Concern for GI bleed  RUQ showing evidence of hepatic steatosis and edematous gallbladder.  PLAN:  - C/w IV PPI    RENAL/:  #Hypernatremia   Free water deficit 1.1L  -500q4 FWF   -pm RFP     #Metabolic Alkalosis w/Respiratory Acidosis   :: Etiology likely multifactorial ( hypovolemia, NGT losses )  PLAN:  - RFP, Mg   - Continue to monitor      HEME/ONC  #Chronic Normocytic Anemia  #Coagulopathy  Hgb 6.7 this AM, labs not indicative of hemolysis, absolute reticulocyte count indicative of hypoproliferative process, s/p 1 unit pRBC, pending repeat CBC  PLAN:  -Keep active type and screen  -Transfuse for Hgb < 8 per vascular medicine   -Maintain 2 large bore pIVs      ENDO:  -No active issues to address      MSK/Rheum:  -No active issues to address     ID:  #C/f Septic Shock 2/2 CAP, resolved  #Leukocytosis, resolving  Azithromycin 500 mg daily (4/1 - 4/4)  Ceftriaxone (3/31 - 4/5)  Doxycycline (4/4 - 4/5)  Flagyl (3/25 - 4/5)  Vancomycin/Zosyn (4/5-4/12)  WBC continues to downtrend  PLAN:  CTM       F: PRN  E: PRN   N: NPO; TF  A: LIJ, A line, PIV  Drains: Tucker, NG  Bowel regimen: Miralax & Senna  Drips: Norepinephrine   DVT ppx: Lovenox  GI ppx: PPI      Code Status: Full    LAYO RIVERA (Spouse)  [phone] (Mobile)         04/13/24 at 6:57 AM - Debbie Emanuel MD

## 2024-04-13 NOTE — ACP (ADVANCE CARE PLANNING)
Confirming Previous Code Status:   Advance Care Planning Note     Discussion Date: 04/13/24   Discussion Participants: spouse    The patient wishes to discuss Advance Care Planning today and the following is a brief summary of our discussion.     Patient has capacity to make their own medical decisions: No  Health Care Agent/Surrogate Decision Maker documented in chart: Yes    Documents on file and valid:  Advance Directive/Living Will: No   Health Care Power of : No  Other: NOK Spouse, Pura Sandhu    Communication of Medical Status/Prognosis:   Discussed with wife about patient's multiple chronic medical comorbidities.  Expressed concern for resuscitation causing more suffering in the long-term and benefit.    Communication of Treatment Goals/Options:   Wife states that she would like to keep patient okay for intubation but wants to change CODE STATUS to DO NOT RESUSCITATE.    Treatment Decisions  Goals of Care: survival is prioritized, if goals for quality or survival can reasonably be achieved     Follow Up Plan  Will revisit code status on Monday either with palliative medicine will see or when wife can call patient's family doctor to discuss     Team Members  Intern, RN    Time Statement: Total face to face time spent on advance care planning was 20 minutes with 10 minutes spent in counseling, including the explanation.    Debbie Emanuel MD  4/13/2024 2:18 PM

## 2024-04-13 NOTE — CARE PLAN
Problem: Pain  Goal: My pain/discomfort is manageable  Outcome: Progressing     Problem: Safety  Goal: Patient will be injury free during hospitalization  Outcome: Progressing  Goal: I will remain free of falls  Outcome: Progressing     Problem: Daily Care  Goal: Daily care needs are met  Outcome: Progressing     Problem: Psychosocial Needs  Goal: Demonstrates ability to cope with hospitalization/illness  Outcome: Progressing  Goal: Collaborate with me, my family, and caregiver to identify my specific goals  Outcome: Progressing     Problem: Discharge Barriers  Goal: My discharge needs are met  Outcome: Progressing     Problem: Safety - Medical Restraint  Goal: Remains free of injury from restraints (Restraint for Interference with Medical Device)  Outcome: Progressing  Goal: Free from restraint(s) (Restraint for Interference with Medical Device)  Outcome: Progressing     Problem: Respiratory  Goal: Clear secretions with interventions this shift  Outcome: Progressing  Goal: Minimize anxiety/maximize coping throughout shift  Outcome: Progressing  Goal: Minimal/no exertional discomfort or dyspnea this shift  Outcome: Progressing  Goal: No signs of respiratory distress (eg. Use of accessory muscles. Peds grunting)  Outcome: Progressing  Goal: Patent airway maintained this shift  Outcome: Progressing  Goal: Tolerate mechanical ventilation evidenced by VS/agitation level this shift  Outcome: Progressing  Goal: Tolerate pulmonary toileting this shift  Outcome: Progressing  Goal: Verbalize decreased shortness of breath this shift  Outcome: Progressing  Goal: Wean oxygen to maintain O2 saturation per order/standard this shift  Outcome: Progressing  Goal: Increase self care and/or family involvement in next 24 hours  Outcome: Progressing     Problem: Skin  Goal: Decreased wound size/increased tissue granulation at next dressing change  Outcome: Progressing  Goal: Participates in plan/prevention/treatment measures  Outcome:  Progressing  Goal: Prevent/manage excess moisture  Outcome: Progressing  Goal: Prevent/minimize sheer/friction injuries  Outcome: Progressing  Goal: Promote/optimize nutrition  Outcome: Progressing  Goal: Promote skin healing  Outcome: Progressing

## 2024-04-14 ENCOUNTER — APPOINTMENT (OUTPATIENT)
Dept: RADIOLOGY | Facility: HOSPITAL | Age: 74
DRG: 853 | End: 2024-04-14
Payer: MEDICARE

## 2024-04-14 LAB
ALBUMIN SERPL BCP-MCNC: 1.6 G/DL (ref 3.4–5)
ALBUMIN SERPL BCP-MCNC: 1.6 G/DL (ref 3.4–5)
ALP SERPL-CCNC: 474 U/L (ref 33–136)
ALT SERPL W P-5'-P-CCNC: 34 U/L (ref 10–52)
ANION GAP SERPL CALC-SCNC: 10 MMOL/L (ref 10–20)
ANION GAP SERPL CALC-SCNC: 9 MMOL/L (ref 10–20)
AST SERPL W P-5'-P-CCNC: 31 U/L (ref 9–39)
BASOPHILS # BLD AUTO: 0.02 X10*3/UL (ref 0–0.1)
BASOPHILS NFR BLD AUTO: 0.1 %
BILIRUB SERPL-MCNC: 0.8 MG/DL (ref 0–1.2)
BUN SERPL-MCNC: 13 MG/DL (ref 6–23)
BUN SERPL-MCNC: 15 MG/DL (ref 6–23)
CALCIUM SERPL-MCNC: 7.3 MG/DL (ref 8.6–10.6)
CALCIUM SERPL-MCNC: 7.3 MG/DL (ref 8.6–10.6)
CHLORIDE SERPL-SCNC: 107 MMOL/L (ref 98–107)
CHLORIDE SERPL-SCNC: 109 MMOL/L (ref 98–107)
CO2 SERPL-SCNC: 31 MMOL/L (ref 21–32)
CO2 SERPL-SCNC: 32 MMOL/L (ref 21–32)
CREAT SERPL-MCNC: 0.43 MG/DL (ref 0.5–1.3)
CREAT SERPL-MCNC: 0.52 MG/DL (ref 0.5–1.3)
EGFRCR SERPLBLD CKD-EPI 2021: >90 ML/MIN/1.73M*2
EGFRCR SERPLBLD CKD-EPI 2021: >90 ML/MIN/1.73M*2
EOSINOPHIL # BLD AUTO: 0.07 X10*3/UL (ref 0–0.4)
EOSINOPHIL NFR BLD AUTO: 0.4 %
ERYTHROCYTE [DISTWIDTH] IN BLOOD BY AUTOMATED COUNT: 16.6 % (ref 11.5–14.5)
GLUCOSE BLD MANUAL STRIP-MCNC: 131 MG/DL (ref 74–99)
GLUCOSE BLD MANUAL STRIP-MCNC: 161 MG/DL (ref 74–99)
GLUCOSE BLD MANUAL STRIP-MCNC: 51 MG/DL (ref 74–99)
GLUCOSE BLD MANUAL STRIP-MCNC: 54 MG/DL (ref 74–99)
GLUCOSE BLD MANUAL STRIP-MCNC: 62 MG/DL (ref 74–99)
GLUCOSE BLD MANUAL STRIP-MCNC: 79 MG/DL (ref 74–99)
GLUCOSE BLD MANUAL STRIP-MCNC: 83 MG/DL (ref 74–99)
GLUCOSE BLD MANUAL STRIP-MCNC: 89 MG/DL (ref 74–99)
GLUCOSE SERPL-MCNC: 66 MG/DL (ref 74–99)
GLUCOSE SERPL-MCNC: 92 MG/DL (ref 74–99)
HCT VFR BLD AUTO: 25.6 % (ref 41–52)
HGB BLD-MCNC: 8.7 G/DL (ref 13.5–17.5)
HOLD SPECIMEN: NORMAL
IMM GRANULOCYTES # BLD AUTO: 0.12 X10*3/UL (ref 0–0.5)
IMM GRANULOCYTES NFR BLD AUTO: 0.7 % (ref 0–0.9)
LYMPHOCYTES # BLD AUTO: 0.52 X10*3/UL (ref 0.8–3)
LYMPHOCYTES NFR BLD AUTO: 3 %
MAGNESIUM SERPL-MCNC: 1.94 MG/DL (ref 1.6–2.4)
MCH RBC QN AUTO: 31.4 PG (ref 26–34)
MCHC RBC AUTO-ENTMCNC: 34 G/DL (ref 32–36)
MCV RBC AUTO: 92 FL (ref 80–100)
MONOCYTES # BLD AUTO: 0.57 X10*3/UL (ref 0.05–0.8)
MONOCYTES NFR BLD AUTO: 3.3 %
NEUTROPHILS # BLD AUTO: 16.01 X10*3/UL (ref 1.6–5.5)
NEUTROPHILS NFR BLD AUTO: 92.5 %
NRBC BLD-RTO: 0 /100 WBCS (ref 0–0)
PHOSPHATE SERPL-MCNC: 2.6 MG/DL (ref 2.5–4.9)
PHOSPHATE SERPL-MCNC: 2.6 MG/DL (ref 2.5–4.9)
PLATELET # BLD AUTO: 336 X10*3/UL (ref 150–450)
POTASSIUM SERPL-SCNC: 3.5 MMOL/L (ref 3.5–5.3)
POTASSIUM SERPL-SCNC: 4 MMOL/L (ref 3.5–5.3)
PROT SERPL-MCNC: 3.8 G/DL (ref 6.4–8.2)
RBC # BLD AUTO: 2.77 X10*6/UL (ref 4.5–5.9)
SODIUM SERPL-SCNC: 144 MMOL/L (ref 136–145)
SODIUM SERPL-SCNC: 146 MMOL/L (ref 136–145)
VANCOMYCIN TROUGH SERPL-MCNC: 12.3 UG/ML (ref 5–20)
WBC # BLD AUTO: 17.3 X10*3/UL (ref 4.4–11.3)

## 2024-04-14 PROCEDURE — 99291 CRITICAL CARE FIRST HOUR: CPT

## 2024-04-14 PROCEDURE — 85025 COMPLETE CBC W/AUTO DIFF WBC: CPT

## 2024-04-14 PROCEDURE — 83735 ASSAY OF MAGNESIUM: CPT

## 2024-04-14 PROCEDURE — 82947 ASSAY GLUCOSE BLOOD QUANT: CPT

## 2024-04-14 PROCEDURE — 84155 ASSAY OF PROTEIN SERUM: CPT | Performed by: STUDENT IN AN ORGANIZED HEALTH CARE EDUCATION/TRAINING PROGRAM

## 2024-04-14 PROCEDURE — 80202 ASSAY OF VANCOMYCIN: CPT

## 2024-04-14 PROCEDURE — 2500000001 HC RX 250 WO HCPCS SELF ADMINISTERED DRUGS (ALT 637 FOR MEDICARE OP)

## 2024-04-14 PROCEDURE — 2500000005 HC RX 250 GENERAL PHARMACY W/O HCPCS: Performed by: STUDENT IN AN ORGANIZED HEALTH CARE EDUCATION/TRAINING PROGRAM

## 2024-04-14 PROCEDURE — 87493 C DIFF AMPLIFIED PROBE: CPT | Performed by: STUDENT IN AN ORGANIZED HEALTH CARE EDUCATION/TRAINING PROGRAM

## 2024-04-14 PROCEDURE — C9113 INJ PANTOPRAZOLE SODIUM, VIA: HCPCS | Performed by: STUDENT IN AN ORGANIZED HEALTH CARE EDUCATION/TRAINING PROGRAM

## 2024-04-14 PROCEDURE — 2500000001 HC RX 250 WO HCPCS SELF ADMINISTERED DRUGS (ALT 637 FOR MEDICARE OP): Performed by: STUDENT IN AN ORGANIZED HEALTH CARE EDUCATION/TRAINING PROGRAM

## 2024-04-14 PROCEDURE — 71045 X-RAY EXAM CHEST 1 VIEW: CPT

## 2024-04-14 PROCEDURE — 2060000001 HC INTERMEDIATE ICU ROOM DAILY

## 2024-04-14 PROCEDURE — 84100 ASSAY OF PHOSPHORUS: CPT

## 2024-04-14 PROCEDURE — 71045 X-RAY EXAM CHEST 1 VIEW: CPT | Performed by: RADIOLOGY

## 2024-04-14 PROCEDURE — 87086 URINE CULTURE/COLONY COUNT: CPT

## 2024-04-14 PROCEDURE — 82040 ASSAY OF SERUM ALBUMIN: CPT

## 2024-04-14 PROCEDURE — 83615 LACTATE (LD) (LDH) ENZYME: CPT

## 2024-04-14 PROCEDURE — 2500000004 HC RX 250 GENERAL PHARMACY W/ HCPCS (ALT 636 FOR OP/ED): Performed by: STUDENT IN AN ORGANIZED HEALTH CARE EDUCATION/TRAINING PROGRAM

## 2024-04-14 PROCEDURE — 2500000004 HC RX 250 GENERAL PHARMACY W/ HCPCS (ALT 636 FOR OP/ED)

## 2024-04-14 PROCEDURE — 37799 UNLISTED PX VASCULAR SURGERY: CPT

## 2024-04-14 PROCEDURE — 87506 IADNA-DNA/RNA PROBE TQ 6-11: CPT | Performed by: STUDENT IN AN ORGANIZED HEALTH CARE EDUCATION/TRAINING PROGRAM

## 2024-04-14 PROCEDURE — 94660 CPAP INITIATION&MGMT: CPT

## 2024-04-14 PROCEDURE — 94668 MNPJ CHEST WALL SBSQ: CPT

## 2024-04-14 PROCEDURE — 80053 COMPREHEN METABOLIC PANEL: CPT

## 2024-04-14 RX ORDER — DEXTROSE MONOHYDRATE AND SODIUM CHLORIDE 5; .9 G/100ML; G/100ML
50 INJECTION, SOLUTION INTRAVENOUS CONTINUOUS
Status: DISCONTINUED | OUTPATIENT
Start: 2024-04-14 | End: 2024-04-14

## 2024-04-14 RX ORDER — POTASSIUM CHLORIDE 1.5 G/1.58G
40 POWDER, FOR SOLUTION ORAL ONCE
Status: COMPLETED | OUTPATIENT
Start: 2024-04-14 | End: 2024-04-14

## 2024-04-14 RX ADMIN — MIDODRINE HYDROCHLORIDE 5 MG: 5 TABLET ORAL at 15:28

## 2024-04-14 RX ADMIN — PIPERACILLIN SODIUM AND TAZOBACTAM SODIUM 4.5 G: 4; .5 INJECTION, SOLUTION INTRAVENOUS at 15:28

## 2024-04-14 RX ADMIN — POTASSIUM CHLORIDE 40 MEQ: 1.5 POWDER, FOR SOLUTION ORAL at 08:55

## 2024-04-14 RX ADMIN — PIPERACILLIN SODIUM AND TAZOBACTAM SODIUM 4.5 G: 4; .5 INJECTION, SOLUTION INTRAVENOUS at 21:38

## 2024-04-14 RX ADMIN — PANTOPRAZOLE SODIUM 40 MG: 40 INJECTION, POWDER, FOR SOLUTION INTRAVENOUS at 06:15

## 2024-04-14 RX ADMIN — ATORVASTATIN CALCIUM 40 MG: 40 TABLET, FILM COATED ORAL at 08:55

## 2024-04-14 RX ADMIN — VANCOMYCIN HYDROCHLORIDE 750 MG: 750 INJECTION, SOLUTION INTRAVENOUS at 16:13

## 2024-04-14 RX ADMIN — DEXTROSE MONOHYDRATE 25 G: 25 INJECTION, SOLUTION INTRAVENOUS at 06:28

## 2024-04-14 RX ADMIN — MIDODRINE HYDROCHLORIDE 5 MG: 5 TABLET ORAL at 08:55

## 2024-04-14 RX ADMIN — Medication 5 MG: at 19:25

## 2024-04-14 RX ADMIN — PIPERACILLIN SODIUM AND TAZOBACTAM SODIUM 4.5 G: 4; .5 INJECTION, SOLUTION INTRAVENOUS at 03:44

## 2024-04-14 RX ADMIN — ENOXAPARIN SODIUM 40 MG: 100 INJECTION SUBCUTANEOUS at 00:35

## 2024-04-14 RX ADMIN — VANCOMYCIN HYDROCHLORIDE 750 MG: 750 INJECTION, SOLUTION INTRAVENOUS at 04:55

## 2024-04-14 RX ADMIN — DEXTROSE AND SODIUM CHLORIDE 50 ML/HR: 5; 900 INJECTION, SOLUTION INTRAVENOUS at 08:55

## 2024-04-14 RX ADMIN — PIPERACILLIN SODIUM AND TAZOBACTAM SODIUM 4.5 G: 4; .5 INJECTION, SOLUTION INTRAVENOUS at 10:15

## 2024-04-14 RX ADMIN — FOLIC ACID 1 MG: 1 TABLET ORAL at 08:55

## 2024-04-14 ASSESSMENT — PAIN - FUNCTIONAL ASSESSMENT
PAIN_FUNCTIONAL_ASSESSMENT: 0-10

## 2024-04-14 ASSESSMENT — PAIN SCALES - GENERAL
PAINLEVEL_OUTOF10: 0 - NO PAIN

## 2024-04-14 NOTE — PROGRESS NOTES
VASCULAR SURGERY PROGRESS NOTE  Subjective   Remains on CPAP  Not following commands  Levophed discontinued yesterday  Afebrile with uptrending leukocytosis    Objective   Vitals:  Heart Rate:  [83-96]   Temp:  [35.3 °C (95.5 °F)-36.8 °C (98.2 °F)]   Resp:  [20-31]   Weight:  [56.6 kg (124 lb 12.5 oz)]   SpO2:  [87 %-100 %]     Exam:  Constitutional: No acute distress, resting comfortably  Neuro: Arousable but not following commands  CV: no tachycardia on bedside monitor  Pulm: non-labored on CPAP  Musculoskeletal: Spontaneously moving both lower extremities  Extremities:  AKA and right BKA guillotine amputation sites without gross evidence of infection, dry eschar right BKA site    Labs:  Results from last 7 days   Lab Units 04/14/24  0345 04/13/24  1005 04/13/24  0409   WBC AUTO x10*3/uL 17.3* 15.3* 13.5*   HEMOGLOBIN g/dL 8.7* 8.4* 6.7*   PLATELETS AUTO x10*3/uL 336 316 328      Results from last 7 days   Lab Units 04/14/24  0345 04/13/24  1605 04/13/24  0409   SODIUM mmol/L 144 144 147*   POTASSIUM mmol/L 3.5 3.8 3.6   CHLORIDE mmol/L 107 106 109*   CO2 mmol/L 32 34* 33*   BUN mg/dL 15 17 16   CREATININE mg/dL 0.43* 0.46* 0.51   GLUCOSE mg/dL 66* 126* 123*   MAGNESIUM mg/dL 1.94  --  1.84   PHOSPHORUS mg/dL 2.6 2.1* 2.7      Results from last 7 days   Lab Units 04/13/24  0409   INR  1.0   PROTIME seconds 11.1   APTT seconds 31     Assessment/Plan   Ry Sandhu is 74 y.o. male who presented with septic shock secondary to pneumonia and bilateral lower extremity ischemic necrosis in setting of shock, PAD and vasopressor requirement who is s/p right left above-knee guillotine amputations for source control    No gross evidence of infection at level of amputation  Uptrending leukocytosis    Plan:  -Wound care: Daily dressing changes with wet-to-dry Kerlix and wrapped with Ace  -Will plan for formalization this upcoming week, pending resolution of leukocytosis  -Continue antibiotics per primary team    D/w  attending, Dr. Zaid Cordero MD  Vascular Surgery PGY-4  Team pager: [phone]

## 2024-04-14 NOTE — PROGRESS NOTES
Critical Care Daily Progress      INTERVAL HISTORY:  Hypoglycemic overnight, started D5NS drip, tube feeds restarted, dextrose drip discontinued  Subjective :  Patient seen and evaluated this morning. Denying chest pain, shortness of breath, and abdominal pain. Patient alert and oriented to person, place and year.     Objective :  VITALS:  Vitals:    04/14/24 0420 04/14/24 0436 04/14/24 0500 04/14/24 0600   BP:       Pulse:   91 87   Resp:  25 23 24   Temp: 35.3 °C (95.5 °F)      TempSrc: Temporal      SpO2:   100% 97%   Weight:       Height:            FiO2 (%):  [45 %] 45 %:     24hr Min/Max:  Temp  Min: 35.3 °C (95.5 °F)  Max: 37.3 °C (99.1 °F)  Pulse  Min: 83  Max: 96  BP  Min: 118/43  Max: 136/53  Resp  Min: 20  Max: 31  SpO2  Min: 89 %  Max: 100 %      PHYSICAL EXAM:  Physical Exam  Constitutional:       General: He is not in acute distress.     Appearance: He is cachectic. He is not ill-appearing.   HENT:      Head: Normocephalic.      Mouth/Throat:      Mouth: Mucous membranes are dry.      Pharynx: Oropharynx is clear.   Eyes:      Extraocular Movements: Extraocular movements intact.      Pupils: Pupils are equal, round, and reactive to light.   Cardiovascular:      Rate and Rhythm: Normal rate and regular rhythm.      Pulses: Normal pulses.      Heart sounds: No murmur heard.  Pulmonary:      Effort: Pulmonary effort is normal. No respiratory distress.      Breath sounds: Rales present. No wheezing.   Abdominal:      General: Bowel sounds are normal. There is no distension.      Palpations: Abdomen is soft.      Tenderness: There is no abdominal tenderness. There is no guarding or rebound.   Musculoskeletal:      Comments: Palpable femoral pulses. L AKA and R BKA guillotine amputation stumps dressed.    Skin:     General: Skin is warm and dry.   Neurological:      General: No focal deficit present.      Mental Status: He is alert and oriented to person, place, and time.   Psychiatric:         Mood and  Affect: Mood normal.          Scheduled Medications:   atorvastatin, 40 mg, oral, Daily  enoxaparin, 40 mg, subcutaneous, q24h  folic acid, 1 mg, oral, Daily  lidocaine, 1 Application, Topical, Once  melatonin, 5 mg, oral, Daily  midodrine, 5 mg, oral, TID  pantoprazole, 40 mg, intravenous, Daily before breakfast  piperacillin-tazobactam, 4.5 g, intravenous, q6h  potassium chloride, 40 mEq, oral, Once  vancomycin, 750 mg, intravenous, q12h         Continuous Medications:   D5 % and 0.9 % sodium chloride, 50 mL/hr  norepinephrine, 0-3 mcg/kg/min, Last Rate: Stopped (04/13/24 0930)         PRN Medications:   PRN medications: acetaminophen, dextrose, dextrose, glucagon, glucagon, insulin lispro, ipratropium-albuteroL, oxyCODONE, oxygen, oxygen, polyethylene glycol, silver nitrate applicators, vancomycin    LDA:  CVC, pIV, A-line (L radial)  Urethral catheter        LABS:  Results from last 7 days   Lab Units 04/14/24  0345 04/13/24  1005 04/13/24  0409   WBC AUTO x10*3/uL 17.3* 15.3* 13.5*   HEMOGLOBIN g/dL 8.7* 8.4* 6.7*   HEMATOCRIT % 25.6* 24.6* 21.7*   PLATELETS AUTO x10*3/uL 336 316 328            Results from last 7 days   Lab Units 04/14/24  0345 04/13/24  1605 04/13/24  0409   SODIUM mmol/L 144 144 147*   POTASSIUM mmol/L 3.5 3.8 3.6   CHLORIDE mmol/L 107 106 109*   CO2 mmol/L 32 34* 33*   BUN mg/dL 15 17 16   CREATININE mg/dL 0.43* 0.46* 0.51   CALCIUM mg/dL 7.3* 7.0* 7.3*     Results from last 7 days   Lab Units 04/14/24  0345 04/13/24  0409 04/12/24  0357   ALK PHOS U/L 474* 481* 706*   BILIRUBIN TOTAL mg/dL 0.8 0.5 0.9   BILIRUBIN DIRECT mg/dL  --  0.2  --    PROTEIN TOTAL g/dL 3.8* 3.5* 3.6*   ALT U/L 34 41 55*   AST U/L 31 36 61*      Results from last 7 days   Lab Units 04/13/24  0409   APTT seconds 31   INR  1.0        IMAGING:  === 04/04/24 ===    US RIGHT UPPER QUADRANT    - Impression -  1. Diffusely echogenic liver parenchyma consistent with steatosis.  2. Thickened/edematous gallbladder wall with  no evidence of  hyperemia, gallbladder wall distention or cholelithiasis. Findings  are equivocal and most likely due to fluid overload.      I personally reviewed the images/study and I agree with the findings  as stated by Resident Dalton Bernal MD. This study was interpreted  at [facility], [place], [place].    MACRO:  None    Signed by: Daniel Gurrola Luhclaudiomerrill 4/5/2024 6:56 PM  Dictation workstation:   [id number]  === 04/04/24 ===    CT HEAD WO IV CONTRAST    - Impression -  No evidence of acute intracranial abnormality. If concerns for an  acute stroke may consider brain MRI for further evaluation.    I personally reviewed the images/study and I agree with the findings  as stated by resident physician Dr. Evan Lai . This study  was interpreted at [facility],  [place], [place].    MACRO:  None    Signed by: Rashi Michael 4/5/2024 6:36 PM  Dictation workstation:   [id number]    Assessment/Plan :  Ry Sandhu is a 74-year-old M w/PMHx of COPD, CAD, PAD who is presenting to the MICU from outside hospital for acute hypoxic respiratory failure secondary to acute community-acquired pneumonia and septic shock secondary to community-acquired pneumonia versus colitis now requiring bilateral below the knee amputations due to severe peripheral arterial disease and increased pressor requirements.     UPDATES 4/14  - no major updates    NEURO:  #Acute Encephalopathy, resolving   Mental status improving, patient alert and oriented to person, place and time.   PLAN:  - Vitamin B12, Folate, TSH w/reflex T4 pending   - Delirium precautions  - Continue to monitor   - Continue melatonin at nighttime      #C/F right basal ganglia ischemia  CT head 4/5 nonacute  - NTD  - Will repeat CTH if other causes AMS ruled out      CARDIO:  #Undifferentiated shock, likely septic, resolving  As of 4/13 AM, patient is off of pressors  PLAN:  - Continue  midodrine 5mg TID (given bilateral amputation expect baseline BP to be lower) goal MAP >60 if mentation well and no s/s of malperfusion     #CAD  #HTN  #PAD  #L AKA and R BKA   PLAN:  - Vascular surgery consulted, appreciate recs, planning for formalization of BKA to AKA on 4/16  - Continue Lipitor  - Holding antihypertensives in setting of requiring pressors    PULM:  #COPD-not in exacerbation   #Opacification of right hemithorax, improving  Likely 2/2 mucus plugging  Current everyday tobacco smoker  No pfts on file  -on 10L NC   -continue aggressive oral care     #CAP  #AHRF 2/2 CAP   #Pleural Effusion  Resp culture from 4/10 NGTD  -Vancomycin/Zosyn (4/5-4/12)  PLAN:  -Restarted vanc zosyn  (4/13-*)     GI:  #Elevated Liver Enzymes   #Fatty infiltration of liver on CT abdomen  #Colitis  #GERD  #Concern for GI bleed  RUQ showing evidence of hepatic steatosis and edematous gallbladder.  PLAN:  - C/w IV PPI    RENAL/:  #Hypernatremia, resolving  -CTM    #Metabolic Alkalosis w/Respiratory Acidosis   :: Etiology likely multifactorial ( hypovolemia, NGT losses )  PLAN:  - RFP, Mg   - Continue to monitor      HEME/ONC  #Chronic Normocytic Anemia  #Coagulopathy  PLAN:  -Keep active type and screen  -Transfuse for Hgb < 8 per vascular medicine   -Maintain 2 large bore pIVs      ENDO:  -No active issues to address      MSK/Rheum:  -No active issues to address     ID:  #C/f Septic Shock 2/2 CAP, resolved  #Leukocytosis, recurring  Azithromycin 500 mg daily (4/1 - 4/4)  Ceftriaxone (3/31 - 4/5)  Doxycycline (4/4 - 4/5)  Flagyl (3/25 - 4/5)  Vancomycin/Zosyn (4/5-4/12)  PLAN:  - Restarted Vanc zoysn (4/13-*)       F: PRN  E: PRN   N: NPO; TF  A: LIJ, A line, PIV  Drains: Tucker, NG  Bowel regimen: Miralax & Senna  Drips: Norepinephrine   DVT ppx: Lovenox  GI ppx: PPI      Code Status: Full    RIVERALAYO (Spouse)  [phone] (Mobile)         04/14/24 at 6:47 AM - Debbie Emanuel MD

## 2024-04-14 NOTE — SIGNIFICANT EVENT
Restraints indicated as alternative therapies have been attempted and have been ineffective.  Restrain with soft mitts restraints b/l until medical devices discontinued and/or patient able to participate with plan of care.

## 2024-04-14 NOTE — CARE PLAN
Problem: Pain  Goal: My pain/discomfort is manageable  Outcome: Progressing  Flowsheets (Taken 4/14/2024 0830)  Resident's pain/discomfort is manageable: Administer pain medication prior to activities that may trigger pain     Problem: Skin  Goal: Decreased wound size/increased tissue granulation at next dressing change  Outcome: Progressing  Flowsheets (Taken 4/14/2024 0830)  Decreased wound size/increased tissue granulation at next dressing change: Promote sleep for wound healing  Goal: Participates in plan/prevention/treatment measures  Outcome: Progressing  Flowsheets (Taken 4/14/2024 0830)  Participates in plan/prevention/treatment measures: Discuss with provider PT/OT consult  Goal: Prevent/manage excess moisture  Outcome: Progressing  Flowsheets (Taken 4/14/2024 0830)  Prevent/manage excess moisture:   Cleanse incontinence/protect with barrier cream   Monitor for/manage infection if present  Goal: Prevent/minimize sheer/friction injuries  Outcome: Progressing  Flowsheets (Taken 4/14/2024 0830)  Prevent/minimize sheer/friction injuries: Complete micro-shifts as needed if patient unable. Adjust patient position to relieve pressure points, not a full turn  Goal: Promote/optimize nutrition  Outcome: Progressing  Flowsheets (Taken 4/14/2024 0830)  Promote/optimize nutrition: Monitor/record intake including meals  Goal: Promote skin healing  Outcome: Progressing  Flowsheets (Taken 4/14/2024 0830)  Promote skin healing: Protective dressings over bony prominences

## 2024-04-14 NOTE — CARE PLAN
Problem: Skin  Goal: Prevent/manage excess moisture  Flowsheets (Taken 4/13/2024 7278)  Prevent/manage excess moisture: Use wicking fabric (obtain order)   The patient's goals for the shift include      The clinical goals for the shift include Pt will not require further use of restraints by not trying to remove medical devices    Over the shift, the patient did not make progress toward the following goals. Barriers to progression include Pt weeping from extremities. Recommendations to address these barriers include use of wicking devices.

## 2024-04-14 NOTE — PROGRESS NOTES
"Vancomycin Dosing by Pharmacy- FOLLOW UP    Ry Sandhu is a 74 y.o. year old male who Pharmacy has been consulted for vancomycin dosing for pneumonia. Based on the patient's indication and renal status this patient is being dosed based on a goal AUC of 500-600.     Renal function is currently stable.    Current vancomycin dose: 750 mg given every 12 hours    Estimated vancomycin AUC on current dose: 488 mg/L.hr     Visit Vitals  /52   Pulse 87   Temp 35.3 °C (95.5 °F) (Temporal)   Resp 24        Lab Results   Component Value Date    CREATININE 0.43 (L) 04/14/2024    CREATININE 0.46 (L) 04/13/2024    CREATININE 0.51 04/13/2024    CREATININE 0.55 04/12/2024        Patient weight is No results found for: \"PTWEIGHT\"    No results found for: \"CULTURE\"     I/O last 3 completed shifts:  In: 3993.4 (70.6 mL/kg) [I.V.:88.4 (1.6 mL/kg); Blood:375; NG/GT:2930; IV Piggyback:600]  Out: 1880 (33.2 mL/kg) [Urine:1880 (0.9 mL/kg/hr)]  Weight: 56.6 kg   [unfilled]    Lab Results   Component Value Date    PATIENTTEMP 37.0 04/12/2024    PATIENTTEMP 37.0 04/11/2024    PATIENTTEMP 37.0 04/10/2024        Assessment/Plan    Within goal AUC range. Continue current vancomycin regimen.    This dosing regimen is predicted by [employer] to result in the following pharmacokinetic parameters:  Loading dose: N/A  Regimen: 750 mg IV every 12 hours.  Start time: 16:55 on 04/14/2024  Exposure target: AUC24 (range)400-600 mg/L.hr   AUC24,ss: 488 mg/L.hr  Probability of AUC24 > 400: 97 %  Ctrough,ss: 16.6 mg/L  Probability of Ctrough,ss > 20: 2 %  Probability of nephrotoxicity (Lodise GONZALO 2009): 12 %      The next level will be obtained on 4/15 at 0500. May be obtained sooner if clinically indicated.   Will continue to monitor renal function daily while on vancomycin and order serum creatinine at least every 48 hours if not already ordered.  Follow for continued vancomycin needs, clinical response, and signs/symptoms of toxicity. "       Cathie Nuno, PharmD

## 2024-04-15 ENCOUNTER — APPOINTMENT (OUTPATIENT)
Dept: RADIOLOGY | Facility: HOSPITAL | Age: 74
DRG: 853 | End: 2024-04-15
Payer: MEDICARE

## 2024-04-15 ENCOUNTER — APPOINTMENT (OUTPATIENT)
Dept: CARDIOLOGY | Facility: HOSPITAL | Age: 74
DRG: 853 | End: 2024-04-15
Payer: MEDICARE

## 2024-04-15 LAB
ABO GROUP (TYPE) IN BLOOD: NORMAL
ALBUMIN SERPL BCP-MCNC: 1.7 G/DL (ref 3.4–5)
ALP SERPL-CCNC: 387 U/L (ref 33–136)
ALT SERPL W P-5'-P-CCNC: 31 U/L (ref 10–52)
AMYLASE FLD-CCNC: 10 U/L
ANION GAP SERPL CALC-SCNC: 11 MMOL/L (ref 10–20)
ANTIBODY SCREEN: NORMAL
AORTIC VALVE MEAN GRADIENT: 5 MMHG
AORTIC VALVE PEAK VELOCITY: 1.67 M/S
AST SERPL W P-5'-P-CCNC: 31 U/L (ref 9–39)
AV PEAK GRADIENT: 11.2 MMHG
AVA (PEAK VEL): 1.99 CM2
AVA (VTI): 2.19 CM2
BACTERIA UR CULT: NORMAL
BASOPHILS # BLD AUTO: 0.02 X10*3/UL (ref 0–0.1)
BASOPHILS NFR BLD AUTO: 0.1 %
BASOPHILS NFR FLD MANUAL: 0 %
BILIRUB SERPL-MCNC: 0.6 MG/DL (ref 0–1.2)
BLASTS NFR FLD MANUAL: 0 %
BUN SERPL-MCNC: 14 MG/DL (ref 6–23)
C COLI+JEJ+UPSA DNA STL QL NAA+PROBE: NOT DETECTED
C DIF TOX TCDA+TCDB STL QL NAA+PROBE: NOT DETECTED
CALCIUM SERPL-MCNC: 7.2 MG/DL (ref 8.6–10.6)
CHLORIDE SERPL-SCNC: 110 MMOL/L (ref 98–107)
CLARITY FLD: CLEAR
CO2 SERPL-SCNC: 28 MMOL/L (ref 21–32)
COLOR FLD: NORMAL
CREAT SERPL-MCNC: 0.48 MG/DL (ref 0.5–1.3)
EC STX1 GENE STL QL NAA+PROBE: NOT DETECTED
EC STX2 GENE STL QL NAA+PROBE: NOT DETECTED
EGFRCR SERPLBLD CKD-EPI 2021: >90 ML/MIN/1.73M*2
EJECTION FRACTION APICAL 4 CHAMBER: 66.7
EOSINOPHIL # BLD AUTO: 0.07 X10*3/UL (ref 0–0.4)
EOSINOPHIL NFR BLD AUTO: 0.4 %
EOSINOPHIL NFR FLD MANUAL: 0 %
ERYTHROCYTE [DISTWIDTH] IN BLOOD BY AUTOMATED COUNT: 15.9 % (ref 11.5–14.5)
GLUCOSE BLD MANUAL STRIP-MCNC: 113 MG/DL (ref 74–99)
GLUCOSE FLD-MCNC: 140 MG/DL
GLUCOSE SERPL-MCNC: 143 MG/DL (ref 74–99)
HCT VFR BLD AUTO: 24.6 % (ref 41–52)
HGB BLD-MCNC: 8.5 G/DL (ref 13.5–17.5)
IMM GRANULOCYTES # BLD AUTO: 0.12 X10*3/UL (ref 0–0.5)
IMM GRANULOCYTES NFR BLD AUTO: 0.6 % (ref 0–0.9)
IMMATURE GRANULOCYTES IN FLUID: 0 %
LDH FLD L TO P-CCNC: 74 U/L
LDH SERPL L TO P-CCNC: 202 U/L (ref 84–246)
LDH SERPL L TO P-CCNC: 219 U/L (ref 84–246)
LEFT VENTRICULAR OUTFLOW TRACT DIAMETER: 1.9 CM
LYMPHOCYTES # BLD AUTO: 0.44 X10*3/UL (ref 0.8–3)
LYMPHOCYTES NFR BLD AUTO: 2.3 %
LYMPHOCYTES NFR FLD MANUAL: 10 %
MAGNESIUM SERPL-MCNC: 1.71 MG/DL (ref 1.6–2.4)
MCH RBC QN AUTO: 31.8 PG (ref 26–34)
MCHC RBC AUTO-ENTMCNC: 34.6 G/DL (ref 32–36)
MCV RBC AUTO: 92 FL (ref 80–100)
MITRAL VALVE E/A RATIO: 0.74
MONOCYTES # BLD AUTO: 0.64 X10*3/UL (ref 0.05–0.8)
MONOCYTES NFR BLD AUTO: 3.3 %
MONOS+MACROS NFR FLD MANUAL: 0 %
NEUTROPHILS # BLD AUTO: 18 X10*3/UL (ref 1.6–5.5)
NEUTROPHILS NFR BLD AUTO: 93.3 %
NEUTROPHILS NFR FLD MANUAL: 90 %
NOROVIRUS GI + GII RNA STL NAA+PROBE: NOT DETECTED
NRBC BLD-RTO: 0 /100 WBCS (ref 0–0)
OTHER CELLS NFR FLD MANUAL: 0 %
PHOSPHATE SERPL-MCNC: 2.2 MG/DL (ref 2.5–4.9)
PLASMA CELLS NFR FLD MANUAL: 0 %
PLATELET # BLD AUTO: 365 X10*3/UL (ref 150–450)
POTASSIUM SERPL-SCNC: 3.7 MMOL/L (ref 3.5–5.3)
PROT FLD-MCNC: 0.7 G/DL
PROT SERPL-MCNC: 3.7 G/DL (ref 6.4–8.2)
PROT SERPL-MCNC: 3.9 G/DL (ref 6.4–8.2)
RBC # BLD AUTO: 2.67 X10*6/UL (ref 4.5–5.9)
RBC # FLD AUTO: 2 /UL
RH FACTOR (ANTIGEN D): NORMAL
RIGHT VENTRICLE FREE WALL PEAK S': 17.6 CM/S
RIGHT VENTRICLE PEAK SYSTOLIC PRESSURE: 34.8 MMHG
RV RNA STL NAA+PROBE: NOT DETECTED
SALMONELLA DNA STL QL NAA+PROBE: NOT DETECTED
SHIGELLA DNA SPEC QL NAA+PROBE: NOT DETECTED
SODIUM SERPL-SCNC: 145 MMOL/L (ref 136–145)
STAPHYLOCOCCUS SPEC CULT: NORMAL
TOTAL CELLS COUNTED FLD: 100
TRICUSPID ANNULAR PLANE SYSTOLIC EXCURSION: 1.6 CM
V CHOLERAE DNA STL QL NAA+PROBE: NOT DETECTED
VANCOMYCIN SERPL-MCNC: 17.5 UG/ML (ref 5–20)
WBC # BLD AUTO: 19.3 X10*3/UL (ref 4.4–11.3)
WBC # FLD AUTO: 74 /UL
Y ENTEROCOL DNA STL QL NAA+PROBE: NOT DETECTED

## 2024-04-15 PROCEDURE — 2500000004 HC RX 250 GENERAL PHARMACY W/ HCPCS (ALT 636 FOR OP/ED): Performed by: STUDENT IN AN ORGANIZED HEALTH CARE EDUCATION/TRAINING PROGRAM

## 2024-04-15 PROCEDURE — 2500000001 HC RX 250 WO HCPCS SELF ADMINISTERED DRUGS (ALT 637 FOR MEDICARE OP)

## 2024-04-15 PROCEDURE — 2500000004 HC RX 250 GENERAL PHARMACY W/ HCPCS (ALT 636 FOR OP/ED)

## 2024-04-15 PROCEDURE — 84100 ASSAY OF PHOSPHORUS: CPT

## 2024-04-15 PROCEDURE — 86900 BLOOD TYPING SEROLOGIC ABO: CPT | Mod: 91

## 2024-04-15 PROCEDURE — 82947 ASSAY GLUCOSE BLOOD QUANT: CPT

## 2024-04-15 PROCEDURE — 71045 X-RAY EXAM CHEST 1 VIEW: CPT

## 2024-04-15 PROCEDURE — 80053 COMPREHEN METABOLIC PANEL: CPT

## 2024-04-15 PROCEDURE — 2060000001 HC INTERMEDIATE ICU ROOM DAILY

## 2024-04-15 PROCEDURE — 94668 MNPJ CHEST WALL SBSQ: CPT

## 2024-04-15 PROCEDURE — 93306 TTE W/DOPPLER COMPLETE: CPT

## 2024-04-15 PROCEDURE — 88112 CYTOPATH CELL ENHANCE TECH: CPT | Performed by: PATHOLOGY

## 2024-04-15 PROCEDURE — C9113 INJ PANTOPRAZOLE SODIUM, VIA: HCPCS | Performed by: STUDENT IN AN ORGANIZED HEALTH CARE EDUCATION/TRAINING PROGRAM

## 2024-04-15 PROCEDURE — 88112 CYTOPATH CELL ENHANCE TECH: CPT | Mod: TC,MCY,91 | Performed by: STUDENT IN AN ORGANIZED HEALTH CARE EDUCATION/TRAINING PROGRAM

## 2024-04-15 PROCEDURE — 0W993ZZ DRAINAGE OF RIGHT PLEURAL CAVITY, PERCUTANEOUS APPROACH: ICD-10-PCS | Performed by: INTERNAL MEDICINE

## 2024-04-15 PROCEDURE — 37799 UNLISTED PX VASCULAR SURGERY: CPT

## 2024-04-15 PROCEDURE — 2500000001 HC RX 250 WO HCPCS SELF ADMINISTERED DRUGS (ALT 637 FOR MEDICARE OP): Performed by: STUDENT IN AN ORGANIZED HEALTH CARE EDUCATION/TRAINING PROGRAM

## 2024-04-15 PROCEDURE — 87015 SPECIMEN INFECT AGNT CONCNTJ: CPT | Mod: 91 | Performed by: STUDENT IN AN ORGANIZED HEALTH CARE EDUCATION/TRAINING PROGRAM

## 2024-04-15 PROCEDURE — 97530 THERAPEUTIC ACTIVITIES: CPT | Mod: GP

## 2024-04-15 PROCEDURE — 87075 CULTR BACTERIA EXCEPT BLOOD: CPT | Mod: 91 | Performed by: STUDENT IN AN ORGANIZED HEALTH CARE EDUCATION/TRAINING PROGRAM

## 2024-04-15 PROCEDURE — 83615 LACTATE (LD) (LDH) ENZYME: CPT | Performed by: STUDENT IN AN ORGANIZED HEALTH CARE EDUCATION/TRAINING PROGRAM

## 2024-04-15 PROCEDURE — 86920 COMPATIBILITY TEST SPIN: CPT

## 2024-04-15 PROCEDURE — 94660 CPAP INITIATION&MGMT: CPT | Mod: MUE

## 2024-04-15 PROCEDURE — 32554 ASPIRATE PLEURA W/O IMAGING: CPT | Performed by: STUDENT IN AN ORGANIZED HEALTH CARE EDUCATION/TRAINING PROGRAM

## 2024-04-15 PROCEDURE — 2500000005 HC RX 250 GENERAL PHARMACY W/O HCPCS

## 2024-04-15 PROCEDURE — 85025 COMPLETE CBC W/AUTO DIFF WBC: CPT

## 2024-04-15 PROCEDURE — 89051 BODY FLUID CELL COUNT: CPT | Performed by: STUDENT IN AN ORGANIZED HEALTH CARE EDUCATION/TRAINING PROGRAM

## 2024-04-15 PROCEDURE — 83735 ASSAY OF MAGNESIUM: CPT

## 2024-04-15 PROCEDURE — 87070 CULTURE OTHR SPECIMN AEROBIC: CPT | Mod: 91

## 2024-04-15 PROCEDURE — 80202 ASSAY OF VANCOMYCIN: CPT

## 2024-04-15 PROCEDURE — 82945 GLUCOSE OTHER FLUID: CPT | Performed by: STUDENT IN AN ORGANIZED HEALTH CARE EDUCATION/TRAINING PROGRAM

## 2024-04-15 PROCEDURE — 88104 CYTOPATH FL NONGYN SMEARS: CPT | Performed by: PATHOLOGY

## 2024-04-15 PROCEDURE — 32554 ASPIRATE PLEURA W/O IMAGING: CPT | Mod: GC | Performed by: STUDENT IN AN ORGANIZED HEALTH CARE EDUCATION/TRAINING PROGRAM

## 2024-04-15 PROCEDURE — 88305 TISSUE EXAM BY PATHOLOGIST: CPT | Performed by: PATHOLOGY

## 2024-04-15 PROCEDURE — 99291 CRITICAL CARE FIRST HOUR: CPT

## 2024-04-15 PROCEDURE — 71045 X-RAY EXAM CHEST 1 VIEW: CPT | Performed by: RADIOLOGY

## 2024-04-15 PROCEDURE — 82150 ASSAY OF AMYLASE: CPT | Performed by: STUDENT IN AN ORGANIZED HEALTH CARE EDUCATION/TRAINING PROGRAM

## 2024-04-15 PROCEDURE — 93306 TTE W/DOPPLER COMPLETE: CPT | Performed by: INTERNAL MEDICINE

## 2024-04-15 PROCEDURE — 97530 THERAPEUTIC ACTIVITIES: CPT | Mod: GO

## 2024-04-15 PROCEDURE — 97535 SELF CARE MNGMENT TRAINING: CPT | Mod: GO

## 2024-04-15 PROCEDURE — 87102 FUNGUS ISOLATION CULTURE: CPT | Performed by: STUDENT IN AN ORGANIZED HEALTH CARE EDUCATION/TRAINING PROGRAM

## 2024-04-15 PROCEDURE — 84157 ASSAY OF PROTEIN OTHER: CPT | Performed by: STUDENT IN AN ORGANIZED HEALTH CARE EDUCATION/TRAINING PROGRAM

## 2024-04-15 PROCEDURE — 97112 NEUROMUSCULAR REEDUCATION: CPT | Mod: GP

## 2024-04-15 RX ORDER — ERGOCALCIFEROL (VITAMIN D2) 200 MCG/ML
200 DROPS ORAL DAILY
Status: DISCONTINUED | OUTPATIENT
Start: 2024-04-15 | End: 2024-04-17

## 2024-04-15 RX ORDER — LOPERAMIDE HYDROCHLORIDE 2 MG/1
2 CAPSULE ORAL 4 TIMES DAILY PRN
Status: DISCONTINUED | OUTPATIENT
Start: 2024-04-15 | End: 2024-04-17

## 2024-04-15 RX ORDER — POTASSIUM CHLORIDE 1.5 G/1.58G
40 POWDER, FOR SOLUTION ORAL ONCE
Status: COMPLETED | OUTPATIENT
Start: 2024-04-15 | End: 2024-04-15

## 2024-04-15 RX ORDER — MAGNESIUM SULFATE HEPTAHYDRATE 40 MG/ML
2 INJECTION, SOLUTION INTRAVENOUS ONCE
Qty: 50 ML | Refills: 0 | Status: COMPLETED | OUTPATIENT
Start: 2024-04-15 | End: 2024-04-15

## 2024-04-15 RX ADMIN — POTASSIUM PHOSPHATE, MONOBASIC POTASSIUM PHOSPHATE, DIBASIC 21 MMOL: 224; 236 INJECTION, SOLUTION, CONCENTRATE INTRAVENOUS at 08:27

## 2024-04-15 RX ADMIN — FOLIC ACID 1 MG: 1 TABLET ORAL at 08:15

## 2024-04-15 RX ADMIN — Medication 200 MCG: at 16:01

## 2024-04-15 RX ADMIN — ENOXAPARIN SODIUM 40 MG: 100 INJECTION SUBCUTANEOUS at 00:47

## 2024-04-15 RX ADMIN — PIPERACILLIN SODIUM AND TAZOBACTAM SODIUM 4.5 G: 4; .5 INJECTION, SOLUTION INTRAVENOUS at 03:19

## 2024-04-15 RX ADMIN — PANTOPRAZOLE SODIUM 40 MG: 40 INJECTION, POWDER, FOR SOLUTION INTRAVENOUS at 06:18

## 2024-04-15 RX ADMIN — MAGNESIUM SULFATE HEPTAHYDRATE 2 G: 40 INJECTION, SOLUTION INTRAVENOUS at 08:07

## 2024-04-15 RX ADMIN — MIDODRINE HYDROCHLORIDE 5 MG: 5 TABLET ORAL at 08:15

## 2024-04-15 RX ADMIN — MIDODRINE HYDROCHLORIDE 5 MG: 5 TABLET ORAL at 20:14

## 2024-04-15 RX ADMIN — Medication 5 MG: at 17:48

## 2024-04-15 RX ADMIN — PERFLUTREN 3 ML OF DILUTION: 6.52 INJECTION, SUSPENSION INTRAVENOUS at 14:09

## 2024-04-15 RX ADMIN — POTASSIUM CHLORIDE 40 MEQ: 1.5 POWDER, FOR SOLUTION ORAL at 08:15

## 2024-04-15 RX ADMIN — MIDODRINE HYDROCHLORIDE 5 MG: 5 TABLET ORAL at 14:42

## 2024-04-15 RX ADMIN — ATORVASTATIN CALCIUM 40 MG: 40 TABLET, FILM COATED ORAL at 08:15

## 2024-04-15 RX ADMIN — PIPERACILLIN SODIUM AND TAZOBACTAM SODIUM 4.5 G: 4; .5 INJECTION, SOLUTION INTRAVENOUS at 09:10

## 2024-04-15 RX ADMIN — VANCOMYCIN HYDROCHLORIDE 750 MG: 750 INJECTION, SOLUTION INTRAVENOUS at 04:00

## 2024-04-15 ASSESSMENT — COGNITIVE AND FUNCTIONAL STATUS - GENERAL
CLIMB 3 TO 5 STEPS WITH RAILING: TOTAL
DAILY ACTIVITIY SCORE: 12
STANDING UP FROM CHAIR USING ARMS: TOTAL
TOILETING: TOTAL
MOVING TO AND FROM BED TO CHAIR: TOTAL
WALKING IN HOSPITAL ROOM: TOTAL
MOBILITY SCORE: 7
MOVING FROM LYING ON BACK TO SITTING ON SIDE OF FLAT BED WITH BEDRAILS: A LOT
TURNING FROM BACK TO SIDE WHILE IN FLAT BAD: TOTAL
EATING MEALS: A LITTLE
HELP NEEDED FOR BATHING: A LOT
DRESSING REGULAR LOWER BODY CLOTHING: TOTAL
PERSONAL GROOMING: A LITTLE
DRESSING REGULAR UPPER BODY CLOTHING: A LOT

## 2024-04-15 ASSESSMENT — PAIN SCALES - GENERAL
PAINLEVEL_OUTOF10: 0 - NO PAIN

## 2024-04-15 ASSESSMENT — PAIN - FUNCTIONAL ASSESSMENT
PAIN_FUNCTIONAL_ASSESSMENT: 0-10

## 2024-04-15 ASSESSMENT — ACTIVITIES OF DAILY LIVING (ADL): HOME_MANAGEMENT_TIME_ENTRY: 10

## 2024-04-15 NOTE — PROGRESS NOTES
Subjective   SUBJECTIVE:  Overnight patient had > 5 watery BM, [facility] placed with 500cc stool output. C.diff + stool pathogen panel were sent and negative.     Mr. Sandhu was seen and examined at bedside this morning, at which time he was resting comfortably in bed saturating > 95% on 10L NC. Patient AO to person only but later during rounds was AO x3 for his wife. Denied any acute complaints for me this AM.          Objective   OBJECTIVE:  Last Recorded Vitals  /52   Pulse (!) 111   Temp 36.4 °C (97.5 °F) (Temporal)   Resp (!) 32   Wt 56.1 kg (123 lb 10.9 oz)   SpO2 96%     I&O 24HR    Intake/Output Summary (Last 24 hours) at 4/15/2024 0758  Last data filed at 4/15/2024 0600  Gross per 24 hour   Intake 1864.17 ml   Output 990 ml   Net 874.17 ml        Physical Exam  General: awake, alert, somewhat lethargic, in no acute distress, appears stated age  HEENT: NCAT, EOMI, no scleral icterus or conjunctivitis  Chest: ctab, normal respiratory effort, on 10L NC  Cardiac: regular rate and rhythm, normal s1, s2, no M/R/G  Abdomen: soft, ND, NT, no involuntary guarding  : no CVA tenderness, Tucker catheter in place draining light yellow urine, [facility] in place with no stool in bag  EXT: right BKA and left AKA wrapped, no strikethrough noted  MSK: no focal joint swelling noted  Skin: no suspect lesions or rashes noted on visible skin  Neuro: AOx1, moving all limbs spontaneously, follows commands  Psych: coherent thought process, appropriate mood and affect    Labs:  Results for orders placed or performed during the hospital encounter of 04/04/24 (from the past 24 hour(s))   POCT GLUCOSE   Result Value Ref Range    POCT Glucose 83 74 - 99 mg/dL   Urine Tube   Result Value Ref Range    Extra Tube Hold for add-ons.    Renal function panel   Result Value Ref Range    Glucose 92 74 - 99 mg/dL    Sodium 146 (H) 136 - 145 mmol/L    Potassium 4.0 3.5 - 5.3 mmol/L    Chloride 109 (H) 98 - 107 mmol/L    Bicarbonate 31  21 - 32 mmol/L    Anion Gap 10 10 - 20 mmol/L    Urea Nitrogen 13 6 - 23 mg/dL    Creatinine 0.52 0.50 - 1.30 mg/dL    eGFR >90 >60 mL/min/1.73m*2    Calcium 7.3 (L) 8.6 - 10.6 mg/dL    Phosphorus 2.6 2.5 - 4.9 mg/dL    Albumin 1.6 (L) 3.4 - 5.0 g/dL   POCT GLUCOSE   Result Value Ref Range    POCT Glucose 89 74 - 99 mg/dL   C. difficile, PCR    Specimen: Stool   Result Value Ref Range    C. difficile, PCR Not Detected Not Detected   Stool Pathogen Panel, PCR    Specimen: Stool   Result Value Ref Range    Campylobacter Group Not Detected Not Detected    Salmonella species Not Detected Not Detected    Shigella species Not Detected Not Detected    Vibrio Group Not Detected Not Detected    Yersinia Enterocolitica Not Detected Not Detected    Shiga Toxin 1 Not Detected Not Detected    Shiga Toxin 2 Not Detected Not Detected    Norovirus GI/GII Not Detected Not Detected    Rotavirus A Not Detected Not Detected   CBC and Auto Differential   Result Value Ref Range    WBC 19.3 (H) 4.4 - 11.3 x10*3/uL    nRBC 0.0 0.0 - 0.0 /100 WBCs    RBC 2.67 (L) 4.50 - 5.90 x10*6/uL    Hemoglobin 8.5 (L) 13.5 - 17.5 g/dL    Hematocrit 24.6 (L) 41.0 - 52.0 %    MCV 92 80 - 100 fL    MCH 31.8 26.0 - 34.0 pg    MCHC 34.6 32.0 - 36.0 g/dL    RDW 15.9 (H) 11.5 - 14.5 %    Platelets 365 150 - 450 x10*3/uL    Neutrophils % 93.3 40.0 - 80.0 %    Immature Granulocytes %, Automated 0.6 0.0 - 0.9 %    Lymphocytes % 2.3 13.0 - 44.0 %    Monocytes % 3.3 2.0 - 10.0 %    Eosinophils % 0.4 0.0 - 6.0 %    Basophils % 0.1 0.0 - 2.0 %    Neutrophils Absolute 18.00 (H) 1.60 - 5.50 x10*3/uL    Immature Granulocytes Absolute, Automated 0.12 0.00 - 0.50 x10*3/uL    Lymphocytes Absolute 0.44 (L) 0.80 - 3.00 x10*3/uL    Monocytes Absolute 0.64 0.05 - 0.80 x10*3/uL    Eosinophils Absolute 0.07 0.00 - 0.40 x10*3/uL    Basophils Absolute 0.02 0.00 - 0.10 x10*3/uL   Magnesium   Result Value Ref Range    Magnesium 1.71 1.60 - 2.40 mg/dL   Comprehensive Metabolic Panel    Result Value Ref Range    Glucose 143 (H) 74 - 99 mg/dL    Sodium 145 136 - 145 mmol/L    Potassium 3.7 3.5 - 5.3 mmol/L    Chloride 110 (H) 98 - 107 mmol/L    Bicarbonate 28 21 - 32 mmol/L    Anion Gap 11 10 - 20 mmol/L    Urea Nitrogen 14 6 - 23 mg/dL    Creatinine 0.48 (L) 0.50 - 1.30 mg/dL    eGFR >90 >60 mL/min/1.73m*2    Calcium 7.2 (L) 8.6 - 10.6 mg/dL    Albumin 1.7 (L) 3.4 - 5.0 g/dL    Alkaline Phosphatase 387 (H) 33 - 136 U/L    Total Protein 3.9 (L) 6.4 - 8.2 g/dL    AST 31 9 - 39 U/L    Bilirubin, Total 0.6 0.0 - 1.2 mg/dL    ALT 31 10 - 52 U/L   Phosphorus   Result Value Ref Range    Phosphorus 2.2 (L) 2.5 - 4.9 mg/dL   Vancomycin   Result Value Ref Range    Vancomycin 17.5 5.0 - 20.0 ug/mL   POCT GLUCOSE   Result Value Ref Range    POCT Glucose 113 (H) 74 - 99 mg/dL       Imaging:  XR chest 1 view    Result Date: 4/14/2024  Interpreted By:  Mono Milian  and Ashanti Tamayo STUDY: XR CHEST 1 VIEW;  4/14/2024 11:18 am   INDICATION: Signs/Symptoms:Previouus Right sided whiteout.   COMPARISON: Chest radiograph 04/18/2024 and CT chest 04/05/2024   ACCESSION NUMBER(S): [id number]   ORDERING CLINICIAN: NO JOHNSON   FINDINGS: AP radiograph of the chest was provided.   Enteric tube courses past the diaphragm and terminates outside the field of view. Left internal jugular central venous catheter tip projects over the expected location of the mid SVC.   CARDIOMEDIASTINAL SILHOUETTE: Cardiomediastinal silhouette is enlarged but stable in size and configuration and is partially silhouetted on the right.   LUNGS: Interval improvement in aeration of the right lung as compared to prior radiograph with interval reduction in size of the now moderate sized layering right pleural effusion with associated adjacent atelectasis/consolidation. There is no pneumothorax. Similar patchy airspace opacities are noted within the left mid and lower lung zone. Findings are superimposed on moderate interstitial  prominence. No pneumothorax.   ABDOMEN: No remarkable upper abdominal findings.   BONES: No osseous injury.       1. Interval improvement in aeration of the right lung as compared to prior radiograph with persistent moderate right layering pleural effusion with associated adjacent atelectasis/consolidation. 2. Redemonstration of patchy airspace opacities within the left mid and lower lung zone likely representing infectious/inflammatory process such as aspiration and/or multifocal pneumonia. 3. Findings above are superimposed on moderate diffuse centrilobular emphysematous change.   I personally reviewed the images/study and I agree with the findings as stated above by resident physician, Dr. Roni Burrell. The study was interpreted at [facility] in [facility].   MACRO: none.   Signed by: Mono Early 4/14/2024 12:51 PM Dictation workstation:   [id number]    XR chest 1 view    Result Date: 4/13/2024  Interpreted By:  Mono Milian and Dervishi Mario STUDY: XR CHEST 1 VIEW;  4/13/2024 6:15 pm   INDICATION: Signs/Symptoms:assess for improvement in R lung.   COMPARISON: Chest radiograph: 04/13/2024. CT chest: 04/05/2024.   ACCESSION NUMBER(S): [id number]   ORDERING CLINICIAN: KYA RAUSCH   FINDINGS: AP radiograph of the chest was provided.   Enteric tube is again noted coursing below the diaphragm with the tip not included in the field of view. Left IJ central venous catheter with the tip projecting over mid SVC.   CARDIOMEDIASTINAL SILHOUETTE: Cardiomediastinal silhouette is stable in size and configuration.   LUNGS: There is similar persistent complete opacification of the right lung fields . Similar patchy airspace opacities are noted in the left lower lung fields with a small pleural effusion with associated atelectasis.   ABDOMEN: No remarkable upper abdominal findings.   BONES: No acute osseous changes.       1.  Similar persistent complete  opacification of the right hemithorax. Findings can again be seen with lung collapse, mucous plugging or a combination of an infiltrative process, and/or large pleural effusion with associated atelectasis/consolidation. 2. Patchy left mid and lower lung airspace opacities similar compared to prior. Findings are concerning for infection/aspiration.     I personally reviewed the images/study and I agree with the findings as stated by Resident Dalton Bernal MD. This study was interpreted at [place], [place].   MACRO: NONE.   Signed by: Mono Early 4/13/2024 7:31 PM Dictation workstation:   [id number]    XR chest 1 view    Result Date: 4/13/2024  Interpreted By:  Mono Milian, STUDY: XR CHEST 1 VIEW; 4/13/2024 2:32 pm   INDICATION: Signs/Symptoms:Desaturation.   COMPARISON: 04/11/2024   ACCESSION NUMBER(S): [id number]   ORDERING CLINICIAN: KYA RAUSCH   FINDINGS: Enteric tube is in place with the tip outside field of view. Left IJ central venous catheter is projecting over mid SVC.   The cardiac size cannot be assessed as right heart border is obscured.   Interval complete opacification the right hemithorax. Patchy airspace opacity in the left lower lung field. Small left pleural effusion. No sizable pneumothorax.   No acute osseous abnormality.       1. Interval complete opacification of the right hemithorax. Correlate with mucous plugging and right lung collapse. 2. Patchy airspace opacity in the left mid and lower lung. Correlate with concern for infection and aspiration. 3. Component of interstitial pulmonary prominence and edema.     Critical Finding:  See findings. Notification was initiated on 4/13/2024 at 2:45 pm by  Mono Early.  (**-YCF-**) Instructions:   Signed by: Mono Early 4/13/2024 2:45 PM Dictation workstation:   [id number]    XR chest 1 view    Result Date: 4/11/2024  Interpreted By:  Korey Arnold, STUDY: XR  CHEST 1 VIEW;  4/11/2024 3:04 pm   INDICATION: Signs/Symptoms:hypoxia.   COMPARISON: Exam dated 04/10/2024   ACCESSION NUMBER(S): [id number]   ORDERING CLINICIAN: COLLIN EDGE   FINDINGS: AP radiograph of the chest was provided.   Left internal jugular central venous catheter with tip projecting over the upper SVC. Enteric tube projects over the esophagus and upper abdomen, tip out of the field of view.   CARDIOMEDIASTINAL SILHOUETTE: Cardiomediastinal silhouette is stable in size and configuration.   LUNGS: Stable diffusely increased perihilar and interstitial markings with superimposed right-greater-than-left basilar opacities and bilateral pleural effusions. No evidence of pneumothorax.   ABDOMEN: No remarkable upper abdominal findings.   BONES: No acute osseous changes.       1.  Stable findings of pulmonary edema with superimposed right-greater-than-left basilar opacities that likely represent atelectasis. Infection or aspiration are not excluded in the appropriate clinical settings. 2. Small right-greater-than-left pleural effusions, not significantly changed. 3. Medical devices as above.       MACRO: None   Signed by: Korey Arnold 4/11/2024 3:20 PM Dictation workstation:   [id number]    US right upper quadrant    Result Date: 4/11/2024  Interpreted By:  Zheng Bearden and Baker Zachary STUDY: US RIGHT UPPER QUADRANT;  4/10/2024 4:54 pm   INDICATION: Signs/Symptoms:worsening leuckocytois, and cholestatic liver injury assess for source of infection.   COMPARISON: Ultrasound right upper quadrant on 04/05/2024.   ACCESSION NUMBER(S): [id number]   ORDERING CLINICIAN: KYA RAUSCH   TECHNIQUE: Multiple images of the right upper quadrant were obtained.   FINDINGS: LIVER: The liver measures 13.7 cm and is diffusely echogenic in appearance, consistent with diffuse fatty infiltration. The resulting increased beam attenuation thereby limiting evaluation of the liver for focal lesions. Within the  limitations, no focal lesions are seen.     GALLBLADDER: The gallbladder is nondistended, and demonstrates no evidence of gallstones, wall thickening or surrounding fluid. The gallbladder wall thickness is 0.1 cm. Sonographic Ramey's sign is negative.     BILE DUCTS: No evidence of intra or extrahepatic biliary dilatation is identified; the common bile duct measures 0.3 cm.   PANCREAS: The visualized pancreas is unremarkable in appearance.   RIGHT KIDNEY: The right kidney measures 10.1 cm in length. The renal cortical echogenicity and thickness are within normal limit.  No hydronephrosis or renal calculi are seen.   Partially visualized right sided pleural effusion.       1. Diffusely echogenic liver parenchyma, similar to prior exam, consistent with hepatic steatosis. 2. Partially visualized right-sided pleural effusion.   I personally reviewed the images/study and I agree with the findings as stated by Dr. Talha Medina M.D. This study was interpreted at [place], [place].   MACRO: None   Signed by: Zheng Bearden 4/11/2024 12:22 AM Dictation workstation:   [id number]    CT head wo IV contrast    Result Date: 4/10/2024  Interpreted By:  Ariel Ferguson, STUDY: CT HEAD WO IV CONTRAST; ;  4/10/2024 3:36 pm   INDICATION: Signs/Symptoms:altered mental status.   COMPARISON: CT head from 04/05/2024.   ACCESSION NUMBER(S): [id number]   ORDERING CLINICIAN: COLLIN EDGE   TECHNIQUE: Routine unenhanced CT of the head was performed.   FINDINGS: There is no acute intracranial hemorrhage or transcortical infarct. The ventricles, sulci, and basilar cisterns are prominent in size due to age related diffuse cerebral volume loss. Stable small focus of calcification within the posterior right temporal lobe.   There are regions of hypoattenuation within the bilateral cerebral hemispheric white matter which are probably sequela of chronic small vessel ischemic changes. Gray-white  differentiation is maintained throughout.   The calvarium is unremarkable in appearance. There is opacification of the right mastoid air cells with nonspecific fluid. There is a small air-fluid level and mucosal thickening located within the right maxillary sinus. There is a partially visualized left nasoenteric tube.       No acute intracranial abnormality. Chronic intracranial findings as above.   This study was interpreted at [facility].   MACRO: None   Signed by: Ariel Ferguson 4/10/2024 3:44 PM Dictation workstation:   [id number]    XR chest 1 view    Result Date: 4/10/2024  Interpreted By:  Ann Serra and Nakamoto Kent STUDY: XR CHEST 1 VIEW;  4/10/2024 11:45 am   INDICATION: Signs/Symptoms:assess penumonia status.   COMPARISON: Chest radiograph 04/09/2024, CT chest 04/05/2024   ACCESSION NUMBER(S): [id number]   ORDERING CLINICIAN: KYA RAUSCH   FINDINGS: AP radiograph of the chest was provided.   Left IJ CVC with tip over the lower SVC. Enteric tube seen coursing below the level diaphragm with tip distal stomach/proximal duodenum.   CARDIOMEDIASTINAL SILHOUETTE: Cardiomediastinal silhouette is stable in size and configuration.   LUNGS: The right basilar opacity has worsened. The left basilar opacity has decreased in size compared to prior exam. There is right-greater-than-left blunting of the costophrenic angles. No pneumothorax. Similar perihilar and interstitial markings.   ABDOMEN: No remarkable upper abdominal findings.   BONES: No acute osseous changes.       1. Interval worsening of the right basilar opacity and mild improvement of the left basilar opacity. 2. Worsened right and improved left pleural effusions. 3. Overall the pulmonary edema is similar compared to prior exam. 4. Medical devices as described above.   I personally reviewed the images/study and I agree with the findings as stated by Derrick Mahan MD. This study was interpreted at [place], [place].   MACRO: None   Signed by: Ann Serra 4/10/2024 12:45 PM Dictation workstation:   [id number]    XR chest 1 view    Result Date: 4/9/2024  Interpreted By:  Sixto Petty and Tippareddy Charit STUDY: XR CHEST 1 VIEW;  4/9/2024 7:46 am   INDICATION: Signs/Symptoms:tachypnea, s/p extubation.   COMPARISON: Chest radiograph 04/08/2024   ACCESSION NUMBER(S): [id number]   ORDERING CLINICIAN: VANDANA BEAUCHAMP   FINDINGS: AP radiograph of the chest was provided.   Interval removal of endotracheal tube. Enteric tube is visualized coursing beneath the diaphragm with the tip beyond the field of view. Left IJ central venous catheter with tip overlying the mid/lower SVC.   CARDIOMEDIASTINAL SILHOUETTE: Cardiomediastinal silhouette is normal in size and configuration.   LUNGS: Interval worsening of bibasilar opacities especially within the right lung base. Persistent small bilateral pleural effusions, left-greater-than-right. No pneumothorax. Similar degree of pulmonary edema.   ABDOMEN: No remarkable upper abdominal findings.   BONES: No acute osseous changes.       1.  Interval worsening of patchy airspace opacities in the in the bilateral lung bases, right-greater-than-left. 2. Persistent small bilateral pleural effusions, left-greater-than-right. Similar degree of pulmonary edema. 3. Medical devices/lines as noted above.   I personally reviewed the images/study and I agree with the findings as stated by Keren Ashraf MD. This study was interpreted at [place], [place].   MACRO: None.   Signed by: Sixto Petty 4/9/2024 9:50 AM Dictation workstation:   [id number]    XR abdomen 1 view    Result Date: 4/9/2024  Interpreted By:  Sixto Petty and Tippareddy Charit STUDY: XR ABDOMEN 1 VIEW;  4/8/2024 11:37 pm   INDICATION: Signs/Symptoms:og tube.   COMPARISON: Radiograph 04/05/2024   ACCESSION NUMBER(S):  [id number]   ORDERING CLINICIAN: CHELY MELENDREZ   FINDINGS: Enteric tube is visualized coursing beneath the diaphragm with the tip overlying the proximal duodenum/pylorus.   Nonobstructive bowel gas pattern. Limited evaluation of pneumoperitoneum on supine imaging, however no gross evidence of free air is noted. No portal venous gas.   Patchy airspace opacities in the bilateral lung bases with probable bilateral pleural effusions and associated atelectasis.   Osseous structures demonstrate no acute bony changes.       1.  Enteric tube with tip overlying the proximal duodenum/pylorus. 2. Nonobstructive bowel-gas pattern. 3. Patchy airspace opacities in the bilateral lung bases with probable bilateral effusions and associated atelectasis.   I personally reviewed the images/study and I agree with the findings as stated by Keren Ashraf MD. This study was interpreted at [place], [place].   MACRO: None   Signed by: Sixto Petty 4/9/2024 9:46 AM Dictation workstation:   [id number]    XR chest 1 view    Result Date: 4/8/2024  Interpreted By:  Mono Milian and Tippareddy Charit STUDY: XR CHEST 1 VIEW;  4/8/2024 12:41 am   INDICATION: Signs/Symptoms:C/f pulm edema.   COMPARISON: Radiograph 04/07/2024   ACCESSION NUMBER(S): [id number]   ORDERING CLINICIAN: AKILAH RILEY   FINDINGS: AP radiograph of the chest was provided.   Endotracheal tube with tip approximately 5.6 cm above the westley. Enteric tube is visualized coursing beneath the diaphragm with the tip beyond the field of view. Left IJ central venous catheter with tip overlying the lower SVC.   CARDIOMEDIASTINAL SILHOUETTE: Cardiomediastinal silhouette is stable in size and configuration.   LUNGS: Patchy airspace opacities in the bilateral lungs predominantly within the lung bases. Mild interval improvement of prominent perihilar and interstitial lung markings. The left costophrenic angle is  beyond the field of view. Questionable trace right pleural effusion. No evidence of pneumothorax.   ABDOMEN: No remarkable upper abdominal findings.   BONES: No acute osseous changes.       1.  Similar to mildly improved patchy airspace opacities in the bilateral lungs predominantly within the lung bases. 2. Mild interval improvement of prominent perihilar and interstitial lung markings suggestive of improving pulmonary edema. 3. Persistent bilateral pleural effusion, left more than right.   I personally reviewed the images/study and I agree with the findings as stated by Keren Ashraf MD. This study was interpreted at [place], [place].   MACRO: None.   Signed by: Mono Early 4/8/2024 8:18 AM Dictation workstation:   [id number]    XR chest 1 view    Result Date: 4/7/2024  Interpreted By:  Sixto Petty and Sheng Max STUDY: XR CHEST 1 VIEW;  4/7/2024 7:50 am   INDICATION: Signs/Symptoms:ET Tube placement.   COMPARISON: Chest radiograph dated 4/6/2024 and CT chest dated 04/05/2024   ACCESSION NUMBER(S): [id number]   ORDERING CLINICIAN: NO JOHNSON   FINDINGS: AP radiograph of the chest     LINES AND DEVICES: Endotracheal tube tip projects 4.0 cm above the westley. Enteric tube traverses below the left hemidiaphragm with its tip outside the field of view. Left internal jugular approach central venous catheter tip projects over the cavoatrial junction.   CARDIOMEDIASTINAL SILHOUETTE: Stable enlargement of the cardiomediastinal silhouette.   LUNGS: Similar appearance in perihilar and interstitial prominence suggests mild pulmonary edema. Redemonstration of bibasilar airspace opacities, the right basilar opacities slightly improved from prior exam. Persistent small left pleural effusion. No pneumothorax.   ABDOMEN: No remarkable upper abdominal findings.   BONES: No acute osseous abnormality. Moderate degenerative changes in the left acromioclavicular and  glenohumeral joint.       1. Endotracheal tube tip projects 4.0 cm above the westley. 2. Redemonstration of bibasilar airspace opacities, the right basilar opacity is slightly improved from prior exam. Findings likely relate to extensive ground-glass and consolidative opacities better imaged on prior CT chest dated 04/05/2024. 3. Similar appearance of mild pulmonary edema and small left pleural effusion.     I personally reviewed the images/study and I agree with the findings as stated by Dr. Joe Hudson. This study was interpreted at [place], [place].   MACRO: None   Signed by: Sixto Petty 4/7/2024 8:50 AM Dictation workstation:   [id number]    XR chest 1 view    Result Date: 4/6/2024  Interpreted By:  Sixto Petty, STUDY: XR CHEST 1 VIEW; 4/6/2024 3:30 pm   INDICATION: Signs/Symptoms:central line confirmation.   COMPARISON: 04/05/2024   ACCESSION NUMBER(S): [id number]   ORDERING CLINICIAN: SOREN KUMAR   FINDINGS: Endotracheal tube in satisfactory position.   CARDIOMEDIASTINAL SILHOUETTE: Cardiomediastinal silhouette is normal in size and configuration.   LUNGS: Slight interval improvement in perihilar airspace disease/edema. Continued atelectasis and effusions. No pneumothorax.   ABDOMEN: No remarkable upper abdominal findings.   BONES: No acute osseous changes.       1.  Slight interval improvement in perihilar airspace disease/edema and correlate with cardiac and fluid status. No pneumothorax.     Signed by: Sixto Petty 4/6/2024 5:54 PM Dictation workstation:   [id number]    CT chest wo IV contrast    Result Date: 4/6/2024  Interpreted By:  Sixto Petty,  and Heather Gordon STUDY: CT CHEST WO IV CONTRAST;  4/5/2024 5:54 pm   INDICATION: Signs/Symptoms:CAP wth bilateral pleural effusions, septic shock.   COMPARISON: CT cardiac 08/16/2023.   ACCESSION NUMBER(S): [id number]   ORDERING CLINICIAN: TERENCE HIDALGO   TECHNIQUE: Helical data  acquisition of the chest was obtained without intravenous contrast. Images were reformatted in axial, coronal, and sagittal planes.   FINDINGS: LUNGS AND AIRWAYS: Endotracheal tube in place with the tip a proximally 5 cm above the westley. The trachea and central airways are patent. No endobronchial lesion is seen.There is bronchial wall thickening most prominently in the right lower lobe.   There are moderate size bilateral pleural effusions with adjacent consolidative opacities most prominently in the left lung. There is significant volume loss of the right lower lobe and near-complete collapse of the left lower lobe. Left pleural effusion extends along the left major fissure. There is interlobular septal thickening and extensive ground-glass and consolidative opacities throughout bilateral lungs with more dense opacities in the posterior aspect of the right and lower lobes. There is an appearance of crazy paving in some regions. Multiple foci of calcification are present throughout the lungs most prominent in the left lower lobe. No evidence of pneumothorax.     MEDIASTINUM AND MANUELA, LOWER NECK AND AXILLA: The visualized thyroid gland is within normal limits. Multiple calcified and noncalcified prominent mediastinal lymph nodes are present. For example, a pericardial lymph node measures 1.1 cm (series 2, image 174) and a right paratracheal lymph node measuring 0.8 cm (series 2 image 120). An enteric tube courses through the esophagus with the tip beyond the field of view.   HEART AND VESSELS: The thoracic aorta normal in course and caliber.There are  moderate to severe calcified atherosclerosis present. Main pulmonary artery and its branches are normal in caliber. Severe coronary artery calcifications are seen. Please note,the study is not optimized for evaluation of coronary arteries.There are calcifications of the aortic valve and mitral valve. The cardiac chambers are not enlarged.Hypoattenuation of the blood  within cardiac chambers likely represents anemia. There is no pericardial effusion seen.   UPPER ABDOMEN: The visualized subdiaphragmatic structures demonstrate no remarkable findings.       CHEST WALL AND OSSEOUS STRUCTURES: There is diffuse body wall edema. Small bilateral gynecomastia. Otherwise, chest wall is unremarkable. No acute osseous pathology.There are no suspicious osseous lesions.There are degenerative changes of the thoracic spine. Findings suggestive of diffuse idiopathic skeletal hyperostosis.       1.  Interlobular septal thickening with extensive ground-glass and consolidative opacities throughout bilateral lungs with more coarse opacities in the posterior aspect of the right upper and lower lobes. There are moderate sized bilateral pleural effusions. Findings are nonspecific and may represent atypical infection, pulmonary hemorrhage, or pulmonary edema. Acute respiratory distress syndrome may have a similar appearance. 2. Severe coronary artery calcifications. Recommend correlation with coronary artery disease risk factors. 3. Moderate emphysematous changes.   I personally reviewed the images/study and I agree with the findings as stated by resident physician Dr. Evan Lai . This study was interpreted at [place], [place].   MACRO: None   Signed by: Sixto Petty 4/6/2024 7:56 AM Dictation workstation:   [id number]    US right upper quadrant    Result Date: 4/5/2024  Interpreted By:  Daniel Wolf and Dervishi Mario STUDY: US RIGHT UPPER QUADRANT;  4/5/2024 3:26 pm   INDICATION: Signs/Symptoms:Elevated LFTs septic shock.   COMPARISON: None.   ACCESSION NUMBER(S): [id number]   ORDERING CLINICIAN: TERENCE HIDALGO   TECHNIQUE: Multiple images of the right upper quadrant were obtained.   FINDINGS: LIVER: The liver measures 15.6 cm and is diffusely echogenic in appearance, consistent with diffuse fatty infiltration. The  resulting increased beam attenuation thereby limiting evaluation of the liver for focal lesions. Within the limitations, no focal lesions are seen.     GALLBLADDER: The gallbladder wall is abnormally thickened and measuring about 7 mm in diameter. There is no distention of the gallbladder or evidence of gallstones. There is free fluid surrounding the gallbladder. No significant hyperemia. Sonographic Ramey's sign can not be determined due to patient's clinical status/intubation.     BILE DUCTS: No evidence of intra or extrahepatic biliary dilatation is identified; the common bile duct measures 0.5 cm.   PANCREAS: The pancreas is poorly visualized due to overlying bowel gas.   RIGHT KIDNEY: The right kidney measures 10.3 cm in length. The renal cortical echogenicity and thickness are within normal limit.  No hydronephrosis or renal calculi are seen.   OTHER:       1. Diffusely echogenic liver parenchyma consistent with steatosis. 2. Thickened/edematous gallbladder wall with no evidence of hyperemia, gallbladder wall distention or cholelithiasis. Findings are equivocal and most likely due to fluid overload.     I personally reviewed the images/study and I agree with the findings as stated by Resident Dalton Bernal MD. This study was interpreted at [place], [place].   MACRO: None   Signed by: Daniel Shahid 4/5/2024 6:56 PM Dictation workstation:   [id number]    CT head wo IV contrast    Result Date: 4/5/2024  Interpreted By:  Rashi Michael and Afshari Mirak Sohrab STUDY: CT HEAD WO IV CONTRAST;  4/5/2024 5:54 pm   INDICATION: Signs/Symptoms:AMS.   COMPARISON: None.   ACCESSION NUMBER(S): [id number]   ORDERING CLINICIAN: TERENCE HIDALGO   TECHNIQUE: Noncontrast axial CT scan of the head was performed. Angled reformats in brain and bone windows were generated. The images were reviewed in bone, brain, blood and soft tissue windows.   FINDINGS: CSF Spaces: The  ventricles, sulci and basal cisterns are within normal limits within the limits of the parenchymal volume loss.. There is no extraaxial fluid collection.   Parenchyma: There are patchy and confluent periventricular and subcortical white matter hypodensities, which are nonspecific and may represent sequela of small-vessel ischemic changes. Focal hypodensity in the izabel may represent a prior lacunar infarcts. There is moderate diffuse parenchymal volume loss. The grey-white differentiation is intact. There is no mass effect or midline shift.  There is no intracranial hemorrhage.   Calvarium: The calvarium is unremarkable.   Paranasal sinuses and mastoids: There is partial opacification of the scattered left-greater-than-right ethmoid air cells. There are frothy secretions in the right maxillary sinus. There is partial opacification of the right mastoid air cell. Note is made of cerumen/granulation tissue in the right-greater-than-left external auditory canals.       No evidence of acute intracranial abnormality. If concerns for an acute stroke may consider brain MRI for further evaluation.   I personally reviewed the images/study and I agree with the findings as stated by resident physician Dr. Evan Lai . This study was interpreted at [place], [place].   MACRO: None   Signed by: Rashi Michael 4/5/2024 6:36 PM Dictation workstation:   [id number]    XR chest 1 view    Result Date: 4/5/2024  Interpreted By:  Mono Milian and Afshari Mirak Sohrab STUDY: XR CHEST 1 VIEW; XR ABDOMEN 1 VIEW;  4/5/2024 1:29 am   INDICATION: Signs/Symptoms:ETT placement s/p transport, pneumonia, pleural effusions; Signs/Symptoms:NGT placement.   COMPARISON: Chest x-ray 08/21/2023.   ACCESSION NUMBER(S): [id number]; [id number]   ORDERING CLINICIAN: ANGELA SALDAÑA   FINDINGS: AP radiograph of the chest and abdomen was provided.   Endotracheal tube tip is approximately 5.2 cm  above the westley. An enteric tube courses below the diaphragm with the tip projecting over stomach. Right IJ approach central venous catheter tip projects over lower SVC.   CARDIOMEDIASTINAL SILHOUETTE: Cardiomediastinal silhouette is normal in size and configuration.   LUNGS: There are airspace opacities in the bilateral perihilar regions extending to the bilateral lower lobes, left-greater-than-right. There is obscuration of the costophrenic angles. No evidence of pneumothorax.   ABDOMEN: Nonobstructive bowel gas pattern. No definite evidence of free air in the abdomen.   BONES: No acute osseous changes.       1. Perihilar and predominantly central airspace opacity in bilateral lungs. Findings may be due to severe edema with infection or aspiration not excluded. 2. Bilateral pleural effusion, left more than right. 3. Nonobstructive bowel gas pattern. 4. Medical devices as above.   I personally reviewed the images/study and I agree with the findings as stated by resident physician Dr. Evan Lai . This study was interpreted at [place], [place].   MACRO: None   Signed by: Mono Early 4/5/2024 8:03 AM Dictation workstation:   [id number]    XR abdomen 1 view    Result Date: 4/5/2024  Interpreted By:  Mono Milian,  Michael Gordon STUDY: XR CHEST 1 VIEW; XR ABDOMEN 1 VIEW;  4/5/2024 1:29 am   INDICATION: Signs/Symptoms:ETT placement s/p transport, pneumonia, pleural effusions; Signs/Symptoms:NGT placement.   COMPARISON: Chest x-ray 08/21/2023.   ACCESSION NUMBER(S): [id number]; [id number]   ORDERING CLINICIAN: ANGELA SALDAÑA   FINDINGS: AP radiograph of the chest and abdomen was provided.   Endotracheal tube tip is approximately 5.2 cm above the westley. An enteric tube courses below the diaphragm with the tip projecting over stomach. Right IJ approach central venous catheter tip projects over lower SVC.   CARDIOMEDIASTINAL  SILHOUETTE: Cardiomediastinal silhouette is normal in size and configuration.   LUNGS: There are airspace opacities in the bilateral perihilar regions extending to the bilateral lower lobes, left-greater-than-right. There is obscuration of the costophrenic angles. No evidence of pneumothorax.   ABDOMEN: Nonobstructive bowel gas pattern. No definite evidence of free air in the abdomen.   BONES: No acute osseous changes.       1. Perihilar and predominantly central airspace opacity in bilateral lungs. Findings may be due to severe edema with infection or aspiration not excluded. 2. Bilateral pleural effusion, left more than right. 3. Nonobstructive bowel gas pattern. 4. Medical devices as above.   I personally reviewed the images/study and I agree with the findings as stated by resident physician Dr. Evan Lai . This study was interpreted at [facility], [place], [place].   MACRO: None   Signed by: Mono Early 4/5/2024 8:03 AM Dictation workstation:   [id number]      Medications:    Current Facility-Administered Medications:     acetaminophen (Tylenol) tablet 650 mg, 650 mg, oral, q6h PRN, Rinku Saenz MD    atorvastatin (Lipitor) tablet 40 mg, 40 mg, oral, Daily, Debbie Emanuel MD, 40 mg at 04/14/24 0855    dextrose 50 % injection 12.5 g, 12.5 g, intravenous, q15 min PRN, Alma Wakefield MD    dextrose 50 % injection 25 g, 25 g, intravenous, q15 min PRN, Alma Wakefield MD, 25 g at 04/14/24 0628    enoxaparin (Lovenox) syringe 40 mg, 40 mg, subcutaneous, q24h, Kellie Pearson MD, 40 mg at 04/15/24 0047    folic acid (Folvite) tablet 1 mg, 1 mg, oral, Daily, Debbie Emanuel MD, 1 mg at 04/14/24 0855    glucagon (Glucagen) injection 1 mg, 1 mg, intramuscular, q15 min PRN, Alma Wakefield MD    glucagon (Glucagen) injection 1 mg, 1 mg, intramuscular, q15 min PRN, Alma Wakefield MD    insulin lispro (HumaLOG) injection 0-10 Units, 0-10  Units, subcutaneous, q6h PRN, Rinku Saenz MD    ipratropium-albuteroL (Duo-Neb) 0.5-2.5 mg/3 mL nebulizer solution 3 mL, 3 mL, nebulization, q6h PRN, Debbie Emanuel MD    lidocaine 2 % mucosal jelly (Uro-Jet) 1 Application, 1 Application, Topical, Once, Domi Nuno, APRN-CNP    loperamide (Imodium) capsule 2 mg, 2 mg, oral, 4x daily PRN, Юлия Rg MD    magnesium sulfate IV 2 g, 2 g, intravenous, Once, Юлия Rg MD    melatonin tablet 5 mg, 5 mg, oral, Daily, Alma Wakefield MD, 5 mg at 04/14/24 1925    midodrine (Proamatine) tablet 5 mg, 5 mg, oral, TID, Stacey Dover MD, 5 mg at 04/14/24 1528    norepinephrine (Levophed) 8 mg in dextrose 5% 250 mL (0.032 mg/mL) infusion (premix), 0-3 mcg/kg/min, intravenous, Continuous, Parish Lepe MD, Stopped at 04/13/24 0930    oxyCODONE (Roxicodone) immediate release tablet 5 mg, 5 mg, oral, q6h PRN, Stacey Dover MD, 5 mg at 04/10/24 0932    oxygen (O2) therapy, , inhalation, Continuous PRN - O2/gases, Eduardo Carpenter MD, 10 L/min at 04/15/24 0731    oxygen (O2) therapy, , inhalation, Continuous PRN - O2/gases, Enid Kaplan MD, Rate Verify at 04/15/24 0400    pantoprazole (ProtoNix) injection 40 mg, 40 mg, intravenous, Daily before breakfast, Alma Wakefield MD, 40 mg at 04/15/24 0618    piperacillin-tazobactam-dextrose (Zosyn) IV 4.5 g, 4.5 g, intravenous, q6h, Debbie Emanuel MD, Stopped at 04/15/24 0349    polyethylene glycol (Glycolax, Miralax) packet 17 g, 17 g, oral, Daily PRN, Stacey Dover MD    potassium chloride (Klor-Con) packet 40 mEq, 40 mEq, oral, Once, Юлия Rg MD    potassium phosphates 21 mmol in dextrose 5 % in water (D5W) 250 mL IV, 21 mmol, intravenous, Once, Юлия Rg MD    silver nitrate applicators applicator, , Topical, BID PRN, Kellie Pearson MD    vancomycin (Vancocin) pharmacy to dose - pharmacy monitoring, , miscellaneous, Daily PRN, Debbie Emanuel MD    vancomycin  in dextrose 5 % (Vancocin) IVPB 750 mg, 750 mg, intravenous, q12h, Debbie Emanuel MD, Stopped at 04/15/24 0379            Assessment/Plan   ASSESSMENT & PLAN:  Principal Problem:    Pneumonia  Active Problems:    CAD (coronary artery disease)    CHF (congestive heart failure) (Multi)    HTN (hypertension)    Hyperlipidemia    Chronic obstructive pulmonary disease (Multi)    Peripheral vascular disease (CMS-HCC)    Gastroesophageal reflux disease    Anemia    Septicemia (Multi)        Ry Sandhu is a 74 y.o. male w/PMHx of COPD, CAD, PAD who is presenting to the MICU from outside hospital for acute hypoxic respiratory failure secondary to acute community-acquired pneumonia and septic shock secondary to community-acquired pneumonia versus colitis now requiring bilateral amputations 4/5 (right BKA, left AKA) due to severe peripheral arterial disease and increased pressor requirements. MICU course c/b whiteout of right lung 2/2 mucus plugging and worsening leukocytosis likely 2/2 atelectasis. Needs aggressive BPH and PT/OT.     UPDATES 4/15  - WBC worsening 17.3 > 19.3 likely I/s/o right lung whiteout and atelectasis   - repeat CXR today to monitor above  - aggressive BPH: IPV and cough assist q4h   - dc vanc/zosyn, low threshold to restart pending clinical course   - repeat sputum cx off Abx   - TTE to evaluate for HF      NEURO:  #Acute Encephalopathy, resolving   :: Mental status improving, patient alert and oriented to person, place and time.  :: Vitamin B12, folate WNL. TSH 7.61(H) but fT4 WNL, suggesting subclinical hypothyroidism   - Delirium precautions  - Continue to monitor   - Continue melatonin at nighttime      #C/F right basal ganglia ischemia  :: CT head 4/5, 4/10 without evidence of right basal ganglia ischemia   - CTM     CARDIO:  #Undifferentiated shock, likely septic, resolving  :: TTE 08/2023 poor quality, unable to assess EF   :: off pressors 4/13 AM  - Continue midodrine 5mg TID (given  bilateral amputation expect baseline BP to be lower)   - goal MAP >65 if mentation well and no s/s of malperfusion   - TTE to evaluate for HF      #CAD  #HTN  #PAD  #L AKA and R BKA   :: s/p right BKA and 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 i/s/o b/l LE gangrene  - Vascular surgery consulted, appreciate recs, holding off on formalization of BKA to AKA pending clinical improvement   - Continue Lipitor  - Holding antihypertensives in setting of requiring pressors     PULM:  #Opacification of right hemithorax, improving  :: Whiteout likely 2/2 mucus plugging  - repeat CXR today to monitor above  - aggressive BPH: IPV and cough assist q4h      #CAP  #AHRF 2/2 CAP   #Pleural Effusion  :: Thora 4/2 draining 1L on left with labs suggesting transudative effusion  :: RCx 4/5 Corneybacterium > RCx 4/10 NGTD  :: Vancomycin/Zosyn (4/5-4/12) (4/13-4/15)  - dc vanc/zosyn  - repeat sputum cx off Abx     #COPD-not in exacerbation   :: Current everyday tobacco smoker  :: No pfts on file  -on 10L NC      GI:  #Elevated Liver Enzymes   #Fatty infiltration of liver on CT abdomen  #Colitis  #GERD  #Concern for GI bleed  :: RUQ 4/5: hepatic steatosis and edematous gallbladder, equivocal i/s/o volume overload  :: RUQ 4/10: hepatic steatosis, nondistended and nonedematous gallbladder   - C/w IV PPI     RENAL/:  #Hypernatremia, resolving  -CTM     HEME/ONC  #Chronic Normocytic Anemia  #Coagulopathy, resolved  :: on admission INR 1.8(H) & PTT 46(H) > INR 1.0 & PTT 31 on 4/13  :: hemolysis labs WNL, fibrinogen 550(H)  -Keep active type and screen  -Transfuse for Hgb < 8 per vascular medicine   -Maintain 2 large bore pIVs      ENDO:  -No active issues to address      MSK/Rheum:  -No active issues to address      ID:  #C/f Septic Shock 2/2 CAP, resolved  #Leukocytosis, worsening  :: Abx history: Azithromycin 500 mg daily (4/1 - 4/4), Ceftriaxone (3/31 - 4/5), Doxycycline (4/4 - 4/5), Flagyl (3/25 - 4/5), Vancomycin/Zosyn (4/5-4/12, 4/13-4/15)  :: BCx 4/5 NGTD  ::  RCx 4/5 Corneybacterium > 4/10 NGTD  PLAN:  - dc vanc/zosyn since suspect leukocytosis 2/2 atelectasis and mucus plugging         F: PRN  E: PRN   N: NPO; TF  A: left IJ CVC, left radial A line, PIV 20Gx2  Drains: Tucker, NG  Bowel regimen: Miralax & Senna  Drips: None  DVT ppx: Lovenox  GI ppx: PPI      Code Status: DNR, ok for intubation    RIVERALAYO (Spouse)  [phone] (Mobile)     Patient discussed with Dr. Parikh who agrees with the plan as outlined above.    Юлия Rg MD  Internal Medicine PGY1  4/15/2024 7:58 AM

## 2024-04-15 NOTE — SIGNIFICANT EVENT
Vascular Surgery Update    Mr. 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 Plans for formalization of amputations tentatively 4/16 however patient has rising leukocytosis and worsening R pleural effusion. Wounds were assessed yesterday by team and are not concerning as source of leukocytosis. Will hold off on operative planning for now until he improves.     Discussed with attending surgeon, Dr. Arango.    Saritha Arboleda MD  PGY3   General Surgery   Vascular Surgery pager [phone]

## 2024-04-15 NOTE — PROCEDURES
Thoracentesis Procedure Note    Consent was obtained from patient prior to the procedure. Indications, risks, and benefits were explained at length. A time out was performed and the chest x-ray was reviewed. The appropriate site on the right mid-axillary chest wall was confirmed by bedside ultrasound and marked. My hands were washed immediately prior to the procedure. I wore a surgical cap, mask with protective eyewear, sterile gown and sterile gloves throughout the procedure. The patient was prepped and draped in a sterile manner using chlorhexidine scrub after the appropriate level was confirmed by ultrasound. 1% lidocaine was used to anesthetize the skin, subcutaneous tissue, superior aspect of the rib periosteum and parietal pleura. A finder needle was then introduced over the superior aspect of the rib to locate the pleural fluid using negative aspiration pressure; straw colored fluid was aspirated at a depth of approximately 3 cm. The catheter sheathe was then advanced over the finder needle without difficulty. 1000 ml of colored fluid was removed without difficulty. The catheter was then removed and firm pressure immediately applied. No immediate complications were noted during the procedure. A post-procedure chest x-ray is pending at the time of this note.

## 2024-04-15 NOTE — CARE PLAN
Problem: Skin  Goal: Decreased wound size/increased tissue granulation at next dressing change  Flowsheets (Taken 4/14/2024 2045)  Decreased wound size/increased tissue granulation at next dressing change:   Protective dressings over bony prominences   Utilize specialty bed per algorithm   Promote sleep for wound healing   The patient's goals for the shift include      The clinical goals for the shift include Pt will maintain SPO2 > 90%    Over the shift, the patient did not make progress toward the following goals. Barriers to progression include open wounds. Recommendations to address these barriers include daily assess and dressing change.

## 2024-04-15 NOTE — PROGRESS NOTES
Physical Therapy    Physical Therapy Treatment    Patient Name: Ry Sandhu  MRN: [id number]  Today's Date: 4/15/2024  Time Calculation  Start Time: 1413  Stop Time: 1443  Time Calculation (min): 30 min       Assessment/Plan   PT Assessment  Rehab Prognosis: Fair  End of Session Communication: Bedside nurse  End of Session Patient Position: Bed, 3 rail up, Alarm off, not on at start of session     PT Plan  Treatment/Interventions: Bed mobility, Transfer training, Gait training, Balance training, Strengthening, Neuromuscular re-education, Therapeutic exercise, Therapeutic activity, Positioning, Postural re-education  PT Plan: Skilled PT  PT Frequency: 4 times per week  PT Discharge Recommendations: Moderate intensity level of continued care  PT Recommended Transfer Status: Assist x2 (to EOB)  PT - OK to Discharge: Yes      General Visit Information:   PT  Visit  PT Received On: 04/15/24  General  Family/Caregiver Present: No  Co-Treatment: OT  Co-Treatment Reason: to maximize patient safety and mobility 2/2 patient is total assist to sit to EOB  Prior to Session Communication: Bedside nurse, Physician  Patient Position Received: Bed, 3 rail up, Alarm off, not on at start of session  General Comment: patient alert and awake on arrival, agreeable to PT; tele, renteria, FMS, (B) mitts, NG, a-line, 10L NC    Subjective   Precautions:  Precautions  Hearing/Visual Limitations: ? [place]  LE Weight Bearing Status:  (anticipate NWB BLEs)  Medical Precautions: Fall precautions, Oxygen therapy device and L/min  Precautions Comment: SBP >100 per MD  Vital Signs:  Vital Signs  Heart Rate:  (pre: 90 post: 99)  Resp:  (pre: 36 post: 37; RR increased to the 40s when sitting EOB)  SpO2:  (pre: 100% post: 97%; ranged from mid 80s to low 90s in supine when talking to therapists and when sitting EOB)  BP:  (pre: 112/44 MAP 69, post: 109/43 MAP 67)  BP Location: Left arm  BP Method: Arterial line    Objective   Pain:  Pain Assessment  Pain  "Assessment: 0-10  Pain Score: 0 - No pain  Cognition:  Cognition  Overall Cognitive Status: Impaired  Arousal/Alertness:  (delayed responses at times)  Orientation Level:  (oriented to self, states [facility] for place, states \"February\" for month then able to choose correct month from choices, oriented to year)  Following Commands:  (follows 75% of one step commands with increased time and repetition)  Postural Control:  Postural Control  Postural Control: Impaired  Head Control: favors increase cervical flexion, required cueing and assist to keep head in neutral position  Trunk Control: retrolean with difficulty completing anterior lean over seated EVA to reduce retrolean.  Righting Reactions: reduced  Static Sitting Balance  Static Sitting-Balance Support: Bilateral upper extremity supported  Static Sitting-Level of Assistance: Dependent, Maximum assistance (brief MINx1 after positioning)  Static Sitting-Comment/Number of Minutes: x1 assist however, required PT to manually block patient's B residual limbs to prevent patient's B hips from sliding anteriorly towards EOB; EOB x18 minutes.  Dynamic Sitting Balance  Dynamic Sitting-Balance Support: Feet unsupported, Bilateral upper extremity supported  Dynamic Sitting-Balance: Forward lean  Dynamic Sitting-Comments: placed BUEs on patient tray and had patient lean anterior to promote increase anterior lean over seated EVA; DEP x1    Activity Tolerance:  Activity Tolerance  Early Mobility/Exercise Safety Screen: Proceed with mobilization - No exclusion criteria met  Activity Tolerance Comments: reported \"some\" SOB with sitting EOB  Treatments:            Bed Mobility  Bed Mobility: Yes  Bed Mobility 1  Bed Mobility 1: Supine to sitting, Sitting to supine  Level of Assistance 1: Dependent, Moderate verbal cues, Moderate tactile cues  Bed Mobility Comments 1: x2 assist with HOB elevated, use of draw sheet    Outcome Measures:  [facility] Basic " Mobility  Turning from your back to your side while in a flat bed without using bedrails: A lot  Moving from lying on your back to sitting on the side of a flat bed without using bedrails: Total  Moving to and from bed to chair (including a wheelchair): Total  Standing up from a chair using your arms (e.g. wheelchair or bedside chair): Total  To walk in hospital room: Total  Climbing 3-5 steps with railing: Total  Basic Mobility - Total Score: 7    FSS-ICU  Ambulation: Unable to attempt due to weakness  Rolling: Maximal assistance (performs 25% - 49% of task)  Sitting: Total assistance (performs 25% or requires another person)  Transfer Sit-to-Stand: Unable to perform  Transfer Supine-to-Sit: Total assistance (performs 25% or requires another person)  Total Score: 4      E = Exercise and Early Mobility  Early Mobility/Exercise Safety Screen: Proceed with mobilization - No exclusion criteria met  Current Activity: Sitting at edge of bed    Education Documentation  Mobility Training, taught by Wanda Mcfarland, PT at 4/15/2024  3:49 PM.  Learner: Patient  Readiness: Acceptance  Method: Explanation  Response: Needs Reinforcement  Comment: mobility progression    Education Comments  No comments found.           Encounter Problems       Encounter Problems (Active)       PT Problem       Patient will complete bed mobility with MINx1 with HOB elevated, use of bedrail   (Not Progressing)       Start:  04/09/24    Expected End:  04/30/24            Patient will complete bed<->chair lateral slide transfer with OD x1 using LRD as needed without acute LOB  (Not Progressing)       Start:  04/09/24    Expected End:  04/30/24            Patient will complete static (contact guard assist x1) and dynamic (minimal assist x1) sitting balance activities using UE support as needed in order to maintain midline without acute LOB.  (Not Progressing)       Start:  04/09/24    Expected End:  04/30/24            Patient will participate in BLE  there-ex program in order to assist in improving strength and to assist with the completion of functional mobility tasks.  (Not Progressing)       Start:  04/09/24    Expected End:  04/30/24               Wanda Mcfarland, PT, DPT

## 2024-04-15 NOTE — PROGRESS NOTES
Vancomycin Dosing by Pharmacy  FOLLOW UP    Ry Sandhu is a 74 y.o. male who Pharmacy is consulted to dose vancomycin for pneumonia.     Based on the patient's indication and renal status, this patient is being dosed based on a goal vancomycin AUC of 400-600.     Current vancomycin dose: 750 mg every 12 hours    Estimated vancomycin AUC on current dose: 570 mg/L.hr    Renal function is currently stable.    Estimated Creatinine Clearance: 105.5 mL/min (A) (by C-G formula based on SCr of 0.48 mg/dL (L)).    Vancomycin   Date Value Ref Range Status   04/15/2024 17.5 5.0 - 20.0 ug/mL Final   04/10/2024 24.4 (H) 5.0 - 20.0 ug/mL Final   04/09/2024 25.7 (H) 5.0 - 20.0 ug/mL Final   04/06/2024 13.6 5.0 - 20.0 ug/mL Final     Vancomycin, Trough   Date Value Ref Range Status   04/14/2024 12.3 5.0 - 20.0 ug/mL Final     Comment:     Therapeutic Ranges:    Peak (all ages):        30.0-40.0 ug/mL                       Trough (all ages):        10.0-20.0 ug/mL                                             Vancomycin trough concentrations drawn immediately prior to the next dose at steady-state are preferred for concentration-guided monitoring of patients treated with vancomycin.    Reference: Am J Health-Syst Pharm. 2020; 77(11):835-864.            Results from last 7 days   Lab Units 04/15/24  0401 04/14/24  1457 04/14/24  0345 04/13/24  1605 04/13/24  1005 04/13/24  0409   CREATININE mg/dL 0.48* 0.52 0.43* 0.46*  --  0.51   BUN mg/dL 14 13 15 17  --  16   WBC AUTO x10*3/uL 19.3*  --  17.3*  --  15.3* 13.5*        Visit Vitals  /52   Pulse (!) 111   Temp 36.4 °C (97.5 °F) (Temporal)   Resp (!) 32       Gram Stain   Date/Time Value Ref Range Status   04/10/2024 03:58 PM   Final    Gram stain indicates specimen consists of lower respiratory tract secretions.   04/10/2024 03:58 PM No predominant organism  Final     Urine Culture   Date/Time Value Ref Range Status   04/05/2024 04:02 AM No growth  Final     Blood Culture    Date/Time Value Ref Range Status   04/05/2024 08:38 AM No growth at 4 days -  FINAL REPORT  Final        Assessment/Plan    AUC is within goal range. Continue current vancomycin regimen. This dosing regimen is predicted by [employer] to result in the following pharmacokinetic parameters:   Loading dose: N/A  Regimen: 750 mg IV every 12 hours.  Start time: 16:00 on 04/15/2024  Exposure target: AUC24 (range)400-600 mg/L.hr   AUC24,ss: 570 mg/L.hr  Probability of AUC24 > 400: 100 %  Ctrough,ss: 19.7 mg/L  Probability of Ctrough,ss > 20: 43 %  Probability of nephrotoxicity (Lodise GONZALO 2009): 17 %    The next vancomycin level will be ordered for 4/18 with AM labs, unless clinically indicated sooner.  Will continue to monitor renal function daily while on vancomycin and order serum creatinine at least every 48 hours if not already ordered.  Will follow for continued vancomycin needs, clinical response, and signs/symptoms of toxicity.       Odalys Bedoya [facility]

## 2024-04-15 NOTE — PROGRESS NOTES
Vancomycin Dosing by Pharmacy- Cessation of Therapy    Consult to pharmacy for vancomycin dosing has been discontinued by the prescriber, pharmacy will sign off at this time.    Please call pharmacy if there are further questions or re-enter a consult if vancomycin is resumed.     Odalys Bedoya [facility]

## 2024-04-15 NOTE — CARE PLAN
Problem: Safety - Medical Restraint  Goal: Remains free of injury from restraints (Restraint for Interference with Medical Device)  Outcome: Met     Problem: Respiratory  Goal: Clear secretions with interventions this shift  Outcome: Progressing     Problem: Respiratory  Goal: Minimize anxiety/maximize coping throughout shift  Outcome: Met     Problem: Respiratory  Goal: Minimal/no exertional discomfort or dyspnea this shift  Outcome: Progressing     Problem: Respiratory  Goal: No signs of respiratory distress (eg. Use of accessory muscles. Peds grunting)  Outcome: Progressing     Problem: Respiratory  Goal: Patent airway maintained this shift  Outcome: Progressing     Problem: Respiratory  Goal: Tolerate pulmonary toileting this shift  Outcome: Progressing    The clinical goals for the shift include Pt will maintain SPO2 > 90%

## 2024-04-15 NOTE — PROGRESS NOTES
Occupational Therapy    Occupational Therapy Treatment    Name: Ry Sandhu  MRN: [id number]  : 1950  Date: 04/15/24  Time Calculation  Start Time: 1412  Stop Time: 1443  Time Calculation (min): 31 min    Assessment:  End of Session Communication: Bedside nurse  End of Session Patient Position: Bed, 3 rail up, Alarm off, not on at start of session  Plan:  Treatment Interventions: ADL retraining, Functional transfer training, UE strengthening/ROM, Endurance training, Cognitive reorientation, Patient/family training, Equipment evaluation/education, Neuromuscular reeducation, Compensatory technique education  OT Frequency: 3 times per week  OT Discharge Recommendations: Moderate intensity level of continued care  OT - OK to Discharge: Yes    Subjective   Previous Visit Info:  OT Last Visit  OT Received On: 04/15/24  General:  General  Family/Caregiver Present: No  Co-Treatment: PT  Co-Treatment Reason: to maximize patient safety and mobility 2/2 patient is total assist to sit to EOB  Prior to Session Communication: Bedside nurse, Physician  Patient Position Received: Bed, 3 rail up, Alarm off, not on at start of session  General Comment: patient alert and awake on arrival, agreeable to OT; tele, renteria, FMS, (B) mitts, NG, a-line, 10L NC  Precautions:  Hearing/Visual Limitations: ? [place]  LE Weight Bearing Status:  (anticipate NWB (B)LEs)  Medical Precautions: Fall precautions, Oxygen therapy device and L/min  Precautions Comment: SBP >100 per MD  Vitals:  Vital Signs  Heart Rate:  (pre 91, post 98)  SpO2:  (pre 100%, post 96%)  BP:  (pre 112/44, post 118/47)  Pain Assessment:  Pain Assessment  Pain Assessment: 0-10  Pain Score: 0 - No pain     Objective   Activities of Daily Living: Grooming  Grooming Comments: min A for using suction toothbrush for oral hygiene, min-mod A for combing hair, CGA for washing face     Bed Mobility/Transfers: Bed Mobility  Bed Mobility: Yes  Bed Mobility 1  Bed Mobility 1: Supine  to sitting, Sitting to supine  Level of Assistance 1: Dependent, Moderate verbal cues, Moderate tactile cues  Bed Mobility Comments 1: x2 assist, HOB elevated for supine to sit, use of draw sheet    Transfers  Transfer: No     Therapy/Activity: Therapeutic Activity  Therapeutic Activity Performed: Yes  Therapeutic Activity 1: Patient sat EOB x18 minutes with mostly dependent A for balance, improved to brief min A. Patient with increased difficulty flexing trunk/hips in sitting to lean anteriorly, patient often pushing self posteriorly in sitting requiring dependent A to prevent full LOB posteriorly. Patient assisted in to placing hands on bedside table in front of him while EOB, patient cued to push paper towel across table away from himself to encourage increased anterior lean, patient with improvement in balance to min A with this activity however once UEs off table patient immediately leaning posteriorly requiring max-dependent A. Increased verbal and tactile cues provided to encourage improved posture.    Outcome Measures:  [facility] Daily Activity  Putting on and taking off regular lower body clothing: Total  Bathing (including washing, rinsing, drying): A lot  Putting on and taking off regular upper body clothing: A lot  Toileting, which includes using toilet, bedpan or urinal: Total  Taking care of personal grooming such as brushing teeth: A little  Eating Meals: A little  Daily Activity - Total Score: 12    ,    , and E = Exercise and Early Mobility  Current Activity: Sitting at edge of bed    Education Documentation  Body Mechanics, taught by Melissa Gonzalez OT at 4/15/2024  3:42 PM.  Learner: Patient  Readiness: Acceptance  Method: Explanation  Response: Needs Reinforcement    ADL Training, taught by Melissa Gonzalez OT at 4/15/2024  3:42 PM.  Learner: Patient  Readiness: Acceptance  Method: Explanation  Response: Needs Reinforcement    Education Comments  No comments found.    Goals:  Encounter Problems        Encounter Problems (Active)       ADLs       Patient with complete upper body dressing with stand by assist level of assistance donning and doffing all UE clothes with no adaptive equipment. (Not Progressing)       Start:  04/10/24    Expected End:  04/24/24            Patient with complete lower body dressing with minimal assist  level of assistance donning and doffing all LE clothes  with PRN adaptive equipment. (Not Progressing)       Start:  04/10/24    Expected End:  04/24/24            Patient will feed self with supervision level of assistance using PRN adaptive equipment. (Not Progressing)       Start:  04/10/24    Expected End:  04/24/24            Patient will complete daily grooming tasks with supervision level of assistance and PRN adaptive equipment. (Progressing)       Start:  04/10/24    Expected End:  04/24/24            Patient will complete toileting including hygiene clothing management/hygiene with minimal assist  level of assistance. (Not Progressing)       Start:  04/10/24    Expected End:  04/24/24               BALANCE       Pt will maintain dynamic sitting balance during ADL task with minimal assist level of assistance in order to demonstrate decreased risk of falling and improved postural control. (Progressing)       Start:  04/10/24    Expected End:  04/24/24               COGNITION/SAFETY       Patient will follow 100% Simple commands to allow improved ADL performance. (Progressing)       Start:  04/10/24    Expected End:  04/24/24               EXERCISE/STRENGTHENING       Patient will complete BUE exercises in order to improve strength and activity tolerance for ADL performance.  (Not Progressing)       Start:  04/10/24    Expected End:  04/24/24               TRANSFERS       Patient will perform bed mobility moderate assist level of assistance in order to improve safety and independence with mobility (Progressing)       Start:  04/10/24    Expected End:  04/24/24             Patient will complete functional transfers with slideboard/LRD with moderate assist level of assistance. (Not Progressing)       Start:  04/10/24    Expected End:  04/24/24               Melissa Gonzalez OTR/L  Inpatient Occupational Therapist   Rehab Office: [phone]

## 2024-04-16 LAB
ALBUMIN SERPL BCP-MCNC: 1.6 G/DL (ref 3.4–5)
ALP SERPL-CCNC: 298 U/L (ref 33–136)
ALT SERPL W P-5'-P-CCNC: 25 U/L (ref 10–52)
ANION GAP SERPL CALC-SCNC: 8 MMOL/L (ref 10–20)
AST SERPL W P-5'-P-CCNC: 26 U/L (ref 9–39)
BASOPHILS # BLD AUTO: 0.02 X10*3/UL (ref 0–0.1)
BASOPHILS NFR BLD AUTO: 0.1 %
BILIRUB SERPL-MCNC: 0.5 MG/DL (ref 0–1.2)
BLOOD EXPIRATION DATE: NORMAL
BUN SERPL-MCNC: 13 MG/DL (ref 6–23)
CALCIUM SERPL-MCNC: 7.3 MG/DL (ref 8.6–10.6)
CHLORIDE SERPL-SCNC: 112 MMOL/L (ref 98–107)
CO2 SERPL-SCNC: 28 MMOL/L (ref 21–32)
CREAT SERPL-MCNC: 0.45 MG/DL (ref 0.5–1.3)
DISPENSE STATUS: NORMAL
EGFRCR SERPLBLD CKD-EPI 2021: >90 ML/MIN/1.73M*2
EOSINOPHIL # BLD AUTO: 0.07 X10*3/UL (ref 0–0.4)
EOSINOPHIL NFR BLD AUTO: 0.5 %
ERYTHROCYTE [DISTWIDTH] IN BLOOD BY AUTOMATED COUNT: 15.7 % (ref 11.5–14.5)
ERYTHROCYTE [DISTWIDTH] IN BLOOD BY AUTOMATED COUNT: 16.4 % (ref 11.5–14.5)
GLUCOSE BLD MANUAL STRIP-MCNC: 100 MG/DL (ref 74–99)
GLUCOSE SERPL-MCNC: 80 MG/DL (ref 74–99)
HCT VFR BLD AUTO: 23.2 % (ref 41–52)
HCT VFR BLD AUTO: 26.9 % (ref 41–52)
HGB BLD-MCNC: 7.6 G/DL (ref 13.5–17.5)
HGB BLD-MCNC: 8.9 G/DL (ref 13.5–17.5)
IMM GRANULOCYTES # BLD AUTO: 0.08 X10*3/UL (ref 0–0.5)
IMM GRANULOCYTES NFR BLD AUTO: 0.6 % (ref 0–0.9)
LABORATORY COMMENT REPORT: NORMAL
LABORATORY COMMENT REPORT: NORMAL
LYMPHOCYTES # BLD AUTO: 0.66 X10*3/UL (ref 0.8–3)
LYMPHOCYTES NFR BLD AUTO: 4.6 %
MAGNESIUM SERPL-MCNC: 1.77 MG/DL (ref 1.6–2.4)
MCH RBC QN AUTO: 30.4 PG (ref 26–34)
MCH RBC QN AUTO: 30.5 PG (ref 26–34)
MCHC RBC AUTO-ENTMCNC: 32.8 G/DL (ref 32–36)
MCHC RBC AUTO-ENTMCNC: 33.1 G/DL (ref 32–36)
MCV RBC AUTO: 92 FL (ref 80–100)
MCV RBC AUTO: 93 FL (ref 80–100)
MONOCYTES # BLD AUTO: 0.55 X10*3/UL (ref 0.05–0.8)
MONOCYTES NFR BLD AUTO: 3.8 %
NEUTROPHILS # BLD AUTO: 12.93 X10*3/UL (ref 1.6–5.5)
NEUTROPHILS NFR BLD AUTO: 90.4 %
NRBC BLD-RTO: 0 /100 WBCS (ref 0–0)
NRBC BLD-RTO: 0 /100 WBCS (ref 0–0)
PATH REPORT.FINAL DX SPEC: NORMAL
PATH REPORT.GROSS SPEC: NORMAL
PATH REPORT.RELEVANT HX SPEC: NORMAL
PATH REPORT.TOTAL CANCER: NORMAL
PATH REVIEW-CELL CT,FLUID: NORMAL
PHOSPHATE SERPL-MCNC: 2.7 MG/DL (ref 2.5–4.9)
PLATELET # BLD AUTO: 334 X10*3/UL (ref 150–450)
PLATELET # BLD AUTO: 347 X10*3/UL (ref 150–450)
POTASSIUM SERPL-SCNC: 3.9 MMOL/L (ref 3.5–5.3)
PRODUCT BLOOD TYPE: 5100
PRODUCT CODE: NORMAL
PROT SERPL-MCNC: 3.9 G/DL (ref 6.4–8.2)
RBC # BLD AUTO: 2.49 X10*6/UL (ref 4.5–5.9)
RBC # BLD AUTO: 2.93 X10*6/UL (ref 4.5–5.9)
SODIUM SERPL-SCNC: 144 MMOL/L (ref 136–145)
UNIT ABO: NORMAL
UNIT NUMBER: NORMAL
UNIT RH: NORMAL
UNIT VOLUME: 350
VIT B1 PYROPHOSHATE BLD-SCNC: 218 NMOL/L (ref 70–180)
WBC # BLD AUTO: 14.3 X10*3/UL (ref 4.4–11.3)
WBC # BLD AUTO: 14.8 X10*3/UL (ref 4.4–11.3)
XM INTEP: NORMAL

## 2024-04-16 PROCEDURE — 2500000004 HC RX 250 GENERAL PHARMACY W/ HCPCS (ALT 636 FOR OP/ED)

## 2024-04-16 PROCEDURE — 2500000001 HC RX 250 WO HCPCS SELF ADMINISTERED DRUGS (ALT 637 FOR MEDICARE OP)

## 2024-04-16 PROCEDURE — 2500000004 HC RX 250 GENERAL PHARMACY W/ HCPCS (ALT 636 FOR OP/ED): Performed by: STUDENT IN AN ORGANIZED HEALTH CARE EDUCATION/TRAINING PROGRAM

## 2024-04-16 PROCEDURE — P9016 RBC LEUKOCYTES REDUCED: HCPCS

## 2024-04-16 PROCEDURE — 92526 ORAL FUNCTION THERAPY: CPT | Mod: GN | Performed by: SPEECH-LANGUAGE PATHOLOGIST

## 2024-04-16 PROCEDURE — C9113 INJ PANTOPRAZOLE SODIUM, VIA: HCPCS | Performed by: STUDENT IN AN ORGANIZED HEALTH CARE EDUCATION/TRAINING PROGRAM

## 2024-04-16 PROCEDURE — 82947 ASSAY GLUCOSE BLOOD QUANT: CPT

## 2024-04-16 PROCEDURE — 36430 TRANSFUSION BLD/BLD COMPNT: CPT

## 2024-04-16 PROCEDURE — 85027 COMPLETE CBC AUTOMATED: CPT

## 2024-04-16 PROCEDURE — 2500000001 HC RX 250 WO HCPCS SELF ADMINISTERED DRUGS (ALT 637 FOR MEDICARE OP): Performed by: STUDENT IN AN ORGANIZED HEALTH CARE EDUCATION/TRAINING PROGRAM

## 2024-04-16 PROCEDURE — 80053 COMPREHEN METABOLIC PANEL: CPT

## 2024-04-16 PROCEDURE — 94660 CPAP INITIATION&MGMT: CPT

## 2024-04-16 PROCEDURE — 37799 UNLISTED PX VASCULAR SURGERY: CPT

## 2024-04-16 PROCEDURE — 85025 COMPLETE CBC W/AUTO DIFF WBC: CPT

## 2024-04-16 PROCEDURE — 99233 SBSQ HOSP IP/OBS HIGH 50: CPT

## 2024-04-16 PROCEDURE — 2060000001 HC INTERMEDIATE ICU ROOM DAILY

## 2024-04-16 PROCEDURE — 84100 ASSAY OF PHOSPHORUS: CPT

## 2024-04-16 PROCEDURE — 2500000001 HC RX 250 WO HCPCS SELF ADMINISTERED DRUGS (ALT 637 FOR MEDICARE OP): Performed by: NURSE PRACTITIONER

## 2024-04-16 PROCEDURE — 83735 ASSAY OF MAGNESIUM: CPT

## 2024-04-16 RX ORDER — HYDROMORPHONE HYDROCHLORIDE 1 MG/ML
0.2 INJECTION, SOLUTION INTRAMUSCULAR; INTRAVENOUS; SUBCUTANEOUS ONCE AS NEEDED
Status: DISCONTINUED | OUTPATIENT
Start: 2024-04-16 | End: 2024-04-19

## 2024-04-16 RX ORDER — MAGNESIUM SULFATE HEPTAHYDRATE 40 MG/ML
2 INJECTION, SOLUTION INTRAVENOUS ONCE
Status: COMPLETED | OUTPATIENT
Start: 2024-04-16 | End: 2024-04-16

## 2024-04-16 RX ORDER — POTASSIUM CHLORIDE 1.5 G/1.58G
40 POWDER, FOR SOLUTION ORAL ONCE
Status: COMPLETED | OUTPATIENT
Start: 2024-04-16 | End: 2024-04-16

## 2024-04-16 RX ADMIN — MAGNESIUM SULFATE HEPTAHYDRATE 2 G: 40 INJECTION, SOLUTION INTRAVENOUS at 08:01

## 2024-04-16 RX ADMIN — Medication 5 MG: at 18:12

## 2024-04-16 RX ADMIN — FOLIC ACID 1 MG: 1 TABLET ORAL at 08:43

## 2024-04-16 RX ADMIN — ATORVASTATIN CALCIUM 40 MG: 40 TABLET, FILM COATED ORAL at 08:43

## 2024-04-16 RX ADMIN — PANTOPRAZOLE SODIUM 40 MG: 40 INJECTION, POWDER, FOR SOLUTION INTRAVENOUS at 06:26

## 2024-04-16 RX ADMIN — MIDODRINE HYDROCHLORIDE 5 MG: 5 TABLET ORAL at 22:41

## 2024-04-16 RX ADMIN — ENOXAPARIN SODIUM 40 MG: 100 INJECTION SUBCUTANEOUS at 02:16

## 2024-04-16 RX ADMIN — MIDODRINE HYDROCHLORIDE 5 MG: 5 TABLET ORAL at 08:43

## 2024-04-16 RX ADMIN — Medication 200 MCG: at 08:43

## 2024-04-16 RX ADMIN — POTASSIUM CHLORIDE 40 MEQ: 1.5 POWDER, FOR SOLUTION ORAL at 08:01

## 2024-04-16 ASSESSMENT — COGNITIVE AND FUNCTIONAL STATUS - GENERAL
HELP NEEDED FOR BATHING: TOTAL
TOILETING: TOTAL
DRESSING REGULAR LOWER BODY CLOTHING: TOTAL
DRESSING REGULAR UPPER BODY CLOTHING: TOTAL
STANDING UP FROM CHAIR USING ARMS: TOTAL
CLIMB 3 TO 5 STEPS WITH RAILING: TOTAL
DAILY ACTIVITIY SCORE: 7
MOVING TO AND FROM BED TO CHAIR: TOTAL
TOILETING: TOTAL
HELP NEEDED FOR BATHING: TOTAL
MOVING TO AND FROM BED TO CHAIR: TOTAL
DRESSING REGULAR UPPER BODY CLOTHING: TOTAL
EATING MEALS: A LOT
PERSONAL GROOMING: TOTAL
MOBILITY SCORE: 6
PERSONAL GROOMING: TOTAL
MOVING FROM LYING ON BACK TO SITTING ON SIDE OF FLAT BED WITH BEDRAILS: TOTAL
MOBILITY SCORE: 6
STANDING UP FROM CHAIR USING ARMS: TOTAL
WALKING IN HOSPITAL ROOM: TOTAL
DAILY ACTIVITIY SCORE: 7
TURNING FROM BACK TO SIDE WHILE IN FLAT BAD: TOTAL
TURNING FROM BACK TO SIDE WHILE IN FLAT BAD: TOTAL
CLIMB 3 TO 5 STEPS WITH RAILING: TOTAL
WALKING IN HOSPITAL ROOM: TOTAL
EATING MEALS: A LOT
DRESSING REGULAR LOWER BODY CLOTHING: TOTAL
MOVING FROM LYING ON BACK TO SITTING ON SIDE OF FLAT BED WITH BEDRAILS: TOTAL

## 2024-04-16 ASSESSMENT — PAIN - FUNCTIONAL ASSESSMENT
PAIN_FUNCTIONAL_ASSESSMENT: 0-10

## 2024-04-16 ASSESSMENT — PAIN SCALES - GENERAL
PAINLEVEL_OUTOF10: 0 - NO PAIN

## 2024-04-16 NOTE — CONSULTS
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      Pertinent Labs:   Albumin   Date Value Ref Range Status   04/16/2024 1.6 (L) 3.4 - 5.0 g/dL Final       Wound Assessment:  Wound 04/04/24 Axilla Left (Active)   Wound Image   04/06/24 2114   Shape dressing dry and intact 04/15/24 1900   State of Healing Early/partial granulation 04/15/24 1900   Margins Well-defined edges 04/05/24 0300       Wound 04/04/24 Penis (Active)   Wound Image   04/06/24 2237   Shape MARIA ISABEL 04/15/24 1900   State of Healing Early/partial granulation;Healing ridge 04/15/24 1900       Wound 04/05/24 Perineum (Active)   Wound Image   04/06/24 2240   Shape reddened 04/15/24 1900   Margins Poorly defined 04/05/24 0300       Wound 04/05/24 Coccyx (Active)   Wound Image   04/06/24 2240   Site Assessment [place] 04/15/24 1900   Shape irregular 04/15/24 1900   State of Healing Early/partial granulation 04/15/24 1900   Margins Well-defined edges 04/05/24 0300   Dressing Foam 04/16/24 1600   Dressing Changed Changed 04/16/24 0200   Dressing Status Clean;Dry;Occlusive 04/15/24 1900       Wound 04/05/24 Incision Pretibial Right (Active)   Wound Image   04/16/24 1151   Site Assessment [place];Purple 04/15/24 1900   Carly-Wound Assessment Unable to assess 04/15/24 1000   Wound Length (cm) 7 cm 04/16/24 1151   Wound Width (cm) 7 cm 04/16/24 1151   Wound Surface Area (cm^2) 49 cm^2 04/16/24 1151   Wound Depth (cm) 0.3 cm 04/16/24 1151   Wound Volume (cm^3) 14.7 cm^3 04/16/24 1151   State of Healing Non-healing 04/16/24 1151   Margins Well-defined edges 04/05/24 1224   Closure None 04/15/24 1900   Drainage Description Purulent 04/13/24 2300   Drainage Amount Scant 04/16/24 0200   Dressing ABD;Kerlix/rolled gauze 04/16/24  1600   Dressing Changed Changed 04/16/24 0200   Dressing Status Clean;Dry;Occlusive 04/16/24 1600       Wound 04/05/24 Incision Knee Left;Anterior (Active)   Wound Image   04/16/24 1153   Site Assessment [place] 04/15/24 1900   Carly-Wound Assessment [place] 04/15/24 1900   Wound Length (cm) 13 cm 04/16/24 1153   Wound Width (cm) 11 cm 04/16/24 1153   Wound Surface Area (cm^2) 143 cm^2 04/16/24 1153   Wound Depth (cm) 0.2 cm 04/16/24 1153   Wound Volume (cm^3) 28.6 cm^3 04/16/24 1153   Closure None 04/15/24 1900   Drainage Description Serosanguineous 04/16/24 0200   Drainage Amount Small 04/16/24 0200   Dressing ABD;Kerlix/rolled gauze 04/16/24 1600   Dressing Changed Changed 04/16/24 0200   Dressing Status Clean;Dry;Occlusive 04/16/24 1600       Wound Team Summary Assessment:      Wound vac applied to right amp site and left amp site. Wounds cleansed. Mepitel to wound bed, white foam over exposed bone, then black foam. Wound vac set at 100mmHg low continuous pressure. Would recommend changing 2 x /week. Next dressing change on Friday      Wound Team Plan: Wound Team will follow      Munira CERVANTES   4/16/2024  7:15 PM

## 2024-04-16 NOTE — PROGRESS NOTES
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         Recommendations:  NPO. Continued alternative means of nutrition  Frequent, aggressive oral care as tolerated to improve infection control, as well as to reduce dental plaque and bacteria on oropharyngeal surfaces which may increase the risk nosocomial infections, including pneumonia.    OK for small amounts of ice chips (one at a time, 10x/hour) for oral comfort and to prevent swallow disuse atrophy, but only after aggressive oral care to avoid colonization of bacteria within the oral cavity.   Modified Barium Swallow Study for further assessment of oropharyngeal swallow and to guide diet recommendations. Will D/w team    Plan:  SLP Frequency: 2x per week      Subjective   Pt resting in bed upon arrival. Appears to be managing secretions better than prior sessions (e.g., wet cough observed less, less mucous observed in oral cavity). Pt A&Ox4, but was slow to respond. Followed one-step directions.Large bore NGT in left nares            Objective       Therapeutic Swallow:       Trialed ice chips, tsp water, straw sips water, applesauce, Yovana 3 oz protocol.    Oral phase:  No anterior loss of bolus. Efficient bolus preparation, control and transit, subjectively.    Pharyngeal Phase: s/s of aspiration noted (I.e., occasional wet cough, throat clearing). Pharyngeal swallow observed, subjectively.  Pt passed Yovana 3 oz  protocol. However, he did clear his throat after ~45 seconds. Ate 2 oz apple sauce with one coughing episode after approximately minute.     Voice: Improved vocal quality and volume during today's session vs last assessment               Encounter Problems       Encounter Problems (Active)       Swallowing       LTG - Patient will tolerate the least restrictive diet without overt difficulty by time of discharge.       Start:  04/16/24    Expected End:  04/30/24            STG - Patient will participate in a Modified Barium Swallow Study- the pt and/or caregiver will verbally demonstrate understanding to 100% acc on follow up visit       Start:  04/16/24    Expected End:  04/30/24            SLP Goal 1       Start:  04/16/24    Expected End:  04/30/24       Pt participate in ongoing assessment, including successfully completion of 3 oz protocol without overt indicators of aspiration on 100% of attempts                Inpatient Education:         Inpatient:  Education Documentation  No documentation found.  Education Comments  No comments found.

## 2024-04-16 NOTE — CARE PLAN
Problem: Pain  Goal: My pain/discomfort is manageable  Outcome: Met  Flowsheets (Taken 4/14/2024 0830 by Kaci Moore RN)  Resident's pain/discomfort is manageable: Administer pain medication prior to activities that may trigger pain     Problem: Safety  Goal: Patient will be injury free during hospitalization  Outcome: Met  Goal: I will remain free of falls  Outcome: Met     Problem: Safety - Medical Restraint  Goal: Free from restraint(s) (Restraint for Interference with Medical Device)  Outcome: Met  Flowsheets (Taken 4/7/2024 0743 by Demond Delgadillo RN)  Free from restraint(s) (restraint for interference with medical device): ONCE/SHIFT or MINIMUM Every 12 hours: Assess and document the continuing need for restraints     Problem: Respiratory  Goal: Minimal/no exertional discomfort or dyspnea this shift  Outcome: Met  Flowsheets (Taken 4/16/2024 0636)  Minimal/no exertional discomfort or dyspnea this shift: Positioning to promote ventilation/comfort  Goal: Patent airway maintained this shift  Outcome: Met

## 2024-04-16 NOTE — PROGRESS NOTES
SOCIAL WORK NOTE   SW met with team for interdisciplinary rounds. Patient is improving and is pending SDU. O2 needs have decreased. He is in need of continued PT/OT post amputation. Patient is currently recommended for moderate  intensity therapy. Social work to follow.  TERENCE Tucker, LISW-S ([id number])

## 2024-04-16 NOTE — CARE PLAN
Problem: Safety - Medical Restraint  Goal: Remains free of injury from restraints (Restraint for Interference with Medical Device)  Outcome: Progressing  Goal: Free from restraint(s) (Restraint for Interference with Medical Device)  Outcome: Progressing     Problem: Skin  Goal: Participates in plan/prevention/treatment measures  Outcome: Progressing  Flowsheets (Taken 4/16/2024 6993)  Participates in plan/prevention/treatment measures: Discuss with provider PT/OT consult

## 2024-04-16 NOTE — PROGRESS NOTES
ICU to SDU Transfer Summary     I:  ICU Admission Reason & Brief ICU Course:    Mr. Sandhu is a 74 YOM with PMH HTN, DLD, COPD, CAD, PAD s/p revascularization procedures to b/l LE (iliofemoral endarterectomy and angioplasty 8/21/23; left CFA/SFA arterectomy and SFA, YISEL/EIA stent 05/2020) presenting to  [facility] 2/2 CAP vs colitis, septic shock, and encephalopathy. Patient started on vanc/zosyn for CAP and continued on Levophed, initially requiring 2nd pressor (vaso). Vascular surgery was consulted on admission due to b/l LE gangrene contributing to escalating pressor requirement. Underwent emergent right BKA and 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, planning for revision 4/16. Started midodrine 5 mg TID and weaned off pressors 4/13. Was also stress dosed hydrocortisone (50 q6 4/5-4/7 > 40 q8 4/7-4/9). Sputum cultures 4/5 growing Corneybacterium striatum group, continued on vanc/zosyn with initial plan for 7 day treatment (4/5-4/12) with repeat sputum cultures 4/10 NGTD. Extubated 4/8, weaned to as low as 4L NC but with escalating O2 requirements to 10L NC , with uptrending leukocytosis from 13 to 19. CXR 4/13 with complete opacification of right lung thought 2/2 mucus plugging, vanc/zosyn restarted and aggressive BPH initiated with repeat CXR with improved oxygenation. Left CVC removed and WBC downtrended to 14.3, vanc/zosyn discontinued. 4/15 patient underwent right-sided thora with 1L off, transudative by Light's criteria. Patient also requiring multiple transfusions of PRBC (4/8: H/H 5.5 s/p 2uPRBC; 4/13: H/H 6.7 s/p 1uPRBC, 4/16: H/H 7.6 s/p 1uPRBC) thought to be partially due to oozing from surgical site vs underlying coagulopathy i/s/o liver injury due to heavy EtOH use vs sepsis. Patient also with hypernatremia, treating with intermittent FWF. Plan for revision of amputations with vascular surgery pending resolution of leukocytosis.      C: Code Status/DPOA Info/Goals of Care/ACP Note     DNR  DPOA/Contact Number: Pura Sandhu (wife) [phone]    U: Unprescribing & Pertinent High-Risk Medications   Changes to home meds: stopped ASA, Plavix, Cozaar, metoprolol succinate, KCl   **unclear whether patient actually taking these at home, per pharmacy med rec on admission patient has not refilled these medications in a while     Anticoagulation: Yes - Lovenox    Antibiotics:   [x] N/A - no current planned antimicrobioals    P: Pending Tests at the Time of Transfer   None      A: Active consultants, including Rehab:   []  Subspecialty Consultants:  vascular surgery  [x]  PT  [x]  OT  []  SLP  []  Wound Care    U: Uncertainty Measure/Diagnostic Pause:    Working diagnosis at the time of transfer AHRF 2/2 CAP though ddx includes AHRF 2/2 pleural effusion vs mucus plug;     Diagnosis Degree of Certainty: 2. Some uncertainty about the clinical diagnosis.     S: Summary of Major Problems and To-Dos:   Updates 4/16  - 4/15 thora with 1L off, transudative by light's criteria  - 4/15 TTE: EF 70-75%, impaired relaxation of LV, RVSP mildly elevated 34.8  - H/H drop 8.5 > 7.6, no s/s active bleed on exam, transfuse 1u PRBC for Hb goal > 8 per vasc surg  - WBC downtrending 19.3 > 14.3 s/p removal of left IJ CVC  - aggressive BPH: IPV and cough assist q4h   - O2 requirement down to 2L NC    To-do list prior for SDU:  []Follow post-transfusion CBC for Hb goal > 8  []Follow 4/15 sputum culture results   []Follow 4/15 pleural fluid culture results  []Vascular surgery recs RE: plan for formalization of BKA   []Continue with aggressive BPH  []Consider addition of GDMT for HFpEF as BP allows (SGLT2i, MRA)     NEURO:  #Acute Encephalopathy (resolving)   :: Mental status improving, patient alert and oriented to person, place and time.  :: Vitamin B12, folate WNL. TSH 7.61(H) but fT4 WNL, suggesting subclinical hypothyroidism   - Delirium precautions  - Continue to monitor   - Continue melatonin at nighttime      #C/F  right basal ganglia ischemia  :: CT head 4/5, 4/10 without evidence of right basal ganglia ischemia   - CTM     CARDIO:  #Undifferentiated shock, likely septic (resolved)  #HFpEF  :: TTE 08/2023: poor quality, unable to assess EF   :: TTE 4/15/24: EF 70-75%, impaired relaxation of LV, RVSP mildly elevated 34.8  :: off pressors 4/13 AM  - Continue midodrine 5mg TID (given bilateral amputation expect baseline BP to be lower)   - goal MAP >65 if mentation well and no s/s of malperfusion      #CAD  #HTN  #PAD  #L AKA and R BKA   :: s/p right BKA and 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 i/s/o b/l LE gangrene  - Vascular surgery consulted, appreciate recs, holding off on formalization of BKA to AKA pending clinical improvement   - Continue Lipitor  - Holding antihypertensives in setting of requiring midodrine     PULM:  #Opacification of right hemithorax (improving)  :: Whiteout likely 2/2 mucus plugging  :: CXR with improvement in right sided opacification  - aggressive BPH: IPV and cough assist q4h      #CAP  #AHRF 2/2 CAP   #Pleural Effusion  :: Thora 4/2 draining 1L on left with labs suggesting transudative effusion  :: Thora 4/15 draining 1L on right with labs suggesting transudative effusion  :: RCx 4/5 Corneybacterium > RCx 4/10 NGTD  :: Vancomycin/Zosyn (4/5-4/12) (4/13-4/15)  - follow 4/15 sputum cx off Abx     #COPD (not in exacerbation)  :: Current everyday tobacco smoker  :: No pfts on file  -on 2L NC      GI:  #Transaminitis (improving)  #Fatty infiltration of liver on CT abdomen  #Colitis  #GERD  #Concern for GI bleed  :: RUQ 4/5: hepatic steatosis and edematous gallbladder, equivocal i/s/o volume overload  :: RUQ 4/10: hepatic steatosis, nondistended and nonedematous gallbladder   - C/w IV PPI     RENAL/:  #Hypernatremia (resolved)  -CTM     HEME/ONC  #Chronic Normocytic Anemia  #Coagulopathy, resolved  :: on admission INR 1.8(H) & PTT 46(H) > INR 1.0 & PTT 31 on 4/13  :: hemolysis labs WNL, fibrinogen 550(H)  -Keep active type  and screen  -Transfuse for Hgb < 8 per vascular medicine   -Maintain 2 large bore pIVs      ENDO:  -No active issues to address      MSK/Rheum:  -No active issues to address      ID:  #C/f Septic Shock 2/2 CAP (resolved)  #Leukocytosis (improving)  :: Abx history: Azithromycin 500 mg daily (4/1 - 4/4), Ceftriaxone (3/31 - 4/5), Doxycycline (4/4 - 4/5), Flagyl (3/25 - 4/5), Vancomycin/Zosyn (4/5-4/12, 4/13-4/15)  :: BCx 4/5 NGTD  :: RCx 4/5 Corneybacterium > 4/10 NGTD  PLAN:  - dc vanc/zosyn since suspect leukocytosis 2/2 atelectasis and mucus plugging      F: PRN  E: PRN   N: NPO; TF  A: PIV 20Gx2  Drains: Tucker, NG, [facility]ac[facility]  Bowel regimen: Miralax & Senna  Drips: None  DVT ppx: Lovenox  GI ppx: PPI      Code Status: DNR, ok for intubation    LAYO RIVERA (Spouse)  [phone] (Mobile)     E: Exam, including Lines/Drains/Airways & Data Review:   General: awake, alert, somewhat lethargic, in no acute distress, appears stated age  HEENT: NCAT, EOMI, no scleral icterus or conjunctivitis  Chest: ctab, normal respiratory effort, on 2L NC  Cardiac: regular rate and rhythm, normal s1, s2, no M/R/G  Abdomen: soft, ND, NT, no involuntary guarding  : no CVA tenderness, Tucker catheter in place draining light yellow urine, [facility] in place with no stool in bag  EXT: right BKA and left AKA wrapped, no strikethrough noted, left AKA with minimal serosanguinous drainage  MSK: no focal joint swelling noted  Skin: no suspect lesions or rashes noted on visible skin  Neuro: AOx2, moving all limbs spontaneously, follows commands  Psych: coherent thought process, appropriate mood and affect    Difficult airway? N/A  Lines/drains assessed for removal? Yes, describe: removing left radial A-line today    Within 30 minutes of the patient physically leaving the floor, a Floor Readiness Note needs to be placed with updated vitals.

## 2024-04-16 NOTE — SIGNIFICANT EVENT
Floor Readiness Note       I, personally, evaluated Ry Sandhu prior to transfer to the floor, including reviewing all current laboratory and imaging studies. The patient remains appropriate for transfer to the floor. Bedside nurse and respiratory therapy are also in agreement of patient's readiness for the floor.     Brief summary:  Ry Sandhu is a 74 y.o. male who was admitted to the MICU on 4/4 for AHRF 2/2 CAP vs colitis, septic shock, and encephalopathy . They have been treated with antibiotics, pressors and thoracentesis.     Updated focused Physical Exam:  General: in no acute distress, sleeping   CV: regular rate and rhythm   Resp: breathing comfortably on 5L   Abdomen: soft, nondistended, nontender     Current Vital Signs:  Visit Vitals  /69   Pulse (!) 111   Temp 36.5 °C (97.7 °F) (Temporal)   Resp (!) 28      Relevant updates since rounds:  none    Accepting team,  SDU , received verbal sign out and the Provider Care team/Attending has been updated. Bedside nurse will now call accepting nurse for report and patient will be transferred to [place] [id number].    Alma Wakefield MD

## 2024-04-16 NOTE — CARE PLAN
Problem: Skin  Goal: Decreased wound size/increased tissue granulation at next dressing change  Outcome: Met  Flowsheets (Taken 4/14/2024 2045 by Elijah Anderson RN)  Decreased wound size/increased tissue granulation at next dressing change:   Protective dressings over bony prominences   Utilize specialty bed per algorithm   Promote sleep for wound healing     Problem: Skin  Goal: Prevent/manage excess moisture  Outcome: Met  Flowsheets (Taken 4/14/2024 0830 by Kaci Moore, RN)  Prevent/manage excess moisture:   Cleanse incontinence/protect with barrier cream   Monitor for/manage infection if present     Problem: Skin  Goal: Prevent/minimize sheer/friction injuries  Outcome: Met  Flowsheets (Taken 4/14/2024 0830 by Kaci Moore, SURYA)  Prevent/minimize sheer/friction injuries: Complete micro-shifts as needed if patient unable. Adjust patient position to relieve pressure points, not a full turn     Problem: Skin  Goal: Promote/optimize nutrition  Outcome: Met  Flowsheets (Taken 4/14/2024 0830 by Kaci Moore, RN)  Promote/optimize nutrition: Monitor/record intake including meals     Problem: Skin  Goal: Promote skin healing  Outcome: Met  Flowsheets (Taken 4/14/2024 0830 by Kaci Moore, RN)  Promote skin healing: Protective dressings over bony prominences

## 2024-04-17 ENCOUNTER — APPOINTMENT (OUTPATIENT)
Dept: RADIOLOGY | Facility: HOSPITAL | Age: 74
DRG: 853 | End: 2024-04-17
Payer: MEDICARE

## 2024-04-17 LAB
ABO GROUP (TYPE) IN BLOOD: NORMAL
ALBUMIN SERPL BCP-MCNC: 1.6 G/DL (ref 3.4–5)
ALP SERPL-CCNC: 256 U/L (ref 33–136)
ALT SERPL W P-5'-P-CCNC: 20 U/L (ref 10–52)
ANION GAP SERPL CALC-SCNC: 8 MMOL/L (ref 10–20)
ANTIBODY SCREEN: NORMAL
AST SERPL W P-5'-P-CCNC: 23 U/L (ref 9–39)
BACTERIA SPEC RESP CULT: NO GROWTH
BASOPHILS # BLD AUTO: 0.01 X10*3/UL (ref 0–0.1)
BASOPHILS NFR BLD AUTO: 0.1 %
BILIRUB SERPL-MCNC: 0.4 MG/DL (ref 0–1.2)
BUN SERPL-MCNC: 14 MG/DL (ref 6–23)
CALCIUM SERPL-MCNC: 7.4 MG/DL (ref 8.6–10.6)
CHLORIDE SERPL-SCNC: 114 MMOL/L (ref 98–107)
CO2 SERPL-SCNC: 28 MMOL/L (ref 21–32)
CREAT SERPL-MCNC: 0.46 MG/DL (ref 0.5–1.3)
EGFRCR SERPLBLD CKD-EPI 2021: >90 ML/MIN/1.73M*2
EOSINOPHIL # BLD AUTO: 0.09 X10*3/UL (ref 0–0.4)
EOSINOPHIL NFR BLD AUTO: 0.8 %
ERYTHROCYTE [DISTWIDTH] IN BLOOD BY AUTOMATED COUNT: 16.3 % (ref 11.5–14.5)
GLUCOSE BLD MANUAL STRIP-MCNC: 105 MG/DL (ref 74–99)
GLUCOSE BLD MANUAL STRIP-MCNC: 106 MG/DL (ref 74–99)
GLUCOSE BLD MANUAL STRIP-MCNC: 124 MG/DL (ref 74–99)
GLUCOSE SERPL-MCNC: 110 MG/DL (ref 74–99)
GRAM STN SPEC: NORMAL
GRAM STN SPEC: NORMAL
HCT VFR BLD AUTO: 23.2 % (ref 41–52)
HGB BLD-MCNC: 7.8 G/DL (ref 13.5–17.5)
IMM GRANULOCYTES # BLD AUTO: 0.08 X10*3/UL (ref 0–0.5)
IMM GRANULOCYTES NFR BLD AUTO: 0.7 % (ref 0–0.9)
LYMPHOCYTES # BLD AUTO: 0.48 X10*3/UL (ref 0.8–3)
LYMPHOCYTES NFR BLD AUTO: 4.1 %
MAGNESIUM SERPL-MCNC: 1.83 MG/DL (ref 1.6–2.4)
MCH RBC QN AUTO: 30.7 PG (ref 26–34)
MCHC RBC AUTO-ENTMCNC: 33.6 G/DL (ref 32–36)
MCV RBC AUTO: 91 FL (ref 80–100)
MONOCYTES # BLD AUTO: 0.48 X10*3/UL (ref 0.05–0.8)
MONOCYTES NFR BLD AUTO: 4.1 %
NEUTROPHILS # BLD AUTO: 10.43 X10*3/UL (ref 1.6–5.5)
NEUTROPHILS NFR BLD AUTO: 90.2 %
NRBC BLD-RTO: 0 /100 WBCS (ref 0–0)
PHOSPHATE SERPL-MCNC: 2.3 MG/DL (ref 2.5–4.9)
PLATELET # BLD AUTO: 316 X10*3/UL (ref 150–450)
POTASSIUM SERPL-SCNC: 4.1 MMOL/L (ref 3.5–5.3)
PROT SERPL-MCNC: 3.8 G/DL (ref 6.4–8.2)
RBC # BLD AUTO: 2.54 X10*6/UL (ref 4.5–5.9)
RH FACTOR (ANTIGEN D): NORMAL
SODIUM SERPL-SCNC: 146 MMOL/L (ref 136–145)
WBC # BLD AUTO: 11.6 X10*3/UL (ref 4.4–11.3)

## 2024-04-17 PROCEDURE — 74018 RADEX ABDOMEN 1 VIEW: CPT | Performed by: RADIOLOGY

## 2024-04-17 PROCEDURE — 2500000001 HC RX 250 WO HCPCS SELF ADMINISTERED DRUGS (ALT 637 FOR MEDICARE OP): Performed by: NURSE PRACTITIONER

## 2024-04-17 PROCEDURE — 36415 COLL VENOUS BLD VENIPUNCTURE: CPT | Performed by: NURSE PRACTITIONER

## 2024-04-17 PROCEDURE — 97110 THERAPEUTIC EXERCISES: CPT | Mod: GP

## 2024-04-17 PROCEDURE — 97530 THERAPEUTIC ACTIVITIES: CPT | Mod: GP

## 2024-04-17 PROCEDURE — 74018 RADEX ABDOMEN 1 VIEW: CPT

## 2024-04-17 PROCEDURE — 99233 SBSQ HOSP IP/OBS HIGH 50: CPT | Performed by: INTERNAL MEDICINE

## 2024-04-17 PROCEDURE — 2500000004 HC RX 250 GENERAL PHARMACY W/ HCPCS (ALT 636 FOR OP/ED): Performed by: NURSE PRACTITIONER

## 2024-04-17 PROCEDURE — 74230 X-RAY XM SWLNG FUNCJ C+: CPT

## 2024-04-17 PROCEDURE — 74230 X-RAY XM SWLNG FUNCJ C+: CPT | Performed by: RADIOLOGY

## 2024-04-17 PROCEDURE — 92611 MOTION FLUOROSCOPY/SWALLOW: CPT | Mod: GN | Performed by: SPEECH-LANGUAGE PATHOLOGIST

## 2024-04-17 PROCEDURE — 84100 ASSAY OF PHOSPHORUS: CPT | Performed by: NURSE PRACTITIONER

## 2024-04-17 PROCEDURE — 94668 MNPJ CHEST WALL SBSQ: CPT

## 2024-04-17 PROCEDURE — 5A09357 ASSISTANCE WITH RESPIRATORY VENTILATION, LESS THAN 24 CONSECUTIVE HOURS, CONTINUOUS POSITIVE AIRWAY PRESSURE: ICD-10-PCS | Performed by: INTERNAL MEDICINE

## 2024-04-17 PROCEDURE — 83735 ASSAY OF MAGNESIUM: CPT | Performed by: NURSE PRACTITIONER

## 2024-04-17 PROCEDURE — 86920 COMPATIBILITY TEST SPIN: CPT

## 2024-04-17 PROCEDURE — 86901 BLOOD TYPING SEROLOGIC RH(D): CPT | Performed by: NURSE PRACTITIONER

## 2024-04-17 PROCEDURE — 85025 COMPLETE CBC W/AUTO DIFF WBC: CPT | Performed by: NURSE PRACTITIONER

## 2024-04-17 PROCEDURE — C9113 INJ PANTOPRAZOLE SODIUM, VIA: HCPCS | Performed by: NURSE PRACTITIONER

## 2024-04-17 PROCEDURE — 82947 ASSAY GLUCOSE BLOOD QUANT: CPT

## 2024-04-17 PROCEDURE — 80053 COMPREHEN METABOLIC PANEL: CPT | Performed by: NURSE PRACTITIONER

## 2024-04-17 PROCEDURE — 3430000001 HC RX 343 DIAGNOSTIC RADIOPHARMACEUTICALS: Performed by: NURSE PRACTITIONER

## 2024-04-17 PROCEDURE — 2060000001 HC INTERMEDIATE ICU ROOM DAILY

## 2024-04-17 PROCEDURE — 94660 CPAP INITIATION&MGMT: CPT

## 2024-04-17 RX ORDER — MIDODRINE HYDROCHLORIDE 5 MG/1
5 TABLET ORAL 3 TIMES DAILY
Status: DISCONTINUED | OUTPATIENT
Start: 2024-04-17 | End: 2024-04-21

## 2024-04-17 RX ORDER — FOLIC ACID 1 MG/1
1 TABLET ORAL DAILY
Status: DISCONTINUED | OUTPATIENT
Start: 2024-04-17 | End: 2024-04-30 | Stop reason: HOSPADM

## 2024-04-17 RX ORDER — POLYETHYLENE GLYCOL 3350 17 G/17G
17 POWDER, FOR SOLUTION ORAL DAILY PRN
Status: DISCONTINUED | OUTPATIENT
Start: 2024-04-17 | End: 2024-04-30 | Stop reason: HOSPADM

## 2024-04-17 RX ORDER — LOPERAMIDE HCL 1MG/7.5ML
2 LIQUID (ML) ORAL 4 TIMES DAILY PRN
Status: DISCONTINUED | OUTPATIENT
Start: 2024-04-17 | End: 2024-04-30 | Stop reason: HOSPADM

## 2024-04-17 RX ORDER — ATORVASTATIN CALCIUM 40 MG/1
40 TABLET, FILM COATED ORAL DAILY
Status: DISCONTINUED | OUTPATIENT
Start: 2024-04-17 | End: 2024-04-30 | Stop reason: HOSPADM

## 2024-04-17 RX ORDER — ERGOCALCIFEROL (VITAMIN D2) 200 MCG/ML
200 DROPS ORAL DAILY
Status: DISCONTINUED | OUTPATIENT
Start: 2024-04-17 | End: 2024-04-30 | Stop reason: HOSPADM

## 2024-04-17 RX ORDER — ACETAMINOPHEN 160 MG/5ML
650 SOLUTION ORAL EVERY 6 HOURS PRN
Status: DISCONTINUED | OUTPATIENT
Start: 2024-04-17 | End: 2024-04-30 | Stop reason: HOSPADM

## 2024-04-17 RX ORDER — ACETAMINOPHEN 500 MG
5 TABLET ORAL DAILY
Status: DISCONTINUED | OUTPATIENT
Start: 2024-04-17 | End: 2024-04-30 | Stop reason: HOSPADM

## 2024-04-17 RX ADMIN — BARIUM SULFATE 30 ML: 400 PASTE ORAL at 10:39

## 2024-04-17 RX ADMIN — FOLIC ACID 1 MG: 1 TABLET ORAL at 08:42

## 2024-04-17 RX ADMIN — BARIUM SULFATE 30 ML: 400 SUSPENSION ORAL at 10:40

## 2024-04-17 RX ADMIN — ENOXAPARIN SODIUM 40 MG: 100 INJECTION SUBCUTANEOUS at 02:03

## 2024-04-17 RX ADMIN — ATORVASTATIN CALCIUM 40 MG: 40 TABLET, FILM COATED ORAL at 08:42

## 2024-04-17 RX ADMIN — OXYCODONE HYDROCHLORIDE 5 MG: 5 TABLET ORAL at 14:53

## 2024-04-17 RX ADMIN — Medication 200 MCG: at 08:42

## 2024-04-17 RX ADMIN — PANTOPRAZOLE SODIUM 40 MG: 40 INJECTION, POWDER, FOR SOLUTION INTRAVENOUS at 06:27

## 2024-04-17 RX ADMIN — MIDODRINE HYDROCHLORIDE 5 MG: 5 TABLET ORAL at 08:41

## 2024-04-17 RX ADMIN — BARIUM SULFATE 30 ML: 0.81 POWDER, FOR SUSPENSION ORAL at 10:40

## 2024-04-17 ASSESSMENT — PAIN - FUNCTIONAL ASSESSMENT
PAIN_FUNCTIONAL_ASSESSMENT: 0-10

## 2024-04-17 ASSESSMENT — COGNITIVE AND FUNCTIONAL STATUS - GENERAL
CLIMB 3 TO 5 STEPS WITH RAILING: TOTAL
WALKING IN HOSPITAL ROOM: TOTAL
DAILY ACTIVITIY SCORE: 6
WALKING IN HOSPITAL ROOM: TOTAL
MOVING FROM LYING ON BACK TO SITTING ON SIDE OF FLAT BED WITH BEDRAILS: A LOT
DRESSING REGULAR LOWER BODY CLOTHING: TOTAL
TURNING FROM BACK TO SIDE WHILE IN FLAT BAD: TOTAL
EATING MEALS: TOTAL
CLIMB 3 TO 5 STEPS WITH RAILING: TOTAL
MOVING FROM LYING ON BACK TO SITTING ON SIDE OF FLAT BED WITH BEDRAILS: A LOT
TURNING FROM BACK TO SIDE WHILE IN FLAT BAD: A LOT
MOVING TO AND FROM BED TO CHAIR: TOTAL
HELP NEEDED FOR BATHING: TOTAL
MOBILITY SCORE: 8
STANDING UP FROM CHAIR USING ARMS: TOTAL
STANDING UP FROM CHAIR USING ARMS: TOTAL
MOBILITY SCORE: 7
TOILETING: TOTAL
PERSONAL GROOMING: TOTAL
DRESSING REGULAR UPPER BODY CLOTHING: TOTAL
MOVING TO AND FROM BED TO CHAIR: TOTAL

## 2024-04-17 ASSESSMENT — PAIN SCALES - GENERAL
PAINLEVEL_OUTOF10: 0 - NO PAIN
PAINLEVEL_OUTOF10: 2
PAINLEVEL_OUTOF10: 0 - NO PAIN
PAINLEVEL_OUTOF10: 7
PAINLEVEL_OUTOF10: 7

## 2024-04-17 ASSESSMENT — PAIN SCALES - WONG BAKER
WONGBAKER_NUMERICALRESPONSE: NO HURT
WONGBAKER_NUMERICALRESPONSE: HURTS LITTLE BIT

## 2024-04-17 NOTE — SIGNIFICANT EVENT
Rapid Response RN responding for RADAR score of 7 due to the following VS: T 36.6 °Celsius;  ; RR 32; /52; SPO2 98% .      Reviewed abnormal VS with bedside RN who expressed no concerns regarding the patient's current condition based upon these.  No interventions by Rapid Response team indicated at this time.

## 2024-04-17 NOTE — SIGNIFICANT EVENT
Vascular surgery plan of care update    Patient is pending formalization of guillotine amputations to bilateral above-knee amputations. The stumps are clean and currently have wound vac therapy bilaterally. Formalization is elective; in this patient's case, nutrition optimization is needed prior to formalization (albumin 1.6 most recently).    Nutrition consulted previously, please reach out to them for optimization and would plan to check nutrition labs early next week. Tentatively will examine OR dates next week for formalization, but ultimately this will hinge on whether nutrition is optimized. Appreciate wound care vac changes and recommendations.     Lonnie Sainz MD, PhD  Vascular Surgery, PGY2  [id number]

## 2024-04-17 NOTE — PROGRESS NOTES
Patient admitted with PNA, hx COPD, plan to wean O2 >92% and nocturnal Cpap, bronchial hygiene. Encephalopgy resolving. Vascular following for s/p bilateral AKA. Will continue to assist with discharge needs. ADOD TBD.  Nadia Lopez RN, TCC.

## 2024-04-17 NOTE — CARE PLAN
The patient's goals for the shift include      The clinical goals for the shift include pt will maintain spO2 >88%    Over the shift, the patient made progress toward the following goals.    Problem: Safety - Medical Restraint  Goal: Remains free of injury from restraints (Restraint for Interference with Medical Device)  Recent Flowsheet Documentation  Taken 4/17/2024 0029 by Sixto Acuna RN  Remains free of injury from restraints (restraint for interference with medical device):   Every 2 hours: Monitor safety, psychosocial status, comfort, nutrition and hydration   Inform patient/family regarding the reason for restraint   Evaluate the patient's condition at the time of restraint application   Determine that other, less restrictive measures have been tried or would not be effective before applying the restraint     Problem: Safety - Medical Restraint  Goal: Remains free of injury from restraints (Restraint for Interference with Medical Device)  4/17/2024 0806 by Sixto Acuna RN  Outcome: Met  4/17/2024 0029 by Sixto Acuna RN  Outcome: Progressing  Flowsheets (Taken 4/17/2024 0029)  Remains free of injury from restraints (restraint for interference with medical device):   Every 2 hours: Monitor safety, psychosocial status, comfort, nutrition and hydration   Inform patient/family regarding the reason for restraint   Evaluate the patient's condition at the time of restraint application   Determine that other, less restrictive measures have been tried or would not be effective before applying the restraint  4/17/2024 0025 by Sixto Acuna RN  Outcome: Progressing  Goal: Free from restraint(s) (Restraint for Interference with Medical Device)  4/17/2024 0806 by Sixto Acuna RN  Outcome: Met  4/17/2024 0029 by Sixto Acuna RN  Outcome: Progressing  4/17/2024 0025 by Sixto Acuna RN  Outcome: Progressing     Problem: Respiratory  Goal: Clear secretions with interventions this shift  4/17/2024 0809 by Sixto Acuna  RN  Outcome: Progressing  4/17/2024 0806 by Sixto Acuna RN  Outcome: Progressing  Goal: No signs of respiratory distress (eg. Use of accessory muscles. Peds grunting)  4/17/2024 0809 by Sixto Acuna RN  Outcome: Progressing  4/17/2024 0806 by Sixto Acuna RN  Outcome: Progressing  4/17/2024 0029 by Sixto Acuna RN  Outcome: Progressing  Goal: Tolerate mechanical ventilation evidenced by VS/agitation level this shift  Outcome: Progressing  Goal: Verbalize decreased shortness of breath this shift  Outcome: Progressing  Goal: Wean oxygen to maintain O2 saturation per order/standard this shift  4/17/2024 0809 by Sixto Acuna RN  Outcome: Progressing  4/17/2024 0029 by Sixto Acuna RN  Outcome: Progressing

## 2024-04-17 NOTE — PROCEDURES
"Speech-Language Pathology    SLP Adult Inpatient Modified Barium Swallow Study      Patient Name: Ry Sandhu  MRN: [id number]  Today's Date: 4/17/2024               Assessment/Plan:    #Severe oropharyngeal swallow impairment  Oropharyngeal swallow characterized by the following: impairments to safety such as frequent gross aspiration of multiple liquid consistencies without effective cough response. Impairment to efficiency such as poor bolus containment posteriorly, impaired bolus collection/awareness/transit, and significant pharyngeal residues implicating impairments across tongue base and pharyngeal constriction.    Considering pt's several chronic medical diagnoses, deconditioned state and impaired mentation, strongly suspect need for long term alternative nutrition to meet hydration/nutrition needs. Pt at high risk for aspiration and high risk for aspiration related complications.    Refer below for additional information regarding oropharyngeal physiology    Recommendations & Treatment:   Recommendations: Continue skilled dysphagia services (dysphagia tx targeting pharyngeal strength and coordination)  Recommendations Comment: NPO; consider long term enteral nutrition to meet nutrition and hydration needs  Treatment/Interventions: Pharyngeal exercises, Bolus trials, Patient/family education  SLP Plan: Skilled SLP  SLP Frequency: 2x per week  Duration: 30 days  SLP Discharge Recommendations: Other (Comment) (cont. skilled SLP following discharge)  Discussed POC:  (results and recommendations reviewed with pt, but will need additional education with caregiver due to underlying cognitive impairments)  Patient/Caregiver Agreeable: Yes  SLP - OK to Discharge: Yes    Subjective   History Of Present Illness  Ry Sandhu is a 74 y.o. male with \"history of HTN, HLD, COPD, CAD, PAD s/p revascularization procedures to BLE, presents to [facility] MICU as transfer from [facility] ICU for acute hypoxic " "respiratory failure secondary to community acquired pneumonia, presumed septic shock, and encephalopathy\"        General Visit Information:  Patient Class: Inpatient  Ordering Physician: Stacey Dover MD  Radiologist: Jonathan Griffin  Patient Seen During This Visit: Yes  Type of Study: Initial MBS      Objective     Oral Phase:  Oral Phase: Impaired     Oral Phase - Comment  Oral Phase - Comment: Oral phase impaired, characterized by premature posterior spillage of thin, mildly thick and moderately thick liquids into the pharyngeal cavity and airway. Large amounts of pooling into valleculae noted. Disorganized bolus preparation, control and transport characterized by episodes of tongue rocking and piece meal deglutition. Pt's oral phase appears inefficient and a risk for aspiration.        Pharyngeal Phase:  Pharyngeal Phase: Impaired     Pharyngeal Phase - Comment  Pharyngeal Comment: Pharyngeal phase impaired, characterized by significant residue throughout pharyngeal cavity, spillage into the airway before the swallow due to poor oral containment and during the swallow due to slow to close laryngeal vestubule, and incomplete laryngeal vestibule closure (especially on thin and/or smaller bolus volumes). Location and degree of pharyngeal stasis suggest impairments of pharyngeal constriction and tongue base retraction. Pt needed cues for coughs and additional swallows to eject material from airway and to fascilitate bolus transit. Nonproductive cough when cued.        Esophageal Phase:  Esophageal Phase: Within Functional Limits                 Encounter Problems       Encounter Problems (Active)       Swallowing       LTG - Patient will tolerate the least restrictive diet without overt difficulty by time of discharge.       Start:  04/16/24    Expected End:  04/30/24            STG - Patient will participate in a Modified Barium Swallow Study- the pt and/or caregiver will verbally demonstrate understanding to " 100% acc on follow up visit (Progressing)       Start:  04/16/24    Expected End:  04/30/24            SLP Goal 1 (Met)       Start:  04/16/24    Expected End:  04/30/24    Resolved:  04/17/24    Pt participate in ongoing assessment, including successfully completion of 3 oz protocol without overt indicators of aspiration on 100% of attempts            Swallowing       STG - Patient will complete effortable swallows 30-40 times per session       Start:  04/17/24    Expected End:  05/08/24            SLP Goal 1       Start:  04/17/24    Expected End:  05/08/24       By the specified date, the patient will complete 30-40 effortful swallows per session with fading cues in order to improve swallowing safety and efficiency, with 80% accuracy                  Inpatient Education:  Adult Outpatient Education  Individual(s) Educated: Patient, Spouse  Verbal Education : yes    Supervising SLP was present, actively participated, and made all clinical decisions during session.  Guzman Paredes M.S.CCC/SLP-Secure chat

## 2024-04-17 NOTE — SIGNIFICANT EVENT
#12 Fr, small bore feeding tube inserted into left nare with Enteral Access System CORTRAK 2 per provider orders, including insertion of nasal bridle and removal of wire stylet; patient tolerated procedure; feeding tube secured at 75 cm; bedside nurse notified; no bleeding or adverse reaction noted; KUB activated for placement verification.

## 2024-04-17 NOTE — PROGRESS NOTES
Physical Therapy    Physical Therapy Treatment    Patient Name: Ry Sandhu  MRN: [id number]  Today's Date: 4/18/2024  Time Calculation  Start Time: 1335  Stop Time: 1417  Time Calculation (min): 42 min     Assessment/Plan   PT Assessment  PT Assessment Results: Decreased strength, Decreased endurance, Decreased range of motion, Impaired balance, Decreased mobility, Pain  Rehab Prognosis: Good  Barriers to Discharge: None  Evaluation/Treatment Tolerance: Patient limited by fatigue  Medical Staff Made Aware: Yes  Strengths: Ability to acquire knowledge, Attitude of self, Coping skills  Barriers to Participation: Other (Comment) (None)  End of Session Communication: Bedside nurse  Assessment Comment: Pt demonstrated improved independence sitting EOB compared to last session with improved ability to hold sitting without support. Pt continues to benefit from skilled PT to address strength, endurance, and mobility deficits.  End of Session Patient Position: Bed, 4 rail up, Alarm off, not on at start of session (Sitter present)  PT Plan  Inpatient/Swing Bed or Outpatient: Inpatient  PT Plan  Treatment/Interventions: Bed mobility, Gait training, Transfer training, Stair training, Balance training, Neuromuscular re-education, Strengthening, Endurance training, Range of motion, Therapeutic exercise, Therapeutic activity  PT Plan: Skilled PT  PT Frequency: 5 times per week  PT Discharge Recommendations: High intensity level of continued care  PT Recommended Transfer Status: Total assist  PT - OK to Discharge: Yes    General Visit Information:   PT  Visit  PT Received On: 04/17/24  Response to Previous Treatment: Patient with no complaints from previous session.  General  Reason for Referral: acute hypoxic respiratory failure secondary to acute CAP with septic shock secondary to pneumonia s/p bilateral amputations on 4/5 (right BKA, left AKA) due to severe peripheral arterial disease and increased pressor requirements  Past  Medical History Relevant to Rehab: COPD, CAD, PAD  Missed Visit: No  Family/Caregiver Present: No  Prior to Session Communication: Bedside nurse  Patient Position Received: Alarm off, not on at start of session, Bed, 4 rail up (Sitter present)  Preferred Learning Style: auditory, verbal, visual  General Comment: Pt pleasant and agreeable to participate in session.    Subjective   Precautions:  Precautions  Hearing/Visual Limitations: False Pass, vision WFL with glasses  Medical Precautions: Fall precautions, Oxygen therapy device and L/min (3L NC, goal O2 > 92%)  Vital Signs:  Vital Signs  Heart Rate:  (Pre: 107, Post: 107)  Heart Rate Source: Monitor  Resp:  (Pre: 26, Post: 24)  SpO2:  (Pre: 95%, Post: 96%)  BP:  (Pre: 122/57, Post: 116/60)  BP Location: Right arm  BP Method: Automatic  Patient Position: Lying    Objective   Pain:  Pain Assessment  Pain Assessment: 0-10  Pain Score: 7  Pain Type: Acute pain  Pain Location: Knee  Cognition:  Cognition  Overall Cognitive Status: Impaired  Arousal/Alertness: Delayed responses to stimuli  Orientation Level: Disoriented to time, Disoriented to situation (Able to report year with 1 cue, unable to report month or date)  Following Commands: Follows one step commands with repetition  Postural Control:  Postural Control  Postural Control: Impaired  Head Control: Forward head  Trunk Control: Kyphosis, retrolean  Righting Reactions: Decreased  Posture Comment: Impaired sitting posture with BUE support  Static Sitting Balance  Static Sitting-Balance Support: Bilateral upper extremity supported  Static Sitting-Level of Assistance: Minimum assistance, Close supervision  Static Sitting-Comment/Number of Minutes: Pt able to sustain independent sitting ~30 seconds prior to LOB corrected by PT  Dynamic Sitting Balance  Dynamic Sitting-Balance Support: Bilateral upper extremity supported  Dynamic Sitting-Balance: Forward lean  Dynamic Sitting-Comments: Min A x 1  Extremity/Trunk  Assessments:  RUE   RUE : Exceptions to [facility]  RUE AROM (degrees)  RUE AROM Comment: [facility]  RUE Strength  RUE Overall Strength: Deficits (Good  strength)  R Shoulder Flexion: 3+/5  R Elbow Flexion: 4-/5  R Elbow Extension: 4-/5  LUE   LUE: Exceptions to [facility]  LUE AROM (degrees)  LUE AROM Comment: WFL  LUE Strength  LUE Overall Strength: Deficits (Good  strength)  L Shoulder Flexion: 3+/5  L Elbow Flexion: 4-/5  L Elbow Extension: 4-/5    Activity Tolerance:  Activity Tolerance  Endurance: Tolerates 30 min exercise with multiple rests  Treatments:  Therapeutic Exercise  Therapeutic Exercise Performed: Yes  Therapeutic Exercise Activity 1: Hand squeezes x 10  Therapeutic Exercise Activity 2: Elbow flexion/extension with therapist resistance x 10  Therapeutic Exercise Activity 3: Shoulder flexion x 10  Therapeutic Exercise Activity 4: Seated trunk flexion/extension with 3 second hold x 10, mod A    Therapeutic Activity  Therapeutic Activity Performed: Yes  Therapeutic Activity 1: Patient sat EOB ~15 minutes, required min-mod A with brief improvement to CS ~30 seconds x 3    Bed Mobility  Bed Mobility: Yes  Bed Mobility 1  Bed Mobility 1: Supine to sitting  Level of Assistance 1: Dependent  Bed Mobility Comments 1: x 2, HOB elevated, draw sheet used  Bed Mobility 2  Bed Mobility  2: Sitting to supine  Level of Assistance 2: Independent  Bed Mobility Comments 2: x2, HOB lowered, draw sheet used  Bed Mobility 3  Bed Mobility 3: Rolling right  Level of Assistance 3: Moderate assistance, Moderate verbal cues  Bed Mobility Comments 3: Cues for hand placement, assist at trunk, repeated x 2  Bed Mobility 4  Bed Mobility 4: Rolling left  Level of Assistance 4: Moderate assistance, Moderate verbal cues  Bed Mobility Comments 4: Repeated x 2    Outcome Measures:  [facility] Basic Mobility  Turning from your back to your side while in a flat bed without using bedrails: A lot  Moving from lying on your back to sitting on the side of  a flat bed without using bedrails: Total  Moving to and from bed to chair (including a wheelchair): Total  Standing up from a chair using your arms (e.g. wheelchair or bedside chair): Total  To walk in hospital room: Total  Climbing 3-5 steps with railing: Total  Basic Mobility - Total Score: 7    Education Documentation  No documentation found.  Education Comments  No comments found.      Encounter Problems       Encounter Problems (Active)       PT Problem       Patient will complete bed mobility with MINx1 with HOB elevated, use of bedrail   (Progressing)       Start:  04/09/24    Expected End:  04/30/24            Patient will complete bed<->chair lateral slide transfer with OD x1 using LRD as needed without acute LOB  (Progressing)       Start:  04/09/24    Expected End:  04/30/24            Patient will complete static (contact guard assist x1) and dynamic (minimal assist x1) sitting balance activities using UE support as needed in order to maintain midline without acute LOB.  (Progressing)       Start:  04/09/24    Expected End:  04/30/24            Patient will participate in BLE there-ex program in order to assist in improving strength and to assist with the completion of functional mobility tasks.  (Progressing)       Start:  04/09/24    Expected End:  04/30/24

## 2024-04-17 NOTE — PROGRESS NOTES
"Ry Sandhu is a 74 y.o. male on day 13 of admission presenting with Pneumonia.    Subjective   Patient transferred to Step down from MICU over night.  Stayed on CPAP over night.  Sitter at bedside for safety, d/t no Restraints wile on CPAP.  Placed on 2L NC this am. Hemodynamically stable  Objective   Physical Exam:   Constitutional: pt in NAD, alert, awake, confused at times  Eyes: PERRL, EOMI, no icterus   ENMT: moist mucous membranes  Head/Neck: Neck supple, no apparent injury  Respiratory/Thorax: Lungs CTA bilaterally, non-labored breathing, on 2L NC  Cardiovascular: Regular, rate and rhythm, no murmurs, normal S1 and S2  Gastrointestinal: abdomen soft, non-tender, BS x 4, NG in place, Rectal tube with dark liquid stool  : Tucker with clear yellow urine  Musculoskeletal: ROM intact, no joint swelling, normal strength  Extremities: R BKA and L AKA with bilateral wound vac, + popliteal pulses   Neurological: A&O x1-2 , mildly confused, follows commands , no focal deficits  Skin: ecchymosis of upper extremities, R BKA and L AKA, Kerlix dressings intact, wound vacs bilateral     Last Recorded Vitals  Blood pressure 110/50, pulse 91, temperature 36.5 °C (97.7 °F), temperature source Temporal, resp. rate 21, height 1.88 m (6' 2\"), weight 60.2 kg (132 lb 11.5 oz), SpO2 100%.    Intake/Output last 3 Shifts:  I/O last 3 completed shifts:  In: 1764.2 (29.3 mL/kg) [I.V.:55 (0.9 mL/kg); Blood:449.2; NG/GT:1260]  Out: 2960 (49.2 mL/kg) [Urine:2610 (1.2 mL/kg/hr); Stool:350]  Weight: 60.2 kg     Scheduled medications  atorvastatin, 40 mg, nasogastric tube, Daily  enoxaparin, 40 mg, subcutaneous, q24h  ergocalciferol, 200 mcg, nasogastric tube, Daily  folic acid, 1 mg, nasogastric tube, Daily  lidocaine, 1 Application, Topical, Once  melatonin, 5 mg, nasogastric tube, Daily  midodrine, 5 mg, nasogastric tube, TID  pantoprazole, 40 mg, intravenous, Daily before breakfast    PRN medications  PRN medications: acetaminophen, " dextrose, dextrose, glucagon, glucagon, HYDROmorphone, insulin lispro, ipratropium-albuteroL, loperamide, oxyCODONE, oxygen, oxygen, polyethylene glycol, silver nitrate applicators     Relevant Results  Results for orders placed or performed during the hospital encounter of 04/04/24 (from the past 24 hour(s))   CBC   Result Value Ref Range    WBC 14.8 (H) 4.4 - 11.3 x10*3/uL    nRBC 0.0 0.0 - 0.0 /100 WBCs    RBC 2.93 (L) 4.50 - 5.90 x10*6/uL    Hemoglobin 8.9 (L) 13.5 - 17.5 g/dL    Hematocrit 26.9 (L) 41.0 - 52.0 %    MCV 92 80 - 100 fL    MCH 30.4 26.0 - 34.0 pg    MCHC 33.1 32.0 - 36.0 g/dL    RDW 16.4 (H) 11.5 - 14.5 %    Platelets 334 150 - 450 x10*3/uL   POCT GLUCOSE   Result Value Ref Range    POCT Glucose 100 (H) 74 - 99 mg/dL   POCT GLUCOSE   Result Value Ref Range    POCT Glucose 106 (H) 74 - 99 mg/dL   Type And Screen   Result Value Ref Range    ABO TYPE O     Rh TYPE POS     ANTIBODY SCREEN NEG    CBC and Auto Differential   Result Value Ref Range    WBC 11.6 (H) 4.4 - 11.3 x10*3/uL    nRBC 0.0 0.0 - 0.0 /100 WBCs    RBC 2.54 (L) 4.50 - 5.90 x10*6/uL    Hemoglobin 7.8 (L) 13.5 - 17.5 g/dL    Hematocrit 23.2 (L) 41.0 - 52.0 %    MCV 91 80 - 100 fL    MCH 30.7 26.0 - 34.0 pg    MCHC 33.6 32.0 - 36.0 g/dL    RDW 16.3 (H) 11.5 - 14.5 %    Platelets 316 150 - 450 x10*3/uL    Neutrophils % 90.2 40.0 - 80.0 %    Immature Granulocytes %, Automated 0.7 0.0 - 0.9 %    Lymphocytes % 4.1 13.0 - 44.0 %    Monocytes % 4.1 2.0 - 10.0 %    Eosinophils % 0.8 0.0 - 6.0 %    Basophils % 0.1 0.0 - 2.0 %    Neutrophils Absolute 10.43 (H) 1.60 - 5.50 x10*3/uL    Immature Granulocytes Absolute, Automated 0.08 0.00 - 0.50 x10*3/uL    Lymphocytes Absolute 0.48 (L) 0.80 - 3.00 x10*3/uL    Monocytes Absolute 0.48 0.05 - 0.80 x10*3/uL    Eosinophils Absolute 0.09 0.00 - 0.40 x10*3/uL    Basophils Absolute 0.01 0.00 - 0.10 x10*3/uL   Magnesium   Result Value Ref Range    Magnesium 1.83 1.60 - 2.40 mg/dL   Comprehensive Metabolic  Panel   Result Value Ref Range    Glucose 110 (H) 74 - 99 mg/dL    Sodium 146 (H) 136 - 145 mmol/L    Potassium 4.1 3.5 - 5.3 mmol/L    Chloride 114 (H) 98 - 107 mmol/L    Bicarbonate 28 21 - 32 mmol/L    Anion Gap 8 (L) 10 - 20 mmol/L    Urea Nitrogen 14 6 - 23 mg/dL    Creatinine 0.46 (L) 0.50 - 1.30 mg/dL    eGFR >90 >60 mL/min/1.73m*2    Calcium 7.4 (L) 8.6 - 10.6 mg/dL    Albumin 1.6 (L) 3.4 - 5.0 g/dL    Alkaline Phosphatase 256 (H) 33 - 136 U/L    Total Protein 3.8 (L) 6.4 - 8.2 g/dL    AST 23 9 - 39 U/L    Bilirubin, Total 0.4 0.0 - 1.2 mg/dL    ALT 20 10 - 52 U/L   Phosphorus   Result Value Ref Range    Phosphorus 2.3 (L) 2.5 - 4.9 mg/dL      Imaging:    XR chest 1 view  Result Date: 4/16/2024  1. Interval improvement in hazy right-sided opacification, with progression to multifocal patchy airspace opacities and associated costophrenic angle blunting. Persistent silhouetting of the right hemidiaphragm and costophrenic angle. Interval improvement of silhouetting of the right heart border. Findings likely represent a combination of effusion and associated consolidation.   2. Similar hazy left basilar opacities, which may represent a infectious/inflammatory process.   3. Coarse background of interstitial markings, which can be seen in the setting of chronic lung disease.       Transthoracic Echo (TTE) Complete  Result Date: 4/15/2024  1. Poorly visualized anatomical structures due to suboptimal image quality.    2. Left ventricular systolic function is hyperdynamic with a 70-75% estimated ejection fraction.    3. Spectral Doppler shows an impaired relaxation pattern of left ventricular diastolic filling.    4. There is moderate mitral annular calcification.    5. Mildly elevated RVSP.    6. Compared with study from 8/25/2023, no significant change.     XR chest 1 view  Result Date: 4/15/2024  1. Similar appearance of the right lung with persistent moderate right layering pleural effusion with airspace  opacification that may be compatible with atelectasis or infection.   2. Stable patchy opacities of the left mid lung and lung base that still may be representative of infectious/inflammatory etiology.       US right upper quadrant  Result Date: 4/11/2024     1. Diffusely echogenic liver parenchyma, similar to prior exam, consistent with hepatic steatosis.   2. Partially visualized right-sided pleural effusion.       CT head wo IV contrast  Result Date: 4/10/2024  No acute intracranial abnormality. Chronic intracranial findings as above.       CT chest wo IV contrast  Result Date: 4/6/2024  1.  Interlobular septal thickening with extensive ground-glass and consolidative opacities throughout bilateral lungs with more coarse opacities in the posterior aspect of the right upper and lower lobes. There are moderate sized bilateral pleural effusions. Findings are nonspecific and may represent atypical infection, pulmonary hemorrhage, or pulmonary edema. Acute respiratory distress syndrome may have a similar appearance.   2. Severe coronary artery calcifications. Recommend correlation with coronary artery disease risk factors.   3. Moderate emphysematous changes.    US right upper quadrant  Result Date: 4/5/2024  1. Diffusely echogenic liver parenchyma consistent with steatosis.   2. Thickened/edematous gallbladder wall with no evidence of hyperemia, gallbladder wall distention or cholelithiasis. Findings are equivocal and most likely due to fluid overload.         CT head wo IV contrast  Result Date: 4/5/2024  No evidence of acute intracranial abnormality. If concerns for an acute stroke may consider brain MRI for further evaluation.       Assessment/Plan   Principal Problem:    Pneumonia  Active Problems:    CAD (coronary artery disease)    CHF (congestive heart failure) (Multi)    HTN (hypertension)    Hyperlipidemia    Chronic obstructive pulmonary disease (Multi)    Peripheral vascular disease (CMS-HCC)     Gastroesophageal reflux disease    Anemia    Septicemia (Multi)    Ry Sandhu is a 74 y.o. male w/PMHx of COPD, CAD, PAD who presented to the MICU from outside hospital for acute hypoxic respiratory failure secondary to acute CAP and septic shock secondary to community-acquired pneumonia versus colitis now s/p bilateral amputations 4/5 (right BKA, left AKA) due to severe peripheral arterial disease and increased pressor requirements. MICU course c/b whiteout of right lung 2/2 mucus plugging and worsening leukocytosis likely 2/2 atelectasis. Transferred to Step down for  aggressive BPH and PT/OT.     NEURO:  Acute Encephalopathy (resolving)  , C/F right basal ganglia ischemia  -A&Ox1-2 with episodes of confusion   -required sitter over night and intermittent restrains for pulling on tubes  -Vitamin B12, folate WNL. TSH 7.61(H) but fT4 WNL,   - Delirium precautions  - Melatonin 5 mg HS   CT head 4/5 and  4/10 without evidence of right basal ganglia ischemia      CARDIO:  hx CAD, HTN, HFpEF Undifferentiated shock, likely septic (resolved), hypotension    -TTE 4/15/24 showing EF 70-75%, impaired relaxation of LV, RVSP mildly elevated 34.8  -off pressors since 4/13  - Continue midodrine 5mg TID (given bilateral amputation expect baseline BP to be lower)   - Holding antihypertensives in setting of requiring midodrine  - goal MAP >65 if mentation well and no s/sx of malperfusion   - Continue Lipitor     VASC:  PAD s/p L AKA and R BKA   -s/p right BKA and 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 i/s/o b/l LE gangrene  - Vascular surgery following - holding off on formalization of BKA to AKA pending clinical improvement     PULM:  hx COPD, AHRF 2/2 CAP, pleural effusion, Opacification of right hemithorax (improving)  -CXR with improvement in right sided opacification  - aggressive BPH: IPV and cough assist q4h    - s/p Thora 4/2 and 4/15 - both draining about 1 L of transudate fluid   -RCx 4/5 Corneybacterium > RCx 4/10 NGTD  -Vancomycin/Zosyn  (4/5-4/12) (4/13-4/15)  - follow 4/15 sputum cx off Abx  -wean O2 for goal Pox >92%, on 2L NC and nocturnal CPAP        FEN/GI: Transaminates (improving), Fatty infiltration of liver on CT abdomen, Colitis, GERD, previous concern for GIB , dysphagia, diarrhea   -RUQ 4/5: hepatic steatosis and edematous gallbladder, equivocal i/s/o volume overload  -RUQ 4/10: hepatic steatosis, nondistended and nonedematous gallbladder   -continue with PPI  -speech eval- dysphagia concerning for prolonged recovery as per MBS on 4/17   -continue TF via NG   -rectal tube for diarrhea  -PRN imodium   -replace electrolytes as needed     RENAL/: hypernatremia   -stable renal function   -added free water flushes for Na at 146   -CTM     HEME/ONC:  Chronic Normocytic Anemia, coagulopathy, resolved  -H/H stabel 7.8/23.2  -hemolysis labs WNL, fibrinogen 550(H)  -Keep active type and screen (most recent 4/17)  -Transfuse for Hgb < 8 per vascular medicine   -Maintain 2 large bore pIVs      ENDO:  -No active issues      ID:  septic shock 2/2 CAP,  resolved  - Abx history: Azithromycin 500 mg daily (4/1 - 4/4), Ceftriaxone (3/31 - 4/5), Doxycycline (4/4 - 4/5), Flagyl (3/25 - 4/5), Vancomycin/Zosyn (4/5-4/12, 4/13-4/15)  -BCx 4/5 NGTD  -RCx 4/5 Corneybacterium > 4/10 NGTD  -monitor of abx now      Prophy:   - Lovenox  -PPI      Code Status: DNR, ok for intubation  LVK LAYO RIVERA (Spouse)  [phone] (Mobile)    Dispo: continue step down care for bronchial hygiene.  Will need SNF at discharge. Social work following     D/w Dr Jl AGUILERA spent >45 minutes in the professional and overall care of this patient.    Elisha Burgess, MURPHY-CNP

## 2024-04-18 LAB
ALBUMIN SERPL BCP-MCNC: 1.6 G/DL (ref 3.4–5)
ALP SERPL-CCNC: 258 U/L (ref 33–136)
ALT SERPL W P-5'-P-CCNC: 22 U/L (ref 10–52)
ANION GAP SERPL CALC-SCNC: 11 MMOL/L (ref 10–20)
AST SERPL W P-5'-P-CCNC: 27 U/L (ref 9–39)
BASOPHILS # BLD AUTO: 0.01 X10*3/UL (ref 0–0.1)
BASOPHILS NFR BLD AUTO: 0.1 %
BILIRUB SERPL-MCNC: 0.5 MG/DL (ref 0–1.2)
BUN SERPL-MCNC: 13 MG/DL (ref 6–23)
CALCIUM SERPL-MCNC: 7.7 MG/DL (ref 8.6–10.6)
CHLORIDE SERPL-SCNC: 113 MMOL/L (ref 98–107)
CO2 SERPL-SCNC: 28 MMOL/L (ref 21–32)
CREAT SERPL-MCNC: 0.45 MG/DL (ref 0.5–1.3)
EGFRCR SERPLBLD CKD-EPI 2021: >90 ML/MIN/1.73M*2
EOSINOPHIL # BLD AUTO: 0.08 X10*3/UL (ref 0–0.4)
EOSINOPHIL NFR BLD AUTO: 0.9 %
ERYTHROCYTE [DISTWIDTH] IN BLOOD BY AUTOMATED COUNT: 15.7 % (ref 11.5–14.5)
GLUCOSE BLD MANUAL STRIP-MCNC: 100 MG/DL (ref 74–99)
GLUCOSE BLD MANUAL STRIP-MCNC: 113 MG/DL (ref 74–99)
GLUCOSE BLD MANUAL STRIP-MCNC: 69 MG/DL (ref 74–99)
GLUCOSE SERPL-MCNC: 70 MG/DL (ref 74–99)
HCT VFR BLD AUTO: 24.3 % (ref 41–52)
HGB BLD-MCNC: 8.1 G/DL (ref 13.5–17.5)
IMM GRANULOCYTES # BLD AUTO: 0.06 X10*3/UL (ref 0–0.5)
IMM GRANULOCYTES NFR BLD AUTO: 0.7 % (ref 0–0.9)
LYMPHOCYTES # BLD AUTO: 0.54 X10*3/UL (ref 0.8–3)
LYMPHOCYTES NFR BLD AUTO: 6.1 %
MAGNESIUM SERPL-MCNC: 1.62 MG/DL (ref 1.6–2.4)
MCH RBC QN AUTO: 31.8 PG (ref 26–34)
MCHC RBC AUTO-ENTMCNC: 33.3 G/DL (ref 32–36)
MCV RBC AUTO: 95 FL (ref 80–100)
MONOCYTES # BLD AUTO: 0.41 X10*3/UL (ref 0.05–0.8)
MONOCYTES NFR BLD AUTO: 4.6 %
NEUTROPHILS # BLD AUTO: 7.73 X10*3/UL (ref 1.6–5.5)
NEUTROPHILS NFR BLD AUTO: 87.6 %
NRBC BLD-RTO: 0 /100 WBCS (ref 0–0)
PHOSPHATE SERPL-MCNC: 3.3 MG/DL (ref 2.5–4.9)
PLATELET # BLD AUTO: 291 X10*3/UL (ref 150–450)
POTASSIUM SERPL-SCNC: 3.7 MMOL/L (ref 3.5–5.3)
PROT SERPL-MCNC: 4 G/DL (ref 6.4–8.2)
RBC # BLD AUTO: 2.55 X10*6/UL (ref 4.5–5.9)
SODIUM SERPL-SCNC: 148 MMOL/L (ref 136–145)
WBC # BLD AUTO: 8.8 X10*3/UL (ref 4.4–11.3)

## 2024-04-18 PROCEDURE — 94668 MNPJ CHEST WALL SBSQ: CPT

## 2024-04-18 PROCEDURE — 94660 CPAP INITIATION&MGMT: CPT

## 2024-04-18 PROCEDURE — 36415 COLL VENOUS BLD VENIPUNCTURE: CPT | Performed by: NURSE PRACTITIONER

## 2024-04-18 PROCEDURE — 2500000005 HC RX 250 GENERAL PHARMACY W/O HCPCS: Performed by: NURSE PRACTITIONER

## 2024-04-18 PROCEDURE — 82947 ASSAY GLUCOSE BLOOD QUANT: CPT

## 2024-04-18 PROCEDURE — 2060000001 HC INTERMEDIATE ICU ROOM DAILY

## 2024-04-18 PROCEDURE — 84100 ASSAY OF PHOSPHORUS: CPT | Performed by: NURSE PRACTITIONER

## 2024-04-18 PROCEDURE — 83735 ASSAY OF MAGNESIUM: CPT | Performed by: NURSE PRACTITIONER

## 2024-04-18 PROCEDURE — 2500000004 HC RX 250 GENERAL PHARMACY W/ HCPCS (ALT 636 FOR OP/ED): Performed by: NURSE PRACTITIONER

## 2024-04-18 PROCEDURE — 85025 COMPLETE CBC W/AUTO DIFF WBC: CPT | Performed by: NURSE PRACTITIONER

## 2024-04-18 PROCEDURE — 2500000001 HC RX 250 WO HCPCS SELF ADMINISTERED DRUGS (ALT 637 FOR MEDICARE OP): Performed by: NURSE PRACTITIONER

## 2024-04-18 PROCEDURE — 99232 SBSQ HOSP IP/OBS MODERATE 35: CPT | Performed by: INTERNAL MEDICINE

## 2024-04-18 PROCEDURE — 80053 COMPREHEN METABOLIC PANEL: CPT | Performed by: NURSE PRACTITIONER

## 2024-04-18 RX ORDER — MAGNESIUM SULFATE HEPTAHYDRATE 40 MG/ML
2 INJECTION, SOLUTION INTRAVENOUS ONCE
Status: COMPLETED | OUTPATIENT
Start: 2024-04-18 | End: 2024-04-18

## 2024-04-18 RX ADMIN — ATORVASTATIN CALCIUM 40 MG: 40 TABLET, FILM COATED ORAL at 08:14

## 2024-04-18 RX ADMIN — Medication 5 MG: at 20:17

## 2024-04-18 RX ADMIN — Medication 200 MCG: at 08:14

## 2024-04-18 RX ADMIN — FOLIC ACID 1 MG: 1 TABLET ORAL at 08:14

## 2024-04-18 RX ADMIN — DEXTROSE MONOHYDRATE 12.5 G: 25 INJECTION, SOLUTION INTRAVENOUS at 08:14

## 2024-04-18 RX ADMIN — MAGNESIUM SULFATE HEPTAHYDRATE 2 G: 40 INJECTION, SOLUTION INTRAVENOUS at 09:56

## 2024-04-18 RX ADMIN — ENOXAPARIN SODIUM 40 MG: 100 INJECTION SUBCUTANEOUS at 01:20

## 2024-04-18 ASSESSMENT — PAIN SCALES - WONG BAKER
WONGBAKER_NUMERICALRESPONSE: NO HURT
WONGBAKER_NUMERICALRESPONSE: NO HURT

## 2024-04-18 ASSESSMENT — COGNITIVE AND FUNCTIONAL STATUS - GENERAL
TOILETING: TOTAL
DRESSING REGULAR UPPER BODY CLOTHING: A LOT
STANDING UP FROM CHAIR USING ARMS: TOTAL
MOBILITY SCORE: 8
PERSONAL GROOMING: A LOT
STANDING UP FROM CHAIR USING ARMS: TOTAL
HELP NEEDED FOR BATHING: TOTAL
DAILY ACTIVITIY SCORE: 10
CLIMB 3 TO 5 STEPS WITH RAILING: TOTAL
DAILY ACTIVITIY SCORE: 12
DRESSING REGULAR UPPER BODY CLOTHING: TOTAL
STANDING UP FROM CHAIR USING ARMS: TOTAL
EATING MEALS: A LOT
MOBILITY SCORE: 7
MOBILITY SCORE: 8
WALKING IN HOSPITAL ROOM: TOTAL
CLIMB 3 TO 5 STEPS WITH RAILING: TOTAL
MOVING FROM LYING ON BACK TO SITTING ON SIDE OF FLAT BED WITH BEDRAILS: A LOT
DRESSING REGULAR LOWER BODY CLOTHING: A LOT
MOVING TO AND FROM BED TO CHAIR: TOTAL
TURNING FROM BACK TO SIDE WHILE IN FLAT BAD: A LOT
MOVING TO AND FROM BED TO CHAIR: TOTAL
TOILETING: TOTAL
CLIMB 3 TO 5 STEPS WITH RAILING: TOTAL
TURNING FROM BACK TO SIDE WHILE IN FLAT BAD: TOTAL
DRESSING REGULAR UPPER BODY CLOTHING: A LOT
MOVING TO AND FROM BED TO CHAIR: TOTAL
EATING MEALS: A LOT
TOILETING: A LOT
PERSONAL GROOMING: A LOT
TURNING FROM BACK TO SIDE WHILE IN FLAT BAD: A LOT
MOVING FROM LYING ON BACK TO SITTING ON SIDE OF FLAT BED WITH BEDRAILS: A LOT
WALKING IN HOSPITAL ROOM: TOTAL
MOVING FROM LYING ON BACK TO SITTING ON SIDE OF FLAT BED WITH BEDRAILS: A LOT
HELP NEEDED FOR BATHING: A LOT
EATING MEALS: A LITTLE
HELP NEEDED FOR BATHING: A LOT
DRESSING REGULAR LOWER BODY CLOTHING: A LOT
DRESSING REGULAR LOWER BODY CLOTHING: A LOT
PERSONAL GROOMING: A LOT
DAILY ACTIVITIY SCORE: 11
WALKING IN HOSPITAL ROOM: TOTAL

## 2024-04-18 ASSESSMENT — PAIN - FUNCTIONAL ASSESSMENT
PAIN_FUNCTIONAL_ASSESSMENT: 0-10

## 2024-04-18 ASSESSMENT — PAIN SCALES - GENERAL
PAINLEVEL_OUTOF10: 0 - NO PAIN

## 2024-04-18 NOTE — PROGRESS NOTES
"Nutrition Follow Up Assessment:   Nutrition Assessment         Patient is a 74 y.o. male presenting with septic shock and encephalopathy.  He is s/p L guillotine AKA and R corry WILLS on 4/5/24.  Moved to SDU for care.  Currently on nasal canula for support.  Being followed by SLP-- remains unsafe for a PO diet.  Dobhoff placed yesterday by team as he had an NGT for access.    Noted by team that vascular is wanting to improve his nutrition before taking him back to the OR next week.  Team has reported to RDN today that pt had been requiring CPAP at night so feeds have been turned off at night.  Team planning on trialing patient without CPAP tonight.  Unclear the location of his dobhoff based on x-ray read yesterday.  He has orders for Isosource 1.5@ 50mls/hr-- no order for Prostat as rec'd by RDN last week.     Unfortunately as pt was severely malnourished on admission (ongoing for months), will not be able to improve his nutrition in a short time pre-OR next week.  His low albumin may be a reflection of other issues, not necessarily nutrition (infection, inflammation, etc) and should not be used as a nutrition marker.       Anthropometrics:  Height: 188 cm (6' 2\")   Weight: 59.4 kg (130 lb 15.3 oz)   BMI (Calculated): 16.81  IBW/kg (Dietitian Calculated):  (Daughter reports that pt was closer to 170cm and not his current height of 188cm.  Will use 170cm for calculations.)  Percent of IBW:  (67.3kg pre amputations/61.3kg post amputations)       Weight History:     4/18/24: 59.4kg  4/12/24: 57.4kg  4/5/24: 54.2kg  9/14/23: 62.1kg    Family previously noted usual body weight to be between 75kg-84kg.    Weight Change %:       Nutrition Focused Physical Exam Findings:    Subcutaneous Fat Loss:   Orbital Fat Pads: Severe (dark circles, hollowing and loose skin)  Buccal Fat Pads: Severe (hollow, sunken and narrow face)  Triceps: Severe (negligible fat tissue)  Muscle Wasting:  Temporalis: Severe (hollowed scooping " "depression)  Pectoralis (Clavicular Region): Severe (protruding prominent clavicle)  Deltoid/Trapezius: Severe (squared shoulders, acromion process prominent)  Quadriceps: Severe (depressions on inner and outer thigh)  Gastrocnemius: Severe (minimal muscle definition)  Edema:  Edema: +3 moderate  Edema Location: B/L LE  Physical Findings:  Skin: Positive (B/L knee wounds; L axilla wound; penis wound; perineum wound; coccyx wound; R pretibial wound)    Nutrition Significant Labs:  BMP Trend:   Results from last 7 days   Lab Units 04/18/24  0527 04/17/24  0452 04/16/24  0411 04/15/24  0401   GLUCOSE mg/dL 70* 110* 80 143*   CALCIUM mg/dL 7.7* 7.4* 7.3* 7.2*   SODIUM mmol/L 148* 146* 144 145   POTASSIUM mmol/L 3.7 4.1 3.9 3.7   CO2 mmol/L 28 28 28 28   CHLORIDE mmol/L 113* 114* 112* 110*   BUN mg/dL 13 14 13 14   CREATININE mg/dL 0.45* 0.46* 0.45* 0.48*    , A1C:No results found for: \"HGBA1C\", BG POCT trend:   Results from last 7 days   Lab Units 04/18/24  1140 04/18/24  0802 04/17/24  1732 04/17/24  1218 04/17/24  0039   POCT GLUCOSE mg/dL 100* 69* 105* 124* 106*    , Renal Lab Trend:   Results from last 7 days   Lab Units 04/18/24  0527 04/17/24  0452 04/16/24  0411 04/15/24  0401   POTASSIUM mmol/L 3.7 4.1 3.9 3.7   PHOSPHORUS mg/dL 3.3 2.3* 2.7 2.2*   SODIUM mmol/L 148* 146* 144 145   MAGNESIUM mg/dL 1.62 1.83 1.77 1.71   EGFR mL/min/1.73m*2 >90 >90 >90 >90   BUN mg/dL 13 14 13 14   CREATININE mg/dL 0.45* 0.46* 0.45* 0.48*    , Vit D:   Lab Results   Component Value Date    VITD25 18 (L) 04/05/2024        Nutrition Specific Medications:  Scheduled medications  atorvastatin, 40 mg, nasogastric tube, Daily  enoxaparin, 40 mg, subcutaneous, q24h  ergocalciferol, 200 mcg, nasogastric tube, Daily  folic acid, 1 mg, nasogastric tube, Daily  lidocaine, 1 Application, Topical, Once  melatonin, 5 mg, nasogastric tube, Daily  midodrine, 5 mg, nasogastric tube, TID  pantoprazole, 40 mg, intravenous, Daily before " breakfast      Continuous medications     PRN medications  PRN medications: acetaminophen, dextrose, dextrose, glucagon, glucagon, HYDROmorphone, insulin lispro, ipratropium-albuteroL, loperamide, oxyCODONE, oxygen, oxygen, polyethylene glycol, silver nitrate applicators     I/O:   Last BM Date: 04/16/24; Stool Appearance: Loose (04/17/24 2100)        Dietary Orders (From admission, onward)       Start     Ordered    04/18/24 0819  Enteral feeding with NPO Isosource 1.5; 40; 250; Water; Every 6 hours  Diet effective now        Comments: Increase once after 8hrs to goal rate of 50mls/h   Question Answer Comment   Tube feeding formula: Isosource 1.5    Tube feeding cyclic rate (mL/hr): 40    Tube feeding flush (mL): 250    Flush type: Water    Flush frequency: Every 6 hours        04/18/24 0818                     Estimated Needs:   Total Energy Estimated Needs (kCal):  (5855-3137)  Method for Estimating Needs: MSJ= 1384  Total Protein Estimated Needs (g):  ()  Method for Estimating Needs: 1.5-2.0 x 57.4kg              Nutrition Diagnosis   Malnutrition Diagnosis  Patient has Malnutrition Diagnosis: Yes  Diagnosis Status: Ongoing  Malnutrition Diagnosis: Severe malnutrition related to chronic disease or condition  As Evidenced by: family reports a terrible appetite and intake for months along with a 22% weight loss in 7 months and severe fat and muscle wasting noted all over his body        Nutrition Interventions/Recommendations         Nutrition Prescription:  For tube feed: can go back to Pivot 1.5 formula.  Would do 55mls/hr to meet 100% of his needs if run over 24hrs daily.    If pt is needing ot be off feeds at night based on CPAP needs, cna do 110mls/hr over 12hrs during the day instead.  Consider PEG placement as he will likely need a long-term means of nutrition and hydration.         Nutrition Interventions:   Food and/or Nutrient Delivery Interventions  Interventions: Enteral intake  Goal: TF at goal=  1980kcals, 124grams protein         Nutrition Education:   Not appropriate       Nutrition Monitoring and Evaluation   Food/Nutrient Related History Monitoring  Monitoring and Evaluation Plan: Enteral and parenteral nutrition intake  Criteria: TF to meet 100% nutrition needs       Time Spent/Follow-up Reminder:   Time Spent (min): 60 minutes  Last Date of Nutrition Visit: 04/18/24  Nutrition Follow-Up Needed?: Dietitian to reassess per policy  Follow up Comment: TF via dooff

## 2024-04-18 NOTE — PROGRESS NOTES
04/18/24 1100   Discharge Planning   Living Arrangements Spouse/significant other   Support Systems Spouse/significant other   Type of Residence Private residence   Home or Post Acute Services Post acute facilities (Rehab/SNF/etc)   Type of Post Acute Facility Services Skilled nursing;Rehab   Does the patient need discharge transport arranged? Yes   RoundTrip coordination needed? Yes     SW met with pt to complete assessment.  Pt is alert and oriented x2.  He states he was living with his spouse and mother in law prior to admission.  Pt began talking about things that did not make sense.  Pt did give permission for SW to speak with his spouse.  SW spoke with pt's spouse, Pura [phone], to obtain more information.  Pt was using a cane to assist with ambulation.  Spouse reports pt did not follow through with appointments after surgery leg to increase blood flow.  Spouse states she did not realize how bad pt's legs were until he was admitted to the hospital.  SW discussed aftercare.  Spouse would like to have [facility] in [place] to be explored.  SW will make referral to [facility] via [facility].  Will follow.  JANI Talley

## 2024-04-18 NOTE — PROGRESS NOTES
"Ry Sandhu is a 74 y.o. male on day 14 of admission presenting with Pneumonia.    Subjective   No acute events over night.  Tolerated CPAP    Objective   Physical Exam:   Constitutional: pt in NAD, alert, awake, confused at times  Eyes: PERRL, EOMI, no icterus   ENMT: moist mucous membranes  Head/Neck: Neck supple, no apparent injury  Respiratory/Thorax: Lungs CTA bilaterally, non-labored breathing, on 2L NC  Cardiovascular: Regular, rate and rhythm, no murmurs, normal S1 and S2  Gastrointestinal: abdomen soft, non-tender, BS x 4, NG in place, Rectal tube with dark liquid stool  : Tucker with clear yellow urine  Musculoskeletal: ROM intact, no joint swelling, normal strength  Extremities: R BKA and L AKA with bilateral wound vac, + popliteal pulses   Neurological: A&O x1-2 , mildly confused, follows commands , no focal deficits  Skin: ecchymosis of upper extremities, R BKA and L AKA, Kerlix dressings intact, wound vacs bilatera    Last Recorded Vitals  Blood pressure 124/69, pulse 92, temperature 36 °C (96.8 °F), temperature source Temporal, resp. rate 17, height 1.88 m (6' 2\"), weight 59.4 kg (130 lb 15.3 oz), SpO2 99%.    Intake/Output last 3 Shifts:  I/O last 3 completed shifts:  In: 395 (6.6 mL/kg) [NG/GT:395]  Out: 3035 (51.1 mL/kg) [Urine:2585 (1.2 mL/kg/hr); Stool:450]  Weight: 59.4 kg     Scheduled medications  atorvastatin, 40 mg, nasogastric tube, Daily  enoxaparin, 40 mg, subcutaneous, q24h  ergocalciferol, 200 mcg, nasogastric tube, Daily  folic acid, 1 mg, nasogastric tube, Daily  lidocaine, 1 Application, Topical, Once  melatonin, 5 mg, nasogastric tube, Daily  midodrine, 5 mg, nasogastric tube, TID  pantoprazole, 40 mg, intravenous, Daily before breakfast    PRN medications  PRN medications: acetaminophen, dextrose, dextrose, glucagon, glucagon, HYDROmorphone, insulin lispro, ipratropium-albuteroL, loperamide, oxyCODONE, oxygen, oxygen, polyethylene glycol, silver nitrate applicators "     Relevant Results  Results for orders placed or performed during the hospital encounter of 04/04/24 (from the past 24 hour(s))   POCT GLUCOSE   Result Value Ref Range    POCT Glucose 124 (H) 74 - 99 mg/dL   POCT GLUCOSE   Result Value Ref Range    POCT Glucose 105 (H) 74 - 99 mg/dL      Imaging:     XR chest 1 view  Result Date: 4/16/2024  1. Interval improvement in hazy right-sided opacification, with progression to multifocal patchy airspace opacities and associated costophrenic angle blunting. Persistent silhouetting of the right hemidiaphragm and costophrenic angle. Interval improvement of silhouetting of the right heart border. Findings likely represent a combination of effusion and associated consolidation.   2. Similar hazy left basilar opacities, which may represent a infectious/inflammatory process.   3. Coarse background of interstitial markings, which can be seen in the setting of chronic lung disease.        Transthoracic Echo (TTE) Complete  Result Date: 4/15/2024  1. Poorly visualized anatomical structures due to suboptimal image quality.    2. Left ventricular systolic function is hyperdynamic with a 70-75% estimated ejection fraction.    3. Spectral Doppler shows an impaired relaxation pattern of left ventricular diastolic filling.    4. There is moderate mitral annular calcification.    5. Mildly elevated RVSP.    6. Compared with study from 8/25/2023, no significant change.      XR chest 1 view  Result Date: 4/15/2024  1. Similar appearance of the right lung with persistent moderate right layering pleural effusion with airspace opacification that may be compatible with atelectasis or infection.   2. Stable patchy opacities of the left mid lung and lung base that still may be representative of infectious/inflammatory etiology.        US right upper quadrant  Result Date: 4/11/2024     1. Diffusely echogenic liver parenchyma, similar to prior exam, consistent with hepatic steatosis.   2. Partially  visualized right-sided pleural effusion.        CT head wo IV contrast  Result Date: 4/10/2024  No acute intracranial abnormality. Chronic intracranial findings as above.        CT chest wo IV contrast  Result Date: 4/6/2024  1.  Interlobular septal thickening with extensive ground-glass and consolidative opacities throughout bilateral lungs with more coarse opacities in the posterior aspect of the right upper and lower lobes. There are moderate sized bilateral pleural effusions. Findings are nonspecific and may represent atypical infection, pulmonary hemorrhage, or pulmonary edema. Acute respiratory distress syndrome may have a similar appearance.   2. Severe coronary artery calcifications. Recommend correlation with coronary artery disease risk factors.   3. Moderate emphysematous changes.     US right upper quadrant  Result Date: 4/5/2024  1. Diffusely echogenic liver parenchyma consistent with steatosis.   2. Thickened/edematous gallbladder wall with no evidence of hyperemia, gallbladder wall distention or cholelithiasis. Findings are equivocal and most likely due to fluid overload.          CT head wo IV contrast  Result Date: 4/5/2024  No evidence of acute intracranial abnormality. If concerns for an acute stroke may consider brain MRI for further evaluation.        This patient has a urinary catheter   Reason for the urinary catheter remaining today? critically ill patient who need accurate urinary output measurements      Assessment/Plan   Principal Problem:    Pneumonia  Active Problems:    CAD (coronary artery disease)    CHF (congestive heart failure) (Multi)    HTN (hypertension)    Hyperlipidemia    Chronic obstructive pulmonary disease (Multi)    Peripheral vascular disease (CMS-HCC)    Gastroesophageal reflux disease    Anemia    Septicemia (Multi)    Ry Sandhu is a 74 y.o. male w/PMHx of COPD, CAD, PAD who presented to the MICU from outside hospital for acute hypoxic respiratory failure  secondary to acute CAP and septic shock secondary to community-acquired pneumonia versus colitis now s/p bilateral amputations 4/5 (right BKA, left AKA) due to severe peripheral arterial disease and increased pressor requirements. MICU course c/b whiteout of right lung 2/2 mucus plugging and worsening leukocytosis likely 2/2 atelectasis. Transferred to Step down for  aggressive BPH and PT/OT.     NEURO:  Acute Encephalopathy (resolving)  , C/F right basal ganglia ischemia  -A&Ox1-2 with episodes of confusion   -required sitter over night and intermittent restrains for pulling on tubes  -Vitamin B12, folate WNL. TSH 7.61(H) but fT4 WNL,   - Delirium precautions  - Melatonin 5 mg HS   CT head 4/5 and  4/10 without evidence of right basal ganglia ischemia      CARDIO:  hx CAD, HTN, HFpEF Undifferentiated shock, likely septic (resolved), hypotension    -TTE 4/15/24 showing EF 70-75%, impaired relaxation of LV, RVSP mildly elevated 34.8  -off pressors since 4/13  - Continue midodrine 5mg TID (given bilateral amputation expect baseline BP to be lower)   - Holding antihypertensives in setting of requiring midodrine  - goal MAP >65 if mentation well and no s/sx of malperfusion   - Continue Lipitor     VASC:  PAD s/p L AKA and R BKA   -s/p right BKA and 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 i/s/o b/l LE gangrene  - Vascular surgery following - holding off on formalization of BKA to AKA pending clinical improvement   -as per most recent vasc surgery update from 4/17 - formalization is elective, patient needs nutritional optimization , reach out to nutrition )  -tentatively will consider OR next week if nutrition is optimized      PULM:  hx COPD, AHRF 2/2 CAP, pleural effusion, Opacification of right hemithorax (improving)  -CXR with improvement in right sided opacification  - aggressive BPH: IPV and cough assist q4h    - s/p Thora 4/2 and 4/15 - both draining about 1 L of transudate fluid   -RCx 4/5 Corneybacterium > RCx 4/10 NGTD  -Vancomycin/Zosyn  (4/5-4/12) (4/13-4/15)  - follow 4/15 sputum cx off Abx  -wean O2 for goal Pox >92%, on 2L NC and nocturnal CPAP (will try off CPAP over night and VBG in am)        FEN/GI: Transaminates (improving), Fatty infiltration of liver on CT abdomen, Colitis, GERD, previous concern for GIB , dysphagia, diarrhea   -RUQ 4/5: hepatic steatosis and edematous gallbladder, equivocal i/s/o volume overload  -RUQ 4/10: hepatic steatosis, nondistended and nonedematous gallbladder   -continue with PPI  -speech eval- dysphagia concerning for prolonged recovery as per MBS on 4/17   -continue TF - switched to Pivot 1.5 per nutrition recs with goal of 55/hr if 24 hrs (if off CPAP) or 110 ml /hr x12 hrs if choose to continue with CPAP  -NG was changed to [place] on 4/18, confirmed with radiology that tube is postpyloric   -rectal tube for diarrhea- consider removing  -PRN imodium   -replace electrolytes as needed     RENAL/: hypernatremia   -stable renal function   -increased free water flushes for Na at 148   -CTM     HEME/ONC:  Chronic Normocytic Anemia, coagulopathy, resolved  -H/H stabel 8.1/24.3  -hemolysis labs WNL, fibrinogen 550(H)  -Keep active type and screen (most recent 4/17)  -Transfuse for Hgb < 8 per vascular medicine   -Maintain 2 large bore pIVs      ENDO:  -No active issues      ID:  septic shock 2/2 CAP,  resolved  - Abx history: Azithromycin 500 mg daily (4/1 - 4/4), Ceftriaxone (3/31 - 4/5), Doxycycline (4/4 - 4/5), Flagyl (3/25 - 4/5), Vancomycin/Zosyn (4/5-4/12, 4/13-4/15)  -BCx 4/5 NGTD  -RCx 4/5 Corneybacterium > 4/10 NGTD  -monitor of abx now      Prophy:   - Lovenox  -PPI      Code Status: DNR, ok for intubation  LAYO CARRILLO (Spouse)  [phone] (Mobile)     Dispo: continue step down care for bronchial hygiene.  Will need SNF at discharge. Social work following.  Palliative care consulted 4/18 for GOC     D/w Dr Jl AGUILERA spent >45 minutes in the professional and overall care of this  patient.      Elisha Burgess, MURPHY-CNP

## 2024-04-18 NOTE — CARE PLAN
The clinical goals for the shift include Patient will tolerate tube feeds through dobhoff throughout shift        Patient remains HDS and VSS throughout  shift. Patient tolerated placement of dobhoff, tube feeds and meds held due to possible kink, awaiting reassessment . No complaints of pain, nausea or numbness/tingling. Tolerated CPAP at night. Patient remains safe and free from injury.

## 2024-04-19 LAB
ALBUMIN SERPL BCP-MCNC: 1.6 G/DL (ref 3.4–5)
ALBUMIN SERPL BCP-MCNC: 1.8 G/DL (ref 3.4–5)
ALP SERPL-CCNC: 263 U/L (ref 33–136)
ALT SERPL W P-5'-P-CCNC: 28 U/L (ref 10–52)
ANION GAP SERPL CALC-SCNC: 11 MMOL/L (ref 10–20)
ANION GAP SERPL CALC-SCNC: 11 MMOL/L (ref 10–20)
AST SERPL W P-5'-P-CCNC: 37 U/L (ref 9–39)
BACTERIA FLD CULT: NORMAL
BASE EXCESS BLDV CALC-SCNC: 4.9 MMOL/L (ref -2–3)
BASOPHILS # BLD AUTO: 0.01 X10*3/UL (ref 0–0.1)
BASOPHILS NFR BLD AUTO: 0.1 %
BILIRUB SERPL-MCNC: 0.3 MG/DL (ref 0–1.2)
BLOOD EXPIRATION DATE: NORMAL
BODY TEMPERATURE: 37 DEGREES CELSIUS
BUN SERPL-MCNC: 17 MG/DL (ref 6–23)
BUN SERPL-MCNC: 19 MG/DL (ref 6–23)
CALCIUM SERPL-MCNC: 7.4 MG/DL (ref 8.6–10.6)
CALCIUM SERPL-MCNC: 7.8 MG/DL (ref 8.6–10.6)
CHLORIDE SERPL-SCNC: 113 MMOL/L (ref 98–107)
CHLORIDE SERPL-SCNC: 113 MMOL/L (ref 98–107)
CO2 SERPL-SCNC: 30 MMOL/L (ref 21–32)
CO2 SERPL-SCNC: 30 MMOL/L (ref 21–32)
CREAT SERPL-MCNC: 0.41 MG/DL (ref 0.5–1.3)
CREAT SERPL-MCNC: 0.42 MG/DL (ref 0.5–1.3)
DISPENSE STATUS: NORMAL
EGFRCR SERPLBLD CKD-EPI 2021: >90 ML/MIN/1.73M*2
EGFRCR SERPLBLD CKD-EPI 2021: >90 ML/MIN/1.73M*2
EOSINOPHIL # BLD AUTO: 0.14 X10*3/UL (ref 0–0.4)
EOSINOPHIL NFR BLD AUTO: 1.7 %
ERYTHROCYTE [DISTWIDTH] IN BLOOD BY AUTOMATED COUNT: 15.2 % (ref 11.5–14.5)
ERYTHROCYTE [DISTWIDTH] IN BLOOD BY AUTOMATED COUNT: 15.9 % (ref 11.5–14.5)
GLUCOSE BLD MANUAL STRIP-MCNC: 122 MG/DL (ref 74–99)
GLUCOSE BLD MANUAL STRIP-MCNC: 134 MG/DL (ref 74–99)
GLUCOSE BLD MANUAL STRIP-MCNC: 145 MG/DL (ref 74–99)
GLUCOSE SERPL-MCNC: 135 MG/DL (ref 74–99)
GLUCOSE SERPL-MCNC: 99 MG/DL (ref 74–99)
GRAM STN SPEC: NORMAL
GRAM STN SPEC: NORMAL
HCO3 BLDV-SCNC: 27.9 MMOL/L (ref 22–26)
HCT VFR BLD AUTO: 22.3 % (ref 41–52)
HCT VFR BLD AUTO: 27.7 % (ref 41–52)
HGB BLD-MCNC: 7.1 G/DL (ref 13.5–17.5)
HGB BLD-MCNC: 9.1 G/DL (ref 13.5–17.5)
IMM GRANULOCYTES # BLD AUTO: 0.06 X10*3/UL (ref 0–0.5)
IMM GRANULOCYTES NFR BLD AUTO: 0.7 % (ref 0–0.9)
INHALED O2 CONCENTRATION: 32 %
LYMPHOCYTES # BLD AUTO: 0.57 X10*3/UL (ref 0.8–3)
LYMPHOCYTES NFR BLD AUTO: 7.1 %
MAGNESIUM SERPL-MCNC: 1.85 MG/DL (ref 1.6–2.4)
MCH RBC QN AUTO: 30.1 PG (ref 26–34)
MCH RBC QN AUTO: 30.1 PG (ref 26–34)
MCHC RBC AUTO-ENTMCNC: 31.8 G/DL (ref 32–36)
MCHC RBC AUTO-ENTMCNC: 32.9 G/DL (ref 32–36)
MCV RBC AUTO: 92 FL (ref 80–100)
MCV RBC AUTO: 95 FL (ref 80–100)
MONOCYTES # BLD AUTO: 0.35 X10*3/UL (ref 0.05–0.8)
MONOCYTES NFR BLD AUTO: 4.4 %
NEUTROPHILS # BLD AUTO: 6.91 X10*3/UL (ref 1.6–5.5)
NEUTROPHILS NFR BLD AUTO: 86 %
NRBC BLD-RTO: 0 /100 WBCS (ref 0–0)
NRBC BLD-RTO: 0 /100 WBCS (ref 0–0)
OXYHGB MFR BLDV: 97.7 % (ref 45–75)
PCO2 BLDV: 35 MM HG (ref 41–51)
PH BLDV: 7.51 PH (ref 7.33–7.43)
PHOSPHATE SERPL-MCNC: 2.9 MG/DL (ref 2.5–4.9)
PHOSPHATE SERPL-MCNC: 3.1 MG/DL (ref 2.5–4.9)
PLATELET # BLD AUTO: 260 X10*3/UL (ref 150–450)
PLATELET # BLD AUTO: 275 X10*3/UL (ref 150–450)
PO2 BLDV: 145 MM HG (ref 35–45)
POTASSIUM SERPL-SCNC: 3.6 MMOL/L (ref 3.5–5.3)
POTASSIUM SERPL-SCNC: 3.9 MMOL/L (ref 3.5–5.3)
PRODUCT BLOOD TYPE: 5100
PRODUCT CODE: NORMAL
PROT SERPL-MCNC: 4 G/DL (ref 6.4–8.2)
RBC # BLD AUTO: 2.36 X10*6/UL (ref 4.5–5.9)
RBC # BLD AUTO: 3.02 X10*6/UL (ref 4.5–5.9)
SAO2 % BLDV: 100 % (ref 45–75)
SODIUM SERPL-SCNC: 150 MMOL/L (ref 136–145)
SODIUM SERPL-SCNC: 150 MMOL/L (ref 136–145)
UNIT ABO: NORMAL
UNIT NUMBER: NORMAL
UNIT RH: NORMAL
UNIT VOLUME: 350
WBC # BLD AUTO: 8 X10*3/UL (ref 4.4–11.3)
WBC # BLD AUTO: 8.8 X10*3/UL (ref 4.4–11.3)
XM INTEP: NORMAL

## 2024-04-19 PROCEDURE — 2500000001 HC RX 250 WO HCPCS SELF ADMINISTERED DRUGS (ALT 637 FOR MEDICARE OP): Performed by: NURSE PRACTITIONER

## 2024-04-19 PROCEDURE — 2500000004 HC RX 250 GENERAL PHARMACY W/ HCPCS (ALT 636 FOR OP/ED): Performed by: NURSE PRACTITIONER

## 2024-04-19 PROCEDURE — 2060000001 HC INTERMEDIATE ICU ROOM DAILY

## 2024-04-19 PROCEDURE — C9113 INJ PANTOPRAZOLE SODIUM, VIA: HCPCS | Performed by: NURSE PRACTITIONER

## 2024-04-19 PROCEDURE — P9016 RBC LEUKOCYTES REDUCED: HCPCS

## 2024-04-19 PROCEDURE — 36415 COLL VENOUS BLD VENIPUNCTURE: CPT | Performed by: NURSE PRACTITIONER

## 2024-04-19 PROCEDURE — 84100 ASSAY OF PHOSPHORUS: CPT | Performed by: NURSE PRACTITIONER

## 2024-04-19 PROCEDURE — 82947 ASSAY GLUCOSE BLOOD QUANT: CPT

## 2024-04-19 PROCEDURE — 80069 RENAL FUNCTION PANEL: CPT | Mod: CCI | Performed by: NURSE PRACTITIONER

## 2024-04-19 PROCEDURE — 36415 COLL VENOUS BLD VENIPUNCTURE: CPT

## 2024-04-19 PROCEDURE — 94668 MNPJ CHEST WALL SBSQ: CPT

## 2024-04-19 PROCEDURE — 97530 THERAPEUTIC ACTIVITIES: CPT | Mod: GP

## 2024-04-19 PROCEDURE — 36430 TRANSFUSION BLD/BLD COMPNT: CPT

## 2024-04-19 PROCEDURE — 85027 COMPLETE CBC AUTOMATED: CPT

## 2024-04-19 PROCEDURE — 83735 ASSAY OF MAGNESIUM: CPT | Performed by: NURSE PRACTITIONER

## 2024-04-19 PROCEDURE — 80053 COMPREHEN METABOLIC PANEL: CPT | Performed by: NURSE PRACTITIONER

## 2024-04-19 PROCEDURE — 97110 THERAPEUTIC EXERCISES: CPT | Mod: GP

## 2024-04-19 PROCEDURE — 82805 BLOOD GASES W/O2 SATURATION: CPT | Performed by: NURSE PRACTITIONER

## 2024-04-19 PROCEDURE — 99232 SBSQ HOSP IP/OBS MODERATE 35: CPT | Performed by: INTERNAL MEDICINE

## 2024-04-19 PROCEDURE — 85025 COMPLETE CBC W/AUTO DIFF WBC: CPT | Performed by: NURSE PRACTITIONER

## 2024-04-19 PROCEDURE — 99497 ADVNCD CARE PLAN 30 MIN: CPT

## 2024-04-19 PROCEDURE — 99223 1ST HOSP IP/OBS HIGH 75: CPT

## 2024-04-19 RX ORDER — DEXTROSE MONOHYDRATE 50 MG/ML
45 INJECTION, SOLUTION INTRAVENOUS CONTINUOUS
Status: DISCONTINUED | OUTPATIENT
Start: 2024-04-19 | End: 2024-04-20

## 2024-04-19 RX ORDER — INSULIN LISPRO 100 [IU]/ML
0-5 INJECTION, SOLUTION INTRAVENOUS; SUBCUTANEOUS EVERY 6 HOURS
Status: DISCONTINUED | OUTPATIENT
Start: 2024-04-19 | End: 2024-04-21

## 2024-04-19 RX ORDER — POTASSIUM CHLORIDE 1.5 G/1.58G
20 POWDER, FOR SOLUTION ORAL ONCE
Status: COMPLETED | OUTPATIENT
Start: 2024-04-19 | End: 2024-04-19

## 2024-04-19 RX ADMIN — Medication 200 MCG: at 08:48

## 2024-04-19 RX ADMIN — ATORVASTATIN CALCIUM 40 MG: 40 TABLET, FILM COATED ORAL at 08:47

## 2024-04-19 RX ADMIN — ENOXAPARIN SODIUM 40 MG: 100 INJECTION SUBCUTANEOUS at 08:48

## 2024-04-19 RX ADMIN — DEXTROSE MONOHYDRATE 45 ML/HR: 50 INJECTION, SOLUTION INTRAVENOUS at 20:20

## 2024-04-19 RX ADMIN — FOLIC ACID 1 MG: 1 TABLET ORAL at 08:47

## 2024-04-19 RX ADMIN — Medication 5 MG: at 21:30

## 2024-04-19 RX ADMIN — POTASSIUM CHLORIDE 20 MEQ: 1.5 POWDER, FOR SOLUTION ORAL at 05:46

## 2024-04-19 RX ADMIN — PANTOPRAZOLE SODIUM 40 MG: 40 INJECTION, POWDER, FOR SOLUTION INTRAVENOUS at 06:44

## 2024-04-19 ASSESSMENT — COGNITIVE AND FUNCTIONAL STATUS - GENERAL
PERSONAL GROOMING: A LOT
WALKING IN HOSPITAL ROOM: TOTAL
TURNING FROM BACK TO SIDE WHILE IN FLAT BAD: A LOT
DAILY ACTIVITIY SCORE: 12
DAILY ACTIVITIY SCORE: 11
MOVING FROM LYING ON BACK TO SITTING ON SIDE OF FLAT BED WITH BEDRAILS: A LITTLE
DRESSING REGULAR UPPER BODY CLOTHING: A LOT
WALKING IN HOSPITAL ROOM: TOTAL
TURNING FROM BACK TO SIDE WHILE IN FLAT BAD: A LITTLE
MOBILITY SCORE: 12
MOBILITY SCORE: 11
TOILETING: TOTAL
HELP NEEDED FOR BATHING: A LOT
MOVING TO AND FROM BED TO CHAIR: A LOT
MOVING FROM LYING ON BACK TO SITTING ON SIDE OF FLAT BED WITH BEDRAILS: A LITTLE
EATING MEALS: A LOT
DRESSING REGULAR LOWER BODY CLOTHING: A LOT
CLIMB 3 TO 5 STEPS WITH RAILING: TOTAL
WALKING IN HOSPITAL ROOM: TOTAL
STANDING UP FROM CHAIR USING ARMS: TOTAL
MOVING TO AND FROM BED TO CHAIR: A LOT
MOBILITY SCORE: 9
MOVING TO AND FROM BED TO CHAIR: A LOT
MOVING FROM LYING ON BACK TO SITTING ON SIDE OF FLAT BED WITH BEDRAILS: A LOT
TURNING FROM BACK TO SIDE WHILE IN FLAT BAD: A LITTLE
TOILETING: A LOT
DRESSING REGULAR LOWER BODY CLOTHING: A LOT
PERSONAL GROOMING: A LOT
EATING MEALS: A LOT
STANDING UP FROM CHAIR USING ARMS: A LOT
STANDING UP FROM CHAIR USING ARMS: TOTAL
CLIMB 3 TO 5 STEPS WITH RAILING: TOTAL
DRESSING REGULAR UPPER BODY CLOTHING: A LOT
CLIMB 3 TO 5 STEPS WITH RAILING: TOTAL
HELP NEEDED FOR BATHING: A LOT

## 2024-04-19 ASSESSMENT — PAIN SCALES - GENERAL
PAINLEVEL_OUTOF10: 0 - NO PAIN

## 2024-04-19 ASSESSMENT — PAIN - FUNCTIONAL ASSESSMENT
PAIN_FUNCTIONAL_ASSESSMENT: 0-10

## 2024-04-19 ASSESSMENT — PAIN SCALES - WONG BAKER: WONGBAKER_NUMERICALRESPONSE: NO HURT

## 2024-04-19 NOTE — PROGRESS NOTES
Wound Care Progress Note     Visit Date: 4/19/2024      Patient Name: Ry Sandhu         MRN: [id number]                Reason for Visit: Wound vac change        Wound History: 74-year-old male with history of HTN, HLD, COPD, CAD, PAD s/p revascularization procedures to BLE, presents to [facility] MICU as transfer from [facility] for acute hypoxic respiratory failure secondary to community acquired pneumonia, presumed septic shock, and encephalopathy.       Wound Assessment:  Bilateral stump wounds with wound vac attached.   Wound vac applied to right amp site and left amp site. Wounds cleansed. Mepitel to wound bed, white foam over exposed bone, then black foam. Wound vac set at 100mmHg low continuous pressure. Would recommend changing 2 x /week. Next dressing change on Tuesday 4/23/24.         NEW          Wound 04/05/24 Incision Pretibial Right (Active)   Wound Image   04/19/24 1638   Site Assessment Black;Brown;Red 04/19/24 1638   Carly-Wound Assessment Intact 04/19/24 1638   Wound Length (cm) 7 cm 04/19/24 1638   Wound Width (cm) 7 cm 04/19/24 1638   Wound Surface Area (cm^2) 49 cm^2 04/19/24 1638   Wound Depth (cm) 0.3 cm 04/19/24 1638   Wound Volume (cm^3) 14.7 cm^3 04/19/24 1638   Wound Healing % 0 04/19/24 1638   State of Healing Non-healing 04/16/24 1151   Margins Well-defined edges 04/05/24 1224   Closure Closure device 04/17/24 2100   Drainage Description None 04/17/24 2100   Drainage Amount None 04/17/24 2100   Dressing Vacuum dressing 04/19/24 1638   Dressing Changed Changed 04/19/24 1638   Dressing Status Clean 04/19/24 1638       Wound 04/05/24 Incision Knee Left;Anterior (Active)   Wound Image   04/19/24 1641   Site Assessment Red;Bleeding 04/19/24 1641   Carly-Wound Assessment Intact 04/19/24 1641   Shape irregular 04/19/24 1641   Wound Length (cm) 13 cm 04/19/24 1641   Wound Width (cm) 11 cm 04/19/24 1641   Wound Surface Area (cm^2) 143 cm^2 04/19/24 1641   Wound Depth (cm) 0.2 cm  04/19/24 1641   Wound Volume (cm^3) 28.6 cm^3 04/19/24 1641   Wound Healing % 0 04/19/24 1641   Closure Closure device 04/19/24 0400   Drainage Description Serosanguineous 04/19/24 1641   Drainage Amount Moderate 04/19/24 1641   Dressing Vacuum dressing 04/19/24 1641   Dressing Changed New 04/16/24 1153   Dressing Status Clean;Dry 04/19/24 0400         Negative Pressure Wound Therapy Knee Left;Anterior;Right (Active)   Placement Date/Time: 04/16/24 1000   Hand Hygiene Completed: Yes  Wound Type: Surgical  Location: Knee  Wound Location Orientation: Left;Anterior;Right   Number of days: 3     Negative Pressure Wound Therapy Knee Left;Anterior;Right (Active)   Unit Type Wound VaC 04/17/24 0021   Dressing Type Black foam 04/19/24 0800   Cycle Continuous 04/19/24 0800   Target Pressure (mmHg) 125 04/19/24 0800   Canister Changed No 04/17/24 2100         Wound Team Plan: Wound team will plan to change vacs on Tuesday.      SUZANNE GERHARDT, RN, BSN, CWOCN  4/19/2024  7:15 PM

## 2024-04-19 NOTE — SIGNIFICANT EVENT
Vascular Surgery Update    Evaluated patient this morning on rounds. Stumps with bilateral wound vacs. Leukocytosis normalized. Tolerating tube feeds. Encephalopathy much improved.    Plan for formalization next Tuesday with Dr. Arango. Will need bilateral AKA at this point. Will discuss with wife.     - NPO @ [facility] on Monday night  - Please transfuse to hgb of 9 (anticipate clinically significant EBL given bilateral surgery)  - Active T&S  - Will place 2 units pRBC on hold for OR    Discussed with attending surgeon, Dr. Arango.     Saritha Arboleda MD  PGY3   General Surgery   Vascular Surgery pager [phone]

## 2024-04-19 NOTE — PROGRESS NOTES
"Ry Sandhu is a 74 y.o. male on day 15 of admission presenting with Pneumonia.    Subjective     No acute events overnight.  Patient trialed off of Cpap, morning VBG 7.5/35/145, does not require nightly cpap at this time.   Denies pain, fever, chills, cp, abdominal pain, and n/v     Objective   Physical Exam:  Constitutional: cachetic elderly male pt in NAD, alert and cooperative  Eyes: PERRL, EOMI, no icterus   ENMT: mucous membranes moist  Head/Neck: Neck supple, no apparent injury  Respiratory/Thorax: Lungs diminished bilaterally, non-labored breathing, no cough, on 3L NC  Cardiovascular: Regular, rate and rhythm, no murmurs, normal S1 and S2  Gastrointestinal: Nondistended, soft, non-tender, BS present x 4  : external catheter in place with clear yellow urine noted in canister   Musculoskeletal: ROM intact, Right bka left aka   Extremities: no edema  Neurological: alert and oriented x 3, speech clear, follows commands appropriately, without focal deficits  Skin: Warm and dry. Right BKA covered with dressing c/d/I.  Left AKA dressing c/d/I with wound vac in place with small amount of serosang output.     Vital Signs  Blood pressure 116/52, pulse 89, temperature 37.1 °C (98.8 °F), temperature source Temporal, resp. rate 21, height 1.88 m (6' 2\"), weight 57 kg (125 lb 10.6 oz), SpO2 96%.  Oxygen Therapy  SpO2: 96 %  Medical Gas Therapy: Supplemental oxygen  O2 Delivery Method: Nasal cannula       Intake/Output last 3 Shifts:  I/O last 3 completed shifts:  In: 1430 (25.1 mL/kg) [P.O.:30; NG/GT:1400]  Out: 1480 (26 mL/kg) [Urine:1480 (0.7 mL/kg/hr)]  Weight: 57 kg     Lines and Tubes:  Peripheral IV 04/18/24 20 G 5.25 cm Right Antecubital (Active)   Placement Date/Time: 04/18/24 1020   Earliest Known Present: 04/18/24  Hand Hygiene Completed: Yes  Size (Gauge): 20 G  Catheter Length (cm): 5.25 cm  Orientation: Right  Location: Antecubital  Site Prep: Chlorhexidine   Comfort Measures: Verbal  Loca...   Number " of days: 0       NG/OG/Feeding Tube Other (Comment) (Active)   Placement Date/Time: 04/17/24 1700   Hand Hygiene Completed: Yes  Type of Tube: Feeding Tube  Tube Length: 75 cm  Tube Type: (c) Other (Comment)   Number of days: 1       External Urinary Catheter Male (Active)   Placement Date/Time: 04/18/24 1542   External Catheter Type: Male   Number of days: 0         Relevant Results  Scheduled medications  atorvastatin, 40 mg, nasogastric tube, Daily  enoxaparin, 40 mg, subcutaneous, q24h  ergocalciferol, 200 mcg, nasogastric tube, Daily  folic acid, 1 mg, nasogastric tube, Daily  lidocaine, 1 Application, Topical, Once  melatonin, 5 mg, nasogastric tube, Daily  midodrine, 5 mg, nasogastric tube, TID  pantoprazole, 40 mg, intravenous, Daily before breakfast      Continuous medications     PRN medications  PRN medications: acetaminophen, dextrose, dextrose, glucagon, glucagon, HYDROmorphone, insulin lispro, ipratropium-albuteroL, loperamide, oxyCODONE, oxygen, oxygen, polyethylene glycol, silver nitrate applicators    Results for orders placed or performed during the hospital encounter of 04/04/24 (from the past 24 hour(s))   POCT GLUCOSE   Result Value Ref Range    POCT Glucose 69 (L) 74 - 99 mg/dL   POCT GLUCOSE   Result Value Ref Range    POCT Glucose 100 (H) 74 - 99 mg/dL   POCT GLUCOSE   Result Value Ref Range    POCT Glucose 113 (H) 74 - 99 mg/dL   POCT GLUCOSE   Result Value Ref Range    POCT Glucose 145 (H) 74 - 99 mg/dL   CBC and Auto Differential   Result Value Ref Range    WBC 8.0 4.4 - 11.3 x10*3/uL    nRBC 0.0 0.0 - 0.0 /100 WBCs    RBC 2.36 (L) 4.50 - 5.90 x10*6/uL    Hemoglobin 7.1 (L) 13.5 - 17.5 g/dL    Hematocrit 22.3 (L) 41.0 - 52.0 %    MCV 95 80 - 100 fL    MCH 30.1 26.0 - 34.0 pg    MCHC 31.8 (L) 32.0 - 36.0 g/dL    RDW 15.2 (H) 11.5 - 14.5 %    Platelets 275 150 - 450 x10*3/uL    Neutrophils % 86.0 40.0 - 80.0 %    Immature Granulocytes %, Automated 0.7 0.0 - 0.9 %    Lymphocytes % 7.1 13.0 -  44.0 %    Monocytes % 4.4 2.0 - 10.0 %    Eosinophils % 1.7 0.0 - 6.0 %    Basophils % 0.1 0.0 - 2.0 %    Neutrophils Absolute 6.91 (H) 1.60 - 5.50 x10*3/uL    Immature Granulocytes Absolute, Automated 0.06 0.00 - 0.50 x10*3/uL    Lymphocytes Absolute 0.57 (L) 0.80 - 3.00 x10*3/uL    Monocytes Absolute 0.35 0.05 - 0.80 x10*3/uL    Eosinophils Absolute 0.14 0.00 - 0.40 x10*3/uL    Basophils Absolute 0.01 0.00 - 0.10 x10*3/uL   Magnesium   Result Value Ref Range    Magnesium 1.85 1.60 - 2.40 mg/dL   Comprehensive Metabolic Panel   Result Value Ref Range    Glucose 135 (H) 74 - 99 mg/dL    Sodium 150 (H) 136 - 145 mmol/L    Potassium 3.6 3.5 - 5.3 mmol/L    Chloride 113 (H) 98 - 107 mmol/L    Bicarbonate 30 21 - 32 mmol/L    Anion Gap 11 10 - 20 mmol/L    Urea Nitrogen 17 6 - 23 mg/dL    Creatinine 0.42 (L) 0.50 - 1.30 mg/dL    eGFR >90 >60 mL/min/1.73m*2    Calcium 7.4 (L) 8.6 - 10.6 mg/dL    Albumin 1.6 (L) 3.4 - 5.0 g/dL    Alkaline Phosphatase 263 (H) 33 - 136 U/L    Total Protein 4.0 (L) 6.4 - 8.2 g/dL    AST 37 9 - 39 U/L    Bilirubin, Total 0.3 0.0 - 1.2 mg/dL    ALT 28 10 - 52 U/L   Phosphorus   Result Value Ref Range    Phosphorus 2.9 2.5 - 4.9 mg/dL   BLOOD GAS VENOUS   Result Value Ref Range    POCT pH, Venous 7.51 (H) 7.33 - 7.43 pH    POCT pCO2, Venous 35 (L) 41 - 51 mm Hg    POCT pO2, Venous 145 (H) 35 - 45 mm Hg    POCT SO2, Venous 100 (H) 45 - 75 %    POCT Oxy Hemoglobin, Venous 97.7 (H) 45.0 - 75.0 %    POCT Base Excess, Venous 4.9 (H) -2.0 - 3.0 mmol/L    POCT HCO3 Calculated, Venous 27.9 (H) 22.0 - 26.0 mmol/L    Patient Temperature 37.0 degrees Celsius    FiO2 32 %   POCT GLUCOSE   Result Value Ref Range    POCT Glucose 134 (H) 74 - 99 mg/dL     FL modified barium swallow study    Result Date: 4/18/2024  Interpreted By:  Zheng Bearden and Nye Luke STUDY: FL MODIFIED BARIUM SWALLOW STUDY;; 4/17/2024 10:34 am   INDICATION: Signs/Symptoms:assess for aspiration.   COMPARISON: None.   ACCESSION  NUMBER(S): [id number]   ORDERING CLINICIAN: EASTON ANDERSON   TECHNIQUE: MBSS completed. Informed verbal consent obtained prior to completion of exam. Trials of thin, nectar thick, honey thick, puree, soft-solids, and regular solids given. Fluoroscopy time : 2.6 minutes. Total of 120 mL of oral barium contrast was administered. Total of 4331 fluoroscopic images were provided for review. SLP: Guzman Paredes M.A. CCC-SLP Phone/Pager: [phone]   SPEECH FINDINGS: Reason for referral: Objectively inform oral diet Patient hx: COPD, CAD, PAD Respiratory status: O2 NC Previous diet: NPO, has corpak   FINAL SPEECH RECOMMENDATIONS   Diet recommendations/feeding strategies: Recommendations: Continue skilled dysphagia services (dysphagia tx targeting pharyngeal strength and coordination) Recommendations Comment: NPO; consider long term enteral nutrition to meet nutrition and hydration needs Treatment/Interventions: Pharyngeal exercises, Bolus trials, Patient/family education     Repeat study/ dc plan: Repeat MBS in 4 weeks   Mechanics of the swallow summary: *Oral phase: Oral phase impaired, characterized by premature posterior spillage of thin, mildly thick and moderately thick liquids into the pharyngeal cavity and airway. Large amounts of pooling into valleculae noted. Disorganized bolus preparation, control and transport characterized by episodes of tongue rocking and piece meal deglutition. Pt's oral phase appears inefficient and a risk for aspiration.     *Pharyngeal phase: Pharyngeal phase impaired, characterized by significant residue throughout pharyngeal cavity, spillage into the airway before the swallow due to poor oral containment and during the swallow due to slow to close laryngeal vestibule, and incomplete laryngeal vestibule closure (especially on thin and/or smaller bolus volumes). Location and degree of pharyngeal stasis suggest impairments of pharyngeal constriction and tongue base retraction. Pt needed cues for  coughs and additional swallows to eject material from airway and to facilitate bolus transit. Nonproductive cough when cued *Esophageal phase: Within Functional Limits   SLP impressions with severity rating: Oropharyngeal swallow characterized by the following: impairments to safety such as frequent gross aspiration of multiple liquid consistencies without effective cough response. Impairment to efficiency such as poor bolus containment posteriorly, impaired bolus collection/awareness/transit, and significant pharyngeal residues implicating impairments across tongue base and pharyngeal constriction.   Considering pt's several chronic medical diagnoses, deconditioned state and impaired mentation, strongly suspect need for long term alternative nutrition to meet hydration/nutrition needs. Pt at high risk for aspiration and high risk for aspiration related complications.     Thin Liquids (MBSS) Rosenbek's Penetration Aspiration Scale, Thin Liquids (MBSS): 8. SILENT ASPIRATION - contrast passes glottis, visible residue, NO pt response     Nectar Thick Liquids (MBSS) Rosenbek's Penetration Aspiration Scale, Nectar thick liquids (MBSS): 8. SILENT ASPIRATION - contrast passes glottis, visible residue, NO pt response     Honey Thick Liquids (MBSS) Rosenbek's Penetration Aspiration Scale, Honey thick liquids (MBSS): 8. SILENT ASPIRATION - contrast passes glottis, visible residue, NO pt response     Purees (MBSS) Rosenbek's Penetration Aspiration Scale, Purees (MBSS): 1. NO ASPIRATION & NO PENETRATION - no aspiration, contrast does not enter airway       Speech Therapy section of this report signed by Guzman Paredes on 4/17/2024 at 2:50 pm.   RADIOLOGY FINDINGS: No radiographic evidence of acute osseous abnormality within limits of current examination. There is an enteric tube with the distal tip projecting outside of the field of view.   Radiology section of this report signed by Dr. Bearden.       Swallow evaluation as dictated  above by speech pathology.   I personally reviewed the images/study and I agree with the findings as stated by resident Jonathan Griffin. This study was interpreted at [place], [place].   MACRO: None   Signed by: Zheng Bearden 4/18/2024 9:58 AM Dictation workstation:   [id number]    XR abdomen 1 view    Result Date: 4/18/2024  Interpreted By:  Ann Serra and Burkard-Mandel Lauren STUDY: XR ABDOMEN 1 VIEW;  4/17/2024 6:39 pm   INDICATION: Signs/Symptoms:Feeding Tube placement.   COMPARISON: None.   ACCESSION NUMBER(S): [id number]   ORDERING CLINICIAN: MARLEE COON   FINDINGS: Single AP view of the upper abdomen.   Enteric tube is noted with in the mid abdomen to the right of midline. Note that there is an area of apparent kinking of the tube, however this may simply reflect a superimposed curve of the tube.   Nonobstructive bowel gas pattern. Oral contrast material is seen within the bowel.   Limited evaluation of pneumoperitoneum on supine imaging, however no gross evidence of free air is noted.   Hazy bibasilar opacities, similar to prior.   Osseous structures demonstrate no acute bony changes.       1. Enteric tube is noted with in the mid abdomen to the right of midline. Note that there is an area of apparent kinking of the tube, however this may simply reflect a superimposed curve of the tube. There is concern for gangrene, consider repositioning. 2. Nonobstructive bowel gas pattern.   I personally reviewed the images/study and Dr. Groves's interpretation and I agree with the findings as stated. This study was interpreted at [place], [place].   MACRO: None   Signed by: Ann Serra 4/18/2024 8:54 AM Dictation workstation:   [id number]     XR chest 1 view 04/15/2024    Narrative  Interpreted By:  Ann Serra and Barbat Antonio  STUDY:  XR CHEST 1 VIEW;  4/15/2024 5:39  pm    INDICATION:  Signs/Symptoms:s/p thoracentesis.    COMPARISON:  Chest radiograph dated 04/15/2024, time stamped 11:30 a.m.    ACCESSION NUMBER(S):  [id number]    ORDERING CLINICIAN:  PALLAVI SHARMA    FINDINGS:  AP radiograph of the chest was provided.    There is an enteric tube projecting over the midline, with the distal  tip outside the field of view of this study. Interval removal of the  previously described left IJ CVC.    CARDIOMEDIASTINAL SILHOUETTE:  Interval improvement obscuration of the right heart border. Aortic  knob calcifications are noted.    LUNGS:  Persistent silhouetting of the right hemidiaphragm and costophrenic  angle. Interval improvement of silhouetting of the right heart  border. Interval improvement in hazy right-sided opacification, with  progression to multifocal patchy airspace opacities. Similar hazy  left basilar opacities. Coarse background of interstitial markings.  No pneumothorax.    ABDOMEN:  No remarkable upper abdominal findings.    BONES:  No acute osseous changes.    Impression  1. Interval improvement in hazy right-sided opacification, with  progression to multifocal patchy airspace opacities and associated  costophrenic angle blunting. Persistent silhouetting of the right  hemidiaphragm and costophrenic angle. Interval improvement of  silhouetting of the right heart border. Findings likely represent a  combination of effusion and associated consolidation.  2. Similar hazy left basilar opacities, which may represent a  infectious/inflammatory process.  3. Coarse background of interstitial markings, which can be seen in  the setting of chronic lung disease.    I personally reviewed the images/study and I agree with the findings  as stated by Lowell Persaud MD. This study was interpreted at  [facility], [place], [place]    MACRO:  None    Signed by: Ann Serra 4/16/2024 8:36 AM  Dictation workstation:    [id number]      Assessment/Plan   Principal Problem:    Pneumonia  Active Problems:    CAD (coronary artery disease)    CHF (congestive heart failure) (Multi)    HTN (hypertension)    Hyperlipidemia    Chronic obstructive pulmonary disease (Multi)    Peripheral vascular disease (CMS-HCC)    Gastroesophageal reflux disease    Anemia    Septicemia (Multi)    Ry Sandhu is a 74 y.o. male w/PMHx of COPD, CAD, PAD who presented to the MICU from outside hospital for acute hypoxic respiratory failure secondary to acute CAP and septic shock secondary to community-acquired pneumonia versus colitis now s/p bilateral amputations 4/5 (right BKA, left AKA) due to severe peripheral arterial disease and increased pressor requirements. MICU course c/b whiteout of right lung 2/2 mucus plugging and worsening leukocytosis likely 2/2 atelectasis. Transferred to Step down for  aggressive BPH and PT/OT.     NEURO:    #Acute Encephalopathy (resolving)    #C/F right basal ganglia ischemia  - A&Ox3 today.  Previously with episodes of confusion which continue to improve   - Previously required sitter and Mitts for agitation.  Today is appropriate and not requiring either, will continue to monitor.   - Vitamin B12 and folate WNL. TSH 7.61(H) but fT4 WNL,   - Delirium precautions  - Melatonin 5 mg HS   - Pain Reg: Acetaminophen q6 prn pain and oxycodone 5mg q6 prn severe pain.   - CT head 4/5 and  4/10 without evidence of right basal ganglia ischemia   - Palliative Care consulted to aide in GOC discussion and possible PEG placement  - PT/OT     CARDIO:    #Hx CAD, HTN, HFpEF   #Undifferentiated shock, likely septic (resolved)  - HDS with sbp 108-126 over last 24hrs   - TTE 4/15/24 showing EF 70-75%, impaired relaxation of LV, RVSP mildly elevated 34.8  - Remains off pressors since 4/13  - Continue midodrine 5mg TID (given bilateral amputation expect baseline BP to be lower)   - Holding antihypertensives in setting of requiring  midodrine  - goal MAP >65 if mentation well and no s/sx of malperfusion   - Continue Atorvastatin HS  - Continue on tele while in SDU     VASC:    #PAD s/p L AKA and R BKA   - s/p right BKA and 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 i/s/o b/l LE gangrene  - Vascular surgery following             > NPO @ [facility] on Monday night            > Transfuse to hgb of 9 (anticipate clinically significant EBL given bilateral surgery)            > Active T&S            > Placed 2 units pRBC on hold for OR     PULM:    #hx COPD  #AHRF 2/2 CAP  #Pleural effusion, Opacification of right hemithorax (improving)  - Remains stable on 3L NC.  Trialed off Cpap overnight, AM VBG 7.5/35/145.  Can hold cpap moving forward   - CXR with improvement in right sided opacification  - Aggressive BPH: IPV and cough assist TID   - s/p Thora 4/2 and 4/15 - both draining about 1 L of transudate fluid   - RCx 4/5 Corneybacterium > RCx 4/10 NGTD  - Vancomycin/Zosyn (4/5-4/12) (4/13-4/15)  - follow 4/15 sputum cx off Abx  - Wean O2 for goal Pox >92%      FEN/GI:   #Transaminates (improving)  #Fatty infiltration of liver on CT abdomen  #Colitis, GERD, previous concern for GIB , dysphagia, diarrhea (Improved)   - RUQ 4/5: hepatic steatosis and edematous gallbladder, equivocal i/s/o volume overload  - RUQ 4/10: hepatic steatosis, nondistended and nonedematous gallbladder   - Continue with PPI  - Speech eval- dysphagia concerning for prolonged recovery as per MBS on 4/17   - Continue TF - switched to Pivot 1.5 per nutrition recs with goal of 55/hr if 24 hrs (if off CPAP) or 110 ml /hr x12 hrs if choose to continue with CPAP  - rectal tube for diarrhea removed 4/18  - Continue PRN imodium   - Daily RFP/Mag  - Will check afternoon RFP to monitor hypernatremia.  Consider increasing fwf vs adding D5w.      RENAL/:   #Hypernatremia   - Stable renal function   - Sodium 150 today, up from 148.  - FWD 2L. Insure 250ml FWF q6 hours are being administered   - Follow up on afternoon RFP  - CTM      HEME/ONC:    #Chronic Normocytic Anemia  #Coagulopathy, resolved  - Hgb 7.1, plan to transfuse 1 unit prbc to meet goal hgb of >9.    - Obtain post transfusion cbc  - Hemolysis labs WNL, fibrinogen 550(H)  - Keep active type and screen (most recent 4/17) --> T&S ordered for AM labs tomorrow  - Transfuse for Hgb to maintain hgb 9 or < per vascular surgery  - Maintain 2 large bore pIVs   - Daily Cbc and prn     ENDO:  #Hypoglycemia, resolved  - SSI Q6 hrs  - Hypoglycemia protocol      ID:    #septic shock 2/2 CAP, resolved  - Abx history: Azithromycin 500 mg daily (4/1 - 4/4), Ceftriaxone (3/31 - 4/5), Doxycycline (4/4 - 4/5), Flagyl (3/25 - 4/5), Vancomycin/Zosyn (4/5-4/12, 4/13-4/15)  - BCx 4/5 NGTD  - RCx 4/5 Corneybacterium > 4/10 NGTD > 4/15 NGTD  - Continue to monitor off abx      Ppx:   DVT: Lovenox  GI: PPI      Code Status: DNR, ok for intubation  NOK: RIVERAROCHELLEA (Spouse)  [phone] (Mobile)     Dispo: Continue step down care for bronchial hygiene.  Will need SNF at discharge. Social work following.  Palliative care consulted 4/18 for GOC    Pt discussed with Dr. Batres, seen and examined. All labs, VS and previous plan of care reviewed.      Aly Wade, APRN-CNP

## 2024-04-19 NOTE — CARE PLAN
The patient's goals for the shift include      Pt carlton;l remain hemodynamically stable    Hgb from am labs 7.1 , 1 unit of PRBC transfusing  Patient weaned to 1L from 3L NC   Tolerating TF at goal Pivot 1.5 55/hr 250 q6 flush    Problem: Daily Care  Goal: Daily care needs are met  Outcome: Progressing     Problem: Psychosocial Needs  Goal: Demonstrates ability to cope with hospitalization/illness  Outcome: Progressing  Goal: Collaborate with me, my family, and caregiver to identify my specific goals  Outcome: Progressing     Problem: Discharge Barriers  Goal: My discharge needs are met  Outcome: Progressing     Problem: Respiratory  Goal: Clear secretions with interventions this shift  Outcome: Progressing  Goal: No signs of respiratory distress (eg. Use of accessory muscles. Peds grunting)  Outcome: Progressing  Goal: Tolerate mechanical ventilation evidenced by VS/agitation level this shift  Outcome: Progressing  Goal: Tolerate pulmonary toileting this shift  Outcome: Progressing  Goal: Verbalize decreased shortness of breath this shift  Outcome: Progressing  Goal: Wean oxygen to maintain O2 saturation per order/standard this shift  Outcome: Progressing  Goal: Increase self care and/or family involvement in next 24 hours  Outcome: Progressing     Problem: Skin  Goal: Participates in plan/prevention/treatment measures  Outcome: Progressing

## 2024-04-19 NOTE — CONSULTS
Inpatient consult to Palliative Care  Consult performed by: SHIVA Ocasio  Consult ordered by: SHIVA Pinto      Palliative Medicine Consult  Complex medical decision making, symptom management, patient/family support    History obtained from chart review including ED note, H&P, patient's daily progress notes, review of lab/test results, and discussion with primary team and bedside RN.    Subjective    History of Present Illness  Mr. Ry Sandhu is a 74y gentleman with a pmh of COPD, CAD, PAD who presented to MICU with acute hypoxic respiratory failure 2/2 community acquired PNA. He is now s/p bilateral LE amputations on 4/5 due to severe PAD and increased pressor requirements. MICU course c/b mucus plugging, leukocytosis, and atelectasis. Plan for surgical formalization next Tuesday, which will result in bilateral AKAs.  Palliative medicine consulted for goals of care, given ongoing dysphagia and consideration for PEG tube.     Introduction to Palliative Care  Met with pt at bedside.   Patient remains altered, does not have capacity to make their own medical decisions at this time. Unable to participate in ROS or goals of care discussion. Surrogate decision maker is wife Pura.  Staff present: Lori HENRIQUEZ  Palliative Medicine was introduced as a specialty service for patients with serious illness to help with symptom management, improve quality of life, assist with goals of care conversations, navigate complex decision making, and provide support to patients and families. Support and empathy was provided throughout the encounter. Provided reflective listening and presence.     Symptoms  ROS limited, pt working with PT    Palliative Medicine Social History:  Limited social hx provided by wife. Pt spends most of his day at home, reading and watching TV. .    Objective    Last Recorded Vitals  /55 (Patient Position: Lying)   Pulse 100   Temp 37 °C (98.6 °F)   Resp 26   Ht 1.88  "m (6' 2\")   Wt 57 kg (125 lb 10.6 oz)   SpO2 95% Comment: 2L O2  BMI 16.13 kg/m²      Physical Exam  HENT:      Head: Normocephalic.      Nose:      Comments: DHT  Musculoskeletal:      Comments: NATALIEA, AKA   Skin:     General: Skin is warm and dry.   Neurological:      Mental Status: He is alert. He is disoriented.          Relevant Results  Results for orders placed or performed during the hospital encounter of 04/04/24 (from the past 24 hour(s))   POCT GLUCOSE   Result Value Ref Range    POCT Glucose 145 (H) 74 - 99 mg/dL   CBC and Auto Differential   Result Value Ref Range    WBC 8.0 4.4 - 11.3 x10*3/uL    nRBC 0.0 0.0 - 0.0 /100 WBCs    RBC 2.36 (L) 4.50 - 5.90 x10*6/uL    Hemoglobin 7.1 (L) 13.5 - 17.5 g/dL    Hematocrit 22.3 (L) 41.0 - 52.0 %    MCV 95 80 - 100 fL    MCH 30.1 26.0 - 34.0 pg    MCHC 31.8 (L) 32.0 - 36.0 g/dL    RDW 15.2 (H) 11.5 - 14.5 %    Platelets 275 150 - 450 x10*3/uL    Neutrophils % 86.0 40.0 - 80.0 %    Immature Granulocytes %, Automated 0.7 0.0 - 0.9 %    Lymphocytes % 7.1 13.0 - 44.0 %    Monocytes % 4.4 2.0 - 10.0 %    Eosinophils % 1.7 0.0 - 6.0 %    Basophils % 0.1 0.0 - 2.0 %    Neutrophils Absolute 6.91 (H) 1.60 - 5.50 x10*3/uL    Immature Granulocytes Absolute, Automated 0.06 0.00 - 0.50 x10*3/uL    Lymphocytes Absolute 0.57 (L) 0.80 - 3.00 x10*3/uL    Monocytes Absolute 0.35 0.05 - 0.80 x10*3/uL    Eosinophils Absolute 0.14 0.00 - 0.40 x10*3/uL    Basophils Absolute 0.01 0.00 - 0.10 x10*3/uL   Magnesium   Result Value Ref Range    Magnesium 1.85 1.60 - 2.40 mg/dL   Comprehensive Metabolic Panel   Result Value Ref Range    Glucose 135 (H) 74 - 99 mg/dL    Sodium 150 (H) 136 - 145 mmol/L    Potassium 3.6 3.5 - 5.3 mmol/L    Chloride 113 (H) 98 - 107 mmol/L    Bicarbonate 30 21 - 32 mmol/L    Anion Gap 11 10 - 20 mmol/L    Urea Nitrogen 17 6 - 23 mg/dL    Creatinine 0.42 (L) 0.50 - 1.30 mg/dL    eGFR >90 >60 mL/min/1.73m*2    Calcium 7.4 (L) 8.6 - 10.6 mg/dL    Albumin 1.6 (L) " 3.4 - 5.0 g/dL    Alkaline Phosphatase 263 (H) 33 - 136 U/L    Total Protein 4.0 (L) 6.4 - 8.2 g/dL    AST 37 9 - 39 U/L    Bilirubin, Total 0.3 0.0 - 1.2 mg/dL    ALT 28 10 - 52 U/L   Phosphorus   Result Value Ref Range    Phosphorus 2.9 2.5 - 4.9 mg/dL   BLOOD GAS VENOUS   Result Value Ref Range    POCT pH, Venous 7.51 (H) 7.33 - 7.43 pH    POCT pCO2, Venous 35 (L) 41 - 51 mm Hg    POCT pO2, Venous 145 (H) 35 - 45 mm Hg    POCT SO2, Venous 100 (H) 45 - 75 %    POCT Oxy Hemoglobin, Venous 97.7 (H) 45.0 - 75.0 %    POCT Base Excess, Venous 4.9 (H) -2.0 - 3.0 mmol/L    POCT HCO3 Calculated, Venous 27.9 (H) 22.0 - 26.0 mmol/L    Patient Temperature 37.0 degrees Celsius    FiO2 32 %   POCT GLUCOSE   Result Value Ref Range    POCT Glucose 134 (H) 74 - 99 mg/dL   Prepare RBC: 1 Units   Result Value Ref Range    PRODUCT CODE P1242Z32     Unit Number [id number]     Unit ABO O     Unit RH POS     XM INTEP COMP     Dispense Status TR     Blood Expiration Date May 15, 2024 23:59 EDT     PRODUCT BLOOD TYPE 5100     UNIT VOLUME 350    POCT GLUCOSE   Result Value Ref Range    POCT Glucose 122 (H) 74 - 99 mg/dL   CBC   Result Value Ref Range    WBC 8.8 4.4 - 11.3 x10*3/uL    nRBC 0.0 0.0 - 0.0 /100 WBCs    RBC 3.02 (L) 4.50 - 5.90 x10*6/uL    Hemoglobin 9.1 (L) 13.5 - 17.5 g/dL    Hematocrit 27.7 (L) 41.0 - 52.0 %    MCV 92 80 - 100 fL    MCH 30.1 26.0 - 34.0 pg    MCHC 32.9 32.0 - 36.0 g/dL    RDW 15.9 (H) 11.5 - 14.5 %    Platelets 260 150 - 450 x10*3/uL   Renal function panel   Result Value Ref Range    Glucose 99 74 - 99 mg/dL    Sodium 150 (H) 136 - 145 mmol/L    Potassium 3.9 3.5 - 5.3 mmol/L    Chloride 113 (H) 98 - 107 mmol/L    Bicarbonate 30 21 - 32 mmol/L    Anion Gap 11 10 - 20 mmol/L    Urea Nitrogen 19 6 - 23 mg/dL    Creatinine 0.41 (L) 0.50 - 1.30 mg/dL    eGFR >90 >60 mL/min/1.73m*2    Calcium 7.8 (L) 8.6 - 10.6 mg/dL    Phosphorus 3.1 2.5 - 4.9 mg/dL    Albumin 1.8 (L) 3.4 - 5.0 g/dL      FL modified barium  swallow study  Narrative: Interpreted By:  Zheng Bearden and Nye Luke   STUDY:  FL MODIFIED BARIUM SWALLOW STUDY;; 4/17/2024 10:34 am      INDICATION:  Signs/Symptoms:assess for aspiration.      COMPARISON:  None.      ACCESSION NUMBER(S):  [id number]      ORDERING CLINICIAN:  EASTON ANDERSON      TECHNIQUE:  MBSS completed. Informed verbal consent obtained prior to completion  of exam. Trials of thin, nectar thick, honey thick, puree,  soft-solids, and regular solids given. Fluoroscopy time : 2.6 minutes.  Total of 120 mL of oral barium contrast was administered.  Total of 4331 fluoroscopic images were provided for review.  SLP: Guzman Paredes M.A. CCC-SLP  Phone/Pager: [phone]      SPEECH FINDINGS:  Reason for referral: Objectively inform oral diet  Patient hx: COPD, CAD, PAD  Respiratory status: O2 NC  Previous diet: NPO, has corpak      FINAL SPEECH RECOMMENDATIONS      Diet recommendations/feeding strategies:  Recommendations: Continue skilled dysphagia services (dysphagia tx  targeting pharyngeal strength and coordination) Recommendations  Comment: NPO; consider long term enteral nutrition to meet nutrition  and hydration needs Treatment/Interventions: Pharyngeal exercises,  Bolus trials, Patient/family education          Repeat study/ dc plan: Repeat MBS in 4 weeks      Mechanics of the swallow summary:  *Oral phase:  Oral phase impaired, characterized by premature posterior spillage of  thin, mildly thick and moderately thick liquids into the pharyngeal  cavity and airway. Large amounts of pooling into valleculae noted.  Disorganized bolus preparation, control and transport characterized  by episodes of tongue rocking and piece meal deglutition. Pt's oral  phase appears inefficient and a risk for aspiration.          *Pharyngeal phase: Pharyngeal phase impaired, characterized by  significant residue throughout pharyngeal cavity, spillage into the  airway before the swallow due to poor oral containment and  during the  swallow due to slow to close laryngeal vestibule, and incomplete  laryngeal vestibule closure (especially on thin and/or smaller bolus  volumes). Location and degree of pharyngeal stasis suggest  impairments of pharyngeal constriction and tongue base retraction. Pt  needed cues for coughs and additional swallows to eject material from  airway and to facilitate bolus transit. Nonproductive cough when cued  *Esophageal phase: Within Functional Limits      SLP impressions with severity rating:  Oropharyngeal swallow characterized by the following: impairments to  safety such as frequent gross aspiration of multiple liquid  consistencies without effective cough response. Impairment to  efficiency such as poor bolus containment posteriorly, impaired bolus  collection/awareness/transit, and significant pharyngeal residues  implicating impairments across tongue base and pharyngeal  constriction.      Considering pt's several chronic medical diagnoses, deconditioned  state and impaired mentation, strongly suspect need for long term  alternative nutrition to meet hydration/nutrition needs. Pt at high  risk for aspiration and high risk for aspiration related  complications.          Thin Liquids (MBSS)  Rosenbek's Penetration Aspiration Scale, Thin Liquids (MBSS):  8. SILENT ASPIRATION - contrast passes glottis, visible residue, NO  pt response          Nectar Thick Liquids (MBSS)  Rosenbek's Penetration Aspiration Scale, Nectar thick liquids (MBSS):  8. SILENT ASPIRATION - contrast passes glottis, visible residue, NO  pt response          Honey Thick Liquids (MBSS)  Rosenbek's Penetration Aspiration Scale, Honey thick liquids (MBSS):  8. SILENT ASPIRATION - contrast passes glottis, visible residue, NO  pt response          Purees (MBSS)  Rosenbek's Penetration Aspiration Scale, Purees (MBSS):  1. NO ASPIRATION & NO PENETRATION - no aspiration, contrast does  not enter airway              Speech Therapy section of  this report signed by Guzman Paredes on 4/17/2024  at 2:50 pm.      RADIOLOGY FINDINGS:  No radiographic evidence of acute osseous abnormality within limits  of current examination. There is an enteric tube with the distal tip  projecting outside of the field of view.      Radiology section of this report signed by Dr. Bearden.      Impression: Swallow evaluation as dictated above by speech pathology.      I personally reviewed the images/study and I agree with the findings  as stated by resident Jonathan Griffin. This study was interpreted  at [place], [place].      MACRO:  None      Signed by: Zheng Bearden 4/18/2024 9:58 AM  Dictation workstation:   [id number]  XR abdomen 1 view  Narrative: Interpreted By:  Ann Serra and Burkard-Mandel Lauren   STUDY:  XR ABDOMEN 1 VIEW;  4/17/2024 6:39 pm      INDICATION:  Signs/Symptoms:Feeding Tube placement.      COMPARISON:  None.      ACCESSION NUMBER(S):  [id number]      ORDERING CLINICIAN:  MARLEE COON      FINDINGS:  Single AP view of the upper abdomen.      Enteric tube is noted with in the mid abdomen to the right of  midline. Note that there is an area of apparent kinking of the tube,  however this may simply reflect a superimposed curve of the tube.      Nonobstructive bowel gas pattern. Oral contrast material is seen  within the bowel.      Limited evaluation of pneumoperitoneum on supine imaging, however no  gross evidence of free air is noted.      Hazy bibasilar opacities, similar to prior.      Osseous structures demonstrate no acute bony changes.      Impression: 1. Enteric tube is noted with in the mid abdomen to the right of  midline. Note that there is an area of apparent kinking of the tube,  however this may simply reflect a superimposed curve of the tube.  There is concern for gangrene, consider repositioning.  2. Nonobstructive bowel gas pattern.      I personally reviewed the images/study  and Dr. Groves's  interpretation and I agree with the findings as stated. This study  was interpreted at [facility],  [place], [place].      MACRO:  None      Signed by: Ann Serra 4/18/2024 8:54 AM  Dictation workstation:   [id number]     No results found for this or any previous visit (from the past 4464 hour(s)).     Allergies  Patient has no known allergies.    Scheduled medications  atorvastatin, 40 mg, nasogastric tube, Daily  enoxaparin, 40 mg, subcutaneous, q24h  ergocalciferol, 200 mcg, nasogastric tube, Daily  folic acid, 1 mg, nasogastric tube, Daily  insulin lispro, 0-5 Units, subcutaneous, q6h  lidocaine, 1 Application, Topical, Once  melatonin, 5 mg, nasogastric tube, Daily  midodrine, 5 mg, nasogastric tube, TID  pantoprazole, 40 mg, intravenous, Daily before breakfast      Continuous medications     PRN medications  PRN medications: acetaminophen, dextrose, dextrose, glucagon, glucagon, ipratropium-albuteroL, loperamide, oxyCODONE, oxygen, polyethylene glycol, silver nitrate applicators     Assessment/Plan    Mr. Ry Sandhu is a 74y gentleman with a pmh of COPD, CAD, PAD who presented to MICU with acute hypoxic respiratory failure 2/2 community acquired PNA. He is now s/p bilateral LE amputations on 4/5 due to severe PAD and increased pressor requirements. MICU course c/b mucus plugging, leukocytosis, and atelectasis. Plan for surgical formalization next Tuesday, which will result in bilateral AKAs.  Palliative medicine consulted for goals of care, given ongoing dysphagia and consideration for PEG tube.    --------------------------------------------------------------------------------------------------------------------------------------------------------------------  Advanced Care Planning  Wife Pura consented to a voluntary Advanced Care Planning meeting.   Serious Illness Assessment and Counseling:  Life Limiting Disease: dysphagia posing  threat to life or function.     Disease Specific Information Provided/Prognosis Discussed: Patient's current clinical condition, including diagnosis, prognosis, and management plan were discussed.   Counseling provided on dysphagia    Understanding/Overall Impression: wife expressing fair understanding of overall health status and severity of illness.     Goals/Hopes: Discussed concern of dysphagia and possibility of PEG feeds. Wife states that food is a major QOL issue for patient. States that he would never want to be in a situation where he could not eat food, and that he has been asking for food. Wife states that having nutrition through a tube in stomach and no oral feeding would NOT be good QOL for patient. Then proceeded to discuss treatment limitations with oral feeding. Wife not ready to make decision regarding DNI or oral feeds at this time. She would like to discuss this more with pt and medical team.    I spent 30 minutes in providing separately identifiable ACP services with the patient and/or surrogate decision maker in a voluntary conversation discussing the patient's wishes and goals as detailed in the above note.   --------------------------------------------------------------------------------------------------------------------------------------------------------------------    #Complex Medical Decision Making  #Goals of Care  #Advanced Care Planning  - Code status: DNR  - Surrogate decision maker: wife Pura [phone]  - Goals are mix of survival and time and improved quality of life  - wife states that NPO with PEG feeds would not be good QOL for patient, [facility] ongoing    #Psychosocial Support  - Music Therapy  - Spiritual Care Support  - Art Therapy      Plan of Care discussed with: Updated MD and bedside RN on goals of care decision, medication adjustments, and code status     Medical Decision Making was high level due to high complexity of problems, extensive data review, and high risk of  management/treatment.       Thank you for allowing us to participate in the care of this patient. Palliative will continue to follow as needed. Palliative medicine is available Monday-Friday, 8a-6p. Please contact team with any questions or concerns.  Team pager [phone] (weekdays)  Lori Beverly DNP, CNP

## 2024-04-19 NOTE — PROGRESS NOTES
Physical Therapy    Physical Therapy Treatment    Patient Name: Ry Sandhu  MRN: [id number]  Today's Date: 4/19/2024  Time Calculation  Start Time: 1459  Stop Time: 1533  Time Calculation (min): 34 min       Assessment/Plan   PT Assessment  PT Assessment Results: Decreased strength, Decreased endurance, Impaired balance, Decreased mobility, Decreased cognition, Orthopedic restrictions  Rehab Prognosis: Good  Barriers to Discharge: None  Evaluation/Treatment Tolerance: Patient tolerated treatment well  Medical Staff Made Aware: Yes  Strengths: Attitude of self, Coping skills  Barriers to Participation:  (none)  End of Session Communication: Bedside nurse  Assessment Comment: The pt demonstrated gradual functional progression with improved strength during supine therapeutic exercises and improved static and dynamic sitting balance.  End of Session Patient Position: Bed, 4 rail up, Alarm on  PT Plan  Inpatient/Swing Bed or Outpatient: Inpatient  PT Plan  Treatment/Interventions: Bed mobility, Transfer training, Balance training, Strengthening, Endurance training, Therapeutic exercise, Therapeutic activity, Home exercise program  PT Plan: Skilled PT  PT Frequency: 5 times per week  PT Discharge Recommendations: High intensity level of continued care  Equipment Recommended upon Discharge:  (none)  PT Recommended Transfer Status: Assist x1  PT - OK to Discharge: Yes      General Visit Information:   PT  Visit  PT Received On: 04/19/24  Response to Previous Treatment: Patient reporting fatigue but able to participate.  General  Prior to Session Communication: Bedside nurse  Patient Position Received: Bed, 4 rail up, Alarm on  Preferred Learning Style: verbal, visual, written  General Comment: The pt was pleasant, cooperative and willing to participate in therapy.    Subjective   Precautions:  Precautions  Hearing/Visual Limitations: Hearing impaired and vision WFL  Medical Precautions: Fall precautions, Swallowing  precautions, Oxygen therapy device and L/min  Precautions Comment: Pt in compliance with precautions throughout PT session.  Vital Signs:  Vital Signs  Heart Rate: 100  Heart Rate Source: Monitor  Resp: 26  SpO2: 95 % (2L O2)  BP: 125/55  Patient Position: Lying    Objective   Pain:  Pain Assessment  Pain Assessment: 0-10  Pain Score: 0 - No pain  Cognition:  Cognition  Overall Cognitive Status: Impaired  Following Commands: Follows one step commands with increased time  Postural Control:  Postural Control  Postural Control: Impaired  Posture Comment: Pt presented with slightly impaired sitting posture.  Static Sitting Balance  Static Sitting-Balance Support: Bilateral upper extremity supported  Static Sitting-Level of Assistance: Contact guard  Dynamic Sitting Balance  Dynamic Sitting-Balance Support: Bilateral upper extremity supported  Dynamic Sitting-Comments: CGA  Extremity/Trunk Assessments:        RUE   RUE : Within Functional Limits  LUE   LUE: Within Functional Limits  RLE   RLE : Within Functional Limits  LLE   LLE : Within Functional Limits  Activity Tolerance:  Activity Tolerance  Endurance: Endurance does not limit participation in activity  Treatments:  Therapeutic Exercise  Therapeutic Exercise Performed: Yes  Therapeutic Exercise Activity 1: hand squeezes x 15  Therapeutic Exercise Activity 2: resisted elbow flexion/extensions x 15  Therapeutic Exercise Activity 3: shoulder flexions x 15  Therapeutic Exercise Activity 4: SLR x 15  Therapeutic Exercise Activity 5: R LE SAQ x 15  Therapeutic Exercise Activity 6: Hip ABD x 15    Therapeutic Activity  Therapeutic Activity Performed: Yes  Therapeutic Activity 1: Static sitting balance with R UE in air L UE on knee x 10 sec; R UE on knee L UE in air x 10 sec; Dipak hands in air x 10 sec.    Balance/Neuromuscular Re-Education  Balance/Neuromuscular Re-Education Activity Performed: Yes  Balance/Neuromuscular Re-Education Activity 1: SBA static sitting balance  with and without Dipak UE support.  Balance/Neuromuscular Re-Education Activity 2: CGA dynamic sitting with Dipak UE support.    Bed Mobility  Bed Mobility: Yes  Bed Mobility 1  Bed Mobility 1: Supine to sitting  Level of Assistance 1: Minimum assistance  Bed Mobility Comments 1: log roll technque  Bed Mobility 2  Bed Mobility  2: Sitting to supine  Level of Assistance 2: Minimum assistance  Bed Mobility Comments 2: log roll technique    Outcome Measures:  [facility] Basic Mobility  Turning from your back to your side while in a flat bed without using bedrails: A little  Moving from lying on your back to sitting on the side of a flat bed without using bedrails: A little  Moving to and from bed to chair (including a wheelchair): A lot  Standing up from a chair using your arms (e.g. wheelchair or bedside chair): Total  To walk in hospital room: Total  Climbing 3-5 steps with railing: Total  Basic Mobility - Total Score: 11    Education Documentation  Body Mechanics, taught by George Edmond PT at 4/19/2024  4:08 PM.  Learner: Patient  Readiness: Acceptance  Method: Explanation, Demonstration  Response: Verbalizes Understanding, Demonstrated Understanding    Home Exercise Program, taught by George Edmond PT at 4/19/2024  4:08 PM.  Learner: Patient  Readiness: Acceptance  Method: Explanation, Demonstration  Response: Verbalizes Understanding, Demonstrated Understanding    Mobility Training, taught by George Edmond PT at 4/19/2024  4:08 PM.  Learner: Patient  Readiness: Acceptance  Method: Explanation, Demonstration  Response: Verbalizes Understanding, Demonstrated Understanding    Education Comments  No comments found.      OP EDUCATION:  Outpatient Education  Individual(s) Educated: Patient  Education Provided: Body Mechanics, Fall Risk, Home Exercise Program, Home Safety, POC, Post-Op Precautions, Posture  Patient Response to Education: Patient/Caregiver Verbalized Understanding of Information, Patient/Caregiver  Performed Return Demonstration of Exercises/Activities    Encounter Problems       Encounter Problems (Active)       PT Problem       Patient will complete bed mobility with MINx1 with HOB elevated, use of bedrail   (Progressing)       Start:  04/09/24    Expected End:  04/30/24            Patient will complete bed<->chair lateral slide transfer with OD x1 using LRD as needed without acute LOB  (Progressing)       Start:  04/09/24    Expected End:  04/30/24            Patient will complete static (contact guard assist x1) and dynamic (minimal assist x1) sitting balance activities using UE support as needed in order to maintain midline without acute LOB.  (Progressing)       Start:  04/09/24    Expected End:  04/30/24            Patient will participate in BLE there-ex program in order to assist in improving strength and to assist with the completion of functional mobility tasks.  (Progressing)       Start:  04/09/24    Expected End:  04/30/24

## 2024-04-19 NOTE — CARE PLAN
The patient's goals for the shift include      The clinical goals for the shift include patient will remain hemodynmically stable    Problem: Daily Care  Goal: Daily care needs are met  Outcome: Progressing     Problem: Psychosocial Needs  Goal: Demonstrates ability to cope with hospitalization/illness  Outcome: Progressing  Goal: Collaborate with me, my family, and caregiver to identify my specific goals  Outcome: Progressing     Problem: Discharge Barriers  Goal: My discharge needs are met  Outcome: Progressing     Problem: Respiratory  Goal: Clear secretions with interventions this shift  Outcome: Progressing  Goal: No signs of respiratory distress (eg. Use of accessory muscles. Peds grunting)  Outcome: Progressing  Goal: Tolerate mechanical ventilation evidenced by VS/agitation level this shift  Outcome: Progressing  Goal: Tolerate pulmonary toileting this shift  Outcome: Progressing  Goal: Verbalize decreased shortness of breath this shift  Outcome: Progressing  Goal: Wean oxygen to maintain O2 saturation per order/standard this shift  Outcome: Progressing  Goal: Increase self care and/or family involvement in next 24 hours  Outcome: Progressing     Problem: Skin  Goal: Participates in plan/prevention/treatment measures  Outcome: Progressing

## 2024-04-19 NOTE — PROGRESS NOTES
Spiritual Care Visit    Clinical Encounter Type  Visited With: Patient not available (Pt working with PT at time of visit. No family members present.)  Continue Visiting: Yes

## 2024-04-20 LAB
ABO GROUP (TYPE) IN BLOOD: NORMAL
ALBUMIN SERPL BCP-MCNC: 1.7 G/DL (ref 3.4–5)
ALP SERPL-CCNC: 276 U/L (ref 33–136)
ALT SERPL W P-5'-P-CCNC: 36 U/L (ref 10–52)
ANION GAP SERPL CALC-SCNC: 7 MMOL/L (ref 10–20)
ANION GAP SERPL CALC-SCNC: 9 MMOL/L (ref 10–20)
ANTIBODY SCREEN: NORMAL
AST SERPL W P-5'-P-CCNC: 48 U/L (ref 9–39)
BASOPHILS # BLD AUTO: 0.01 X10*3/UL (ref 0–0.1)
BASOPHILS NFR BLD AUTO: 0.1 %
BILIRUB SERPL-MCNC: 0.4 MG/DL (ref 0–1.2)
BUN SERPL-MCNC: 21 MG/DL (ref 6–23)
BUN SERPL-MCNC: 22 MG/DL (ref 6–23)
CALCIUM SERPL-MCNC: 7.4 MG/DL (ref 8.6–10.6)
CALCIUM SERPL-MCNC: 7.6 MG/DL (ref 8.6–10.6)
CHLORIDE SERPL-SCNC: 109 MMOL/L (ref 98–107)
CHLORIDE SERPL-SCNC: 110 MMOL/L (ref 98–107)
CO2 SERPL-SCNC: 31 MMOL/L (ref 21–32)
CO2 SERPL-SCNC: 32 MMOL/L (ref 21–32)
CREAT SERPL-MCNC: 0.4 MG/DL (ref 0.5–1.3)
CREAT SERPL-MCNC: 0.4 MG/DL (ref 0.5–1.3)
EGFRCR SERPLBLD CKD-EPI 2021: >90 ML/MIN/1.73M*2
EGFRCR SERPLBLD CKD-EPI 2021: >90 ML/MIN/1.73M*2
EOSINOPHIL # BLD AUTO: 0.11 X10*3/UL (ref 0–0.4)
EOSINOPHIL NFR BLD AUTO: 1.3 %
ERYTHROCYTE [DISTWIDTH] IN BLOOD BY AUTOMATED COUNT: 15.9 % (ref 11.5–14.5)
GLUCOSE BLD MANUAL STRIP-MCNC: 109 MG/DL (ref 74–99)
GLUCOSE BLD MANUAL STRIP-MCNC: 109 MG/DL (ref 74–99)
GLUCOSE BLD MANUAL STRIP-MCNC: 133 MG/DL (ref 74–99)
GLUCOSE BLD MANUAL STRIP-MCNC: 86 MG/DL (ref 74–99)
GLUCOSE SERPL-MCNC: 112 MG/DL (ref 74–99)
GLUCOSE SERPL-MCNC: 121 MG/DL (ref 74–99)
HCT VFR BLD AUTO: 25.9 % (ref 41–52)
HGB BLD-MCNC: 8.7 G/DL (ref 13.5–17.5)
IMM GRANULOCYTES # BLD AUTO: 0.09 X10*3/UL (ref 0–0.5)
IMM GRANULOCYTES NFR BLD AUTO: 1.1 % (ref 0–0.9)
LYMPHOCYTES # BLD AUTO: 0.7 X10*3/UL (ref 0.8–3)
LYMPHOCYTES NFR BLD AUTO: 8.3 %
MAGNESIUM SERPL-MCNC: 1.71 MG/DL (ref 1.6–2.4)
MCH RBC QN AUTO: 29.9 PG (ref 26–34)
MCHC RBC AUTO-ENTMCNC: 33.6 G/DL (ref 32–36)
MCV RBC AUTO: 89 FL (ref 80–100)
MONOCYTES # BLD AUTO: 0.52 X10*3/UL (ref 0.05–0.8)
MONOCYTES NFR BLD AUTO: 6.2 %
NEUTROPHILS # BLD AUTO: 6.97 X10*3/UL (ref 1.6–5.5)
NEUTROPHILS NFR BLD AUTO: 83 %
NRBC BLD-RTO: 0 /100 WBCS (ref 0–0)
PHOSPHATE SERPL-MCNC: 2.8 MG/DL (ref 2.5–4.9)
PLATELET # BLD AUTO: 262 X10*3/UL (ref 150–450)
POTASSIUM SERPL-SCNC: 3.9 MMOL/L (ref 3.5–5.3)
POTASSIUM SERPL-SCNC: 4.1 MMOL/L (ref 3.5–5.3)
PROT SERPL-MCNC: 4.2 G/DL (ref 6.4–8.2)
RBC # BLD AUTO: 2.91 X10*6/UL (ref 4.5–5.9)
RH FACTOR (ANTIGEN D): NORMAL
SODIUM SERPL-SCNC: 144 MMOL/L (ref 136–145)
SODIUM SERPL-SCNC: 146 MMOL/L (ref 136–145)
WBC # BLD AUTO: 8.4 X10*3/UL (ref 4.4–11.3)

## 2024-04-20 PROCEDURE — 82947 ASSAY GLUCOSE BLOOD QUANT: CPT

## 2024-04-20 PROCEDURE — 94668 MNPJ CHEST WALL SBSQ: CPT

## 2024-04-20 PROCEDURE — 86901 BLOOD TYPING SEROLOGIC RH(D): CPT

## 2024-04-20 PROCEDURE — 85025 COMPLETE CBC W/AUTO DIFF WBC: CPT | Performed by: NURSE PRACTITIONER

## 2024-04-20 PROCEDURE — 2500000001 HC RX 250 WO HCPCS SELF ADMINISTERED DRUGS (ALT 637 FOR MEDICARE OP): Performed by: NURSE PRACTITIONER

## 2024-04-20 PROCEDURE — 36415 COLL VENOUS BLD VENIPUNCTURE: CPT | Performed by: NURSE PRACTITIONER

## 2024-04-20 PROCEDURE — 1200000002 HC GENERAL ROOM WITH TELEMETRY DAILY

## 2024-04-20 PROCEDURE — 99232 SBSQ HOSP IP/OBS MODERATE 35: CPT | Performed by: INTERNAL MEDICINE

## 2024-04-20 PROCEDURE — 84100 ASSAY OF PHOSPHORUS: CPT | Performed by: NURSE PRACTITIONER

## 2024-04-20 PROCEDURE — C9113 INJ PANTOPRAZOLE SODIUM, VIA: HCPCS | Performed by: NURSE PRACTITIONER

## 2024-04-20 PROCEDURE — 2500000004 HC RX 250 GENERAL PHARMACY W/ HCPCS (ALT 636 FOR OP/ED): Performed by: NURSE PRACTITIONER

## 2024-04-20 PROCEDURE — 84075 ASSAY ALKALINE PHOSPHATASE: CPT | Performed by: NURSE PRACTITIONER

## 2024-04-20 PROCEDURE — 80048 BASIC METABOLIC PNL TOTAL CA: CPT | Mod: CCI | Performed by: NURSE PRACTITIONER

## 2024-04-20 PROCEDURE — 86920 COMPATIBILITY TEST SPIN: CPT

## 2024-04-20 PROCEDURE — 83735 ASSAY OF MAGNESIUM: CPT | Performed by: NURSE PRACTITIONER

## 2024-04-20 PROCEDURE — 2500000001 HC RX 250 WO HCPCS SELF ADMINISTERED DRUGS (ALT 637 FOR MEDICARE OP)

## 2024-04-20 RX ORDER — NAPROXEN SODIUM 220 MG/1
81 TABLET, FILM COATED ORAL DAILY
Status: DISCONTINUED | OUTPATIENT
Start: 2024-04-20 | End: 2024-04-30 | Stop reason: HOSPADM

## 2024-04-20 RX ORDER — POTASSIUM CHLORIDE 1.5 G/1.58G
20 POWDER, FOR SOLUTION ORAL ONCE
Status: COMPLETED | OUTPATIENT
Start: 2024-04-20 | End: 2024-04-20

## 2024-04-20 RX ORDER — MAGNESIUM SULFATE HEPTAHYDRATE 40 MG/ML
2 INJECTION, SOLUTION INTRAVENOUS ONCE
Status: COMPLETED | OUTPATIENT
Start: 2024-04-20 | End: 2024-04-20

## 2024-04-20 RX ADMIN — OXYCODONE HYDROCHLORIDE 5 MG: 5 TABLET ORAL at 08:01

## 2024-04-20 RX ADMIN — MAGNESIUM SULFATE HEPTAHYDRATE 2 G: 40 INJECTION, SOLUTION INTRAVENOUS at 06:16

## 2024-04-20 RX ADMIN — FOLIC ACID 1 MG: 1 TABLET ORAL at 08:01

## 2024-04-20 RX ADMIN — ENOXAPARIN SODIUM 40 MG: 100 INJECTION SUBCUTANEOUS at 08:01

## 2024-04-20 RX ADMIN — ACETAMINOPHEN 650 MG: 650 SOLUTION ORAL at 21:48

## 2024-04-20 RX ADMIN — ATORVASTATIN CALCIUM 40 MG: 40 TABLET, FILM COATED ORAL at 08:01

## 2024-04-20 RX ADMIN — MIDODRINE HYDROCHLORIDE 5 MG: 5 TABLET ORAL at 20:40

## 2024-04-20 RX ADMIN — ASPIRIN 81 MG CHEWABLE TABLET 81 MG: 81 TABLET CHEWABLE at 18:08

## 2024-04-20 RX ADMIN — PANTOPRAZOLE SODIUM 40 MG: 40 INJECTION, POWDER, FOR SOLUTION INTRAVENOUS at 06:16

## 2024-04-20 RX ADMIN — Medication 200 MCG: at 08:01

## 2024-04-20 RX ADMIN — POTASSIUM CHLORIDE 20 MEQ: 1.5 POWDER, FOR SOLUTION ORAL at 06:16

## 2024-04-20 RX ADMIN — Medication 5 MG: at 18:08

## 2024-04-20 ASSESSMENT — COGNITIVE AND FUNCTIONAL STATUS - GENERAL
MOVING FROM LYING ON BACK TO SITTING ON SIDE OF FLAT BED WITH BEDRAILS: A LOT
TOILETING: TOTAL
TURNING FROM BACK TO SIDE WHILE IN FLAT BAD: A LOT
CLIMB 3 TO 5 STEPS WITH RAILING: TOTAL
DRESSING REGULAR UPPER BODY CLOTHING: A LOT
MOBILITY SCORE: 9
WALKING IN HOSPITAL ROOM: TOTAL
STANDING UP FROM CHAIR USING ARMS: TOTAL
PERSONAL GROOMING: A LOT
DRESSING REGULAR LOWER BODY CLOTHING: A LOT
EATING MEALS: A LOT
MOVING TO AND FROM BED TO CHAIR: A LOT
HELP NEEDED FOR BATHING: A LOT
DAILY ACTIVITIY SCORE: 11

## 2024-04-20 ASSESSMENT — PAIN - FUNCTIONAL ASSESSMENT
PAIN_FUNCTIONAL_ASSESSMENT: 0-10

## 2024-04-20 ASSESSMENT — PAIN SCALES - GENERAL
PAINLEVEL_OUTOF10: 5 - MODERATE PAIN
PAINLEVEL_OUTOF10: 0 - NO PAIN
PAINLEVEL_OUTOF10: 7

## 2024-04-20 ASSESSMENT — PAIN SCALES - WONG BAKER: WONGBAKER_NUMERICALRESPONSE: HURTS LITTLE MORE

## 2024-04-20 NOTE — SIGNIFICANT EVENT
04/20/24 1300   Onset Documentation   Rapid Response Initiated By Radar auto page   Location/Room [facility]  ([facility] 8065)   Pager Time [phone]   Arrival Time 1300   Event End Time 1305   Level II Called No   Primary Reason for Call Radar auto page     Rapid Response Note    Radar auto-page received for a radar score of 8 with the following vital signs: 36.7, 100, 22, 127/66, 91%.  Vital signs were confirmed and reviewed with primary RN.  RN had just treated pain otherwise no acute changes.  No interventions are indicated by Rapid Response at this time.  RN to contact Rapid Response with any future concerns or signs of clinical decompensation.

## 2024-04-20 NOTE — CARE PLAN
The patient's goals for the shift include      The clinical goals for the shift include pt will remain hemodynamically stable      Problem: Daily Care  Goal: Daily care needs are met  Outcome: Progressing     Problem: Psychosocial Needs  Goal: Demonstrates ability to cope with hospitalization/illness  Outcome: Progressing  Goal: Collaborate with me, my family, and caregiver to identify my specific goals  Outcome: Progressing     Problem: Discharge Barriers  Goal: My discharge needs are met  Outcome: Progressing     Problem: Respiratory  Goal: Clear secretions with interventions this shift  Outcome: Progressing  Goal: No signs of respiratory distress (eg. Use of accessory muscles. Peds grunting)  Outcome: Progressing  Goal: Tolerate mechanical ventilation evidenced by VS/agitation level this shift  Outcome: Progressing  Goal: Tolerate pulmonary toileting this shift  Outcome: Progressing  Goal: Verbalize decreased shortness of breath this shift  Outcome: Progressing  Goal: Wean oxygen to maintain O2 saturation per order/standard this shift  Outcome: Progressing  Goal: Increase self care and/or family involvement in next 24 hours  Outcome: Progressing     Problem: Skin  Goal: Participates in plan/prevention/treatment measures  Outcome: Progressing  Flowsheets (Taken 4/19/2024 2571)  Participates in plan/prevention/treatment measures: Discuss with provider PT/OT consult

## 2024-04-20 NOTE — SIGNIFICANT EVENT
04/20/24 0830   Onset Documentation   Rapid Response Initiated By Radar auto page   Location/Room [facility]  ([facility] 2141)   Pager Time [phone]   Arrival Time 0830   Event End Time 0833   Level II Called No   Primary Reason for Call Radar auto page     Rapid Response Note    Radar auto-page received for a radar score of 8 with the following vital signs: 37.2, 94, 28, 126/63, 93%.  Vital signs were confirmed and reviewed with primary RN.  RN had just treated pain otherwise no acute changes.  No interventions are indicated by Rapid Response at this time.  RN to contact Rapid Response with any future concerns or signs of clinical decompensation.

## 2024-04-20 NOTE — CARE PLAN
The patient's goals for the shift include      The clinical goals for the shift include pt will remain free of fall and injury throughout shift      Problem: Daily Care  Goal: Daily care needs are met  Outcome: Progressing     Problem: Psychosocial Needs  Goal: Demonstrates ability to cope with hospitalization/illness  Outcome: Progressing  Goal: Collaborate with me, my family, and caregiver to identify my specific goals  Outcome: Progressing     Problem: Discharge Barriers  Goal: My discharge needs are met  Outcome: Progressing     Problem: Respiratory  Goal: Clear secretions with interventions this shift  Outcome: Progressing  Goal: No signs of respiratory distress (eg. Use of accessory muscles. Peds grunting)  Outcome: Progressing  Goal: Tolerate mechanical ventilation evidenced by VS/agitation level this shift  Outcome: Progressing  Goal: Tolerate pulmonary toileting this shift  Outcome: Progressing  Goal: Verbalize decreased shortness of breath this shift  Outcome: Progressing  Goal: Wean oxygen to maintain O2 saturation per order/standard this shift  Outcome: Progressing  Goal: Increase self care and/or family involvement in next 24 hours  Outcome: Progressing     Problem: Skin  Goal: Participates in plan/prevention/treatment measures  Outcome: Progressing  Flowsheets (Taken 4/20/2024 2393)  Participates in plan/prevention/treatment measures:   Discuss with provider PT/OT consult   Elevate heels

## 2024-04-20 NOTE — PROGRESS NOTES
Ry Sandhu is a 74 y.o. male on day 16 of admission presenting with Pneumonia.    Hospital Course    Mr. Sandhu is a 74 YOM with PMH HTN, DLD, COPD, CAD, PAD s/p revascularization procedures to b/l LE (iliofemoral endarterectomy and angioplasty 8/21/23; left CFA/SFA arterectomy and SFA, YISEL/EIA stent 05/2020) presenting to  [facility] 2/2 CAP vs colitis, septic shock, and encephalopathy.    [place] Hospital Course:  Initially presented to OSH with 3-4 days of generalized weakness, nausea, vomiting, and diarrhea.  Found to have colitis on CT imaging, was started on IV flagyl and admitted.  Hgb 7.2 without obvious source of bleeding, received 1 unit pRBC.  Found to have dry gangrene of left foot i/s/o known severe bilateral lower extremity peripheral vascular disease, vascular surgery consulted and recommending no acute surgical intervention.  Hospital course complicated by worsening altered mental status, with CT head at that time demonstrating findings suspicious for early ischemic change in right basal ganglia.  Also with acute hypoxic respiratory failure, found to have bilateral pneumonia with pleural effusions, was placed on CPAP for AHRF and CTX/doxy for PNA.  Thoracentesis on 4/2 draining 1L on left with labs suggesting transudative effusion (serum total protein on 3.8, pleural fluid protein was 0.7, serum LDH was 722, fluid LDH was 183), cultures negative. New hypotension requiring initiation of norepinephrine gtt. Stable on CPAP settings, but was intubated prior to transfer here for airway protection given degree of obtundation (A&O x0 but responsive).     MICU Course:  Patient started on vanc/zosyn for CAP and continued on Levophed, initially requiring 2nd pressor (vaso). Vascular surgery was consulted on admission due to b/l LE gangrene contributing to escalating pressor requirement. Underwent emergent right BKA and 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, planning for revision  4/16. Started midodrine 5 mg TID and weaned off pressors 4/13. Was also stress dosed hydrocortisone (50 q6 4/5-4/7 > 40 q8 4/7-4/9). Sputum cultures 4/5 growing Corneybacterium striatum group, continued on vanc/zosyn with initial plan for 7 day treatment (4/5-4/12) with repeat sputum cultures 4/10 NGTD. Extubated 4/8, weaned to as low as 4L NC but with escalating O2 requirements to 10L NC , with uptrending leukocytosis from 13 to 19. CXR 4/13 with complete opacification of right lung thought 2/2 mucus plugging, vanc/zosyn restarted and aggressive BPH initiated with repeat CXR with improved oxygenation. Left CVC removed and WBC downtrended to 14.3, vanc/zosyn discontinued. 4/15 patient underwent right-sided thora with 1L off, transudative by Light's criteria. Patient also requiring multiple transfusions of PRBC (4/8: H/H 5.5 s/p 2uPRBC; 4/13: H/H 6.7 s/p 1uPRBC, 4/16: H/H 7.6 s/p 1uPRBC) thought to be partially due to oozing from surgical site vs underlying coagulopathy i/s/o liver injury due to heavy EtOH use vs sepsis. Patient also with hypernatremia, treating with intermittent FWF. Plan for revision of amputations with vascular surgery pending resolution of leukocytosis.    SDU  Failed MBS on 4/17, consider PEG in future.  Plan for OR with vascular surgery for revision of BKA/AKA on Tuesday 4/23.  Transfer to [facility] on 4/20            Subjective     Patient with an episode of agitation overnight in which he began to grab and pull and wound vac dressing on LLE. No issues since.  Patient remains HDS on 2L NC.   Denies fever, chills, SOB, cough, CP, abdominal pain, and N/v     Objective   Physical Exam:  Constitutional: cachetic elderly male pt in NAD, alert and cooperative  Eyes: PERRL, EOMI, no icterus   ENMT: mucous membranes moist  Head/Neck: Neck supple, no apparent injury  Respiratory/Thorax: Lungs diminished bilaterally, non-labored breathing, no cough, on 2L NC  Cardiovascular: Regular, rate and rhythm, no  "murmurs, normal S1 and S2  Gastrointestinal: Nondistended, soft, non-tender, BS present x 4  : external catheter in place with clear yellow urine noted in canister   Musculoskeletal: ROM intact, Right bka left aka   Extremities: no edema  Neurological: alert and oriented x 2, speech clear, follows commands appropriately, without focal deficits  Skin: Warm and dry. Right BKA covered with dressing c/d/I.  Left AKA dressing c/d/I with wound vac in place with small amount of serosang output.     Vital Signs  Blood pressure 131/73, pulse 102, temperature 37.2 °C (99 °F), temperature source Temporal, resp. rate 21, height 1.88 m (6' 2\"), weight 57 kg (125 lb 10.6 oz), SpO2 96%.  Oxygen Therapy  SpO2: 96 %  Medical Gas Therapy: Supplemental oxygen  O2 Delivery Method: Nasal cannula       Intake/Output last 3 Shifts:  I/O last 3 completed shifts:  In: 2336.7 (41 mL/kg) [P.O.:30; Blood:406.7; NG/GT:1900]  Out: 1200 (21.1 mL/kg) [Urine:1200 (0.6 mL/kg/hr)]  Weight: 57 kg     Lines and Tubes:  Peripheral IV 04/18/24 20 G 5.25 cm Right Antecubital (Active)   Placement Date/Time: 04/18/24 1020   Earliest Known Present: 04/18/24  Hand Hygiene Completed: Yes  Size (Gauge): 20 G  Catheter Length (cm): 5.25 cm  Orientation: Right  Location: Antecubital  Site Prep: Chlorhexidine   Comfort Measures: Verbal  Loca...   Number of days: 1       NG/OG/Feeding Tube Other (Comment) (Active)   Placement Date/Time: 04/17/24 1700   Hand Hygiene Completed: Yes  Type of Tube: Feeding Tube  Tube Length: 75 cm  Tube Type: (c) Other (Comment)   Number of days: 2       External Urinary Catheter Male (Active)   Placement Date/Time: 04/18/24 1542   External Catheter Type: Male   Number of days: 1         Relevant Results  Scheduled medications  atorvastatin, 40 mg, nasogastric tube, Daily  enoxaparin, 40 mg, subcutaneous, q24h  ergocalciferol, 200 mcg, nasogastric tube, Daily  folic acid, 1 mg, nasogastric tube, Daily  insulin lispro, 0-5 Units, " subcutaneous, q6h  lidocaine, 1 Application, Topical, Once  magnesium sulfate, 2 g, intravenous, Once  melatonin, 5 mg, nasogastric tube, Daily  midodrine, 5 mg, nasogastric tube, TID  pantoprazole, 40 mg, intravenous, Daily before breakfast      Continuous medications     PRN medications  PRN medications: acetaminophen, dextrose, dextrose, glucagon, glucagon, ipratropium-albuteroL, loperamide, oxyCODONE, oxygen, polyethylene glycol, silver nitrate applicators    Results for orders placed or performed during the hospital encounter of 04/04/24 (from the past 24 hour(s))   Prepare RBC: 1 Units   Result Value Ref Range    PRODUCT CODE J2700G64     Unit Number [id number]     Unit ABO O     Unit RH POS     XM INTEP COMP     Dispense Status TR     Blood Expiration Date May 15, 2024 23:59 EDT     PRODUCT BLOOD TYPE 5100     UNIT VOLUME 350    POCT GLUCOSE   Result Value Ref Range    POCT Glucose 122 (H) 74 - 99 mg/dL   CBC   Result Value Ref Range    WBC 8.8 4.4 - 11.3 x10*3/uL    nRBC 0.0 0.0 - 0.0 /100 WBCs    RBC 3.02 (L) 4.50 - 5.90 x10*6/uL    Hemoglobin 9.1 (L) 13.5 - 17.5 g/dL    Hematocrit 27.7 (L) 41.0 - 52.0 %    MCV 92 80 - 100 fL    MCH 30.1 26.0 - 34.0 pg    MCHC 32.9 32.0 - 36.0 g/dL    RDW 15.9 (H) 11.5 - 14.5 %    Platelets 260 150 - 450 x10*3/uL   Renal function panel   Result Value Ref Range    Glucose 99 74 - 99 mg/dL    Sodium 150 (H) 136 - 145 mmol/L    Potassium 3.9 3.5 - 5.3 mmol/L    Chloride 113 (H) 98 - 107 mmol/L    Bicarbonate 30 21 - 32 mmol/L    Anion Gap 11 10 - 20 mmol/L    Urea Nitrogen 19 6 - 23 mg/dL    Creatinine 0.41 (L) 0.50 - 1.30 mg/dL    eGFR >90 >60 mL/min/1.73m*2    Calcium 7.8 (L) 8.6 - 10.6 mg/dL    Phosphorus 3.1 2.5 - 4.9 mg/dL    Albumin 1.8 (L) 3.4 - 5.0 g/dL   CBC and Auto Differential   Result Value Ref Range    WBC 8.4 4.4 - 11.3 x10*3/uL    nRBC 0.0 0.0 - 0.0 /100 WBCs    RBC 2.91 (L) 4.50 - 5.90 x10*6/uL    Hemoglobin 8.7 (L) 13.5 - 17.5 g/dL    Hematocrit 25.9 (L)  41.0 - 52.0 %    MCV 89 80 - 100 fL    MCH 29.9 26.0 - 34.0 pg    MCHC 33.6 32.0 - 36.0 g/dL    RDW 15.9 (H) 11.5 - 14.5 %    Platelets 262 150 - 450 x10*3/uL    Neutrophils % 83.0 40.0 - 80.0 %    Immature Granulocytes %, Automated 1.1 (H) 0.0 - 0.9 %    Lymphocytes % 8.3 13.0 - 44.0 %    Monocytes % 6.2 2.0 - 10.0 %    Eosinophils % 1.3 0.0 - 6.0 %    Basophils % 0.1 0.0 - 2.0 %    Neutrophils Absolute 6.97 (H) 1.60 - 5.50 x10*3/uL    Immature Granulocytes Absolute, Automated 0.09 0.00 - 0.50 x10*3/uL    Lymphocytes Absolute 0.70 (L) 0.80 - 3.00 x10*3/uL    Monocytes Absolute 0.52 0.05 - 0.80 x10*3/uL    Eosinophils Absolute 0.11 0.00 - 0.40 x10*3/uL    Basophils Absolute 0.01 0.00 - 0.10 x10*3/uL   Comprehensive Metabolic Panel   Result Value Ref Range    Glucose 121 (H) 74 - 99 mg/dL    Sodium 144 136 - 145 mmol/L    Potassium 3.9 3.5 - 5.3 mmol/L    Chloride 109 (H) 98 - 107 mmol/L    Bicarbonate 32 21 - 32 mmol/L    Anion Gap 7 (L) 10 - 20 mmol/L    Urea Nitrogen 21 6 - 23 mg/dL    Creatinine 0.40 (L) 0.50 - 1.30 mg/dL    eGFR >90 >60 mL/min/1.73m*2    Calcium 7.4 (L) 8.6 - 10.6 mg/dL    Albumin 1.7 (L) 3.4 - 5.0 g/dL    Alkaline Phosphatase 276 (H) 33 - 136 U/L    Total Protein 4.2 (L) 6.4 - 8.2 g/dL    AST 48 (H) 9 - 39 U/L    Bilirubin, Total 0.4 0.0 - 1.2 mg/dL    ALT 36 10 - 52 U/L   Magnesium   Result Value Ref Range    Magnesium 1.71 1.60 - 2.40 mg/dL   Phosphorus   Result Value Ref Range    Phosphorus 2.8 2.5 - 4.9 mg/dL   Type and screen   Result Value Ref Range    ABO TYPE O     Rh TYPE POS     ANTIBODY SCREEN NEG    POCT GLUCOSE   Result Value Ref Range    POCT Glucose 133 (H) 74 - 99 mg/dL   POCT GLUCOSE   Result Value Ref Range    POCT Glucose 109 (H) 74 - 99 mg/dL       XR chest 1 view 04/15/2024    Narrative  Interpreted By:  Ann Serra and Barbat Antonio  STUDY:  XR CHEST 1 VIEW;  4/15/2024 5:39 pm    INDICATION:  Signs/Symptoms:s/p thoracentesis.    COMPARISON:  Chest radiograph dated  04/15/2024, time stamped 11:30 a.m.    ACCESSION NUMBER(S):  [id number]    ORDERING CLINICIAN:  PALLAVI SHARMA    FINDINGS:  AP radiograph of the chest was provided.    There is an enteric tube projecting over the midline, with the distal  tip outside the field of view of this study. Interval removal of the  previously described left IJ CVC.    CARDIOMEDIASTINAL SILHOUETTE:  Interval improvement obscuration of the right heart border. Aortic  knob calcifications are noted.    LUNGS:  Persistent silhouetting of the right hemidiaphragm and costophrenic  angle. Interval improvement of silhouetting of the right heart  border. Interval improvement in hazy right-sided opacification, with  progression to multifocal patchy airspace opacities. Similar hazy  left basilar opacities. Coarse background of interstitial markings.  No pneumothorax.    ABDOMEN:  No remarkable upper abdominal findings.    BONES:  No acute osseous changes.    Impression  1. Interval improvement in hazy right-sided opacification, with  progression to multifocal patchy airspace opacities and associated  costophrenic angle blunting. Persistent silhouetting of the right  hemidiaphragm and costophrenic angle. Interval improvement of  silhouetting of the right heart border. Findings likely represent a  combination of effusion and associated consolidation.  2. Similar hazy left basilar opacities, which may represent a  infectious/inflammatory process.  3. Coarse background of interstitial markings, which can be seen in  the setting of chronic lung disease.    I personally reviewed the images/study and I agree with the findings  as stated by Lowell Persaud MD. This study was interpreted at  [facility], [place], [place]    MACRO:  None    Signed by: Ann Serra 4/16/2024 8:36 AM  Dictation workstation:   [id number]      Assessment/Plan   Principal Problem:    Pneumonia  Active Problems:    CAD (coronary artery disease)     CHF (congestive heart failure) (Multi)    HTN (hypertension)    Hyperlipidemia    Chronic obstructive pulmonary disease (Multi)    Peripheral vascular disease (CMS-[facility])    Gastroesophageal reflux disease    Anemia    Septicemia (Multi)    Ry Sandhu is a 74 y.o. male w/PMHx of COPD, CAD, PAD who presented to the MICU from outside hospital for acute hypoxic respiratory failure secondary to acute CAP and septic shock secondary to community-acquired pneumonia versus colitis now s/p bilateral amputations 4/5 (right BKA, left AKA) due to severe peripheral arterial disease and increased pressor requirements. MICU course c/b whiteout of right lung 2/2 mucus plugging and worsening leukocytosis likely 2/2 atelectasis. Transferred to Step down for  aggressive BPH and PT/OT.     NEURO:    #Acute Encephalopathy (resolving)    #C/F right basal ganglia ischemia  - A&Ox2 today.  Previously with episodes of confusion which continue to improve   - Previously required sitter and Mitts for agitation.  Sitter request placed by nursing staff due to increased confusion overnight. Will assess need for sitter throughout day.   - Vitamin B12 and folate WNL. TSH 7.61(H) but fT4 WNL,   - Delirium precautions  - Melatonin 5 mg HS   - Pain Reg: Acetaminophen q6 prn pain and oxycodone 5mg q6 prn severe pain.   - CT head 4/5 and  4/10 without evidence of right basal ganglia ischemia   - Palliative Care consulted to aide in GOC discussion and possible PEG placement  - PT/OT     CARDIO:    #Hx CAD, HTN, HFpEF   #Undifferentiated shock, likely septic (resolved)  - HDS with sbp 108-126 over last 24hrs   - TTE 4/15/24 showing EF 70-75%, impaired relaxation of LV, RVSP mildly elevated 34.8  - Remains off pressors since 4/13  - Continue midodrine 5mg TID (given bilateral amputation expect baseline BP to be lower)   - Holding antihypertensives in setting of requiring midodrine  - goal MAP >65 if mentation well and no s/sx of malperfusion   -  Continue Atorvastatin HS  - Continue on tele while in SDU     VASC:    #PAD s/p L AKA and R BKA   - s/p right BKA and 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 i/s/o b/l LE gangrene  - Vascular surgery following             > NPO @ [facility] on Monday night            > Transfuse to hgb of 9 (anticipate clinically significant EBL given bilateral surgery)            > Active T&S            > Placed 2 units pRBC on hold for OR     PULM:    #hx COPD  #AHRF 2/2 CAP  #Pleural effusion, Opacification of right hemithorax (improving)  - Remains stable on 2L NC.  Trialed off Cpap overnight, AM VBG 7.5/35/145.  Can hold cpap moving forward   - CXR with improvement in right sided opacification  - Aggressive BPH: IPV and cough assist TID   - s/p Thora 4/2 and 4/15 - both draining about 1 L of transudate fluid   - RCx 4/5 Corneybacterium > RCx 4/10 NGTD  - Vancomycin/Zosyn (4/5-4/12) (4/13-4/15)  - follow 4/15 sputum cx off Abx  - Wean O2 for goal Pox >92%      FEN/GI:   #Transaminates (improving)  #Fatty infiltration of liver on CT abdomen  #Colitis, GERD, previous concern for GIB , dysphagia, diarrhea (Improved)   - RUQ 4/5: hepatic steatosis and edematous gallbladder, equivocal i/s/o volume overload  - RUQ 4/10: hepatic steatosis, nondistended and nonedematous gallbladder   - Continue with PPI  - Speech eval- dysphagia concerning for prolonged recovery as per MBS on 4/17   - Likely to need PEG in future, discussion started with Wife, Pura who would like more time to consider.   - Continue TF - Pivot 1.5 per nutrition recs with goal of 55/hr if 24 hrs  - rectal tube for diarrhea removed 4/18  - Continue PRN imodium   - Daily RFP/Mag       RENAL/:   #Hypernatremia   - Stable renal function   - Sodium 144 today, down from 150 --> Sodium decreased by 6 in under 6 hours after being placed on D5W gtt @45ml/hr.  D5W gtt stopped d/t c/f rapid correction.  Will obtain noon RFP and consider resuming FWF if Na stable.   - CTM     HEME/ONC:    #Chronic Normocytic  Anemia  #Coagulopathy, resolved  - Hgb 7.1, plan to transfuse 1 unit prbc to meet goal hgb of >8.    - Obtain post transfusion cbc  - Hemolysis labs WNL, fibrinogen 550(H)  - Keep active type and screen (most recent 4/17) --> T&S ordered for AM labs tomorrow  - Transfuse for Hgb to maintain hgb 8 and >9 prior to OR on Tuesday   - Maintain 2 large bore pIVs   - Daily Cbc and prn     ENDO:  #Hypoglycemia, resolved  - SSI Q6 hrs  - Hypoglycemia protocol      ID:    #septic shock 2/2 CAP, resolved  - Abx history: Azithromycin 500 mg daily (4/1 - 4/4), Ceftriaxone (3/31 - 4/5), Doxycycline (4/4 - 4/5), Flagyl (3/25 - 4/5), Vancomycin/Zosyn (4/5-4/12, 4/13-4/15)  - BCx 4/5 NGTD  - RCx 4/5 Corneybacterium > 4/10 NGTD > 4/15 NGTD  - Continue to monitor off abx      Ppx:   DVT: Lovenox  GI: PPI      Code Status: DNR, ok for intubation  NOK: MIGUELLAYO (Spouse)  [phone] (Mobile)     Dispo: Patient appropriate for transfer to [facility] with tentative plan for OR on Tuesday with Vascular Surgery.  Will need SNF at discharge. Social work following.  Palliative care consulted 4/18 for GOC     Pt discussed with Dr. Batres, seen and examined. All labs, VS and previous plan of care reviewed.    Aly Wade, MURPHY-CNP

## 2024-04-20 NOTE — PROGRESS NOTES
Ry Sandhu is a 74 y.o. male on day 16 of admission presenting with Pneumonia.    Subjective   Patient is a 74-year-old male with a history of HTN, HLD, COPD, CAD, PAD s/p s/p b/l iliofemoral endarterectomy and bovine patch angioplasty (8/21/23) and L CFA/SFA arterectomy and SFA, YISEL/EIA stent (05/2020) who initially presented from [facility] for bilateral lower extremity pain, weakness, nausea. Patient was found to have dry gangrene of the bilateral lower extremities and colitis for which he was started on Flagyl. Patient developed new hypoxic respiratory failure requiring thoracentesis but eventually required pressors and intubation for airway protection and was transferred to [facility].    In the [facility] MICU, patient was emergently taken to the OR by vascular surgery for right BKA and left AKA. Patient treated with Vanc/Zosyn for Corynebacterium PNA. Extubated and weaned off pressors but with some mucus plugging persisting. Course also complicated by dysphagia requiring Dobhoff placement, hypernatremia, and persistent oozing from post-op wounds.    Transferred to the floor following monitoring for respiratory stability in SDU. Per patient, his dyspnea is relatively well controlled, much improved from before. Not endorsing any pain or discomfort anywhere else.    Active Medications  atorvastatin, 40 mg, nasogastric tube, Daily  enoxaparin, 40 mg, subcutaneous, q24h  ergocalciferol, 200 mcg, nasogastric tube, Daily  folic acid, 1 mg, nasogastric tube, Daily  insulin lispro, 0-5 Units, subcutaneous, q6h  lidocaine, 1 Application, Topical, Once  melatonin, 5 mg, nasogastric tube, Daily  midodrine, 5 mg, nasogastric tube, TID  pantoprazole, 40 mg, intravenous, Daily before breakfast                   Objective     Physical Exam  Constitutional:       Appearance: Normal appearance.   HENT:      Head: Normocephalic and atraumatic.      Mouth/Throat:      Mouth: Mucous membranes are dry.      Pharynx: No posterior  "oropharyngeal erythema.   Eyes:      Extraocular Movements: Extraocular movements intact.      Pupils: Pupils are equal, round, and reactive to light.   Cardiovascular:      Rate and Rhythm: Normal rate and regular rhythm.      Pulses: Normal pulses.      Heart sounds: Normal heart sounds.   Pulmonary:      Comments: Slightly increased WOB, diminished air entry in the bases  Abdominal:      General: Abdomen is flat. There is no distension.      Palpations: Abdomen is soft.      Tenderness: There is no abdominal tenderness.      Comments: Dobhoff in place   Musculoskeletal:      Right lower leg: No edema.      Left lower leg: No edema.   Skin:     Findings: Bruising present.   Neurological:      General: No focal deficit present.      Mental Status: He is alert.      Comments: Aox2, some deficits in attention         Last Recorded Vitals  Blood pressure 127/66, pulse 100, temperature 36.7 °C (98.1 °F), resp. rate 22, height 1.88 m (6' 2\"), weight 57 kg (125 lb 10.6 oz), SpO2 91%.  Intake/Output last 3 Shifts:  I/O last 3 completed shifts:  In: 2765.4 (48.5 mL/kg) [P.O.:30; I.V.:408.8 (7.2 mL/kg); Blood:406.7; NG/GT:1920]  Out: 1900 (33.3 mL/kg) [Urine:1900 (0.9 mL/kg/hr)]  Weight: 57 kg     Relevant Results  Results from last 7 days   Lab Units 04/20/24  0040   WBC AUTO x10*3/uL 8.4   HEMOGLOBIN g/dL 8.7*   HEMATOCRIT % 25.9*   PLATELETS AUTO x10*3/uL 262      Results from last 7 days   Lab Units 04/20/24  1206   SODIUM mmol/L 146*   POTASSIUM mmol/L 4.1   CHLORIDE mmol/L 110*   CO2 mmol/L 31   BUN mg/dL 22   CREATININE mg/dL 0.40*   GLUCOSE mg/dL 112*   CALCIUM mg/dL 7.6*          XR chest 1 view    Result Date: 4/16/2024    1. Interval improvement in hazy right-sided opacification, with progression to multifocal patchy airspace opacities and associated costophrenic angle blunting. Persistent silhouetting of the right hemidiaphragm and costophrenic angle. Interval improvement of silhouetting of the right heart " border. Findings likely represent a combination of effusion and associated consolidation. 2. Similar hazy left basilar opacities, which may represent a infectious/inflammatory process. 3. Coarse background of interstitial markings, which can be seen in the setting of chronic lung disease.     XR chest 1 view    Result Date: 4/13/2024     1. Interval complete opacification of the right hemithorax. Correlate with mucous plugging and right lung collapse. 2. Patchy airspace opacity in the left mid and lower lung. Correlate with concern for infection and aspiration. 3. Component of interstitial pulmonary prominence and edema.       US right upper quadrant    Result Date: 4/11/2024    1. Diffusely echogenic liver parenchyma, similar to prior exam, consistent with hepatic steatosis. 2. Partially visualized right-sided pleural effusion.      CT head wo IV contrast    Result Date: 4/10/2024     No acute intracranial abnormality. Chronic intracranial findings as above.       CT chest wo IV contrast    Result Date: 4/6/2024      1.  Interlobular septal thickening with extensive ground-glass and consolidative opacities throughout bilateral lungs with more coarse opacities in the posterior aspect of the right upper and lower lobes. There are moderate sized bilateral pleural effusions. Findings are nonspecific and may represent atypical infection, pulmonary hemorrhage, or pulmonary edema. Acute respiratory distress syndrome may have a similar appearance. 2. Severe coronary artery calcifications. Recommend correlation with coronary artery disease risk factors. 3. Moderate emphysematous changes.     US right upper quadrant    Result Date: 4/5/2024    1. Diffusely echogenic liver parenchyma consistent with steatosis. 2. Thickened/edematous gallbladder wall with no evidence of hyperemia, gallbladder wall distention or cholelithiasis. Findings are equivocal and most likely due to fluid overload.     CT head wo IV contrast    Result  Date: 4/5/2024    No evidence of acute intracranial abnormality. If concerns for an acute stroke may consider brain MRI for further evaluation.      XR chest 1 view    Result Date: 4/5/2024    1. Perihilar and predominantly central airspace opacity in bilateral lungs. Findings may be due to severe edema with infection or aspiration not excluded. 2. Bilateral pleural effusion, left more than right. 3. Nonobstructive bowel gas pattern. 4. Medical devices as above.                      Assessment/Plan   Patient is a 74-year-old male with a history of HTN, HLD, COPD, CAD, PAD s/p s/p b/l iliofemoral endarterectomy and bovine patch angioplasty (8/21/23) and L CFA/SFA arterectomy and SFA, YISEL/EIA stent (05/2020) who initially presented with bilateral dry gangrene and colitis. Course complicated by acute hypoxic respiratory failure and septic shock requiring vasopressors and intubation. Patient s/p right BKA and left AKA. Now extubated and off pressors, planning for revision of amputations.    #Bilateral dry gangrene  #Peripheral vascular disease  #Coronary artery disease  ::Most recent cath report in 8/23: EF of 40%, moderate coronary artery disease with significant stenosis  ::Extensive history of vascular disease with b/l iliofemoral endarterectomy and angioplasty, arterectomies, stents  ::Taken emergently for right BKA, left AKA on 4/5  -Planning for revision of amputations on 4/23 per Vascular Surgery   -NPO @ [facility] on Monday night   -Hgb goal of 9.0 before surgery  -Atorvastatin 40mg  -Restarting aspirin 81mg daily  -Midodrine 5mg TID for hypotension, will assess for necessity tomorrow    #Acute hypoxic respiratory failure  #Community acquired pneumonia  #Pleural effusion  #Mucus plugging  #Atelectasis  ::Growing Corynebacterium striatum  ::Abx history: Azithromycin 500 mg daily (4/1 - 4/4), Ceftriaxone (3/31 - 4/5), Doxycycline (4/4 - 4/5), Flagyl (3/25 - 4/5), Vancomycin/Zosyn (4/5-4/12, 4/13-4/15)   ::S/p two  thoracentesis  -Aggressive bronchopulmonary hygiene    #Acute encephalopathy  #Dysphagia  ::Likely septic in etiology  ::Vitamin B12 and folate WNL, TSH 7.61 from fT4 WNL  -Delirium precautions  -Melatonin 5mg at bedtime  -Tylenol 650mg q6h PRN, Oxy 5mg q6h PRN  -Failed barium swallow, Dobhoff in place  -Requires goals of care discussions for possible PEG placement in the future    #Hypernatremia  ::Na of 150 at peak  ::Unclear etiology, possibly insensible losses from tachypnea, wounds  -Continue free water flushes 200cc q8h, can titrate as needed    #COPD  ::No PFT on file  -DuoNeb q6h PRN  -Titrate oxygen to 88-92%    #Acute on chronic blood loss anemia  ::Oozing for post-operattive wounds  ::Required 1u PRBC  -Active T&S  -No signs of coagulopathy    Code status: DNR, ok for elective intubation  DVT ppx: Lovenox  GI ppx: Protonix  Oxygen: 2LNC  Access: PIV, external urinary catheter, Dobhoff  NOK: Pura Sandhu (wife) [phone]          Wayne Rosen MD  PGY-1 Internal Medicine

## 2024-04-21 LAB
ALBUMIN SERPL BCP-MCNC: 1.9 G/DL (ref 3.4–5)
ALP SERPL-CCNC: 322 U/L (ref 33–136)
ALT SERPL W P-5'-P-CCNC: 50 U/L (ref 10–52)
ANION GAP SERPL CALC-SCNC: 9 MMOL/L (ref 10–20)
AST SERPL W P-5'-P-CCNC: 79 U/L (ref 9–39)
BASOPHILS # BLD AUTO: 0.01 X10*3/UL (ref 0–0.1)
BASOPHILS NFR BLD AUTO: 0.1 %
BILIRUB SERPL-MCNC: 0.3 MG/DL (ref 0–1.2)
BUN SERPL-MCNC: 24 MG/DL (ref 6–23)
CALCIUM SERPL-MCNC: 8.1 MG/DL (ref 8.6–10.6)
CHLORIDE SERPL-SCNC: 108 MMOL/L (ref 98–107)
CO2 SERPL-SCNC: 31 MMOL/L (ref 21–32)
CREAT SERPL-MCNC: 0.42 MG/DL (ref 0.5–1.3)
EGFRCR SERPLBLD CKD-EPI 2021: >90 ML/MIN/1.73M*2
EOSINOPHIL # BLD AUTO: 0.09 X10*3/UL (ref 0–0.4)
EOSINOPHIL NFR BLD AUTO: 1.1 %
ERYTHROCYTE [DISTWIDTH] IN BLOOD BY AUTOMATED COUNT: 15.5 % (ref 11.5–14.5)
GLUCOSE BLD MANUAL STRIP-MCNC: 104 MG/DL (ref 74–99)
GLUCOSE BLD MANUAL STRIP-MCNC: 116 MG/DL (ref 74–99)
GLUCOSE BLD MANUAL STRIP-MCNC: 124 MG/DL (ref 74–99)
GLUCOSE BLD MANUAL STRIP-MCNC: 124 MG/DL (ref 74–99)
GLUCOSE BLD MANUAL STRIP-MCNC: 125 MG/DL (ref 74–99)
GLUCOSE SERPL-MCNC: 120 MG/DL (ref 74–99)
HCT VFR BLD AUTO: 24.4 % (ref 41–52)
HGB BLD-MCNC: 8.4 G/DL (ref 13.5–17.5)
IMM GRANULOCYTES # BLD AUTO: 0.08 X10*3/UL (ref 0–0.5)
IMM GRANULOCYTES NFR BLD AUTO: 1 % (ref 0–0.9)
LYMPHOCYTES # BLD AUTO: 0.52 X10*3/UL (ref 0.8–3)
LYMPHOCYTES NFR BLD AUTO: 6.6 %
MAGNESIUM SERPL-MCNC: 1.89 MG/DL (ref 1.6–2.4)
MCH RBC QN AUTO: 31.1 PG (ref 26–34)
MCHC RBC AUTO-ENTMCNC: 34.4 G/DL (ref 32–36)
MCV RBC AUTO: 90 FL (ref 80–100)
MONOCYTES # BLD AUTO: 0.35 X10*3/UL (ref 0.05–0.8)
MONOCYTES NFR BLD AUTO: 4.4 %
NEUTROPHILS # BLD AUTO: 6.88 X10*3/UL (ref 1.6–5.5)
NEUTROPHILS NFR BLD AUTO: 86.8 %
NRBC BLD-RTO: 0 /100 WBCS (ref 0–0)
PHOSPHATE SERPL-MCNC: 3 MG/DL (ref 2.5–4.9)
PLATELET # BLD AUTO: 231 X10*3/UL (ref 150–450)
POTASSIUM SERPL-SCNC: 3.9 MMOL/L (ref 3.5–5.3)
PROT SERPL-MCNC: 5 G/DL (ref 6.4–8.2)
RBC # BLD AUTO: 2.7 X10*6/UL (ref 4.5–5.9)
SODIUM SERPL-SCNC: 144 MMOL/L (ref 136–145)
WBC # BLD AUTO: 7.9 X10*3/UL (ref 4.4–11.3)

## 2024-04-21 PROCEDURE — 83735 ASSAY OF MAGNESIUM: CPT | Performed by: NURSE PRACTITIONER

## 2024-04-21 PROCEDURE — 2500000004 HC RX 250 GENERAL PHARMACY W/ HCPCS (ALT 636 FOR OP/ED): Performed by: NURSE PRACTITIONER

## 2024-04-21 PROCEDURE — 36415 COLL VENOUS BLD VENIPUNCTURE: CPT | Performed by: NURSE PRACTITIONER

## 2024-04-21 PROCEDURE — 99233 SBSQ HOSP IP/OBS HIGH 50: CPT

## 2024-04-21 PROCEDURE — 2500000001 HC RX 250 WO HCPCS SELF ADMINISTERED DRUGS (ALT 637 FOR MEDICARE OP): Performed by: NURSE PRACTITIONER

## 2024-04-21 PROCEDURE — 99232 SBSQ HOSP IP/OBS MODERATE 35: CPT | Performed by: INTERNAL MEDICINE

## 2024-04-21 PROCEDURE — 82947 ASSAY GLUCOSE BLOOD QUANT: CPT

## 2024-04-21 PROCEDURE — 84075 ASSAY ALKALINE PHOSPHATASE: CPT | Performed by: NURSE PRACTITIONER

## 2024-04-21 PROCEDURE — 85025 COMPLETE CBC W/AUTO DIFF WBC: CPT | Performed by: NURSE PRACTITIONER

## 2024-04-21 PROCEDURE — 2500000001 HC RX 250 WO HCPCS SELF ADMINISTERED DRUGS (ALT 637 FOR MEDICARE OP)

## 2024-04-21 PROCEDURE — 84100 ASSAY OF PHOSPHORUS: CPT | Performed by: NURSE PRACTITIONER

## 2024-04-21 PROCEDURE — 1100000001 HC PRIVATE ROOM DAILY

## 2024-04-21 PROCEDURE — C9113 INJ PANTOPRAZOLE SODIUM, VIA: HCPCS | Performed by: NURSE PRACTITIONER

## 2024-04-21 RX ADMIN — Medication 5 MG: at 18:14

## 2024-04-21 RX ADMIN — ATORVASTATIN CALCIUM 40 MG: 40 TABLET, FILM COATED ORAL at 09:10

## 2024-04-21 RX ADMIN — FOLIC ACID 1 MG: 1 TABLET ORAL at 09:10

## 2024-04-21 RX ADMIN — Medication 200 MCG: at 09:10

## 2024-04-21 RX ADMIN — ENOXAPARIN SODIUM 40 MG: 100 INJECTION SUBCUTANEOUS at 09:10

## 2024-04-21 RX ADMIN — ASPIRIN 81 MG CHEWABLE TABLET 81 MG: 81 TABLET CHEWABLE at 09:10

## 2024-04-21 RX ADMIN — PANTOPRAZOLE SODIUM 40 MG: 40 INJECTION, POWDER, FOR SOLUTION INTRAVENOUS at 06:31

## 2024-04-21 ASSESSMENT — COGNITIVE AND FUNCTIONAL STATUS - GENERAL
DRESSING REGULAR UPPER BODY CLOTHING: A LOT
DRESSING REGULAR UPPER BODY CLOTHING: A LOT
DAILY ACTIVITIY SCORE: 11
TURNING FROM BACK TO SIDE WHILE IN FLAT BAD: A LOT
CLIMB 3 TO 5 STEPS WITH RAILING: TOTAL
TOILETING: TOTAL
DRESSING REGULAR LOWER BODY CLOTHING: A LOT
TOILETING: TOTAL
HELP NEEDED FOR BATHING: A LOT
PERSONAL GROOMING: A LOT
STANDING UP FROM CHAIR USING ARMS: TOTAL
WALKING IN HOSPITAL ROOM: TOTAL
MOVING TO AND FROM BED TO CHAIR: A LOT
DRESSING REGULAR LOWER BODY CLOTHING: A LOT
DRESSING REGULAR LOWER BODY CLOTHING: A LOT
MOBILITY SCORE: 9
STANDING UP FROM CHAIR USING ARMS: TOTAL
MOVING FROM LYING ON BACK TO SITTING ON SIDE OF FLAT BED WITH BEDRAILS: A LOT
MOBILITY SCORE: 9
HELP NEEDED FOR BATHING: A LOT
MOVING TO AND FROM BED TO CHAIR: A LOT
DAILY ACTIVITIY SCORE: 11
EATING MEALS: A LOT
EATING MEALS: A LOT
TURNING FROM BACK TO SIDE WHILE IN FLAT BAD: A LOT
WALKING IN HOSPITAL ROOM: TOTAL
TOILETING: TOTAL
MOVING FROM LYING ON BACK TO SITTING ON SIDE OF FLAT BED WITH BEDRAILS: A LOT
MOBILITY SCORE: 9
MOVING TO AND FROM BED TO CHAIR: A LOT
EATING MEALS: A LOT
HELP NEEDED FOR BATHING: A LOT
DRESSING REGULAR UPPER BODY CLOTHING: A LOT
WALKING IN HOSPITAL ROOM: TOTAL
CLIMB 3 TO 5 STEPS WITH RAILING: TOTAL
MOVING FROM LYING ON BACK TO SITTING ON SIDE OF FLAT BED WITH BEDRAILS: A LOT
CLIMB 3 TO 5 STEPS WITH RAILING: TOTAL
PERSONAL GROOMING: A LOT
PERSONAL GROOMING: A LOT
TURNING FROM BACK TO SIDE WHILE IN FLAT BAD: A LOT
STANDING UP FROM CHAIR USING ARMS: TOTAL
DAILY ACTIVITIY SCORE: 11

## 2024-04-21 ASSESSMENT — PAIN - FUNCTIONAL ASSESSMENT: PAIN_FUNCTIONAL_ASSESSMENT: 0-10

## 2024-04-21 ASSESSMENT — PAIN SCALES - GENERAL
PAINLEVEL_OUTOF10: 0 - NO PAIN
PAINLEVEL_OUTOF10: 0 - NO PAIN

## 2024-04-21 NOTE — CARE PLAN
The patient's goals for the shift include      The clinical goals for the shift include Patient will remain safe and free from falls or injuries overnight    Over the shift, the patient did not make progress toward the following goals. Barriers to progression include. Recommendations to address these barriers include.    Problem: Fall/Injury  Goal: Not fall by end of shift  Outcome: Progressing  Goal: Be free from injury by end of the shift  Outcome: Progressing  Goal: Verbalize understanding of personal risk factors for fall in the hospital  Outcome: Progressing  Goal: Verbalize understanding of risk factor reduction measures to prevent injury from fall in the home  Outcome: Progressing  Goal: Use assistive devices by end of the shift  Outcome: Progressing  Goal: Pace activities to prevent fatigue by end of the shift  Outcome: Progressing     Problem: Respiratory  Goal: Clear secretions with interventions this shift  4/21/2024 1105 by Marcelino Garcia RN  Outcome: Progressing  4/21/2024 1105 by Marcelino Garcia RN  Outcome: Progressing  Goal: No signs of respiratory distress (eg. Use of accessory muscles. Peds grunting)  4/21/2024 1105 by Marcelino Garcia RN  Outcome: Progressing  4/21/2024 1105 by Marcelino Garcia RN  Outcome: Progressing  Goal: Tolerate mechanical ventilation evidenced by VS/agitation level this shift  4/21/2024 1105 by Marcelino Garcia RN  Outcome: Progressing  4/21/2024 1105 by Marcelino Garcia RN  Outcome: Progressing  Goal: Tolerate pulmonary toileting this shift  4/21/2024 1105 by Marcelino Garcia RN  Outcome: Progressing  4/21/2024 1105 by Marcelino Garcia RN  Outcome: Progressing  Goal: Verbalize decreased shortness of breath this shift  4/21/2024 1105 by Marcelino Garcia RN  Outcome: Progressing  4/21/2024 1105 by Marcelino Garcia RN  Outcome: Progressing  Goal: Wean oxygen to maintain O2 saturation per order/standard this shift  4/21/2024 1105 by Marcelino Garcia RN  Outcome:  Progressing  4/21/2024 1105 by Marcelino Garcia RN  Outcome: Progressing  Goal: Increase self care and/or family involvement in next 24 hours  4/21/2024 1105 by Marcelino Garcia RN  Outcome: Progressing  4/21/2024 1105 by Marcelino Garcia RN  Outcome: Progressing     Problem: Skin  Goal: Participates in plan/prevention/treatment measures  4/21/2024 1105 by Marcelino Garcia RN  Outcome: Progressing  4/21/2024 1105 by Marcelino Garcia RN  Outcome: Progressing

## 2024-04-21 NOTE — PROGRESS NOTES
Ry Sandhu is a 74 y.o. male on day 17 of admission presenting with Pneumonia.    Subjective   Chart reviewed; patient transferred from SDU to [facility] 20. Patient will be going back to OR on 4/23 with vascular surgery. Patients wife requested referral be sent to [facility]. Per [facility], they are willing to follow but unsure if patient will be able to tolerate high intensity therapy at discharge. PT/OT currently recommending SNF.    SW will continue to follow.    -Carrie WILDE MA, LSW  [phone] or [facility]  Care Transitions

## 2024-04-21 NOTE — CONSULTS
"Reason For Consult  PEG placement     History Of Present Illness  Ry Sandhu is a 74 y.o. male with PMH including HTN, HLD, COPD, CAD, PAD s/p b/l iliofemoral EA & bovine patch angioplasty (8/23), L CFA/ SFA arterectomy and YISEL/EIA stent placement in 5/2020, who was originally admitted for bilateral dry gangrene and colitis, and is now s/p R BKA and L AKA. Vascular surgery planning for formalization of amputations on 4/23. Patient developed acute encephalopathy, though to be septic in nature, and failed MBS on 4/17. Currently receiving TF at goal through dobhoff. Per primary team discussion with patient's wife, she would like to proceed with PEG placement.     Past Medical History  He has no past medical history on file.    Surgical History  He has a past surgical history that includes CT angio aorta and bilateral iliofemoral runoff w and or wo IV contrast (8/2/2023) and CT angio coronary art with heartflow if score >30% (8/16/2023).     Social History  He reports that he has never smoked. He has never used smokeless tobacco. Alcohol use questions deferred to the physician. Drug use questions deferred to the physician.    Family History  No family history on file.     Allergies  Patient has no known allergies.    Review of Systems  See HPI     Physical Exam  General: sitting in bed, engages in limited conversation   Cards: upper limit of regular rate  Pulm: satting 90% on 2L NC  Abd: soft, nondistended, nontender, no scars visiblle   : deferred  Skin: extensive ecchymosis throughout body     Last Recorded Vitals  Blood pressure 138/67, pulse 95, temperature 36.7 °C (98.1 °F), temperature source Temporal, resp. rate 20, height 1.88 m (6' 2\"), weight 55.6 kg (122 lb 9.2 oz), SpO2 95%.    Relevant Results  Results for orders placed or performed during the hospital encounter of 04/04/24 (from the past 24 hour(s))   POCT GLUCOSE   Result Value Ref Range    POCT Glucose 86 74 - 99 mg/dL   POCT GLUCOSE   Result " Value Ref Range    POCT Glucose 124 (H) 74 - 99 mg/dL   POCT GLUCOSE   Result Value Ref Range    POCT Glucose 104 (H) 74 - 99 mg/dL   CBC and Auto Differential   Result Value Ref Range    WBC 7.9 4.4 - 11.3 x10*3/uL    nRBC 0.0 0.0 - 0.0 /100 WBCs    RBC 2.70 (L) 4.50 - 5.90 x10*6/uL    Hemoglobin 8.4 (L) 13.5 - 17.5 g/dL    Hematocrit 24.4 (L) 41.0 - 52.0 %    MCV 90 80 - 100 fL    MCH 31.1 26.0 - 34.0 pg    MCHC 34.4 32.0 - 36.0 g/dL    RDW 15.5 (H) 11.5 - 14.5 %    Platelets 231 150 - 450 x10*3/uL    Neutrophils % 86.8 40.0 - 80.0 %    Immature Granulocytes %, Automated 1.0 (H) 0.0 - 0.9 %    Lymphocytes % 6.6 13.0 - 44.0 %    Monocytes % 4.4 2.0 - 10.0 %    Eosinophils % 1.1 0.0 - 6.0 %    Basophils % 0.1 0.0 - 2.0 %    Neutrophils Absolute 6.88 (H) 1.60 - 5.50 x10*3/uL    Immature Granulocytes Absolute, Automated 0.08 0.00 - 0.50 x10*3/uL    Lymphocytes Absolute 0.52 (L) 0.80 - 3.00 x10*3/uL    Monocytes Absolute 0.35 0.05 - 0.80 x10*3/uL    Eosinophils Absolute 0.09 0.00 - 0.40 x10*3/uL    Basophils Absolute 0.01 0.00 - 0.10 x10*3/uL   Comprehensive Metabolic Panel   Result Value Ref Range    Glucose 120 (H) 74 - 99 mg/dL    Sodium 144 136 - 145 mmol/L    Potassium 3.9 3.5 - 5.3 mmol/L    Chloride 108 (H) 98 - 107 mmol/L    Bicarbonate 31 21 - 32 mmol/L    Anion Gap 9 (L) 10 - 20 mmol/L    Urea Nitrogen 24 (H) 6 - 23 mg/dL    Creatinine 0.42 (L) 0.50 - 1.30 mg/dL    eGFR >90 >60 mL/min/1.73m*2    Calcium 8.1 (L) 8.6 - 10.6 mg/dL    Albumin 1.9 (L) 3.4 - 5.0 g/dL    Alkaline Phosphatase 322 (H) 33 - 136 U/L    Total Protein 5.0 (L) 6.4 - 8.2 g/dL    AST 79 (H) 9 - 39 U/L    Bilirubin, Total 0.3 0.0 - 1.2 mg/dL    ALT 50 10 - 52 U/L   Magnesium   Result Value Ref Range    Magnesium 1.89 1.60 - 2.40 mg/dL   Phosphorus   Result Value Ref Range    Phosphorus 3.0 2.5 - 4.9 mg/dL   POCT GLUCOSE   Result Value Ref Range    POCT Glucose 116 (H) 74 - 99 mg/dL   POCT GLUCOSE   Result Value Ref Range    POCT Glucose 125  (H) 74 - 99 mg/dL         Assessment/Plan     74M with multiple vascular co-morbidities, originally admitted for bilateral dry gangrene, s/p R BKA and L AKA, now with encephalopathy likely septic in nature. Patient failed MBS 4/17 and on dobhoff feeds. Wife would like to pursue PEG placement. Vascular surgery planning to take back on 4/23 for formalization of amputations. Primary team and patient would like PEG placement at same time if possible.    Recs:  -will plan to do PEG tube placement in vascular's OR Tuesday    Discussed with Dr. Vikash Ponce MD

## 2024-04-21 NOTE — CARE PLAN
The patient's goals for the shift include  Pain control    The clinical goals for the shift include Patient will remain safe and free from falls or injuries overnight    Over the shift, the patient did make progress toward the following goals. Patient had an headache and was given PRN medication which was effective. Patient was able to rest throughout the night, bed alarm on, call light within reach and bed in lowest position.

## 2024-04-22 ENCOUNTER — ANESTHESIA EVENT (OUTPATIENT)
Dept: OPERATING ROOM | Facility: HOSPITAL | Age: 74
DRG: 853 | End: 2024-04-22
Payer: MEDICARE

## 2024-04-22 ENCOUNTER — PREP FOR PROCEDURE (OUTPATIENT)
Dept: VASCULAR SURGERY | Facility: HOSPITAL | Age: 74
End: 2024-04-22
Payer: MEDICARE

## 2024-04-22 PROBLEM — R63.8 INADEQUATE ORAL INTAKE: Status: ACTIVE | Noted: 2024-04-02

## 2024-04-22 LAB
ALBUMIN SERPL BCP-MCNC: 2 G/DL (ref 3.4–5)
ALP SERPL-CCNC: 362 U/L (ref 33–136)
ALT SERPL W P-5'-P-CCNC: 60 U/L (ref 10–52)
ANION GAP SERPL CALC-SCNC: 9 MMOL/L (ref 10–20)
AST SERPL W P-5'-P-CCNC: 86 U/L (ref 9–39)
BASOPHILS # BLD AUTO: 0.01 X10*3/UL (ref 0–0.1)
BASOPHILS # BLD AUTO: 0.01 X10*3/UL (ref 0–0.1)
BASOPHILS NFR BLD AUTO: 0.1 %
BASOPHILS NFR BLD AUTO: 0.2 %
BILIRUB SERPL-MCNC: 0.3 MG/DL (ref 0–1.2)
BUN SERPL-MCNC: 28 MG/DL (ref 6–23)
CALCIUM SERPL-MCNC: 8.3 MG/DL (ref 8.6–10.6)
CHLORIDE SERPL-SCNC: 110 MMOL/L (ref 98–107)
CO2 SERPL-SCNC: 33 MMOL/L (ref 21–32)
CREAT SERPL-MCNC: 0.39 MG/DL (ref 0.5–1.3)
EGFRCR SERPLBLD CKD-EPI 2021: >90 ML/MIN/1.73M*2
EOSINOPHIL # BLD AUTO: 0.1 X10*3/UL (ref 0–0.4)
EOSINOPHIL # BLD AUTO: 0.12 X10*3/UL (ref 0–0.4)
EOSINOPHIL NFR BLD AUTO: 1.5 %
EOSINOPHIL NFR BLD AUTO: 1.8 %
ERYTHROCYTE [DISTWIDTH] IN BLOOD BY AUTOMATED COUNT: 15.4 % (ref 11.5–14.5)
ERYTHROCYTE [DISTWIDTH] IN BLOOD BY AUTOMATED COUNT: 15.7 % (ref 11.5–14.5)
GLUCOSE BLD MANUAL STRIP-MCNC: 108 MG/DL (ref 74–99)
GLUCOSE BLD MANUAL STRIP-MCNC: 114 MG/DL (ref 74–99)
GLUCOSE BLD MANUAL STRIP-MCNC: 115 MG/DL (ref 74–99)
GLUCOSE SERPL-MCNC: 103 MG/DL (ref 74–99)
HCT VFR BLD AUTO: 27.4 % (ref 41–52)
HCT VFR BLD AUTO: 27.6 % (ref 41–52)
HGB BLD-MCNC: 8.2 G/DL (ref 13.5–17.5)
HGB BLD-MCNC: 9 G/DL (ref 13.5–17.5)
IMM GRANULOCYTES # BLD AUTO: 0.06 X10*3/UL (ref 0–0.5)
IMM GRANULOCYTES # BLD AUTO: 0.07 X10*3/UL (ref 0–0.5)
IMM GRANULOCYTES NFR BLD AUTO: 0.9 % (ref 0–0.9)
IMM GRANULOCYTES NFR BLD AUTO: 1.1 % (ref 0–0.9)
LYMPHOCYTES # BLD AUTO: 0.64 X10*3/UL (ref 0.8–3)
LYMPHOCYTES # BLD AUTO: 0.68 X10*3/UL (ref 0.8–3)
LYMPHOCYTES NFR BLD AUTO: 10.1 %
LYMPHOCYTES NFR BLD AUTO: 9.8 %
MAGNESIUM SERPL-MCNC: 1.89 MG/DL (ref 1.6–2.4)
MCH RBC QN AUTO: 29.5 PG (ref 26–34)
MCH RBC QN AUTO: 29.7 PG (ref 26–34)
MCHC RBC AUTO-ENTMCNC: 29.9 G/DL (ref 32–36)
MCHC RBC AUTO-ENTMCNC: 32.6 G/DL (ref 32–36)
MCV RBC AUTO: 91 FL (ref 80–100)
MCV RBC AUTO: 99 FL (ref 80–100)
MONOCYTES # BLD AUTO: 0.37 X10*3/UL (ref 0.05–0.8)
MONOCYTES # BLD AUTO: 0.39 X10*3/UL (ref 0.05–0.8)
MONOCYTES NFR BLD AUTO: 5.5 %
MONOCYTES NFR BLD AUTO: 5.9 %
NEUTROPHILS # BLD AUTO: 5.35 X10*3/UL (ref 1.6–5.5)
NEUTROPHILS # BLD AUTO: 5.5 X10*3/UL (ref 1.6–5.5)
NEUTROPHILS NFR BLD AUTO: 81.5 %
NEUTROPHILS NFR BLD AUTO: 81.6 %
NRBC BLD-RTO: 0 /100 WBCS (ref 0–0)
NRBC BLD-RTO: 0 /100 WBCS (ref 0–0)
PHOSPHATE SERPL-MCNC: 3.3 MG/DL (ref 2.5–4.9)
PLATELET # BLD AUTO: 234 X10*3/UL (ref 150–450)
PLATELET # BLD AUTO: 237 X10*3/UL (ref 150–450)
POTASSIUM SERPL-SCNC: 3.9 MMOL/L (ref 3.5–5.3)
PROT SERPL-MCNC: 5.3 G/DL (ref 6.4–8.2)
RBC # BLD AUTO: 2.78 X10*6/UL (ref 4.5–5.9)
RBC # BLD AUTO: 3.03 X10*6/UL (ref 4.5–5.9)
SODIUM SERPL-SCNC: 148 MMOL/L (ref 136–145)
WBC # BLD AUTO: 6.6 X10*3/UL (ref 4.4–11.3)
WBC # BLD AUTO: 6.7 X10*3/UL (ref 4.4–11.3)

## 2024-04-22 PROCEDURE — 85025 COMPLETE CBC W/AUTO DIFF WBC: CPT

## 2024-04-22 PROCEDURE — 36415 COLL VENOUS BLD VENIPUNCTURE: CPT

## 2024-04-22 PROCEDURE — 80053 COMPREHEN METABOLIC PANEL: CPT | Performed by: NURSE PRACTITIONER

## 2024-04-22 PROCEDURE — 84100 ASSAY OF PHOSPHORUS: CPT | Performed by: NURSE PRACTITIONER

## 2024-04-22 PROCEDURE — 2500000004 HC RX 250 GENERAL PHARMACY W/ HCPCS (ALT 636 FOR OP/ED): Performed by: NURSE PRACTITIONER

## 2024-04-22 PROCEDURE — 2500000001 HC RX 250 WO HCPCS SELF ADMINISTERED DRUGS (ALT 637 FOR MEDICARE OP): Performed by: NURSE PRACTITIONER

## 2024-04-22 PROCEDURE — 2500000001 HC RX 250 WO HCPCS SELF ADMINISTERED DRUGS (ALT 637 FOR MEDICARE OP)

## 2024-04-22 PROCEDURE — 83735 ASSAY OF MAGNESIUM: CPT | Performed by: NURSE PRACTITIONER

## 2024-04-22 PROCEDURE — C9113 INJ PANTOPRAZOLE SODIUM, VIA: HCPCS | Performed by: NURSE PRACTITIONER

## 2024-04-22 PROCEDURE — 2500000005 HC RX 250 GENERAL PHARMACY W/O HCPCS: Performed by: NURSE PRACTITIONER

## 2024-04-22 PROCEDURE — 2500000004 HC RX 250 GENERAL PHARMACY W/ HCPCS (ALT 636 FOR OP/ED)

## 2024-04-22 PROCEDURE — 82947 ASSAY GLUCOSE BLOOD QUANT: CPT

## 2024-04-22 PROCEDURE — 36430 TRANSFUSION BLD/BLD COMPNT: CPT

## 2024-04-22 PROCEDURE — 85025 COMPLETE CBC W/AUTO DIFF WBC: CPT | Performed by: NURSE PRACTITIONER

## 2024-04-22 PROCEDURE — 94667 MNPJ CHEST WALL 1ST: CPT

## 2024-04-22 PROCEDURE — 1100000001 HC PRIVATE ROOM DAILY

## 2024-04-22 PROCEDURE — P9016 RBC LEUKOCYTES REDUCED: HCPCS

## 2024-04-22 PROCEDURE — 99232 SBSQ HOSP IP/OBS MODERATE 35: CPT

## 2024-04-22 PROCEDURE — 36415 COLL VENOUS BLD VENIPUNCTURE: CPT | Performed by: NURSE PRACTITIONER

## 2024-04-22 RX ADMIN — Medication 3 L/MIN: at 15:02

## 2024-04-22 RX ADMIN — ASPIRIN 81 MG CHEWABLE TABLET 81 MG: 81 TABLET CHEWABLE at 09:43

## 2024-04-22 RX ADMIN — SODIUM CHLORIDE, POTASSIUM CHLORIDE, SODIUM LACTATE AND CALCIUM CHLORIDE 1000 ML: 600; 310; 30; 20 INJECTION, SOLUTION INTRAVENOUS at 13:07

## 2024-04-22 RX ADMIN — ATORVASTATIN CALCIUM 40 MG: 40 TABLET, FILM COATED ORAL at 09:43

## 2024-04-22 RX ADMIN — ENOXAPARIN SODIUM 40 MG: 100 INJECTION SUBCUTANEOUS at 13:07

## 2024-04-22 RX ADMIN — PANTOPRAZOLE SODIUM 40 MG: 40 INJECTION, POWDER, FOR SOLUTION INTRAVENOUS at 06:32

## 2024-04-22 RX ADMIN — Medication 5 MG: at 18:41

## 2024-04-22 RX ADMIN — FOLIC ACID 1 MG: 1 TABLET ORAL at 09:43

## 2024-04-22 RX ADMIN — Medication 200 MCG: at 09:43

## 2024-04-22 ASSESSMENT — COGNITIVE AND FUNCTIONAL STATUS - GENERAL
DRESSING REGULAR LOWER BODY CLOTHING: A LOT
WALKING IN HOSPITAL ROOM: TOTAL
EATING MEALS: A LOT
PERSONAL GROOMING: A LOT
MOVING FROM LYING ON BACK TO SITTING ON SIDE OF FLAT BED WITH BEDRAILS: A LOT
DAILY ACTIVITIY SCORE: 11
CLIMB 3 TO 5 STEPS WITH RAILING: TOTAL
TURNING FROM BACK TO SIDE WHILE IN FLAT BAD: A LOT
TOILETING: TOTAL
MOVING TO AND FROM BED TO CHAIR: A LOT
STANDING UP FROM CHAIR USING ARMS: TOTAL
HELP NEEDED FOR BATHING: A LOT
DRESSING REGULAR UPPER BODY CLOTHING: A LOT
MOBILITY SCORE: 9

## 2024-04-22 ASSESSMENT — PAIN SCALES - GENERAL
PAINLEVEL_OUTOF10: 0 - NO PAIN
PAINLEVEL_OUTOF10: 0 - NO PAIN

## 2024-04-22 ASSESSMENT — PAIN - FUNCTIONAL ASSESSMENT: PAIN_FUNCTIONAL_ASSESSMENT: 0-10

## 2024-04-22 NOTE — PROGRESS NOTES
Physical Therapy                 Therapy Communication Note    Patient Name: Ry Sandhu  MRN: [id number]  Today's Date: 4/22/2024     Discipline: Physical Therapy    Missed Visit Reason: Missed Visit Reason:  (PT treatement attempted though pt declining PT/mobility on this date as he notes not feeling well. Pt wanting PT to return at a later date. Will follow as pt tolerates.)    Missed Time: Attempt

## 2024-04-22 NOTE — PROGRESS NOTES
"Music Therapy Note    Ry Sandhu     Therapy Session  Referral Type: New referral this admission  Visit Type: New visit  Session Start Time: 1515  Session End Time: 1527  Intervention Delivery: In-person  Conflict of Service: None  Family Present for Session: None  Number of staff members present: 1 (nurse)     Pre-assessment  Pain Score:  (\"not really\")  Mood/Affect: Cooperative, Participative, Appropriate, Tired/lethargic  Verbalized Emotional State:  (\"alright\")         Treatment/Interventions  Music Therapy Interventions: Assessment  Interruption: No  Patient Fell Asleep at End of Session: No    Post-assessment  Unable to Assess Reason: Did not provide expressive therapy intervention  Mood/Affect: Cooperative, Participative, Appropriate, Tired/lethargic, Goal focused  Continue Visiting: Yes  Total Session Time (min): 12 minutes    Narrative  Assessment Detail: Pt presented awake in bed receiving blood transfusion at time of approach. Pt received brief intro to MT services. Conversation paused when pt progressively coughed up a significant amount of content from his throat. When conversation resumed, pt affect brightened and he expressed liking all kinds of music. He mentioned resonating with rap, does not like the beatles, and sometimes listens to country. Pt abruptly stated that he was going back to sleep and agreed to follow up.  Follow-up: MTI to follow up            "

## 2024-04-22 NOTE — CARE PLAN
The patient's goals for the shift include  YUSEF    The clinical goals for the shift include Patient will remain safe and free from falls or injuries overnight    Patient had no issues overnight.

## 2024-04-22 NOTE — CARE PLAN
The patient's goals for the shift include Maintain patient safety    The clinical goals for the shift include Monitor HDS    Over the shift, the patient did not make progress toward the following goals. Barriers to progression include   . Recommendations to address these barriers include   .

## 2024-04-22 NOTE — PROGRESS NOTES
"Ry Sandhu is a 74 y.o. male on day 18 of admission presenting with Pneumonia.    Subjective   Afebrile and stable overnight with no acute events. Patient does not endorse any pain or any change in breathing.    Active Medications  aspirin, 81 mg, oral, Daily  atorvastatin, 40 mg, nasogastric tube, Daily  enoxaparin, 40 mg, subcutaneous, q24h  ergocalciferol, 200 mcg, nasogastric tube, Daily  folic acid, 1 mg, nasogastric tube, Daily  melatonin, 5 mg, nasogastric tube, Daily  pantoprazole, 40 mg, intravenous, Daily before breakfast                   Objective     Physical Exam  Constitutional:       Appearance: Normal appearance.   HENT:      Head: Normocephalic and atraumatic.      Mouth/Throat:      Mouth: Mucous membranes are dry.      Pharynx: No posterior oropharyngeal erythema.   Eyes:      Extraocular Movements: Extraocular movements intact.      Pupils: Pupils are equal, round, and reactive to light.   Cardiovascular:      Rate and Rhythm: Normal rate and regular rhythm.      Pulses: Normal pulses.      Heart sounds: Normal heart sounds.   Pulmonary:      Comments: Slightly increased WOB, diminished air entry in the bases  Abdominal:      General: Abdomen is flat. There is no distension.      Palpations: Abdomen is soft.      Tenderness: There is no abdominal tenderness.      Comments: Dobhoff in place   Musculoskeletal:      Right lower leg: No edema.      Left lower leg: No edema.   Skin:     Findings: Bruising present.   Neurological:      General: No focal deficit present.      Mental Status: He is alert.      Comments: Aox2, some deficits in attention         Last Recorded Vitals  Blood pressure 132/63, pulse 94, temperature 37 °C (98.6 °F), temperature source Temporal, resp. rate 22, height 1.88 m (6' 2\"), weight 55.6 kg (122 lb 9.2 oz), SpO2 98%.  Intake/Output last 3 Shifts:  I/O last 3 completed shifts:  In: 900 (16.2 mL/kg) [NG/GT:900]  Out: - (0 mL/kg)   Weight: 55.6 kg     Relevant " Results  Results from last 7 days   Lab Units 04/22/24  0850   WBC AUTO x10*3/uL 6.6   HEMOGLOBIN g/dL 8.2*   HEMATOCRIT % 27.4*   PLATELETS AUTO x10*3/uL 237      Results from last 7 days   Lab Units 04/22/24  0850   SODIUM mmol/L 148*   POTASSIUM mmol/L 3.9   CHLORIDE mmol/L 110*   CO2 mmol/L 33*   BUN mg/dL 28*   CREATININE mg/dL 0.39*   GLUCOSE mg/dL 103*   CALCIUM mg/dL 8.3*          XR chest 1 view    Result Date: 4/16/2024    1. Interval improvement in hazy right-sided opacification, with progression to multifocal patchy airspace opacities and associated costophrenic angle blunting. Persistent silhouetting of the right hemidiaphragm and costophrenic angle. Interval improvement of silhouetting of the right heart border. Findings likely represent a combination of effusion and associated consolidation. 2. Similar hazy left basilar opacities, which may represent a infectious/inflammatory process. 3. Coarse background of interstitial markings, which can be seen in the setting of chronic lung disease.     XR chest 1 view    Result Date: 4/13/2024     1. Interval complete opacification of the right hemithorax. Correlate with mucous plugging and right lung collapse. 2. Patchy airspace opacity in the left mid and lower lung. Correlate with concern for infection and aspiration. 3. Component of interstitial pulmonary prominence and edema.       US right upper quadrant    Result Date: 4/11/2024    1. Diffusely echogenic liver parenchyma, similar to prior exam, consistent with hepatic steatosis. 2. Partially visualized right-sided pleural effusion.      CT head wo IV contrast    Result Date: 4/10/2024     No acute intracranial abnormality. Chronic intracranial findings as above.       CT chest wo IV contrast    Result Date: 4/6/2024      1.  Interlobular septal thickening with extensive ground-glass and consolidative opacities throughout bilateral lungs with more coarse opacities in the posterior aspect of the right upper  and lower lobes. There are moderate sized bilateral pleural effusions. Findings are nonspecific and may represent atypical infection, pulmonary hemorrhage, or pulmonary edema. Acute respiratory distress syndrome may have a similar appearance. 2. Severe coronary artery calcifications. Recommend correlation with coronary artery disease risk factors. 3. Moderate emphysematous changes.     US right upper quadrant    Result Date: 4/5/2024    1. Diffusely echogenic liver parenchyma consistent with steatosis. 2. Thickened/edematous gallbladder wall with no evidence of hyperemia, gallbladder wall distention or cholelithiasis. Findings are equivocal and most likely due to fluid overload.     CT head wo IV contrast    Result Date: 4/5/2024    No evidence of acute intracranial abnormality. If concerns for an acute stroke may consider brain MRI for further evaluation.      XR chest 1 view    Result Date: 4/5/2024    1. Perihilar and predominantly central airspace opacity in bilateral lungs. Findings may be due to severe edema with infection or aspiration not excluded. 2. Bilateral pleural effusion, left more than right. 3. Nonobstructive bowel gas pattern. 4. Medical devices as above.                      Assessment/Plan   Patient is a 74-year-old male with a history of HTN, HLD, COPD, CAD, PAD s/p s/p b/l iliofemoral endarterectomy and bovine patch angioplasty (8/21/23) and L CFA/SFA arterectomy and SFA, YISEL/EIA stent (05/2020) who initially presented with bilateral dry gangrene and colitis. Course complicated by acute hypoxic respiratory failure and septic shock requiring vasopressors and intubation. Patient s/p right BKA and left AKA. Now extubated and off pressors, planning for revision of amputations.    Update 4/22:  -Transfusing PRBC today for Hgb goal of 9  -OR tomorrow stump revisions and PEG tube placement    #Bilateral dry gangrene  #Peripheral vascular disease  #Coronary artery disease  ::Most recent cath report in  8/23: EF of 40%, moderate coronary artery disease with significant stenosis  ::Extensive history of vascular disease with b/l iliofemoral endarterectomy and angioplasty, arterectomies, stents  ::Taken emergently for right BKA, left AKA on 4/5  -Planning for revision of amputations on 4/23 per Vascular Surgery   -NPO @ [facility] on Monday night   -Hgb goal of 9.0 before surgery, transfusing PRBC today  -Atorvastatin 40mg  -Restarting aspirin 81mg daily    #Acute hypoxic respiratory failure  #Community acquired pneumonia  #Pleural effusion  #Mucus plugging  #Atelectasis  ::Growing Corynebacterium striatum  ::Abx history: Azithromycin 500 mg daily (4/1 - 4/4), Ceftriaxone (3/31 - 4/5), Doxycycline (4/4 - 4/5), Flagyl (3/25 - 4/5), Vancomycin/Zosyn (4/5-4/12, 4/13-4/15)   ::S/p two thoracentesis  -Aggressive bronchopulmonary hygiene    #Acute encephalopathy  #Dysphagia  ::Likely septic in etiology  ::Vitamin B12 and folate WNL, TSH 7.61 from fT4 WNL  -Delirium precautions  -Melatonin 5mg at bedtime  -Tylenol 650mg q6h PRN, Oxy 5mg q6h PRN  -Failed barium swallow, Dobhoff in place  -PEG tube placement tomorrow in OR    #Hypernatremia  ::Na of 150 at peak  ::Unclear etiology, possibly insensible losses from tachypnea, wounds  -Continue free water flushes 200cc q8h, can titrate as needed    #COPD  ::No PFT on file  -DuoNeb q6h PRN  -Titrate oxygen to 88-92%    #Acute on chronic blood loss anemia  ::Oozing for post-operattive wounds  ::Required 1u PRBC  -Active T&S  -No signs of coagulopathy    Code status: DNR, ok for elective intubation  DVT ppx: Lovenox  GI ppx: Protonix  Oxygen: 2LNC  Access: PIV, external urinary catheter, Dobhoff  NOK: Pura Edson (wife) [phone]          Wayne Rosen MD  PGY-1 Internal Medicine

## 2024-04-23 ENCOUNTER — ANESTHESIA (OUTPATIENT)
Dept: OPERATING ROOM | Facility: HOSPITAL | Age: 74
DRG: 853 | End: 2024-04-23
Payer: MEDICARE

## 2024-04-23 LAB
ABO GROUP (TYPE) IN BLOOD: NORMAL
ALBUMIN SERPL BCP-MCNC: 2 G/DL (ref 3.4–5)
ALP SERPL-CCNC: 378 U/L (ref 33–136)
ALT SERPL W P-5'-P-CCNC: 74 U/L (ref 10–52)
ANION GAP SERPL CALC-SCNC: 12 MMOL/L (ref 10–20)
ANTIBODY SCREEN: NORMAL
AST SERPL W P-5'-P-CCNC: 103 U/L (ref 9–39)
BASOPHILS # BLD AUTO: 0.02 X10*3/UL (ref 0–0.1)
BASOPHILS NFR BLD AUTO: 0.3 %
BILIRUB SERPL-MCNC: 0.4 MG/DL (ref 0–1.2)
BLOOD EXPIRATION DATE: NORMAL
BUN SERPL-MCNC: 29 MG/DL (ref 6–23)
CALCIUM SERPL-MCNC: 8.7 MG/DL (ref 8.6–10.6)
CHLORIDE SERPL-SCNC: 109 MMOL/L (ref 98–107)
CO2 SERPL-SCNC: 32 MMOL/L (ref 21–32)
CREAT SERPL-MCNC: 0.4 MG/DL (ref 0.5–1.3)
DISPENSE STATUS: NORMAL
EGFRCR SERPLBLD CKD-EPI 2021: >90 ML/MIN/1.73M*2
EOSINOPHIL # BLD AUTO: 0.13 X10*3/UL (ref 0–0.4)
EOSINOPHIL NFR BLD AUTO: 2 %
ERYTHROCYTE [DISTWIDTH] IN BLOOD BY AUTOMATED COUNT: 16.4 % (ref 11.5–14.5)
GLUCOSE BLD MANUAL STRIP-MCNC: 145 MG/DL (ref 74–99)
GLUCOSE BLD MANUAL STRIP-MCNC: 78 MG/DL (ref 74–99)
GLUCOSE BLD MANUAL STRIP-MCNC: 88 MG/DL (ref 74–99)
GLUCOSE BLD MANUAL STRIP-MCNC: 91 MG/DL (ref 74–99)
GLUCOSE BLD MANUAL STRIP-MCNC: 92 MG/DL (ref 74–99)
GLUCOSE SERPL-MCNC: 70 MG/DL (ref 74–99)
HCT VFR BLD AUTO: 32.7 % (ref 41–52)
HGB BLD-MCNC: 10.2 G/DL (ref 13.5–17.5)
IMM GRANULOCYTES # BLD AUTO: 0.07 X10*3/UL (ref 0–0.5)
IMM GRANULOCYTES NFR BLD AUTO: 1.1 % (ref 0–0.9)
INR PPP: 0.9 (ref 0.9–1.1)
LYMPHOCYTES # BLD AUTO: 0.78 X10*3/UL (ref 0.8–3)
LYMPHOCYTES NFR BLD AUTO: 11.8 %
MAGNESIUM SERPL-MCNC: 1.78 MG/DL (ref 1.6–2.4)
MCH RBC QN AUTO: 29.7 PG (ref 26–34)
MCHC RBC AUTO-ENTMCNC: 31.2 G/DL (ref 32–36)
MCV RBC AUTO: 95 FL (ref 80–100)
MONOCYTES # BLD AUTO: 0.5 X10*3/UL (ref 0.05–0.8)
MONOCYTES NFR BLD AUTO: 7.6 %
NEUTROPHILS # BLD AUTO: 5.09 X10*3/UL (ref 1.6–5.5)
NEUTROPHILS NFR BLD AUTO: 77.2 %
NRBC BLD-RTO: 0 /100 WBCS (ref 0–0)
PHOSPHATE SERPL-MCNC: 4 MG/DL (ref 2.5–4.9)
PLATELET # BLD AUTO: 259 X10*3/UL (ref 150–450)
POTASSIUM SERPL-SCNC: 3.7 MMOL/L (ref 3.5–5.3)
PRODUCT BLOOD TYPE: 5100
PRODUCT CODE: NORMAL
PROT SERPL-MCNC: 5.7 G/DL (ref 6.4–8.2)
PROTHROMBIN TIME: 10.4 SECONDS (ref 9.8–12.8)
RBC # BLD AUTO: 3.43 X10*6/UL (ref 4.5–5.9)
RH FACTOR (ANTIGEN D): NORMAL
SODIUM SERPL-SCNC: 149 MMOL/L (ref 136–145)
UNIT ABO: NORMAL
UNIT NUMBER: NORMAL
UNIT RH: NORMAL
UNIT VOLUME: 285
UNIT VOLUME: 350
UNIT VOLUME: 350
WBC # BLD AUTO: 6.6 X10*3/UL (ref 4.4–11.3)
XM INTEP: NORMAL

## 2024-04-23 PROCEDURE — 88307 TISSUE EXAM BY PATHOLOGIST: CPT | Mod: TC,SUR | Performed by: SURGERY

## 2024-04-23 PROCEDURE — 2720000007 HC OR 272 NO HCPCS: Performed by: SURGERY

## 2024-04-23 PROCEDURE — 82947 ASSAY GLUCOSE BLOOD QUANT: CPT

## 2024-04-23 PROCEDURE — 88311 DECALCIFY TISSUE: CPT | Performed by: PATHOLOGY

## 2024-04-23 PROCEDURE — 27592 AMPUTATE LEG AT THIGH: CPT | Performed by: SURGERY

## 2024-04-23 PROCEDURE — P9016 RBC LEUKOCYTES REDUCED: HCPCS

## 2024-04-23 PROCEDURE — 0Y6C0Z1 DETACHMENT AT RIGHT UPPER LEG, HIGH, OPEN APPROACH: ICD-10-PCS | Performed by: SURGERY

## 2024-04-23 PROCEDURE — 36430 TRANSFUSION BLD/BLD COMPNT: CPT | Mod: GC

## 2024-04-23 PROCEDURE — P9045 ALBUMIN (HUMAN), 5%, 250 ML: HCPCS | Mod: JZ

## 2024-04-23 PROCEDURE — 2500000001 HC RX 250 WO HCPCS SELF ADMINISTERED DRUGS (ALT 637 FOR MEDICARE OP)

## 2024-04-23 PROCEDURE — 3600000009 HC OR TIME - EACH INCREMENTAL 1 MINUTE - PROCEDURE LEVEL FOUR: Performed by: SURGERY

## 2024-04-23 PROCEDURE — 3700000001 HC GENERAL ANESTHESIA TIME - INITIAL BASE CHARGE: Performed by: SURGERY

## 2024-04-23 PROCEDURE — 3E0G76Z INTRODUCTION OF NUTRITIONAL SUBSTANCE INTO UPPER GI, VIA NATURAL OR ARTIFICIAL OPENING: ICD-10-PCS | Performed by: SURGERY

## 2024-04-23 PROCEDURE — 99232 SBSQ HOSP IP/OBS MODERATE 35: CPT

## 2024-04-23 PROCEDURE — 82310 ASSAY OF CALCIUM: CPT | Performed by: NURSE PRACTITIONER

## 2024-04-23 PROCEDURE — 83735 ASSAY OF MAGNESIUM: CPT | Performed by: NURSE PRACTITIONER

## 2024-04-23 PROCEDURE — 88307 TISSUE EXAM BY PATHOLOGIST: CPT | Mod: TC,91,SUR | Performed by: INTERNAL MEDICINE

## 2024-04-23 PROCEDURE — A4217 STERILE WATER/SALINE, 500 ML: HCPCS | Performed by: SURGERY

## 2024-04-23 PROCEDURE — 2500000005 HC RX 250 GENERAL PHARMACY W/O HCPCS

## 2024-04-23 PROCEDURE — 2500000004 HC RX 250 GENERAL PHARMACY W/ HCPCS (ALT 636 FOR OP/ED)

## 2024-04-23 PROCEDURE — 99100 ANES PT EXTEME AGE<1 YR&>70: CPT | Performed by: ANESTHESIOLOGY

## 2024-04-23 PROCEDURE — 85025 COMPLETE CBC W/AUTO DIFF WBC: CPT | Performed by: NURSE PRACTITIONER

## 2024-04-23 PROCEDURE — 43246 EGD PLACE GASTROSTOMY TUBE: CPT | Performed by: SURGERY

## 2024-04-23 PROCEDURE — 2500000004 HC RX 250 GENERAL PHARMACY W/ HCPCS (ALT 636 FOR OP/ED): Performed by: SURGERY

## 2024-04-23 PROCEDURE — 3600000004 HC OR TIME - INITIAL BASE CHARGE - PROCEDURE LEVEL FOUR: Performed by: SURGERY

## 2024-04-23 PROCEDURE — 0Y6D0Z1 DETACHMENT AT LEFT UPPER LEG, HIGH, OPEN APPROACH: ICD-10-PCS | Performed by: SURGERY

## 2024-04-23 PROCEDURE — 7100000001 HC RECOVERY ROOM TIME - INITIAL BASE CHARGE: Performed by: SURGERY

## 2024-04-23 PROCEDURE — 88307 TISSUE EXAM BY PATHOLOGIST: CPT | Performed by: PATHOLOGY

## 2024-04-23 PROCEDURE — 86900 BLOOD TYPING SEROLOGIC ABO: CPT | Mod: 91

## 2024-04-23 PROCEDURE — 7100000002 HC RECOVERY ROOM TIME - EACH INCREMENTAL 1 MINUTE: Performed by: SURGERY

## 2024-04-23 PROCEDURE — 36415 COLL VENOUS BLD VENIPUNCTURE: CPT

## 2024-04-23 PROCEDURE — 1100000001 HC PRIVATE ROOM DAILY

## 2024-04-23 PROCEDURE — 84100 ASSAY OF PHOSPHORUS: CPT | Performed by: NURSE PRACTITIONER

## 2024-04-23 PROCEDURE — A43246 PR EDG PERCUTANEOUS PLACEMENT GASTROSTOMY TUBE: Performed by: ANESTHESIOLOGY

## 2024-04-23 PROCEDURE — 85610 PROTHROMBIN TIME: CPT

## 2024-04-23 PROCEDURE — 2500000004 HC RX 250 GENERAL PHARMACY W/ HCPCS (ALT 636 FOR OP/ED): Mod: JZ

## 2024-04-23 PROCEDURE — 2500000004 HC RX 250 GENERAL PHARMACY W/ HCPCS (ALT 636 FOR OP/ED): Mod: JZ,MUE

## 2024-04-23 PROCEDURE — 3700000002 HC GENERAL ANESTHESIA TIME - EACH INCREMENTAL 1 MINUTE: Performed by: SURGERY

## 2024-04-23 PROCEDURE — 2780000003 HC OR 278 NO HCPCS: Performed by: SURGERY

## 2024-04-23 RX ORDER — ALBUTEROL SULFATE 0.83 MG/ML
2.5 SOLUTION RESPIRATORY (INHALATION) ONCE AS NEEDED
Status: DISCONTINUED | OUTPATIENT
Start: 2024-04-23 | End: 2024-04-23 | Stop reason: HOSPADM

## 2024-04-23 RX ORDER — SODIUM CHLORIDE, SODIUM LACTATE, POTASSIUM CHLORIDE, CALCIUM CHLORIDE 600; 310; 30; 20 MG/100ML; MG/100ML; MG/100ML; MG/100ML
100 INJECTION, SOLUTION INTRAVENOUS CONTINUOUS
Status: DISCONTINUED | OUTPATIENT
Start: 2024-04-23 | End: 2024-04-23 | Stop reason: HOSPADM

## 2024-04-23 RX ORDER — HYDROMORPHONE HYDROCHLORIDE 1 MG/ML
0.4 INJECTION, SOLUTION INTRAMUSCULAR; INTRAVENOUS; SUBCUTANEOUS EVERY 4 HOURS PRN
Status: DISCONTINUED | OUTPATIENT
Start: 2024-04-23 | End: 2024-04-30 | Stop reason: HOSPADM

## 2024-04-23 RX ORDER — ALBUTEROL SULFATE 0.83 MG/ML
2.5 SOLUTION RESPIRATORY (INHALATION) ONCE
Status: DISCONTINUED | OUTPATIENT
Start: 2024-04-23 | End: 2024-04-23 | Stop reason: HOSPADM

## 2024-04-23 RX ORDER — ROCURONIUM BROMIDE 10 MG/ML
INJECTION, SOLUTION INTRAVENOUS AS NEEDED
Status: DISCONTINUED | OUTPATIENT
Start: 2024-04-23 | End: 2024-04-23

## 2024-04-23 RX ORDER — HYDROMORPHONE HYDROCHLORIDE 1 MG/ML
0.5 INJECTION, SOLUTION INTRAMUSCULAR; INTRAVENOUS; SUBCUTANEOUS EVERY 5 MIN PRN
Status: DISCONTINUED | OUTPATIENT
Start: 2024-04-23 | End: 2024-04-23 | Stop reason: HOSPADM

## 2024-04-23 RX ORDER — ALBUMIN HUMAN 50 G/1000ML
SOLUTION INTRAVENOUS AS NEEDED
Status: DISCONTINUED | OUTPATIENT
Start: 2024-04-23 | End: 2024-04-23

## 2024-04-23 RX ORDER — LIDOCAINE HYDROCHLORIDE 10 MG/ML
0.1 INJECTION INFILTRATION; PERINEURAL ONCE
Status: DISCONTINUED | OUTPATIENT
Start: 2024-04-23 | End: 2024-04-23 | Stop reason: HOSPADM

## 2024-04-23 RX ORDER — PHENYLEPHRINE 10 MG/250 ML(40 MCG/ML)IN 0.9 % SOD.CHLORIDE INTRAVENOUS
CONTINUOUS PRN
Status: DISCONTINUED | OUTPATIENT
Start: 2024-04-23 | End: 2024-04-23

## 2024-04-23 RX ORDER — PROPOFOL 10 MG/ML
INJECTION, EMULSION INTRAVENOUS AS NEEDED
Status: DISCONTINUED | OUTPATIENT
Start: 2024-04-23 | End: 2024-04-23

## 2024-04-23 RX ORDER — CEFAZOLIN 1 G/1
INJECTION, POWDER, FOR SOLUTION INTRAVENOUS AS NEEDED
Status: DISCONTINUED | OUTPATIENT
Start: 2024-04-23 | End: 2024-04-23

## 2024-04-23 RX ORDER — LABETALOL HYDROCHLORIDE 5 MG/ML
5 INJECTION, SOLUTION INTRAVENOUS ONCE AS NEEDED
Status: DISCONTINUED | OUTPATIENT
Start: 2024-04-23 | End: 2024-04-23 | Stop reason: HOSPADM

## 2024-04-23 RX ORDER — VANCOMYCIN HYDROCHLORIDE 1 G/20ML
INJECTION, POWDER, LYOPHILIZED, FOR SOLUTION INTRAVENOUS AS NEEDED
Status: DISCONTINUED | OUTPATIENT
Start: 2024-04-23 | End: 2024-04-23 | Stop reason: HOSPADM

## 2024-04-23 RX ORDER — SODIUM CHLORIDE, SODIUM LACTATE, POTASSIUM CHLORIDE, CALCIUM CHLORIDE 600; 310; 30; 20 MG/100ML; MG/100ML; MG/100ML; MG/100ML
INJECTION, SOLUTION INTRAVENOUS CONTINUOUS PRN
Status: DISCONTINUED | OUTPATIENT
Start: 2024-04-23 | End: 2024-04-23

## 2024-04-23 RX ORDER — HYDROMORPHONE HYDROCHLORIDE 1 MG/ML
0.2 INJECTION, SOLUTION INTRAMUSCULAR; INTRAVENOUS; SUBCUTANEOUS EVERY 5 MIN PRN
Status: DISCONTINUED | OUTPATIENT
Start: 2024-04-23 | End: 2024-04-23 | Stop reason: HOSPADM

## 2024-04-23 RX ORDER — NORETHINDRONE AND ETHINYL ESTRADIOL 0.5-0.035
KIT ORAL AS NEEDED
Status: DISCONTINUED | OUTPATIENT
Start: 2024-04-23 | End: 2024-04-23

## 2024-04-23 RX ORDER — PHENYLEPHRINE HCL IN 0.9% NACL 1 MG/10 ML
SYRINGE (ML) INTRAVENOUS AS NEEDED
Status: DISCONTINUED | OUTPATIENT
Start: 2024-04-23 | End: 2024-04-23

## 2024-04-23 RX ORDER — DROPERIDOL 2.5 MG/ML
0.62 INJECTION, SOLUTION INTRAMUSCULAR; INTRAVENOUS ONCE AS NEEDED
Status: DISCONTINUED | OUTPATIENT
Start: 2024-04-23 | End: 2024-04-23 | Stop reason: HOSPADM

## 2024-04-23 RX ORDER — ACETAMINOPHEN 325 MG/1
650 TABLET ORAL EVERY 4 HOURS PRN
Status: DISCONTINUED | OUTPATIENT
Start: 2024-04-23 | End: 2024-04-23 | Stop reason: HOSPADM

## 2024-04-23 RX ORDER — CEFAZOLIN SODIUM 2 G/100ML
2 INJECTION, SOLUTION INTRAVENOUS EVERY 8 HOURS
Status: COMPLETED | OUTPATIENT
Start: 2024-04-23 | End: 2024-04-24

## 2024-04-23 RX ORDER — CEFAZOLIN SODIUM 2 G/100ML
2 INJECTION, SOLUTION INTRAVENOUS EVERY 8 HOURS
Status: DISCONTINUED | OUTPATIENT
Start: 2024-04-23 | End: 2024-04-23

## 2024-04-23 RX ORDER — ONDANSETRON HYDROCHLORIDE 2 MG/ML
4 INJECTION, SOLUTION INTRAVENOUS ONCE AS NEEDED
Status: DISCONTINUED | OUTPATIENT
Start: 2024-04-23 | End: 2024-04-23 | Stop reason: HOSPADM

## 2024-04-23 RX ORDER — FENTANYL CITRATE 50 UG/ML
INJECTION, SOLUTION INTRAMUSCULAR; INTRAVENOUS AS NEEDED
Status: DISCONTINUED | OUTPATIENT
Start: 2024-04-23 | End: 2024-04-23

## 2024-04-23 RX ORDER — SODIUM CHLORIDE 0.9 G/100ML
IRRIGANT IRRIGATION AS NEEDED
Status: DISCONTINUED | OUTPATIENT
Start: 2024-04-23 | End: 2024-04-23 | Stop reason: HOSPADM

## 2024-04-23 RX ORDER — LIDOCAINE HCL/PF 100 MG/5ML
SYRINGE (ML) INTRAVENOUS AS NEEDED
Status: DISCONTINUED | OUTPATIENT
Start: 2024-04-23 | End: 2024-04-23

## 2024-04-23 RX ORDER — OXYCODONE HYDROCHLORIDE 5 MG/1
5 TABLET ORAL EVERY 4 HOURS PRN
Status: DISCONTINUED | OUTPATIENT
Start: 2024-04-23 | End: 2024-04-30 | Stop reason: HOSPADM

## 2024-04-23 RX ORDER — ONDANSETRON HYDROCHLORIDE 2 MG/ML
INJECTION, SOLUTION INTRAVENOUS AS NEEDED
Status: DISCONTINUED | OUTPATIENT
Start: 2024-04-23 | End: 2024-04-23

## 2024-04-23 RX ADMIN — EPHEDRINE SULFATE 5 MG: 50 INJECTION, SOLUTION INTRAVENOUS at 08:21

## 2024-04-23 RX ADMIN — CEFAZOLIN 2 G: 1 INJECTION, POWDER, FOR SOLUTION INTRAMUSCULAR; INTRAVENOUS at 08:15

## 2024-04-23 RX ADMIN — CEFAZOLIN SODIUM 2 G: 2 INJECTION, SOLUTION INTRAVENOUS at 17:46

## 2024-04-23 RX ADMIN — HYDROMORPHONE HYDROCHLORIDE 0.2 MG: 1 INJECTION, SOLUTION INTRAMUSCULAR; INTRAVENOUS; SUBCUTANEOUS at 10:49

## 2024-04-23 RX ADMIN — OXYCODONE HYDROCHLORIDE 5 MG: 5 TABLET ORAL at 17:34

## 2024-04-23 RX ADMIN — LIDOCAINE HYDROCHLORIDE 40 MG: 20 INJECTION INTRAVENOUS at 07:56

## 2024-04-23 RX ADMIN — ALBUMIN HUMAN 250 ML: 0.05 INJECTION, SOLUTION INTRAVENOUS at 09:13

## 2024-04-23 RX ADMIN — SUGAMMADEX 200 MG: 100 INJECTION, SOLUTION INTRAVENOUS at 10:05

## 2024-04-23 RX ADMIN — Medication 120 MCG: at 08:41

## 2024-04-23 RX ADMIN — Medication 5 MG: at 17:54

## 2024-04-23 RX ADMIN — SODIUM CHLORIDE, POTASSIUM CHLORIDE, SODIUM LACTATE AND CALCIUM CHLORIDE: 600; 310; 30; 20 INJECTION, SOLUTION INTRAVENOUS at 07:44

## 2024-04-23 RX ADMIN — HYDROMORPHONE HYDROCHLORIDE 0.2 MG: 1 INJECTION, SOLUTION INTRAMUSCULAR; INTRAVENOUS; SUBCUTANEOUS at 11:15

## 2024-04-23 RX ADMIN — FENTANYL CITRATE 50 MCG: 50 INJECTION, SOLUTION INTRAMUSCULAR; INTRAVENOUS at 07:56

## 2024-04-23 RX ADMIN — Medication 200 MCG: at 08:21

## 2024-04-23 RX ADMIN — ROCURONIUM BROMIDE 10 MG: 10 INJECTION INTRAVENOUS at 09:17

## 2024-04-23 RX ADMIN — Medication 4 L/MIN: at 10:20

## 2024-04-23 RX ADMIN — EPHEDRINE SULFATE 5 MG: 50 INJECTION, SOLUTION INTRAVENOUS at 09:01

## 2024-04-23 RX ADMIN — HYDROMORPHONE HYDROCHLORIDE 0.2 MG: 1 INJECTION, SOLUTION INTRAMUSCULAR; INTRAVENOUS; SUBCUTANEOUS at 10:26

## 2024-04-23 RX ADMIN — Medication 120 MCG: at 08:52

## 2024-04-23 RX ADMIN — ONDANSETRON 4 MG: 2 INJECTION INTRAMUSCULAR; INTRAVENOUS at 10:04

## 2024-04-23 RX ADMIN — EPHEDRINE SULFATE 5 MG: 50 INJECTION, SOLUTION INTRAVENOUS at 08:14

## 2024-04-23 RX ADMIN — ALBUMIN HUMAN 250 ML: 0.05 INJECTION, SOLUTION INTRAVENOUS at 08:30

## 2024-04-23 RX ADMIN — ROCURONIUM BROMIDE 60 MG: 10 INJECTION INTRAVENOUS at 07:56

## 2024-04-23 RX ADMIN — Medication 0.4 MCG/KG/MIN: at 08:50

## 2024-04-23 RX ADMIN — OXYCODONE HYDROCHLORIDE 5 MG: 5 TABLET ORAL at 21:52

## 2024-04-23 RX ADMIN — HYDROMORPHONE HYDROCHLORIDE 0.2 MG: 1 INJECTION, SOLUTION INTRAMUSCULAR; INTRAVENOUS; SUBCUTANEOUS at 11:30

## 2024-04-23 RX ADMIN — PROPOFOL 120 MG: 10 INJECTION, EMULSION INTRAVENOUS at 07:56

## 2024-04-23 RX ADMIN — CEFAZOLIN SODIUM 2 G: 2 INJECTION, SOLUTION INTRAVENOUS at 22:30

## 2024-04-23 RX ADMIN — Medication 200 MCG: at 08:14

## 2024-04-23 SDOH — HEALTH STABILITY: MENTAL HEALTH: CURRENT SMOKER: 0

## 2024-04-23 ASSESSMENT — COGNITIVE AND FUNCTIONAL STATUS - GENERAL
STANDING UP FROM CHAIR USING ARMS: TOTAL
PERSONAL GROOMING: A LOT
MOBILITY SCORE: 9
MOVING FROM LYING ON BACK TO SITTING ON SIDE OF FLAT BED WITH BEDRAILS: A LOT
CLIMB 3 TO 5 STEPS WITH RAILING: TOTAL
EATING MEALS: A LOT
MOBILITY SCORE: 9
DAILY ACTIVITIY SCORE: 11
PERSONAL GROOMING: A LOT
DRESSING REGULAR LOWER BODY CLOTHING: A LOT
MOBILITY SCORE: 9
PERSONAL GROOMING: A LOT
WALKING IN HOSPITAL ROOM: TOTAL
EATING MEALS: A LOT
TOILETING: TOTAL
WALKING IN HOSPITAL ROOM: TOTAL
CLIMB 3 TO 5 STEPS WITH RAILING: TOTAL
MOVING TO AND FROM BED TO CHAIR: A LOT
MOVING TO AND FROM BED TO CHAIR: A LOT
DRESSING REGULAR UPPER BODY CLOTHING: A LOT
DAILY ACTIVITIY SCORE: 11
DRESSING REGULAR UPPER BODY CLOTHING: A LOT
CLIMB 3 TO 5 STEPS WITH RAILING: TOTAL
DRESSING REGULAR LOWER BODY CLOTHING: A LOT
TURNING FROM BACK TO SIDE WHILE IN FLAT BAD: A LOT
TURNING FROM BACK TO SIDE WHILE IN FLAT BAD: A LOT
STANDING UP FROM CHAIR USING ARMS: TOTAL
DAILY ACTIVITIY SCORE: 11
TOILETING: TOTAL
DRESSING REGULAR UPPER BODY CLOTHING: A LOT
MOVING TO AND FROM BED TO CHAIR: A LOT
HELP NEEDED FOR BATHING: A LOT
EATING MEALS: A LOT
MOVING FROM LYING ON BACK TO SITTING ON SIDE OF FLAT BED WITH BEDRAILS: A LOT
STANDING UP FROM CHAIR USING ARMS: TOTAL
TURNING FROM BACK TO SIDE WHILE IN FLAT BAD: A LOT
WALKING IN HOSPITAL ROOM: TOTAL
TOILETING: TOTAL
MOVING FROM LYING ON BACK TO SITTING ON SIDE OF FLAT BED WITH BEDRAILS: A LOT
DRESSING REGULAR LOWER BODY CLOTHING: A LOT
HELP NEEDED FOR BATHING: A LOT
HELP NEEDED FOR BATHING: A LOT

## 2024-04-23 ASSESSMENT — PAIN SCALES - GENERAL
PAINLEVEL_OUTOF10: 7
PAINLEVEL_OUTOF10: 5 - MODERATE PAIN
PAINLEVEL_OUTOF10: 4
PAINLEVEL_OUTOF10: 5 - MODERATE PAIN
PAINLEVEL_OUTOF10: 4
PAINLEVEL_OUTOF10: 5 - MODERATE PAIN
PAINLEVEL_OUTOF10: 4
PAINLEVEL_OUTOF10: 6
PAINLEVEL_OUTOF10: 9
PAINLEVEL_OUTOF10: 0 - NO PAIN
PAINLEVEL_OUTOF10: 6
PAINLEVEL_OUTOF10: 5 - MODERATE PAIN

## 2024-04-23 ASSESSMENT — PAIN DESCRIPTION - LOCATION: LOCATION: OTHER (COMMENT)

## 2024-04-23 ASSESSMENT — PAIN - FUNCTIONAL ASSESSMENT
PAIN_FUNCTIONAL_ASSESSMENT: 0-10

## 2024-04-23 NOTE — ANESTHESIA PROCEDURE NOTES
Peripheral IV  Date/Time: 4/23/2024 8:57 AM      Placement  Needle size: 18 G  Laterality: left  Location: external jugular  Site prep: alcohol  Attempts: 1

## 2024-04-23 NOTE — PROGRESS NOTES
"Music Therapy Note    Ry Sandhu     Therapy Session  Referral Type: New referral this admission  Visit Type: Follow-up visit  Session Start Time: 1536  Session End Time: 1537  Intervention Delivery: In-person  Conflict of Service: Asleep  Family Present for Session: None  Number of staff members present: 0     Pre-assessment  Pain Score:  (\"not really\")  Mood/Affect: Cooperative, Participative, Appropriate, Tired/lethargic  Verbalized Emotional State:  (\"alright\")         Treatment/Interventions  Music Therapy Interventions: Assessment  Interruption: No  Patient Fell Asleep at End of Session: No    Post-assessment  Unable to Assess Reason: Did not provide expressive therapy intervention  Mood/Affect: Cooperative, Participative, Appropriate, Tired/lethargic, Goal focused  Continue Visiting: Yes  Total Session Time (min): 1 minutes    Narrative  Assessment Detail: Pt presented asleep in bed. MTI did not attempt to wake pt given procedure today.  Follow-up: MTI to follow up            "

## 2024-04-23 NOTE — ANESTHESIA PREPROCEDURE EVALUATION
Patient: Ry Sandhu    Procedure Information       Date/Time: 04/23/24 0715    Procedures:       Amputation Above Knee (Bilateral)      Insertion Gastrostomy Tube Percutaneous (Bilateral)    Location: [facility] OR 27 / [facility] OR    Surgeons: Efrain Arango MD PhD          74yom here for completion AKA and PEG.    Patient weak, answered questions appropriately. Said he does not want to be a vegetable. Discussed with wife as well. Ok for perioperative reversal of DNR with resumption upon return to the floor.      Relevant Problems   Cardiac   (+) CAD (coronary artery disease)   (+) CHF (congestive heart failure) (Multi) (Diastolic Dysfunction    LVEF on cath 40%, most recent 75%)   (+) HTN (hypertension)   (+) Hyperlipidemia   (+) Peripheral vascular disease (CMS-HCC)      Pulmonary   (+) Chronic obstructive pulmonary disease (Multi)   (+) Pneumonia      GI   (+) Gastroesophageal reflux disease      Hematology   (+) Anemia      ID   (+) Septicemia (Multi) (Now resolved)       Clinical information reviewed:   Tobacco  Allergies  Meds  Problems  Med Hx  Surg Hx   Fam Hx  Soc   Hx        NPO Detail:  No data recorded     Physical Exam    Airway  Mallampati: II  TM distance: >3 FB  Neck ROM: full     Cardiovascular   Rhythm: regular  Rate: normal     Dental   (+) upper dentures, lower dentures     Pulmonary    Abdominal            Anesthesia Plan    History of general anesthesia?: yes  History of complications of general anesthesia?: no    ASA 3     general     The patient is not a current smoker.    Anesthetic plan and risks discussed with patient.  Use of blood products discussed with patient who consented to blood products.

## 2024-04-23 NOTE — ANESTHESIA POSTPROCEDURE EVALUATION
Patient: Ry Sandhu    Procedure Summary       Date: 04/23/24 Room / Location: [facility] OR 27 / [facility] OR    Anesthesia Start: 0744 Anesthesia Stop: 1024    Procedures:       BILATERAL AKA FORMALIZATION (Bilateral)      Insertion Gastrostomy Tube Percutaneous (Bilateral) Diagnosis:       Peripheral vascular disease (CMS-HCC)      (Peripheral vascular disease (CMS-[facility]) [I73.9])    Surgeons: Efrain Arango MD PhD Responsible Provider: David Jc MD    Anesthesia Type: general ASA Status: 3            Anesthesia Type: general    Vitals Value Taken Time   /49 04/23/24 1024   Temp 36.5 04/23/24 1024   Pulse 97 04/23/24 1021   Resp 27 04/23/24 1021   SpO2 96 % 04/23/24 1021   Vitals shown include unfiled device data.    Anesthesia Post Evaluation    Patient location during evaluation: PACU  Patient participation: complete - patient participated  Level of consciousness: awake and alert  Pain management: satisfactory to patient  Airway patency: patent  Cardiovascular status: blood pressure returned to baseline and acceptable  Respiratory status: acceptable and face mask  Hydration status: acceptable  Postoperative Nausea and Vomiting: none        No notable events documented.

## 2024-04-23 NOTE — SIGNIFICANT EVENT
Post PEG Placement Check    Ry Sandhu  is a 74 y.o. male s/p PEG tube placement and concurrent bl AKA with vascular who is recovering well post procedure. No acute distress or complaints.    Vitals:    04/23/24 1300   BP: 114/60   Pulse: 94   Resp: 18   Temp: 36.3 °C (97.3 °F)   SpO2: 99%     General: reporting pain in bl LE, no abd pain  Neuro: no new deficits  CV: tachycardic to 110s  Resp: nonlabored breathing  Abd: soft, nondistended, appropriately tender. PEG tube in place with no active bleeding, spins freely, TF running  Extremities: bl AKA soft no strktrough    A/P:  Ry Sandhu  is a 74 y.o. male s/p PEG tube placement who is recovering appropriately post procedure from [facility] standpoint.    - Primary team made aware of tachycardia and pain    -May use PEG for meds/flushes immediately  -May start tube feeds in 3 hours post procedure  -Maintain bumper 1cm from skin- should spin freely  -Check for bleeding every 4 hours for next few days  -Cleanse daily, apply dry dressing ABOVE bumper only  -Maintain abdominal binder at all times to prevent PEG from being pulled  -Ok to continue ASA, may resume DVT chemoprophylaxis in 12hours   -If PEG placed to gravity, please secure with drain summers   -If PEG removal desired, may follow up any time after 4-6 weeks  - Please page with questions or concerns.     Wayne Davenport MD  PGY1 Surgery Resident  [facility]    D/w Chief resident

## 2024-04-23 NOTE — OP NOTE
Operative Date: 4/23/2024    Preop diagnosis: S/p right below knee guillotine and left above knee guillotine amputations.     Postop diagnosis: Same    Procedure: Bilateral above-knee amputation    Surgeon: Efrain Arango MD, PhD.     Assistant: Lonnie Sainz MD.     Anesthesia: General    Indications: The patient is a 74 year old male with non-healing s/p right below knee guillotine and left above knee guillotine amputations on 4/5/2024 in the setting of tissue loss with septic shock. He is here for bilateral above knee amputations for formalization.   I discussed the plan for bilateral above knee amputations. The risks were discussed, including bleeding, infection, wound complications, chronic pain, limb swelling (edema), reaction to medication, or the need for reoperation (including revision to a higher level of amputation), were discussed. We also discussed the additional risks of myocardial infarction, anesthetic complications, or other serious complications. The plan for post-operative care and rehabilitation were addressed. The patient indicated understanding of the procedure, plan, alternatives, and risks, and voiced consent to proceed.    Description of procedure: The general anesthesia was achieved by the anesthesia team. The patient was placed in supine position. The bilateral lower extremities were prepped and draped in the usual sterile fashion. An occlusive dressing was applied to the leg tip to the level of the knee.  The time out was performed confirming correct patient, site, procedure and positioning.     Right leg anterior and posterior skin flaps were outlined with a marking pen approximately, 8 cm proximal to the knee joint. The skin incision was then made and deepened through the subcutaneous tissue until the muscular fascia was identified. The greater saphenous vein was suture ligated and divided. The muscle croups over the anterior and medial thighs were divided with electrocautery at the  same level of the skin incision. The neurovascular bundle was identified on the medial aspect of the thigh. The popliteal artery and veins were isolated and suture ligated with a 2-0 silk ligature. The sciatic nerve was pulled and divided. The posterior thigh muscles were then divided with electrocautery. Once the muscle groups were circumferentially divided, the periosteum was incised and elevated approximately 5 cm proximally off the femur. The femur was divided with an electric saw. The proximal end of the transected femur was smoothed. The amputation stump was irrigated copiously with an antibiotic solution. Hemostasis was achieved. The fascia of the thigh muscles was closed with interrupted 2-0 absorbable sutures. The skin was approximated with skin staplers. Sterile dressing was applied followed by ace bandage.     Left leg anterior and posterior skin flaps were outlined with a marking pen approximately, 8 cm proximal to the knee joint. The skin incision was then made and deepened through the subcutaneous tissue until the muscular fascia was identified. The greater saphenous vein was suture ligated and divided. The muscle croups over the anterior and medial thighs were divided with electrocautery at the same level of the skin incision. The neurovascular bundle was identified on the medial aspect of the thigh. The popliteal artery and veins were isolated and suture ligated with a 2-0 silk ligature. The sciatic nerve was pulled and divided. The posterior thigh muscles were then divided with electrocautery. Once the muscle groups were circumferentially divided, the periosteum was incised and elevated approximately 5 cm proximally off the femur. The femur was divided with an electric saw. The proximal end of the transected femur was smoothed. The amputation stump was irrigated copiously with an antibiotic solution. Hemostasis was achieved. The fascia of the thigh muscles was closed with interrupted 2-0 absorbable  sutures. The skin was approximated with skin staplers. Sterile dressing was applied followed by ace bandage.     The patient tolerated the procedure well. The case was then handed off to the general surgery team for the PEG tube placement. Please refer to the operative note.      I was physically present for the entire length of the vascular case and scubbed for the entire vascular portions.     Efrain Arango MD, PhD.   Clinical   [employer]  Co-Director, Aortic Center  [facility] [place]

## 2024-04-23 NOTE — CARE PLAN
Patient was calm and cooperative with care, no complaints of pain, Pt NPO at midnight for surgery, Bed alarm active and audible.   Problem: Daily Care  Goal: Daily care needs are met  Outcome: Progressing     Problem: Fall/Injury  Goal: Not fall by end of shift  Outcome: Progressing  Goal: Be free from injury by end of the shift  Outcome: Progressing  Goal: Verbalize understanding of personal risk factors for fall in the hospital  Outcome: Progressing   The patient's goals for the shift include Maintain patient safety    The clinical goals for the shift include Patient will remain free from falls or injuries throughout the shift    Over the shift, the patient did make progress toward the following goals.

## 2024-04-23 NOTE — PROGRESS NOTES
Ry Sandhu is a 74 y.o. male on day 19 of admission presenting with Pneumonia.    Subjective   Call placed to patients wife Pura to further discuss discharge plan. Patient has been evaluated by PT/OT and is recommended for moderate intensity/SNF at discharge. Referral had previously been sent to [facility] when patient was in SDU.    Spoke to wife and discussed that [facility] is an acute rehab facility and is not the level of care recommended for the patient at discharge. Discussed the difference between SNF level of care & AR; wife expressed understanding. Agreeable for SNF list to be sent to her email ([email or web address]). Aware that SW will follow up to obtain choices.    Per medical team, patients ADOD is Friday.    -Carrie WILDE MA, LSW  [phone] or [facility] Secure Chat  Care Transitions

## 2024-04-23 NOTE — SIGNIFICANT EVENT
Vascular surgery plan of care update    Patient is s/p bilateral above knee amputations (formalization from prior guillotine amputations). Incisions closed with staples and covered with gauze/ACE. General surgery also performed PEG at case completion.    Recommendations:   - ok to resume any antithrombotic medications (Aspirin, LMWH) on POD1 (4/24/24)  - recommend oxycodone 5/10 prn q4h and breakthrough dilaudid IV; can consider gabapentin if patient endorses phantom pain, muscle relaxant if patient endorses spasms  - defer to [facility] team for tube feed initiation after PEG placement  - no renteria  - 24h ancef post-op  - non-weight bearing BLE  - will remove dressings on POD2-3; please page if dressings have saturated    Lonnie Sainz MD, PhD  Vascular Surgery, PGY2  [id number]

## 2024-04-23 NOTE — ANESTHESIA PROCEDURE NOTES
Airway  Date/Time: 4/23/2024 8:03 AM  Urgency: elective    Airway not difficult    Staffing  Performed: resident   Authorized by: David Jc MD    Performed by: Aleisha Jones MD  Patient location during procedure: OR    Indications and Patient Condition  Indications for airway management: anesthesia  Spontaneous ventilation: present  Sedation level: deep  Preoxygenated: yes  MILS maintained throughout  Mask difficulty assessment: 2 - vent by mask + OA or adjuvant +/- NMBA  Planned trial extubation    Final Airway Details  Final airway type: endotracheal airway      Successful airway: ETT  Cuffed: yes   Successful intubation technique: direct laryngoscopy  Blade: Estee  Blade size: #4  ETT size (mm): 7.5  Cormack-Lehane Classification: grade IIb - view of arytenoids or posterior of glottis only  Placement verified by: chest auscultation   Measured from: lips  ETT to lips (cm): 22  Number of attempts at approach: 1  Ventilation between attempts: none

## 2024-04-23 NOTE — CARE PLAN
The patient's goals for the shift include Maintain patient safety    The clinical goals for the shift include   Problem: Daily Care  Goal: Daily care needs are met  Outcome: Progressing     Problem: Psychosocial Needs  Goal: Demonstrates ability to cope with hospitalization/illness  Outcome: Progressing  Goal: Collaborate with me, my family, and caregiver to identify my specific goals  Outcome: Progressing     Problem: Discharge Barriers  Goal: My discharge needs are met  Outcome: Progressing     Problem: Respiratory  Goal: Clear secretions with interventions this shift  Outcome: Progressing  Goal: No signs of respiratory distress (eg. Use of accessory muscles. Peds grunting)  Outcome: Progressing  Goal: Tolerate mechanical ventilation evidenced by VS/agitation level this shift  Outcome: Progressing  Goal: Tolerate pulmonary toileting this shift  Outcome: Progressing  Goal: Verbalize decreased shortness of breath this shift  Outcome: Progressing  Goal: Wean oxygen to maintain O2 saturation per order/standard this shift  Outcome: Progressing  Goal: Increase self care and/or family involvement in next 24 hours  Outcome: Progressing     Problem: Skin  Goal: Participates in plan/prevention/treatment measures  Outcome: Progressing  Flowsheets (Taken 4/23/2024 1401)  Participates in plan/prevention/treatment measures: Elevate heels     Problem: Fall/Injury  Goal: Not fall by end of shift  Outcome: Progressing  Goal: Be free from injury by end of the shift  Outcome: Progressing  Goal: Verbalize understanding of personal risk factors for fall in the hospital  Outcome: Progressing  Goal: Verbalize understanding of risk factor reduction measures to prevent injury from fall in the home  Outcome: Progressing  Goal: Use assistive devices by end of the shift  Outcome: Progressing  Goal: Pace activities to prevent fatigue by end of the shift  Outcome: Progressing

## 2024-04-23 NOTE — OP NOTE
Insertion Gastrostomy Tube Percutaneous Operative Note     Date: 2024  OR Location: [facility] OR    Name: Ry Sandhu, : 1950, Age: 74 y.o., MRN: [id number], Sex: male    Diagnosis  Pre-op Diagnosis     * Peripheral vascular disease (CMS-HCC) [I73.9] Post-op Diagnosis     * Peripheral vascular disease (CMS-HCC) [I73.9]     Procedures  Insertion Gastrostomy Tube Percutaneous  42910 - [place] INSERT GASTROSTOMY TUBE PERCUTANEOUS      Surgeons   Yosvany Suh MD    Resident/Fellow/Other Assistant:  None    Procedure Summary  Anesthesia: General  ASA: III  Anesthesia Staff: Anesthesiologist: David Jc MD  Anesthesia Resident: Aleisha Jones MD  Estimated Blood Loss: 1mL  Intra-op Medications:   Administrations occurring from 0715 to 0935 on 24:   Medication Name Total Dose   vancomycin (Vancocin) vial for injection 2 g   acetaminophen (Tylenol) oral liquid 650 mg Cannot be calculated   aspirin chewable tablet 81 mg Cannot be calculated   atorvastatin (Lipitor) tablet 40 mg Cannot be calculated   dextrose 50 % injection 12.5 g Cannot be calculated   dextrose 50 % injection 25 g Cannot be calculated   enoxaparin (Lovenox) syringe 40 mg Cannot be calculated   ergocalciferol (Vitamin D-2) drops 200 mcg Cannot be calculated   folic acid (Folvite) tablet 1 mg Cannot be calculated   glucagon (Glucagen) injection 1 mg Cannot be calculated   glucagon (Glucagen) injection 1 mg Cannot be calculated   ipratropium-albuteroL (Duo-Neb) 0.5-2.5 mg/3 mL nebulizer solution 3 mL Cannot be calculated   loperamide (Imodium A-D) liquid 2 mg Cannot be calculated   melatonin tablet 5 mg Cannot be calculated   oxyCODONE (Roxicodone) immediate release tablet 5 mg Cannot be calculated   pantoprazole (ProtoNix) injection 40 mg Cannot be calculated   polyethylene glycol (Glycolax, Miralax) packet 17 g Cannot be calculated   silver nitrate applicators applicator Cannot be calculated              Anesthesia Record                Intraprocedure I/O Totals          Intake    PRBC 350.00 mL    Phenylephrine Drip 52.68 mL    The total shown is the total volume documented since Anesthesia Start was filed.    LR infusion 1000.00 mL    albumin human 5 % 500.00 mL    Total Intake 1902.68 mL          Specimen:   None    Staff:   Circulator: Coleen Crabtree RN; Anna Yun RN  Scrub Person: Gopal Guadalupe RN; Shanti Thomas         Drains and/or Catheters:   NG/OG/Feeding Tube Other (Comment) (Active)   Tube Status Continuous tube feeding 04/20/24 0812   Placement Verification Measurements 04/20/24 0812   Distal Tube Measurement 75 cm 04/20/24 0812   Site Assessment Dry;Intact;Clean 04/23/24 0256   Irrigant Tap water 04/20/24 0812   Response To Intervention No resistance met 04/20/24 0812   Tube Securement Nasal bridle 04/20/24 0812   Tube Feeding Frequency Continuous 04/22/24 0940   Tube Feeding Isosource 1.5 04/22/24 0940   Tube Feeding Strength Full strength 04/22/24 0600   Tube Feeding Method Continuous per pump 04/22/24 0940   Tube Feeding Rate (ml/hr) 55 mL/hr 04/22/24 0940   Tube Feeding Bag Changed Yes 04/22/24 0600   Feeding Tube Flushed With Tap water 04/22/24 0940   Intake (mL) 204 mL 04/20/24 1220   Intake - Flush (mL) 200 mL 04/22/24 1800       Gastrostomy/Enterostomy Gastrostomy 1 20 Fr. LUQ (Active)       Tourniquet Times:         Implants: 20Fr PEG    Findings: normal EGD, PEG at 3cm    Indications: Ry Sandhu is an 74 y.o. male who is having surgery for Dyphagis, here for PEG.    The patient was seen in the preoperative area. The risks, benefits, complications, treatment options, non-operative alternatives, expected recovery and outcomes were discussed with the patient. The possibilities of reaction to medication, pulmonary aspiration, injury to surrounding structures, bleeding, recurrent infection, the need for additional procedures, failure to diagnose a condition, and creating a complication  requiring transfusion or operation were discussed with the patient. The patient concurred with the proposed plan, giving informed consent.  The site of surgery was properly noted/marked if necessary per policy. The patient has been actively warmed in preoperative area. Preoperative antibiotics have been ordered and given within 1 hours of incision. Venous thrombosis prophylaxis have been ordered including chemical prophylaxis    Procedure Details:  The adult gastroscope was inserted into the mouth and advanced to the first portion of the duodenum. The mucosa was carefully examined in the duodenum, stomach, and esophagus. No significant lesions were noted. The scope was used to transilluminate the abdominal wall. A finger was placed on the patient’s abdomen and a pressure applied demonstrating adequate indentation of the stomach wall visualized via the endoscope.     The area was cleaned with Betadine solution. A sterile drape was placed over the patient. 5mL of lidocaine without epinephrine was drawn up in a syringe. A small wheal was placed in the patient’s skin for local anesthesia. The finder needle was inserted perpendicular to the skin at an angle previously noted to be appropriate as per the endoscopic visualization. Aspiration with injection of lidocaine was performed on a tract into the stomach. Bubbles were noted in the syringe upon reaching the stomach with the needle immediately seen endoscopically thus completing the safe track technique. The finder needle was then withdrawn     A small incision was made in the same area and the trocar catheter assembly was then inserted into the abdominal wall and stomach. The wire was passed through the trocar into the stomach. A snare was passed down the endoscope and used to ensnare the wire. The scope and the wire were removed from the patient.     The wire was removed from the snare, and the wire was attached to the PEG tube and secured. The snare was then placed  over the tube and the wire was then pulled through the wall of the abdomen pulling the PEG tube and endoscope down the esophagus into the stomach and out the abdominal wall as expected.     The gastroscope was then used to visualize placement of the PEG tube. The tube was noted to be in appropriate position without bleeding. The external bumper was then applied to the tube and was at 3cm at the skin. The patient tolerated the procedure well and there were no immediate complications.    Complications:  None; patient tolerated the procedure well.    Disposition: PACU - hemodynamically stable.  Condition: stable         Additional Details: none    Attending Attestation: I was present and scrubbed for the entire procedure.    Yosvany Suh MD

## 2024-04-23 NOTE — H&P
I have reviewed the patient's History and Physical Examination. I have personally seen and evaluated the patient, repeating key portions. There is no significant interval change.     Surgery is still indicated. Yes    Consent reviewed and signed by patient/family: Yes      Homa Donald MD  PGY1 General Surgery  Vascular Surgery [id number]  Available on Secure Chat

## 2024-04-23 NOTE — SIGNIFICANT EVENT
Vascular surgery post-op check    S: no acute events since OR    O: sleeping, comfortable and in NAD. Unlabored respirations. Regular rate.  Bilateral AKA dressings intact, stumps soft, no strikethrough    A: Recovering well from bilateral AKA    P: No changes to post-operative plan of care  - ok to resume any antithrombotic medications (Aspirin, LMWH) on POD1 (4/24/24)  - recommend oxycodone 5/10 prn q4h and breakthrough dilaudid IV; can consider gabapentin if patient endorses phantom pain, muscle relaxant if patient endorses spasms  - defer to [place] team for tube feed initiation after PEG placement  - no renteria  - 24h ancef post-op  - check CBC in the morning  - non-weight bearing BLE  - will remove dressings on POD2-3; please page if dressings have saturated    Lonnie Sainz MD, PhD  Vascular Surgery, PGY2  [id number]

## 2024-04-23 NOTE — PROGRESS NOTES
"Nutrition Follow Up Assessment:    - Received Secure chat re: starting TF with new PEG tube.      Ry Sandhu is a 74 y.o. male on day 19 of admission presenting with Pneumonia.     S/p surgery today:  completion AKA and PEG placement    Patient is s/p b/l AKA  .   Nutrition History:  Food and Nutrient History: Attempted to meet with patient, but sound asleep. Spoke with bedside RN who is waiting for TF formula to be delivered to floor.     Anthropometrics:  Height: 188 cm (6' 2\")   Weight: 49.7 kg (109 lb 9.1 oz)   BMI (Calculated): 14.06  IBW/kg (Dietitian Calculated):  (Daughter reports that pt was closer to 170cm and not his current height of 188cm.  Will use 170cm for calculations.)  Percent of IBW:  (67.3kg pre amputations/61.3kg post amputations)     Weight History:   Weights (last 14 days)    Date/Time Weight   04/23/24 0539 49.7 kg (109 lb 9.1 oz)   04/23/24 04:23:25 49.7 kg (109 lb 9.1 oz)   04/21/24 0600 55.6 kg (122 lb 9.2 oz)   04/19/24 0648 57 kg (125 lb 10.6 oz)   04/18/24 0548 59.4 kg (130 lb 15.3 oz)   04/17/24 0600 60.2 kg (132 lb 11.5 oz)   04/16/24 1200 52.1 kg (114 lb 13.8 oz)   04/16/24 0600 52.1 kg (114 lb 13.8 oz)   04/15/24 0300 56.1 kg (123 lb 10.9 oz)   04/14/24 0000 56.6 kg (124 lb 12.5 oz)   04/13/24 0600 57.5 kg (126 lb 12.2 oz)   04/12/24 0539 57.4 kg (126 lb 8.7 oz)   04/11/24 1326 55.7 kg (122 lb 12.7 oz)   04/11/24 0542 55.7 kg (122 lb 12.7 oz)   04/10/24 0600 54.4 kg (119 lb 14.9 oz)   04/09/24 0600 52.6 kg (115 lb 15.4 oz)       Nutrition Focused Physical Exam Findings:    Subcutaneous Fat Loss:   Orbital Fat Pads: Severe (dark circles, hollowing and loose skin)  Buccal Fat Pads: Severe (hollow, sunken and narrow face)  Triceps: Severe (negligible fat tissue)  Muscle Wasting:  Temporalis: Severe (hollowed scooping depression)  Pectoralis (Clavicular Region): Severe (protruding prominent clavicle)  Deltoid/Trapezius: Severe (squared shoulders, acromion process " prominent)  Quadriceps: Severe (depressions on inner and outer thigh)  Gastrocnemius: Severe (minimal muscle definition)  Physical Findings:  Skin: Positive (B/L AKA surgical wounds; L axilla wound; penis wound; perineum wound; coccyx wound; R pretibial wound)    Nutrition Significant Labs:  BMP Trend:   Results from last 7 days   Lab Units 04/23/24  0618 04/22/24  0850 04/21/24  0746 04/20/24  1206   GLUCOSE mg/dL 70* 103* 120* 112*   CALCIUM mg/dL 8.7 8.3* 8.1* 7.6*   SODIUM mmol/L 149* 148* 144 146*   POTASSIUM mmol/L 3.7 3.9 3.9 4.1   CO2 mmol/L 32 33* 31 31   CHLORIDE mmol/L 109* 110* 108* 110*   BUN mg/dL 29* 28* 24* 22   CREATININE mg/dL 0.40* 0.39* 0.42* 0.40*    , BG POCT trend:   Results from last 7 days   Lab Units 04/23/24  1242 04/23/24  0556 04/23/24  0150 04/22/24 2021 04/22/24  1428   POCT GLUCOSE mg/dL 78 91 88 115* 108*    , Renal Lab Trend:   Results from last 7 days   Lab Units 04/23/24  0618 04/22/24  0850 04/21/24  0746 04/20/24  1206   POTASSIUM mmol/L 3.7 3.9 3.9 4.1   PHOSPHORUS mg/dL 4.0 3.3 3.0  --    SODIUM mmol/L 149* 148* 144 146*   MAGNESIUM mg/dL 1.78 1.89 1.89  --    EGFR mL/min/1.73m*2 >90 >90 >90 >90   BUN mg/dL 29* 28* 24* 22   CREATININE mg/dL 0.40* 0.39* 0.42* 0.40*        Nutrition Specific Medications:  atorvastatin, 40 mg, nasogastric tube, Daily  ceFAZolin, 2 g, intravenous, q8h  ergocalciferol, 200 mcg, nasogastric tube, Daily  folic acid, 1 mg, nasogastric tube, Daily  pantoprazole, 40 mg, intravenous, Daily before breakfast    Continuous medications     PRN medications  PRN medications: acetaminophen, dextrose, dextrose, glucagon, glucagon, HYDROmorphone, ipratropium-albuteroL, loperamide, oxyCODONE, oxygen, polyethylene glycol, silver nitrate applicators    I/O:   Last BM Date: 04/22/24; Stool Appearance: Loose, Tarry (04/22/24 0940)        Dietary Orders (From admission, onward)       Start     Ordered    04/23/24 1440  Enteral feeding with NPO Pivot 1.5; PEG  (percutaneous endoscopic gastric); 55; 300; Water; Tap water; Other (specify); Every 8 hours  Diet effective now        Question Answer Comment   Tube feeding formula: Pivot 1.5    Feeding route: PEG (percutaneous endoscopic gastric)    Tube feeding continuous rate (mL/hr): 55    Tube feeding flush (mL): 300    Flush type: Water    Water type: Tap water    Flush frequency: Other (specify)    Additional Details Every 8 hours        04/23/24 1440                     Estimated Needs:   Total Energy Estimated Needs (kCal):  (3909-5659)  Method for Estimating Needs: MSJ= 1384  Total Protein Estimated Needs (g):  ()  Method for Estimating Needs: 1.5-2.0 x 57.4kg              Nutrition Diagnosis   Malnutrition Diagnosis  Patient has Malnutrition Diagnosis: Yes  Diagnosis Status: Ongoing  Malnutrition Diagnosis: Severe malnutrition related to chronic disease or condition  As Evidenced by: family reports a terrible appetite and intake for months along with a 22% weight loss in 7 months and severe fat and muscle wasting noted all over his body          Nutrition Interventions/Recommendations         Nutrition Prescription:  Individualized Nutrition Prescription:   Restart Pivot 1.5 and run at 55ml/hr continuous.    Switch to bolus feeds tomorrow: Pivot 1.5 300ml bolus 4 x a day.    Flush with 30ml of water before and after each bolus.    Additional water flushes of 200 ml every 8 hours or  per MD.        Nutrition Interventions:   Food and/or Nutrient Delivery Interventions  Interventions: Enteral intake  Goal: TF at goal = 1800 kcals, 113 g protein.       Nutrition Education:          Nutrition Monitoring and Evaluation   Food/Nutrient Related History Monitoring  Monitoring and Evaluation Plan: Energy intake  Criteria: TF to meet 100% nutrition needs    Body Composition/Growth/Weight History  Monitoring and Evaluation Plan: Weight  Criteria: Stable weight    Biochemical Data, Medical Tests and Procedures  Monitoring  and Evaluation Plan: Electrolyte/renal panel, Glucose/endocrine profile  Criteria: Labs wnl        Time Spent/Follow-up Reminder:   Time Spent (min): 30 minutes  Last Date of Nutrition Visit: 04/23/24  Nutrition Follow-Up Needed?: Dietitian to reassess per policy  Follow up Comment: New PEG 4/23/24

## 2024-04-23 NOTE — ANESTHESIA PROCEDURE NOTES
Peripheral IV  Date/Time: 4/23/2024 8:30 AM      Placement  Needle size: 20 G  Laterality: right  Location: wrist  Site prep: alcohol  Attempts: 1

## 2024-04-23 NOTE — NURSING NOTE
Geriatric Nursing rounds summary  Mr Sandhu is a 74 year old admitted s/p amputations peg placement pneumonia,  Pmh cad copd pad htn  Wife is suppport  Course complicated by acute respiratory hypoxia, encephalopathy anemia  Age friendly  What matters dnr nutrition will need snf  Mentation cam neg but lethargic cont to assess  Mobility bed  Med lovenox 2 liter oxygen duonebs

## 2024-04-23 NOTE — PROGRESS NOTES
Physical Therapy                 Therapy Communication Note    Patient Name: Ry Sandhu  MRN: [id number]  Today's Date: 4/23/2024     Discipline: Physical Therapy    Missed Visit Reason: Missed Visit Reason:  (Pt currently down at OR at this time. Will reattempt PT at later date as appropriate.)    Missed Time: Attempt

## 2024-04-23 NOTE — BRIEF OP NOTE
Date: 2024 - 2024  OR Location: [facility] OR    Name: Ry Sandhu, : 1950, Age: 74 y.o., MRN: [id number], Sex: male    Diagnosis  Pre-op Diagnosis     * Peripheral vascular disease (CMS-HCC) [I73.9] Post-op Diagnosis     * Peripheral vascular disease (CMS-HCC) [I73.9]     Procedures  BILATERAL AKA FORMALIZATION  16264 - [place] AMPUTATION THIGH THROUGH FEMUR ANY LEVEL    Bilateral above knee amputations at the distal femur level    Insertion Gastrostomy Tube Percutaneous  65090 - [place] INSERT GASTROSTOMY TUBE PERCUTANEOUS      Surgeons      * Efrain Arango - Primary    Resident/Fellow/Other Assistant:  Surgeons and Role:     * Lonnie Sainz MD - Resident - Assisting    Procedure Summary  Anesthesia: General  ASA: III  Anesthesia Staff: Anesthesiologist: David Jc MD  Anesthesia Resident: Aleisha Jones MD  Estimated Blood Loss: 200mL  Intra-op Medications:   Administrations occurring from 0715 to 0935 on 24:   Medication Name Total Dose   vancomycin (Vancocin) vial for injection 2 g   acetaminophen (Tylenol) oral liquid 650 mg Cannot be calculated   aspirin chewable tablet 81 mg Cannot be calculated   atorvastatin (Lipitor) tablet 40 mg Cannot be calculated   dextrose 50 % injection 12.5 g Cannot be calculated   dextrose 50 % injection 25 g Cannot be calculated   enoxaparin (Lovenox) syringe 40 mg Cannot be calculated   ergocalciferol (Vitamin D-2) drops 200 mcg Cannot be calculated   folic acid (Folvite) tablet 1 mg Cannot be calculated   glucagon (Glucagen) injection 1 mg Cannot be calculated   glucagon (Glucagen) injection 1 mg Cannot be calculated   ipratropium-albuteroL (Duo-Neb) 0.5-2.5 mg/3 mL nebulizer solution 3 mL Cannot be calculated   loperamide (Imodium A-D) liquid 2 mg Cannot be calculated   melatonin tablet 5 mg Cannot be calculated   oxyCODONE (Roxicodone) immediate release tablet 5 mg Cannot be calculated   pantoprazole (ProtoNix) injection 40 mg Cannot be  calculated   polyethylene glycol (Glycolax, Miralax) packet 17 g Cannot be calculated   silver nitrate applicators applicator Cannot be calculated              Anesthesia Record               Intraprocedure I/O Totals          Intake    PRBC 350.00 mL    Phenylephrine Drip 0.00 mL    The total shown is the total volume documented since Anesthesia Start was filed.    Total Intake 350 mL          Specimen:   ID Type Source Tests Collected by Time   1 : RIGHT ABOVE KNEE AMPUTATION Tissue LEG AMPUTATION ABOVE THE KNEE RIGHT SURGICAL PATHOLOGY EXAM Efrain Arango MD PhD 4/23/2024 0855   2 : LEFT ABOVE THE KNEE AMPUTATION Tissue LEG AMPUTATION ABOVE THE KNEE LEFT SURGICAL PATHOLOGY EXAM Efrain Arango MD PhD 4/23/2024 0923        Staff:   Circulator: Coleen Crabtree RN; Anna Yun RN  Scrub Person: Gopal Guadalupe RN; Shanti Thomas          Findings: bilateral above knee amputations without any evidence of ascending infection    Complications:  None; patient tolerated the procedure well.     Disposition: PACU - hemodynamically stable.  Condition: stable  Specimens Collected:   ID Type Source Tests Collected by Time   1 : RIGHT ABOVE KNEE AMPUTATION Tissue LEG AMPUTATION ABOVE THE KNEE RIGHT SURGICAL PATHOLOGY EXAM Efrain Arango MD PhD 4/23/2024 0855   2 : LEFT ABOVE THE KNEE AMPUTATION Tissue LEG AMPUTATION ABOVE THE KNEE LEFT SURGICAL PATHOLOGY EXAM Efrain Arango MD PhD 4/23/2024 0923     Lonnie Sainz MD, PhD  Vascular Surgery, PGY2  [id number]      Attending Attestation: I was present and scrubbed for the entire procedure.    Efrain Arango  Phone Number: [phone]

## 2024-04-23 NOTE — PROGRESS NOTES
"Ry Sandhu is a 74 y.o. male on day 19 of admission presenting with Pneumonia.    Subjective   Afebrile and stable overnight with no acute events. Patient does not endorse any pain or any change in breathing. Going to OR today    Active Medications  [Transfer Hold] aspirin, 81 mg, oral, Daily  [Transfer Hold] atorvastatin, 40 mg, nasogastric tube, Daily  [Held by provider] enoxaparin, 40 mg, subcutaneous, q24h  [Transfer Hold] ergocalciferol, 200 mcg, nasogastric tube, Daily  [Transfer Hold] folic acid, 1 mg, nasogastric tube, Daily  [Transfer Hold] melatonin, 5 mg, nasogastric tube, Daily  [Transfer Hold] pantoprazole, 40 mg, intravenous, Daily before breakfast                   Objective     Physical Exam  Constitutional:       Appearance: Normal appearance.   HENT:      Head: Normocephalic and atraumatic.      Mouth/Throat:      Mouth: Mucous membranes are dry.      Pharynx: No posterior oropharyngeal erythema.   Eyes:      Extraocular Movements: Extraocular movements intact.      Pupils: Pupils are equal, round, and reactive to light.   Cardiovascular:      Rate and Rhythm: Normal rate and regular rhythm.      Pulses: Normal pulses.      Heart sounds: Normal heart sounds.   Pulmonary:      Comments: Slightly increased WOB, diminished air entry in the bases  Abdominal:      General: Abdomen is flat. There is no distension.      Palpations: Abdomen is soft.      Tenderness: There is no abdominal tenderness.      Comments: Dobhoff in place   Musculoskeletal:      Right lower leg: No edema.      Left lower leg: No edema.   Skin:     Findings: Bruising present.   Neurological:      General: No focal deficit present.      Mental Status: He is alert.      Comments: Aox2, some deficits in attention         Last Recorded Vitals  Blood pressure 144/65, pulse 95, temperature 36.5 °C (97.7 °F), temperature source Tympanic, resp. rate 14, height 1.88 m (6' 2\"), weight 49.7 kg (109 lb 9.1 oz), SpO2 97%.  Intake/Output " last 3 Shifts:  I/O last 3 completed shifts:  In: 1000 (20.1 mL/kg) [NG/GT:1000]  Out: 75 (1.5 mL/kg) [Drains:75]  Weight: 49.7 kg     Relevant Results  Results from last 7 days   Lab Units 04/22/24  1708   WBC AUTO x10*3/uL 6.7   HEMOGLOBIN g/dL 9.0*   HEMATOCRIT % 27.6*   PLATELETS AUTO x10*3/uL 234      Results from last 7 days   Lab Units 04/22/24  0850   SODIUM mmol/L 148*   POTASSIUM mmol/L 3.9   CHLORIDE mmol/L 110*   CO2 mmol/L 33*   BUN mg/dL 28*   CREATININE mg/dL 0.39*   GLUCOSE mg/dL 103*   CALCIUM mg/dL 8.3*          XR chest 1 view    Result Date: 4/16/2024    1. Interval improvement in hazy right-sided opacification, with progression to multifocal patchy airspace opacities and associated costophrenic angle blunting. Persistent silhouetting of the right hemidiaphragm and costophrenic angle. Interval improvement of silhouetting of the right heart border. Findings likely represent a combination of effusion and associated consolidation. 2. Similar hazy left basilar opacities, which may represent a infectious/inflammatory process. 3. Coarse background of interstitial markings, which can be seen in the setting of chronic lung disease.     XR chest 1 view    Result Date: 4/13/2024     1. Interval complete opacification of the right hemithorax. Correlate with mucous plugging and right lung collapse. 2. Patchy airspace opacity in the left mid and lower lung. Correlate with concern for infection and aspiration. 3. Component of interstitial pulmonary prominence and edema.       US right upper quadrant    Result Date: 4/11/2024    1. Diffusely echogenic liver parenchyma, similar to prior exam, consistent with hepatic steatosis. 2. Partially visualized right-sided pleural effusion.      CT head wo IV contrast    Result Date: 4/10/2024     No acute intracranial abnormality. Chronic intracranial findings as above.       CT chest wo IV contrast    Result Date: 4/6/2024      1.  Interlobular septal thickening with  extensive ground-glass and consolidative opacities throughout bilateral lungs with more coarse opacities in the posterior aspect of the right upper and lower lobes. There are moderate sized bilateral pleural effusions. Findings are nonspecific and may represent atypical infection, pulmonary hemorrhage, or pulmonary edema. Acute respiratory distress syndrome may have a similar appearance. 2. Severe coronary artery calcifications. Recommend correlation with coronary artery disease risk factors. 3. Moderate emphysematous changes.     US right upper quadrant    Result Date: 4/5/2024    1. Diffusely echogenic liver parenchyma consistent with steatosis. 2. Thickened/edematous gallbladder wall with no evidence of hyperemia, gallbladder wall distention or cholelithiasis. Findings are equivocal and most likely due to fluid overload.     CT head wo IV contrast    Result Date: 4/5/2024    No evidence of acute intracranial abnormality. If concerns for an acute stroke may consider brain MRI for further evaluation.      XR chest 1 view    Result Date: 4/5/2024    1. Perihilar and predominantly central airspace opacity in bilateral lungs. Findings may be due to severe edema with infection or aspiration not excluded. 2. Bilateral pleural effusion, left more than right. 3. Nonobstructive bowel gas pattern. 4. Medical devices as above.                      Assessment/Plan   Patient is a 74-year-old male with a history of HTN, HLD, COPD, CAD, PAD s/p s/p b/l iliofemoral endarterectomy and bovine patch angioplasty (8/21/23) and L CFA/SFA arterectomy and SFA, YISEL/EIA stent (05/2020) who initially presented with bilateral dry gangrene and colitis. Course complicated by acute hypoxic respiratory failure and septic shock requiring vasopressors and intubation. Patient s/p right BKA and left AKA. Now extubated and off pressors, planning for revision of amputations.    Update 4/23:  -OR today for stump revision and PEG placement  -Will  titrate up on free water flushes to 300cc q8h    #Bilateral dry gangrene  #Peripheral vascular disease  #Coronary artery disease  ::Most recent cath report in 8/23: EF of 40%, moderate coronary artery disease with significant stenosis  ::Extensive history of vascular disease with b/l iliofemoral endarterectomy and angioplasty, arterectomies, stents  ::Taken emergently for right BKA, left AKA on 4/5  -Revision of amputations on 4/23 per Vascular Surgery  -Atorvastatin 40mg  -Restarting aspirin 81mg daily  -Vascular surgery to follow outpatient    #Acute hypoxic respiratory failure  #Community acquired pneumonia  #Pleural effusion  #Mucus plugging  #Atelectasis  ::Growing Corynebacterium striatum  ::Abx history: Azithromycin 500 mg daily (4/1 - 4/4), Ceftriaxone (3/31 - 4/5), Doxycycline (4/4 - 4/5), Flagyl (3/25 - 4/5), Vancomycin/Zosyn (4/5-4/12, 4/13-4/15)   ::S/p two thoracentesis  -Aggressive bronchopulmonary hygiene    #Acute encephalopathy  #Dysphagia  ::Likely septic in etiology  ::Vitamin B12 and folate WNL, TSH 7.61 from fT4 WNL  -Delirium precautions  -Melatonin 5mg at bedtime  -Tylenol 650mg q6h PRN, Oxy 5mg q6h PRN  -Failed barium swallow, Dobhoff in place  -PEG tube placement today in OR, will restart tube feeds following surgery    #Hypernatremia  ::Na of 150 at peak  ::Unclear etiology, possibly insensible losses from tachypnea, wounds  -Continue free water flushes 200cc q8h, can titrate as needed    #Hepatic steatosis  ::Have been increasing for the past few days, liver enzymes have fluctuated during this hospital course  ::RUQ U/S on 4/10 showed hepatic steatosis with no evidence of biliary dilation  -Continue to monitor    #COPD  ::No PFT on file  -DuoNeb q6h PRN  -Titrate oxygen to 88-92%    #Acute on chronic blood loss anemia  ::Oozing for post-operattive wounds  ::Required 1u PRBC  -Active T&S  -No signs of coagulopathy    Code status: DNR, ok for elective intubation  DVT ppx: Lovenox  GI ppx:  Protonix  Oxygen: 2LNC  Access: PIV, external urinary catheter, Dobhoff  NOK: Pura Sandhu (wife) [phone]     Dispo: Discharge to rehab after post-op monitoring period         Wayne Rosen MD  PGY-1 Internal Medicine

## 2024-04-24 ENCOUNTER — APPOINTMENT (OUTPATIENT)
Dept: RADIOLOGY | Facility: EXTERNAL LOCATION | Age: 74
End: 2024-04-24
Payer: MEDICARE

## 2024-04-24 LAB
ALBUMIN SERPL BCP-MCNC: 2.1 G/DL (ref 3.4–5)
ALP SERPL-CCNC: 272 U/L (ref 33–136)
ALT SERPL W P-5'-P-CCNC: 40 U/L (ref 10–52)
ANION GAP SERPL CALC-SCNC: 9 MMOL/L (ref 10–20)
AST SERPL W P-5'-P-CCNC: 77 U/L (ref 9–39)
BASOPHILS # BLD AUTO: 0.01 X10*3/UL (ref 0–0.1)
BASOPHILS NFR BLD AUTO: 0.1 %
BILIRUB SERPL-MCNC: 0.3 MG/DL (ref 0–1.2)
BUN SERPL-MCNC: 29 MG/DL (ref 6–23)
CALCIUM SERPL-MCNC: 7.7 MG/DL (ref 8.6–10.6)
CHLORIDE SERPL-SCNC: 110 MMOL/L (ref 98–107)
CO2 SERPL-SCNC: 34 MMOL/L (ref 21–32)
CREAT SERPL-MCNC: 0.6 MG/DL (ref 0.5–1.3)
EGFRCR SERPLBLD CKD-EPI 2021: >90 ML/MIN/1.73M*2
EOSINOPHIL # BLD AUTO: 0.06 X10*3/UL (ref 0–0.4)
EOSINOPHIL NFR BLD AUTO: 0.7 %
ERYTHROCYTE [DISTWIDTH] IN BLOOD BY AUTOMATED COUNT: 17.2 % (ref 11.5–14.5)
GLUCOSE BLD MANUAL STRIP-MCNC: 119 MG/DL (ref 74–99)
GLUCOSE BLD MANUAL STRIP-MCNC: 135 MG/DL (ref 74–99)
GLUCOSE BLD MANUAL STRIP-MCNC: 97 MG/DL (ref 74–99)
GLUCOSE SERPL-MCNC: 139 MG/DL (ref 74–99)
HCT VFR BLD AUTO: 25.9 % (ref 41–52)
HGB BLD-MCNC: 8.5 G/DL (ref 13.5–17.5)
IMM GRANULOCYTES # BLD AUTO: 0.06 X10*3/UL (ref 0–0.5)
IMM GRANULOCYTES NFR BLD AUTO: 0.7 % (ref 0–0.9)
LYMPHOCYTES # BLD AUTO: 0.73 X10*3/UL (ref 0.8–3)
LYMPHOCYTES NFR BLD AUTO: 8.2 %
MAGNESIUM SERPL-MCNC: 1.76 MG/DL (ref 1.6–2.4)
MCH RBC QN AUTO: 29.8 PG (ref 26–34)
MCHC RBC AUTO-ENTMCNC: 32.8 G/DL (ref 32–36)
MCV RBC AUTO: 91 FL (ref 80–100)
MONOCYTES # BLD AUTO: 0.6 X10*3/UL (ref 0.05–0.8)
MONOCYTES NFR BLD AUTO: 6.8 %
NEUTROPHILS # BLD AUTO: 7.4 X10*3/UL (ref 1.6–5.5)
NEUTROPHILS NFR BLD AUTO: 83.5 %
NRBC BLD-RTO: 0 /100 WBCS (ref 0–0)
PHOSPHATE SERPL-MCNC: 2.5 MG/DL (ref 2.5–4.9)
PLATELET # BLD AUTO: 238 X10*3/UL (ref 150–450)
POTASSIUM SERPL-SCNC: 3.5 MMOL/L (ref 3.5–5.3)
PROT SERPL-MCNC: 4.9 G/DL (ref 6.4–8.2)
RBC # BLD AUTO: 2.85 X10*6/UL (ref 4.5–5.9)
SODIUM SERPL-SCNC: 149 MMOL/L (ref 136–145)
WBC # BLD AUTO: 8.9 X10*3/UL (ref 4.4–11.3)

## 2024-04-24 PROCEDURE — 84100 ASSAY OF PHOSPHORUS: CPT

## 2024-04-24 PROCEDURE — 2500000004 HC RX 250 GENERAL PHARMACY W/ HCPCS (ALT 636 FOR OP/ED)

## 2024-04-24 PROCEDURE — 36415 COLL VENOUS BLD VENIPUNCTURE: CPT

## 2024-04-24 PROCEDURE — 2500000001 HC RX 250 WO HCPCS SELF ADMINISTERED DRUGS (ALT 637 FOR MEDICARE OP)

## 2024-04-24 PROCEDURE — 2500000004 HC RX 250 GENERAL PHARMACY W/ HCPCS (ALT 636 FOR OP/ED): Mod: JZ

## 2024-04-24 PROCEDURE — 99024 POSTOP FOLLOW-UP VISIT: CPT | Performed by: SURGERY

## 2024-04-24 PROCEDURE — 70371 SPEECH EVALUATION COMPLEX: CPT | Performed by: SPEECH-LANGUAGE PATHOLOGIST

## 2024-04-24 PROCEDURE — 82947 ASSAY GLUCOSE BLOOD QUANT: CPT

## 2024-04-24 PROCEDURE — 99232 SBSQ HOSP IP/OBS MODERATE 35: CPT

## 2024-04-24 PROCEDURE — 85025 COMPLETE CBC W/AUTO DIFF WBC: CPT

## 2024-04-24 PROCEDURE — 1100000001 HC PRIVATE ROOM DAILY

## 2024-04-24 PROCEDURE — 84075 ASSAY ALKALINE PHOSPHATASE: CPT

## 2024-04-24 PROCEDURE — C9113 INJ PANTOPRAZOLE SODIUM, VIA: HCPCS

## 2024-04-24 PROCEDURE — 97530 THERAPEUTIC ACTIVITIES: CPT | Mod: GP

## 2024-04-24 PROCEDURE — 83735 ASSAY OF MAGNESIUM: CPT

## 2024-04-24 PROCEDURE — 92526 ORAL FUNCTION THERAPY: CPT | Mod: GN | Performed by: SPEECH-LANGUAGE PATHOLOGIST

## 2024-04-24 PROCEDURE — 94668 MNPJ CHEST WALL SBSQ: CPT

## 2024-04-24 RX ADMIN — PANTOPRAZOLE SODIUM 40 MG: 40 INJECTION, POWDER, FOR SOLUTION INTRAVENOUS at 07:00

## 2024-04-24 RX ADMIN — ATORVASTATIN CALCIUM 40 MG: 40 TABLET, FILM COATED ORAL at 08:11

## 2024-04-24 RX ADMIN — SODIUM CHLORIDE, POTASSIUM CHLORIDE, SODIUM LACTATE AND CALCIUM CHLORIDE 500 ML: 600; 310; 30; 20 INJECTION, SOLUTION INTRAVENOUS at 12:24

## 2024-04-24 RX ADMIN — OXYCODONE HYDROCHLORIDE 5 MG: 5 TABLET ORAL at 16:28

## 2024-04-24 RX ADMIN — Medication 5 MG: at 18:24

## 2024-04-24 RX ADMIN — OXYCODONE HYDROCHLORIDE 5 MG: 5 TABLET ORAL at 02:42

## 2024-04-24 RX ADMIN — ASPIRIN 81 MG CHEWABLE TABLET 81 MG: 81 TABLET CHEWABLE at 08:11

## 2024-04-24 RX ADMIN — HYDROMORPHONE HYDROCHLORIDE 0.4 MG: 1 INJECTION, SOLUTION INTRAMUSCULAR; INTRAVENOUS; SUBCUTANEOUS at 05:30

## 2024-04-24 RX ADMIN — OXYCODONE HYDROCHLORIDE 5 MG: 5 TABLET ORAL at 08:16

## 2024-04-24 RX ADMIN — FOLIC ACID 1 MG: 1 TABLET ORAL at 08:11

## 2024-04-24 RX ADMIN — CEFAZOLIN SODIUM 2 G: 2 INJECTION, SOLUTION INTRAVENOUS at 06:35

## 2024-04-24 RX ADMIN — ENOXAPARIN SODIUM 40 MG: 100 INJECTION SUBCUTANEOUS at 08:10

## 2024-04-24 RX ADMIN — Medication 200 MCG: at 08:11

## 2024-04-24 ASSESSMENT — COGNITIVE AND FUNCTIONAL STATUS - GENERAL
STANDING UP FROM CHAIR USING ARMS: TOTAL
MOBILITY SCORE: 10
STANDING UP FROM CHAIR USING ARMS: TOTAL
WALKING IN HOSPITAL ROOM: TOTAL
DRESSING REGULAR LOWER BODY CLOTHING: A LOT
MOBILITY SCORE: 8
MOVING TO AND FROM BED TO CHAIR: TOTAL
HELP NEEDED FOR BATHING: A LOT
MOVING FROM LYING ON BACK TO SITTING ON SIDE OF FLAT BED WITH BEDRAILS: A LOT
TOILETING: TOTAL
WALKING IN HOSPITAL ROOM: TOTAL
DRESSING REGULAR UPPER BODY CLOTHING: A LOT
CLIMB 3 TO 5 STEPS WITH RAILING: TOTAL
CLIMB 3 TO 5 STEPS WITH RAILING: TOTAL
PERSONAL GROOMING: A LOT
TURNING FROM BACK TO SIDE WHILE IN FLAT BAD: A LITTLE
DAILY ACTIVITIY SCORE: 11
TURNING FROM BACK TO SIDE WHILE IN FLAT BAD: A LOT
EATING MEALS: A LOT
MOVING TO AND FROM BED TO CHAIR: TOTAL
MOVING FROM LYING ON BACK TO SITTING ON SIDE OF FLAT BED WITH BEDRAILS: A LITTLE

## 2024-04-24 ASSESSMENT — PAIN - FUNCTIONAL ASSESSMENT
PAIN_FUNCTIONAL_ASSESSMENT: 0-10
PAIN_FUNCTIONAL_ASSESSMENT: PAINAD (PAIN ASSESSMENT IN ADVANCED DEMENTIA SCALE)

## 2024-04-24 ASSESSMENT — PAIN SCALES - GENERAL
PAINLEVEL_OUTOF10: 8
PAINLEVEL_OUTOF10: 7
PAINLEVEL_OUTOF10: 0 - NO PAIN
PAINLEVEL_OUTOF10: 6
PAINLEVEL_OUTOF10: 8
PAINLEVEL_OUTOF10: 7

## 2024-04-24 ASSESSMENT — PAIN DESCRIPTION - LOCATION
LOCATION: LEG
LOCATION: LEG

## 2024-04-24 ASSESSMENT — PAIN SCALES - WONG BAKER: WONGBAKER_NUMERICALRESPONSE: NO HURT

## 2024-04-24 ASSESSMENT — PAIN DESCRIPTION - ORIENTATION
ORIENTATION: RIGHT;LEFT
ORIENTATION: RIGHT;LEFT

## 2024-04-24 NOTE — PROGRESS NOTES
Music Therapy Note    Ry Sandhu     Therapy Session  Referral Type: New referral this admission  Visit Type: Follow-up visit  Session Start Time: 1506  Session End Time: 1508  Intervention Delivery: In-person  Conflict of Service: None  Family Present for Session: None  Number of staff members present: 0         Post-assessment  Total Session Time (min): 2 minutes    Narrative  Assessment Detail: Pt endorses fatigue and pain. Pain specified to amputation site and phantom pain. Pt initially thought [facility] was there for physical therapy and seemed to decline services. Upon clarification and brief reminder of ways to use music for coping. Pt exhibited distressed affect and said he was in too much pain for music therapy. Pt declined music intervention this day and was not agreeable to follow up.  Follow-up: [facility] informed pt of option to notify nurse if he changes his mind and [facility] will sign off

## 2024-04-24 NOTE — PROGRESS NOTES
Ry Sandhu is a 74 y.o. male on day 20 of admission presenting with Pneumonia.    Subjective   Afebrile and stable overnight with no acute events. POD#1 of rright AKA and PEG placement. Patient seems more alert and conversant this morning. Not endorsing any changes in breathing. He states his pain control is adequate and he does not endorse any nausea.    Active Medications  aspirin, 81 mg, oral, Daily  atorvastatin, 40 mg, nasogastric tube, Daily  enoxaparin, 40 mg, subcutaneous, q24h  ergocalciferol, 200 mcg, nasogastric tube, Daily  folic acid, 1 mg, nasogastric tube, Daily  melatonin, 5 mg, nasogastric tube, Daily  pantoprazole, 40 mg, intravenous, Daily before breakfast    PRN medications: acetaminophen, dextrose, dextrose, glucagon, glucagon, HYDROmorphone, ipratropium-albuteroL, loperamide, oxyCODONE, oxygen, polyethylene glycol, silver nitrate applicators                Objective     Physical Exam  Constitutional:       Appearance: Normal appearance.   HENT:      Head: Normocephalic and atraumatic.      Mouth/Throat:      Mouth: Mucous membranes are dry.      Pharynx: No posterior oropharyngeal erythema.   Eyes:      Extraocular Movements: Extraocular movements intact.      Pupils: Pupils are equal, round, and reactive to light.   Cardiovascular:      Rate and Rhythm: Normal rate and regular rhythm.      Pulses: Normal pulses.      Heart sounds: Normal heart sounds.   Pulmonary:      Comments: Slightly increased WOB, diminished air entry in the bases  Abdominal:      General: Abdomen is flat. There is no distension.      Palpations: Abdomen is soft.      Tenderness: There is no abdominal tenderness.      Comments: PEG in place. Dressing is c/d/i   Musculoskeletal:      Right lower leg: No edema.      Left lower leg: No edema.      Comments: S/p bilateral BKA. Dressings are c/d/i   Skin:     Findings: Bruising present.   Neurological:      General: No focal deficit present.      Mental Status: He is  "alert.      Comments: Aox2, some deficits in attention         Last Recorded Vitals  Blood pressure 109/54, pulse 97, temperature 37 °C (98.6 °F), temperature source Temporal, resp. rate 17, height 1.88 m (6' 2\"), weight 48.7 kg (107 lb 5.8 oz), SpO2 92%.  Intake/Output last 3 Shifts:  I/O last 3 completed shifts:  In: 2686 (55.2 mL/kg) [I.V.:1236 (25.4 mL/kg); Blood:350; NG/GT:500; IV Piggyback:600]  Out: 0 (0 mL/kg)   Weight: 48.7 kg     Relevant Results  Results from last 7 days   Lab Units 04/23/24  0618   WBC AUTO x10*3/uL 6.6   HEMOGLOBIN g/dL 10.2*   HEMATOCRIT % 32.7*   PLATELETS AUTO x10*3/uL 259      Results from last 7 days   Lab Units 04/23/24  0618   SODIUM mmol/L 149*   POTASSIUM mmol/L 3.7   CHLORIDE mmol/L 109*   CO2 mmol/L 32   BUN mg/dL 29*   CREATININE mg/dL 0.40*   GLUCOSE mg/dL 70*   CALCIUM mg/dL 8.7          XR chest 1 view    Result Date: 4/16/2024    1. Interval improvement in hazy right-sided opacification, with progression to multifocal patchy airspace opacities and associated costophrenic angle blunting. Persistent silhouetting of the right hemidiaphragm and costophrenic angle. Interval improvement of silhouetting of the right heart border. Findings likely represent a combination of effusion and associated consolidation. 2. Similar hazy left basilar opacities, which may represent a infectious/inflammatory process. 3. Coarse background of interstitial markings, which can be seen in the setting of chronic lung disease.     XR chest 1 view    Result Date: 4/13/2024     1. Interval complete opacification of the right hemithorax. Correlate with mucous plugging and right lung collapse. 2. Patchy airspace opacity in the left mid and lower lung. Correlate with concern for infection and aspiration. 3. Component of interstitial pulmonary prominence and edema.       US right upper quadrant    Result Date: 4/11/2024    1. Diffusely echogenic liver parenchyma, similar to prior exam, consistent with " hepatic steatosis. 2. Partially visualized right-sided pleural effusion.      CT head wo IV contrast    Result Date: 4/10/2024     No acute intracranial abnormality. Chronic intracranial findings as above.       CT chest wo IV contrast    Result Date: 4/6/2024      1.  Interlobular septal thickening with extensive ground-glass and consolidative opacities throughout bilateral lungs with more coarse opacities in the posterior aspect of the right upper and lower lobes. There are moderate sized bilateral pleural effusions. Findings are nonspecific and may represent atypical infection, pulmonary hemorrhage, or pulmonary edema. Acute respiratory distress syndrome may have a similar appearance. 2. Severe coronary artery calcifications. Recommend correlation with coronary artery disease risk factors. 3. Moderate emphysematous changes.     US right upper quadrant    Result Date: 4/5/2024    1. Diffusely echogenic liver parenchyma consistent with steatosis. 2. Thickened/edematous gallbladder wall with no evidence of hyperemia, gallbladder wall distention or cholelithiasis. Findings are equivocal and most likely due to fluid overload.     CT head wo IV contrast    Result Date: 4/5/2024    No evidence of acute intracranial abnormality. If concerns for an acute stroke may consider brain MRI for further evaluation.      XR chest 1 view    Result Date: 4/5/2024    1. Perihilar and predominantly central airspace opacity in bilateral lungs. Findings may be due to severe edema with infection or aspiration not excluded. 2. Bilateral pleural effusion, left more than right. 3. Nonobstructive bowel gas pattern. 4. Medical devices as above.                      Assessment/Plan   Patient is a 74-year-old male with a history of HTN, HLD, COPD, CAD, PAD s/p s/p b/l iliofemoral endarterectomy and bovine patch angioplasty (8/21/23) and L CFA/SFA arterectomy and SFA, YISEL/EIA stent (05/2020) who initially presented with bilateral dry gangrene  and colitis. Course complicated by acute hypoxic respiratory failure and septic shock requiring vasopressors and intubation. Patient s/p right BKA and left AKA. Now extubated and off pressors, planning for revision of amputations.    Update 4/24:  -Dressing changes on POD#3  -Bolus feedings through PEG today  -Continue to trend sodium    #Bilateral dry gangrene  #Peripheral vascular disease  #Coronary artery disease  ::Most recent cath report in 8/23: EF of 40%, moderate coronary artery disease with significant stenosis  ::Extensive history of vascular disease with b/l iliofemoral endarterectomy and angioplasty, arterectomies, stents  ::Taken emergently for right BKA, left AKA on 4/5  -POD#1 of right BKA  -Oxycodone 5mg q4h, Dilaudid 0.4mg PRN for pain  -Atorvastatin 40mg  -Restarting aspirin 81mg daily  -Vascular surgery to follow outpatient    #Acute hypoxic respiratory failure  #Community acquired pneumonia  #Pleural effusion  #Mucus plugging  #Atelectasis  ::Growing Corynebacterium striatum  ::Abx history: Azithromycin 500 mg daily (4/1 - 4/4), Ceftriaxone (3/31 - 4/5), Doxycycline (4/4 - 4/5), Flagyl (3/25 - 4/5), Vancomycin/Zosyn (4/5-4/12, 4/13-4/15)   ::S/p two thoracentesis  -Aggressive bronchopulmonary hygiene    #Acute encephalopathy  #Dysphagia  ::Likely septic in etiology  ::Vitamin B12 and folate WNL, TSH 7.61 from fT4 WNL  -Delirium precautions  -Melatonin 5mg at bedtime  -Tylenol 650mg q6h PRN, Oxy 5mg q4h PRN, Dilaudid 0.4mg for breakthrough pain  -Failed barium swallow, POD#1 of PEG placement  -PEG tube placed with bolus feeds    #Hypernatremia  ::Na of 150 at peak  ::Unclear etiology, possibly insensible losses from tachypnea, wounds  -Titrated free water flushes to 300cc q8h, can titrate further as needed    #Hepatic steatosis  ::Have been increasing for the past few days, liver enzymes have fluctuated during this hospital course  ::RUQ U/S on 4/10 showed hepatic steatosis with no evidence of  biliary dilation  -Continue to monitor    #COPD  ::No PFT on file  -DuoNeb q6h PRN  -Titrate oxygen to 88-92%    #Acute on chronic blood loss anemia  ::Oozing for post-operattive wounds  ::Required 1u PRBC  -Active T&S  -No signs of coagulopathy    Code status: DNR, ok for elective intubation  DVT ppx: Lovenox  GI ppx: Protonix  Oxygen: 2LNC  Access: PIV, external urinary catheter, Dobhoff  NOK: Pura Edson (wife) [phone]     Dispo: Discharge to rehab after post-op monitoring period         Wayne Rosen MD  PGY-1 Internal Medicine

## 2024-04-24 NOTE — CARE PLAN
Patient was calm and cooperative with care, surgical incisions pain , medications administered with decrease in pain, Peg tube dry and intact, AKA dressings dry and intact, Pt tolerating tube feed and water flushes without issue. Bed alarm active and audible,     The patient's goals for the shift include Maintain patient safety    The clinical goals for the shift include Patients pain will be controlled throughout the shift    Over the shift, the patient did  make progress toward the following goals.

## 2024-04-24 NOTE — PROGRESS NOTES
Speech-Language Pathology    SLP Adult Inpatient Speech-Language Cognition    Patient Name: Ry Sandhu  MRN: [id number]  Today's Date: 4/24/2024           SLP Assessment:   Conducted Cog Log to provide treatment for cognitive deficits. Completed 4/8 treatment items. Ended tx activity due to pt lethargy. Pt independently able to identify year, month with multiple choice, and name of hospital with multiple choice. Was disoriented to time when asked if it was morning or evening. Unable to accurately repeat verbally presented stimuli and recall months in backwards order. Pt did identify several months in forwards chronological order. Pt quickly counted backwards from 20 on 20-1 task, only missing one number.     Overall, orientation, working memory and short term memory appear to be challenges at this time. Lethargy also suspected to impact pt cognition.  SLP Assessment  Treatment Provided: Yes       SLP Plan:  Plan  SLP Frequency: 2x per week  Duration: 30 days  SLP Discharge Recommendations: Other (Comment) (cont. skilled SLP following discharge)  Discussed POC:  (results and recommendations reviewed with pt, but will need additional education with caregiver due to underlying cognitive impairments)  Patient/Caregiver Agreeable: Yes  SLP - OK to Discharge: Yes      Subjective   Pt resting in bed upon arrival. A&O x2. Asked clinician to assist him with finding his cellphone throughout session.     Most Recent Visit:  SLP Most Recent Visit  SLP Received On: 04/24/24      General Visit Information:  General Information  Patient Seen During This Visit: Yes      Objective         Cognition:  Cognition  Orientation Level: Other (Comment) (A&Ox2)    Cog Log:    Score: Items:   3 Date, Time, and Hospital Name:  These are components of orientation that may present problems for even those  who otherwise are oriented.    0 Repeat Address:The person is asked to repeat one of the following addresses based on the day of the week.  Monday--[place], [address] [facility]   2     20-1: The person is asked to count backwards from 20 to 1.    0   Months Reversed: The person is asked to say the months in reverse order beginning with December.      N/a     Fist-Edge-Palm:The examiner demonstrates the hand positions of fist, edge, and palm two times telling the person  to “Watch what I do.”   N/a 30 Second Test: Without the benefit of a time piece the person is asked to estimate when 30 seconds has passed  with the examiner stating “Beginning now.”   N/a     Go/No-Go:The examiner instructs the person to “Raise your finger when I say red and then put it down. Do  nothing if I say green.”    N/a Address Recall: The person is asked to recall the address presented earlier   Total: n/a        The items of the Cog-Log include the three most difficult items from the O-Log (name of facility, date, and time of day), as well as items requiring concentration, memory, and executive skills.    The Cognitive Log (Cog-Log) is designed to be a quick quantitative measure of cognition for use at bedside with rehabilitation patients. It is intended for individuals who have achieved consistent accurate orientation, such as measured by the Orientation Log (O-Log). The Cog-Log can be used to document cognitive progress on a daily basis when time is short, such as when rounding on patients. All items are scored from 0 to 3 for a total possible score of 30, which can be graphed for quick reference           Encounter Problems       Encounter Problems (Active)       Cognitive/Linguistics       LTG - Patient will improve Cognitive Linguistic Skills to allow for completion of daily activities       Start:  04/24/24    Expected End:  05/01/24            SLP Goal 1       Start:  04/24/24    Expected End:  05/01/24       Patient will participate in ongoing assessment of cognition and score a 27/30 on Cog Log or O Log protocol.            Swallowing       LTG - Patient will  tolerate the least restrictive diet without overt difficulty by time of discharge.       Start:  04/16/24    Expected End:  04/30/24            STG - Patient will participate in a Modified Barium Swallow Study- the pt and/or caregiver will verbally demonstrate understanding to 100% acc on follow up visit (Progressing)       Start:  04/16/24    Expected End:  04/30/24            SLP Goal 1 (Met)       Start:  04/16/24    Expected End:  04/30/24    Resolved:  04/17/24    Pt participate in ongoing assessment, including successfully completion of 3 oz protocol without overt indicators of aspiration on 100% of attempts            Swallowing       STG - Patient will complete effortable swallows 30-40 times per session       Start:  04/17/24    Expected End:  05/08/24            SLP Goal 1       Start:  04/17/24    Expected End:  05/08/24       By the specified date, the patient will complete 30-40 effortful swallows per session with fading cues in order to improve swallowing safety and efficiency, with 80% accuracy                   Outpatient Education:  Adult Outpatient Education  Individual(s) Educated: Patient  Verbal Education : yes      Inpatient:  Education Documentation  No documentation found.  Education Comments  No comments found.    Supervising SLP was present, actively participated, and made all clinical decisions during session.  Guzman Paredes M.S.CCC/SLP-Sec[employer] chat

## 2024-04-24 NOTE — PROGRESS NOTES
"Vascular Surgery Daily Progress Note    Ry Sandhu is a 74 y.o. male on day 20 of admission presenting with Pneumonia.    Subjective   Overnight events: NAEO  Pain well controlled         Objective     Physical Exam:  Constitutional: no acute distress, chronically-ill appearing  Neuro: awake, alert, oriented; no gross deficits noted   HEENT: No deformities, no scleral icterus   Cardiac: regular rate  Pulmonary: unlabored respirations on nasal cannula  Abdomen: soft, non-tender, non-distended  Skin: warm and dry; wounds: bilateral AKA dressings in place, no hematoma or strikethrough  Extremities: no peripheral edema noted. Bilateral AKA  MSK: motor and sensory intact in all extremities        Last Recorded Vitals  /54   Pulse 97   Temp 37 °C (98.6 °F) (Temporal)   Resp 17   Ht 1.88 m (6' 2\")   Wt 48.7 kg (107 lb 5.8 oz)   SpO2 92%   BMI 13.78 kg/m²     Intake/Output last 3 Shifts:  I/O last 3 completed shifts:  In: 2686 (55.2 mL/kg) [I.V.:1236 (25.4 mL/kg); Blood:350; NG/GT:500; IV Piggyback:600]  Out: 0 (0 mL/kg)   Weight: 48.7 kg     Relevant Lab Results    Lab Results   Component Value Date     (H) 04/23/2024    K 3.7 04/23/2024     (H) 04/23/2024    CO2 32 04/23/2024    CREATININE 0.40 (L) 04/23/2024    BUN 29 (H) 04/23/2024    CALCIUM 8.7 04/23/2024    PHOS 4.0 04/23/2024    WBC 6.6 04/23/2024    HGB 10.2 (L) 04/23/2024    HCT 32.7 (L) 04/23/2024     04/23/2024        Imaging:   No results found.     Current medications:   Scheduled medications  aspirin, 81 mg, oral, Daily  atorvastatin, 40 mg, nasogastric tube, Daily  enoxaparin, 40 mg, subcutaneous, q24h  ergocalciferol, 200 mcg, nasogastric tube, Daily  folic acid, 1 mg, nasogastric tube, Daily  melatonin, 5 mg, nasogastric tube, Daily  pantoprazole, 40 mg, intravenous, Daily before breakfast      Continuous medications     PRN medications  PRN medications: acetaminophen, dextrose, dextrose, glucagon, glucagon, " HYDROmorphone, ipratropium-albuteroL, loperamide, oxyCODONE, oxygen, polyethylene glycol, silver nitrate applicators    Assessment/Plan   Principal Problem:    Pneumonia  Active Problems:    CAD (coronary artery disease)    CHF (congestive heart failure) (Multi)    HTN (hypertension)    Hyperlipidemia    Chronic obstructive pulmonary disease (Multi)    Peripheral vascular disease (CMS-[facility])    Gastroesophageal reflux disease    Anemia    Septicemia (Multi)    Inadequate oral intake    Patient is 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 Incisions closed with staples and covered with gauze/ACE. General surgery also performed PEG at case completion.     Recovering well with adequate pain control and no apparent complications.     Recommendations:   - ok to resume any antithrombotic medications (Aspirin, LMWH) today  - recommend oxycodone 5/10 prn q4h and breakthrough dilaudid IV; can consider gabapentin if patient endorses phantom pain, muscle relaxant if patient endorses spasms  - 24h ancef post-op  - CBC pending this morning  - non-weight bearing BLE  - will remove dressings on POD3; please page if dressings have saturated     Discussed with attending, Dr. Conchita Sainz MD, PhD  Vascular Surgery, PGY2  [id number]

## 2024-04-24 NOTE — PROGRESS NOTES
Speech-Language Pathology       Inpatient  Speech-Language Pathology Treatment     Patient Name: Ry Sandhu  MRN: [id number]  Today's Date: 4/24/2024        Assessment/Plan:  #Severe oropharyngeal swallow impairment  Pt presents with a severe oropharyngeal swallow  impairment, identified on previous MBS imaging,  in the setting of COPD, hypoxic respiratory failure, pneumonia, deconditioned state and impaired mentation. Puree and ice chips trialed at bedside to rehab swallow function. Pt participating with assistance and cues to maintain pt's attention to tasks. Tolerating therapeutic trials of puree trials without overt indicators of aspiration; tolerating ice chips with intermittent coughing. Pt continues to be at high risk for aspiration and high risk for aspiration related complications.          Recommendations:  NPO; continue long term nutrition means  Frequent, aggressive oral care as tolerated to improve infection control, as well as to reduce dental plaque and bacteria on oropharyngeal surfaces which may increase the risk nosocomial infections, including pneumonia.   OK for small amounts of ice chips (one at a time, 5-10x/hour) for oral comfort and to prevent swallow disuse atrophy, but only after aggressive oral care to avoid colonization of bacteria within the oral cavity.  Continue skilled SLP services (PO trials, pharyngeal strengthening exercise)  Repeat MBS in 3 weeks    Plan:  SLP Frequency: 2x per week  Duration: 30 days  SLP Discharge Recommendations: Other (Comment) (cont. skilled SLP following discharge)  Discussed POC:  (results and recommendations reviewed with pt, but will need additional education with caregiver due to underlying cognitive impairments)  Patient/Caregiver Agreeable: Yes  SLP - OK to Discharge: Yes      Subjective   Pt resting in bed upon arrival. A&Ox2. Oral care provided.   Patient Seen During This Visit: Yes        Objective       Therapeutic Swallow:  Therapeutic Swallow  Intervention : PO Trials, Pharyngeal Strengthening Techniques  2 oz puree; 10 ice chips    Oral phase:  No anterior spillage. Effective labial seal. No pocketing noted. Bolus preparation, transport and control appeared WFL, but continue to suspect posterior spillage due to previous imaging.    Pharyngeal Phase: S/s of aspiration noted post swallow (I.e. delayed cough). Swallow appreciated, subjectively.              Encounter Problems       Encounter Problems (Active)       Swallowing       LTG - Patient will tolerate the least restrictive diet without overt difficulty by time of discharge.       Start:  04/16/24    Expected End:  04/30/24            STG - Patient will participate in a Modified Barium Swallow Study- the pt and/or caregiver will verbally demonstrate understanding to 100% acc on follow up visit (Progressing)       Start:  04/16/24    Expected End:  04/30/24            SLP Goal 1 (Met)       Start:  04/16/24    Expected End:  04/30/24    Resolved:  04/17/24    Pt participate in ongoing assessment, including successfully completion of 3 oz protocol without overt indicators of aspiration on 100% of attempts            Swallowing       STG - Patient will complete effortable swallows 30-40 times per session       Start:  04/17/24    Expected End:  05/08/24            SLP Goal 1       Start:  04/17/24    Expected End:  05/08/24       By the specified date, the patient will complete 30-40 effortful swallows per session with fading cues in order to improve swallowing safety and efficiency, with 80% accuracy                Inpatient Education:  Adult Outpatient Education  Individual(s) Educated: Patient  Verbal Education : yes

## 2024-04-24 NOTE — CARE PLAN
Patient calm and cooperative with care, complaints of pain, medication administered. ANA MARÍA AKA dressings dry and intact. Peg tube site  dry and intact, tube feed continuous tolerated without issue.     Problem: Daily Care  Goal: Daily care needs are met  Outcome: Progressing     Problem: Fall/Injury  Goal: Not fall by end of shift  Outcome: Progressing  Goal: Be free from injury by end of the shift  Outcome: Progressing   The patient's goals for the shift include Maintain patient safety    The clinical goals for the shift include Patients pain will be controlled throughout the shift    Over the shift, the patient did make progress toward the following goals.

## 2024-04-24 NOTE — SIGNIFICANT EVENT
Palliative medicine has been following for complex medical decision making / goals of care, symptom management, and pt/family support. Goals are clear, symptoms being managed by primary team at this time. We will sign off, please reconsult if needed.     Lori Beverly DNP, CNP  Palliative Medicine

## 2024-04-24 NOTE — PROGRESS NOTES
Ry Sandhu is a 74 y.o. male on day 20 of admission presenting with Pneumonia.    Subjective   [employer] reviewed and noted that patients wife selected [facility] [facility] the [facility] as her F[facility]. Referral faxed to [phone] as they are not on Careport.    Will await response to see if they are able to accommodate. ADOD is Friday per medical team.    -Carrie WILDE MA, LSW  [phone] or [facility]  Care Transitions

## 2024-04-24 NOTE — CARE PLAN
The patient's goals for the shift include Maintain patient safety    The clinical goals for the shift include pt will express adequate pain control during shift    Over the shift, the patient did make progress toward the following goals.       Problem: Daily Care  Goal: Daily care needs are met  Outcome: Progressing     Problem: Psychosocial Needs  Goal: Demonstrates ability to cope with hospitalization/illness  Outcome: Progressing     Problem: Respiratory  Goal: Wean oxygen to maintain O2 saturation per order/standard this shift  Outcome: Progressing     Problem: Skin  Goal: Participates in plan/prevention/treatment measures  Outcome: Progressing     Problem: Fall/Injury  Goal: Not fall by end of shift  Outcome: Progressing

## 2024-04-25 LAB
ALBUMIN SERPL BCP-MCNC: 2.1 G/DL (ref 3.4–5)
ALP SERPL-CCNC: 331 U/L (ref 33–136)
ALT SERPL W P-5'-P-CCNC: 46 U/L (ref 10–52)
ANION GAP SERPL CALC-SCNC: 11 MMOL/L (ref 10–20)
AST SERPL W P-5'-P-CCNC: 108 U/L (ref 9–39)
BASOPHILS # BLD AUTO: 0.01 X10*3/UL (ref 0–0.1)
BASOPHILS NFR BLD AUTO: 0.1 %
BILIRUB SERPL-MCNC: 0.5 MG/DL (ref 0–1.2)
BUN SERPL-MCNC: 33 MG/DL (ref 6–23)
CALCIUM SERPL-MCNC: 7.8 MG/DL (ref 8.6–10.6)
CHLORIDE SERPL-SCNC: 110 MMOL/L (ref 98–107)
CO2 SERPL-SCNC: 30 MMOL/L (ref 21–32)
CREAT SERPL-MCNC: 0.49 MG/DL (ref 0.5–1.3)
EGFRCR SERPLBLD CKD-EPI 2021: >90 ML/MIN/1.73M*2
EOSINOPHIL # BLD AUTO: 0.14 X10*3/UL (ref 0–0.4)
EOSINOPHIL NFR BLD AUTO: 1.5 %
ERYTHROCYTE [DISTWIDTH] IN BLOOD BY AUTOMATED COUNT: 16.7 % (ref 11.5–14.5)
GLUCOSE BLD MANUAL STRIP-MCNC: 148 MG/DL (ref 74–99)
GLUCOSE BLD MANUAL STRIP-MCNC: 160 MG/DL (ref 74–99)
GLUCOSE SERPL-MCNC: 132 MG/DL (ref 74–99)
HCT VFR BLD AUTO: 27 % (ref 41–52)
HGB BLD-MCNC: 8.5 G/DL (ref 13.5–17.5)
IMM GRANULOCYTES # BLD AUTO: 0.04 X10*3/UL (ref 0–0.5)
IMM GRANULOCYTES NFR BLD AUTO: 0.4 % (ref 0–0.9)
LYMPHOCYTES # BLD AUTO: 0.95 X10*3/UL (ref 0.8–3)
LYMPHOCYTES NFR BLD AUTO: 10.4 %
MAGNESIUM SERPL-MCNC: 1.74 MG/DL (ref 1.6–2.4)
MCH RBC QN AUTO: 28.8 PG (ref 26–34)
MCHC RBC AUTO-ENTMCNC: 31.5 G/DL (ref 32–36)
MCV RBC AUTO: 92 FL (ref 80–100)
MONOCYTES # BLD AUTO: 0.47 X10*3/UL (ref 0.05–0.8)
MONOCYTES NFR BLD AUTO: 5.1 %
NEUTROPHILS # BLD AUTO: 7.54 X10*3/UL (ref 1.6–5.5)
NEUTROPHILS NFR BLD AUTO: 82.5 %
NRBC BLD-RTO: 0 /100 WBCS (ref 0–0)
PHOSPHATE SERPL-MCNC: 1.3 MG/DL (ref 2.5–4.9)
PLATELET # BLD AUTO: 318 X10*3/UL (ref 150–450)
POTASSIUM SERPL-SCNC: 3.4 MMOL/L (ref 3.5–5.3)
PROT SERPL-MCNC: 5.3 G/DL (ref 6.4–8.2)
RBC # BLD AUTO: 2.95 X10*6/UL (ref 4.5–5.9)
SODIUM SERPL-SCNC: 148 MMOL/L (ref 136–145)
WBC # BLD AUTO: 9.2 X10*3/UL (ref 4.4–11.3)

## 2024-04-25 PROCEDURE — 84075 ASSAY ALKALINE PHOSPHATASE: CPT

## 2024-04-25 PROCEDURE — 82947 ASSAY GLUCOSE BLOOD QUANT: CPT

## 2024-04-25 PROCEDURE — 85025 COMPLETE CBC W/AUTO DIFF WBC: CPT

## 2024-04-25 PROCEDURE — 36415 COLL VENOUS BLD VENIPUNCTURE: CPT

## 2024-04-25 PROCEDURE — 2500000005 HC RX 250 GENERAL PHARMACY W/O HCPCS

## 2024-04-25 PROCEDURE — 92526 ORAL FUNCTION THERAPY: CPT | Mod: GN | Performed by: SPEECH-LANGUAGE PATHOLOGIST

## 2024-04-25 PROCEDURE — 2500000004 HC RX 250 GENERAL PHARMACY W/ HCPCS (ALT 636 FOR OP/ED)

## 2024-04-25 PROCEDURE — 2500000001 HC RX 250 WO HCPCS SELF ADMINISTERED DRUGS (ALT 637 FOR MEDICARE OP)

## 2024-04-25 PROCEDURE — 99232 SBSQ HOSP IP/OBS MODERATE 35: CPT

## 2024-04-25 PROCEDURE — 84100 ASSAY OF PHOSPHORUS: CPT

## 2024-04-25 PROCEDURE — 1100000001 HC PRIVATE ROOM DAILY

## 2024-04-25 PROCEDURE — 83735 ASSAY OF MAGNESIUM: CPT

## 2024-04-25 PROCEDURE — C9113 INJ PANTOPRAZOLE SODIUM, VIA: HCPCS

## 2024-04-25 PROCEDURE — 94668 MNPJ CHEST WALL SBSQ: CPT

## 2024-04-25 RX ORDER — SODIUM CHLORIDE, SODIUM LACTATE, POTASSIUM CHLORIDE, CALCIUM CHLORIDE 600; 310; 30; 20 MG/100ML; MG/100ML; MG/100ML; MG/100ML
100 INJECTION, SOLUTION INTRAVENOUS CONTINUOUS
Status: DISCONTINUED | OUTPATIENT
Start: 2024-04-25 | End: 2024-04-26

## 2024-04-25 RX ADMIN — ENOXAPARIN SODIUM 40 MG: 100 INJECTION SUBCUTANEOUS at 08:42

## 2024-04-25 RX ADMIN — ATORVASTATIN CALCIUM 40 MG: 40 TABLET, FILM COATED ORAL at 08:42

## 2024-04-25 RX ADMIN — Medication 200 MCG: at 08:41

## 2024-04-25 RX ADMIN — SODIUM CHLORIDE, POTASSIUM CHLORIDE, SODIUM LACTATE AND CALCIUM CHLORIDE 100 ML/HR: 600; 310; 30; 20 INJECTION, SOLUTION INTRAVENOUS at 11:18

## 2024-04-25 RX ADMIN — FOLIC ACID 1 MG: 1 TABLET ORAL at 08:42

## 2024-04-25 RX ADMIN — Medication 5 MG: at 18:14

## 2024-04-25 RX ADMIN — PANTOPRAZOLE SODIUM 40 MG: 40 INJECTION, POWDER, FOR SOLUTION INTRAVENOUS at 08:42

## 2024-04-25 RX ADMIN — POTASSIUM PHOSPHATE, MONOBASIC POTASSIUM PHOSPHATE, DIBASIC 30 MMOL: 224; 236 INJECTION, SOLUTION, CONCENTRATE INTRAVENOUS at 15:40

## 2024-04-25 RX ADMIN — ASPIRIN 81 MG CHEWABLE TABLET 81 MG: 81 TABLET CHEWABLE at 08:42

## 2024-04-25 ASSESSMENT — COGNITIVE AND FUNCTIONAL STATUS - GENERAL
PERSONAL GROOMING: A LOT
MOVING FROM LYING ON BACK TO SITTING ON SIDE OF FLAT BED WITH BEDRAILS: TOTAL
TOILETING: TOTAL
STANDING UP FROM CHAIR USING ARMS: TOTAL
DRESSING REGULAR UPPER BODY CLOTHING: TOTAL
MOVING TO AND FROM BED TO CHAIR: TOTAL
CLIMB 3 TO 5 STEPS WITH RAILING: TOTAL
DAILY ACTIVITIY SCORE: 11
TOILETING: TOTAL
TURNING FROM BACK TO SIDE WHILE IN FLAT BAD: A LOT
EATING MEALS: TOTAL
MOBILITY SCORE: 6
DAILY ACTIVITIY SCORE: 11
PERSONAL GROOMING: TOTAL
MOBILITY SCORE: 8
EATING MEALS: A LOT
WALKING IN HOSPITAL ROOM: TOTAL
CLIMB 3 TO 5 STEPS WITH RAILING: TOTAL
DRESSING REGULAR UPPER BODY CLOTHING: A LOT
WALKING IN HOSPITAL ROOM: TOTAL
TURNING FROM BACK TO SIDE WHILE IN FLAT BAD: A LOT
DRESSING REGULAR LOWER BODY CLOTHING: A LOT
STANDING UP FROM CHAIR USING ARMS: TOTAL
TURNING FROM BACK TO SIDE WHILE IN FLAT BAD: TOTAL
PERSONAL GROOMING: A LOT
CLIMB 3 TO 5 STEPS WITH RAILING: TOTAL
EATING MEALS: A LOT
MOVING TO AND FROM BED TO CHAIR: TOTAL
DAILY ACTIVITIY SCORE: 6
TOILETING: TOTAL
HELP NEEDED FOR BATHING: A LOT
MOVING FROM LYING ON BACK TO SITTING ON SIDE OF FLAT BED WITH BEDRAILS: A LOT
MOVING TO AND FROM BED TO CHAIR: TOTAL
WALKING IN HOSPITAL ROOM: TOTAL
MOVING FROM LYING ON BACK TO SITTING ON SIDE OF FLAT BED WITH BEDRAILS: A LOT
DRESSING REGULAR LOWER BODY CLOTHING: TOTAL
DRESSING REGULAR LOWER BODY CLOTHING: A LOT
STANDING UP FROM CHAIR USING ARMS: TOTAL
HELP NEEDED FOR BATHING: TOTAL
MOBILITY SCORE: 8
HELP NEEDED FOR BATHING: A LOT
DRESSING REGULAR UPPER BODY CLOTHING: A LOT

## 2024-04-25 ASSESSMENT — PAIN SCALES - GENERAL
PAINLEVEL_OUTOF10: 0 - NO PAIN
PAINLEVEL_OUTOF10: 0 - NO PAIN

## 2024-04-25 NOTE — SIGNIFICANT EVENT
Rapid Response RN responding for RADAR score of 6 due to the following VS: T 36.5 °Celsius; HR 97 ; RR 8; BP 86/64; SPO2 97% .      Reviewed abnormal VS with bedside RN who expressed no concerns regarding the patient's current condition based upon these.  Indicating also that she would reassess the BP.  No interventions by Rapid Response team at this time.

## 2024-04-25 NOTE — PROGRESS NOTES
Speech-Language Pathology      Inpatient  Speech-Language Pathology Treatment     Patient Name: Ry Sandhu  MRN: [id number]  Today's Date: 4/25/2024        Assessment/Plan:  #Severe oropharyngeal swallow impairment  Pt presents with a severe oropharyngeal swallow  impairment, identified on previous MBS imaging,  in the setting of COPD, hypoxic respiratory failure, pneumonia, deconditioned state and impaired mentation. Puree, ice chips and cracker in applesauce (minced and moist) trialed at bedside to rehab swallow function. Pt participating with assistance and cues to maintain pt's attention to tasks. Throat clearing and delayed coughing noted on PO trials. Pt continues to be at high risk for aspiration and high risk for aspiration related complications.          Recommendations:  NPO; continue alternative means of nutrition  Frequent, aggressive oral care as tolerated to improve infection control, as well as to reduce dental plaque and bacteria on oropharyngeal surfaces which may increase the risk nosocomial infections, including pneumonia.   OK for small amounts of ice chips (one at a time, 10x/hour) for oral comfort and to prevent swallow disuse atrophy, but only after aggressive oral care to avoid colonization of bacteria within the oral cavity.  Continue skilled SLP (effortful swallow, PO trials)  Repeat MBS in 3 weeks    Supervising SLP was present, actively participated, and made all clinical decisions during session.  Guzman Paredes M.S.CCC/SLP-Secure chat      Plan:  SLP TX Plan: Continue Plan of Care  SLP Frequency: 2x per week  Duration: 30 days  SLP Discharge Recommendations: Other (Comment) (cont. skilled SLP following discharge)  Discussed POC:  (results and recommendations reviewed with pt, but will need additional education with caregiver due to underlying cognitive impairments)  Patient/Caregiver Agreeable: Yes  SLP - OK to Discharge: Yes      Subjective   Pt asleep in bed upon arrival. A&Ox3 with  multiple choice. Pt smiling and laughing with clinicians throughout session. Oral care provided prior to PO trials.   Patient Seen During This Visit: Yes        Objective       Therapeutic Swallow:  Therapeutic Swallow Intervention : PO Trials, Pharyngeal Strengthening Techniques    2 ice chips, 4 oz applesauce and 1/2 trisha cracker in applesauce trialed at bedside. Some s/s of aspiration noted (I.e., delayed cough, throat clear). Pt now tolerating full 4oz container of applesauce without showing signs of fatigue.  Oral phase:  No anterior loss of bolus. Adequate bolus transport and control but continue to suspect posterior spillage due to previous imaging. Preparation somewhat prolonged but typical considering pt lack of dentition.     Pharyngeal Phase: S/s of aspiration noted post swallow (I.e. delayed cough after completion of 4 oz trials of puree, throat clear). Swallow appreciated, subjectively.          Encounter Problems       Encounter Problems (Active)       Cognitive/Linguistics       LTG - Patient will improve Cognitive Linguistic Skills to allow for completion of daily activities       Start:  04/24/24    Expected End:  05/01/24            SLP Goal 1       Start:  04/24/24    Expected End:  05/01/24       Patient will participate in ongoing assessment of cognition and score a 27/30 on Cog Log or O Log protocol.            Swallowing       LTG - Patient will tolerate the least restrictive diet without overt difficulty by time of discharge.       Start:  04/16/24    Expected End:  04/30/24            STG - Patient will participate in a Modified Barium Swallow Study- the pt and/or caregiver will verbally demonstrate understanding to 100% acc on follow up visit (Progressing)       Start:  04/16/24    Expected End:  04/30/24            SLP Goal 1 (Met)       Start:  04/16/24    Expected End:  04/30/24    Resolved:  04/17/24    Pt participate in ongoing assessment, including successfully completion of 3 oz  protocol without overt indicators of aspiration on 100% of attempts            Swallowing       STG - Patient will complete effortable swallows 30-40 times per session (Progressing)       Start:  04/17/24    Expected End:  05/08/24            SLP Goal 1 (Progressing)       Start:  04/17/24    Expected End:  05/08/24       By the specified date, the patient will complete 30-40 effortful swallows per session with fading cues in order to improve swallowing safety and efficiency, with 80% accuracy                Inpatient Education:  Adult Outpatient Education  Individual(s) Educated: Patient  Verbal Education : yes      Inpatient:  Education Documentation  No documentation found.  Education Comments  No comments found.

## 2024-04-25 NOTE — PROGRESS NOTES
TCC attempted to call [facility] ([phone]) at [place] [facility] the [facility] to follow up if able to accept pt but there was no answer. Left a VM message with TCC info. Will continue to follow. Made [facility] aware to send referral.   [phone]-[facility] from [facility] stated they are not unable to accept pt at this time.   3990-TCC spoke to pt's spouse, Pura and made aware FOC unable to accept. Per Pura additional SNF FOCs are 1) [facility] and other FOCs are [facility], [facility], and [facility]. TCC updated [facility] to send referrals.

## 2024-04-25 NOTE — CARE PLAN
The patient's goals for the shift include Maintain patient safety    The clinical goals for the shift include   Problem: Daily Care  Goal: Daily care needs are met  Outcome: Progressing     Problem: Psychosocial Needs  Goal: Demonstrates ability to cope with hospitalization/illness  Outcome: Progressing  Goal: Collaborate with me, my family, and caregiver to identify my specific goals  Outcome: Progressing     Problem: Discharge Barriers  Goal: My discharge needs are met  Outcome: Progressing     Problem: Respiratory  Goal: Clear secretions with interventions this shift  Outcome: Progressing  Goal: No signs of respiratory distress (eg. Use of accessory muscles. Peds grunting)  Outcome: Progressing  Goal: Tolerate mechanical ventilation evidenced by VS/agitation level this shift  Outcome: Progressing  Goal: Tolerate pulmonary toileting this shift  Outcome: Progressing  Goal: Verbalize decreased shortness of breath this shift  Outcome: Progressing  Goal: Wean oxygen to maintain O2 saturation per order/standard this shift  Outcome: Progressing  Goal: Increase self care and/or family involvement in next 24 hours  Outcome: Progressing     Problem: Skin  Goal: Participates in plan/prevention/treatment measures  Outcome: Progressing  Goal: Prevent/manage excess moisture  Outcome: Progressing  Flowsheets (Taken 4/25/2024 5099)  Prevent/manage excess moisture:   Cleanse incontinence/protect with barrier cream   Monitor for/manage infection if present     Problem: Fall/Injury  Goal: Not fall by end of shift  Outcome: Progressing  Goal: Be free from injury by end of the shift  Outcome: Progressing  Goal: Verbalize understanding of personal risk factors for fall in the hospital  Outcome: Progressing  Goal: Verbalize understanding of risk factor reduction measures to prevent injury from fall in the home  Outcome: Progressing  Goal: Use assistive devices by end of the shift  Outcome: Progressing  Goal: Pace activities to prevent  fatigue by end of the shift  Outcome: Progressing     Problem: Pain - Adult  Goal: Verbalizes/displays adequate comfort level or baseline comfort level  Outcome: Progressing     Problem: Safety - Adult  Goal: Free from fall injury  Outcome: Progressing     Problem: Discharge Planning  Goal: Discharge to home or other facility with appropriate resources  Outcome: Progressing     Problem: Chronic Conditions and Co-morbidities  Goal: Patient's chronic conditions and co-morbidity symptoms are monitored and maintained or improved  Outcome: Progressing     Problem: Knowledge Deficit  Goal: Patient/family/caregiver demonstrates understanding of disease process, treatment plan, medications, and discharge instructions  Outcome: Progressing     Problem: Mechanical Ventilation  Goal: Patient Will Maintain Patent Airway  Outcome: Progressing  Goal: Oral health is maintained or improved  Outcome: Progressing  Goal: Tracheostomy will be managed safely  Outcome: Progressing  Goal: ET tube will be managed safely  Outcome: Progressing  Goal: Ability to express needs and understand communication  Outcome: Progressing  Goal: Mobility/activity is maintained at optimum level for patient  Outcome: Progressing     Problem: Heart Failure  Goal: Improved gas exchange this shift  Outcome: Progressing  Goal: Improved urinary output this shift  Outcome: Progressing  Goal: Reduction in peripheral edema within 24 hours  Outcome: Progressing  Goal: Report improvement of dyspnea/breathlessness this shift  Outcome: Progressing  Goal: Weight from fluid excess reduced over 2-3 days, then stabilize  Outcome: Progressing  Goal: Increase self care and/or family involvement in 24 hours  Outcome: Progressing     Problem: Nutrition  Goal: Less than 5 days NPO/clear liquids  Outcome: Progressing  Goal: Oral intake greater than 50%  Outcome: Progressing  Goal: Oral intake greater 75%  Outcome: Progressing  Goal: Consume prescribed supplement  Outcome:  Progressing  Goal: Adequate PO fluid intake  Outcome: Progressing  Goal: Nutrition support goals are met within 48 hrs  Outcome: Progressing  Goal: Nutrition support is meeting 75% of nutrient needs  Outcome: Progressing  Goal: Tube feed tolerance  Outcome: Progressing  Goal: BG  mg/dL  Outcome: Progressing  Goal: Lab values WNL  Outcome: Progressing  Goal: Electrolytes WNL  Outcome: Progressing  Goal: Promote healing  Outcome: Progressing  Goal: Maintain stable weight  Outcome: Progressing  Goal: Reduce weight from edema/fluid  Outcome: Progressing  Goal: Gradual weight gain  Outcome: Progressing  Goal: Improve ostomy output  Outcome: Progressing

## 2024-04-25 NOTE — PROGRESS NOTES
Ry Sandhu is a 74 y.o. male on day 21 of admission presenting with Pneumonia.    Subjective   Afebrile and stable overnight with no acute events. POD#2 of right AKA and PEG placement. Clinically stable with some agitation where he was pulling at lines/tubes. Placed in mitts this morning.    Active Medications  aspirin, 81 mg, oral, Daily  atorvastatin, 40 mg, nasogastric tube, Daily  enoxaparin, 40 mg, subcutaneous, q24h  ergocalciferol, 200 mcg, nasogastric tube, Daily  folic acid, 1 mg, nasogastric tube, Daily  melatonin, 5 mg, nasogastric tube, Daily  pantoprazole, 40 mg, intravenous, Daily before breakfast    PRN medications: acetaminophen, dextrose, dextrose, glucagon, glucagon, HYDROmorphone, ipratropium-albuteroL, loperamide, oxyCODONE, oxygen, polyethylene glycol, silver nitrate applicators      lactated Ringer's, 100 mL/hr, Last Rate: 100 mL/hr (04/25/24 1118)             Objective     Physical Exam  Constitutional:       Appearance: Normal appearance.   HENT:      Head: Normocephalic and atraumatic.      Mouth/Throat:      Mouth: Mucous membranes are dry.      Pharynx: No posterior oropharyngeal erythema.   Eyes:      Extraocular Movements: Extraocular movements intact.      Pupils: Pupils are equal, round, and reactive to light.   Cardiovascular:      Rate and Rhythm: Normal rate and regular rhythm.      Pulses: Normal pulses.      Heart sounds: Normal heart sounds.   Pulmonary:      Comments: Slightly increased WOB, diminished air entry in the bases  Abdominal:      General: Abdomen is flat. There is no distension.      Palpations: Abdomen is soft.      Tenderness: There is no abdominal tenderness.      Comments: PEG in place. Dressing is c/d/i   Musculoskeletal:      Right lower leg: No edema.      Left lower leg: No edema.      Comments: S/p bilateral BKA. Dressings are c/d/i   Skin:     Findings: Bruising present.   Neurological:      General: No focal deficit present.      Mental Status: He  "is alert.      Comments: Aox2, some deficits in attention       Last Recorded Vitals  Blood pressure 111/54, pulse 99, temperature 36.6 °C (97.9 °F), temperature source Temporal, resp. rate 20, height 1.88 m (6' 2\"), weight 48.7 kg (107 lb 5.8 oz), SpO2 92%.  Intake/Output last 3 Shifts:  I/O last 3 completed shifts:  In: 2540 (52.2 mL/kg) [NG/GT:2040; IV Piggyback:500]  Out: - (0 mL/kg)   Weight: 48.7 kg     Relevant Results  Results from last 7 days   Lab Units 04/25/24  1211   WBC AUTO x10*3/uL 9.2   HEMOGLOBIN g/dL 8.5*   HEMATOCRIT % 27.0*   PLATELETS AUTO x10*3/uL 318      Results from last 7 days   Lab Units 04/25/24  1211   SODIUM mmol/L 148*   POTASSIUM mmol/L 3.4*   CHLORIDE mmol/L 110*   CO2 mmol/L 30   BUN mg/dL 33*   CREATININE mg/dL 0.49*   GLUCOSE mg/dL 132*   CALCIUM mg/dL 7.8*          XR chest 1 view    Result Date: 4/16/2024    1. Interval improvement in hazy right-sided opacification, with progression to multifocal patchy airspace opacities and associated costophrenic angle blunting. Persistent silhouetting of the right hemidiaphragm and costophrenic angle. Interval improvement of silhouetting of the right heart border. Findings likely represent a combination of effusion and associated consolidation. 2. Similar hazy left basilar opacities, which may represent a infectious/inflammatory process. 3. Coarse background of interstitial markings, which can be seen in the setting of chronic lung disease.     XR chest 1 view    Result Date: 4/13/2024     1. Interval complete opacification of the right hemithorax. Correlate with mucous plugging and right lung collapse. 2. Patchy airspace opacity in the left mid and lower lung. Correlate with concern for infection and aspiration. 3. Component of interstitial pulmonary prominence and edema.       US right upper quadrant    Result Date: 4/11/2024    1. Diffusely echogenic liver parenchyma, similar to prior exam, consistent with hepatic steatosis. 2. Partially " visualized right-sided pleural effusion.      CT head wo IV contrast    Result Date: 4/10/2024     No acute intracranial abnormality. Chronic intracranial findings as above.       CT chest wo IV contrast    Result Date: 4/6/2024      1.  Interlobular septal thickening with extensive ground-glass and consolidative opacities throughout bilateral lungs with more coarse opacities in the posterior aspect of the right upper and lower lobes. There are moderate sized bilateral pleural effusions. Findings are nonspecific and may represent atypical infection, pulmonary hemorrhage, or pulmonary edema. Acute respiratory distress syndrome may have a similar appearance. 2. Severe coronary artery calcifications. Recommend correlation with coronary artery disease risk factors. 3. Moderate emphysematous changes.     US right upper quadrant    Result Date: 4/5/2024    1. Diffusely echogenic liver parenchyma consistent with steatosis. 2. Thickened/edematous gallbladder wall with no evidence of hyperemia, gallbladder wall distention or cholelithiasis. Findings are equivocal and most likely due to fluid overload.     CT head wo IV contrast    Result Date: 4/5/2024    No evidence of acute intracranial abnormality. If concerns for an acute stroke may consider brain MRI for further evaluation.      XR chest 1 view    Result Date: 4/5/2024    1. Perihilar and predominantly central airspace opacity in bilateral lungs. Findings may be due to severe edema with infection or aspiration not excluded. 2. Bilateral pleural effusion, left more than right. 3. Nonobstructive bowel gas pattern. 4. Medical devices as above.                      Assessment/Plan   Patient is a 74-year-old male with a history of HTN, HLD, COPD, CAD, PAD s/p s/p b/l iliofemoral endarterectomy and bovine patch angioplasty (8/21/23) and L CFA/SFA arterectomy and SFA, YISEL/EIA stent (05/2020) who initially presented with bilateral dry gangrene and colitis. Course complicated by  acute hypoxic respiratory failure and septic shock requiring vasopressors and intubation. Patient s/p right BKA and left AKA. Now extubated and off pressors, planning for revision of amputations.    Update 4/25:  -Dressing changes on POD#3  -Mitts in place for pulling at tubes/lines    #Bilateral dry gangrene  #Peripheral vascular disease  #Coronary artery disease  ::Most recent cath report in 8/23: EF of 40%, moderate coronary artery disease with significant stenosis  ::Extensive history of vascular disease with b/l iliofemoral endarterectomy and angioplasty, arterectomies, stents  ::Taken emergently for right BKA, left AKA on 4/5  -POD#1 of right BKA  -Oxycodone 5mg q4h PRN, Dilaudid 0.4mg PRN for pain  -Atorvastatin 40mg  -Restarting aspirin 81mg daily  -Vascular surgery to follow outpatient    #Acute hypoxic respiratory failure  #Community acquired pneumonia  #Pleural effusion  #Mucus plugging  #Atelectasis  ::Growing Corynebacterium striatum  ::Abx history: Azithromycin 500 mg daily (4/1 - 4/4), Ceftriaxone (3/31 - 4/5), Doxycycline (4/4 - 4/5), Flagyl (3/25 - 4/5), Vancomycin/Zosyn (4/5-4/12, 4/13-4/15)   ::S/p two thoracentesis  -Aggressive bronchopulmonary hygiene    #Acute encephalopathy  #Dysphagia  ::Likely septic in etiology, improved  ::Vitamin B12 and folate WNL, TSH 7.61 from fT4 WNL  -Delirium precautions  -Melatonin 5mg at bedtime  -Tylenol 650mg q6h PRN, Oxy 5mg q4h PRN, Dilaudid 0.4mg for breakthrough pain  -Failed barium swallow, POD#2 of PEG placement  -PEG tube placed with bolus feeds    #Hypernatremia  ::Na of 150 at peak  ::Unclear etiology, possibly insensible losses from tachypnea, wounds  -Titrated free water flushes to 300cc q8h, can titrate further as needed    #Hepatic steatosis  ::Have been increasing for the past few days, liver enzymes have fluctuated during this hospital course  ::RUQ U/S on 4/10 showed hepatic steatosis with no evidence of biliary dilation  -Continue to  monitor    #COPD  ::No PFT on file  -DuoNeb q6h PRN  -Titrate oxygen to 88-92%    #Acute on chronic blood loss anemia  ::Oozing for post-operattive wounds  ::Required 1u PRBC  -Active T&S  -No signs of coagulopathy    Code status: DNR, ok for elective intubation  DVT ppx: Lovenox  GI ppx: Protonix  Oxygen: 2LNC  Access: PIV, external urinary catheter, Dobhoff  NOK: Pura Sandhu (wife) [phone]     Dispo: Discharge to rehab after post-op monitoring period         Wayne Rosen MD  PGY-1 Internal Medicine

## 2024-04-26 LAB
ABO GROUP (TYPE) IN BLOOD: NORMAL
ALBUMIN SERPL BCP-MCNC: 2 G/DL (ref 3.4–5)
ALP SERPL-CCNC: 350 U/L (ref 33–136)
ALT SERPL W P-5'-P-CCNC: 69 U/L (ref 10–52)
ANION GAP SERPL CALC-SCNC: 7 MMOL/L (ref 10–20)
ANTIBODY SCREEN: NORMAL
AST SERPL W P-5'-P-CCNC: 123 U/L (ref 9–39)
BASOPHILS # BLD AUTO: 0.01 X10*3/UL (ref 0–0.1)
BASOPHILS NFR BLD AUTO: 0.1 %
BILIRUB SERPL-MCNC: 0.4 MG/DL (ref 0–1.2)
BUN SERPL-MCNC: 38 MG/DL (ref 6–23)
CALCIUM SERPL-MCNC: 7.8 MG/DL (ref 8.6–10.6)
CHLORIDE SERPL-SCNC: 112 MMOL/L (ref 98–107)
CK SERPL-CCNC: 43 U/L (ref 0–325)
CO2 SERPL-SCNC: 36 MMOL/L (ref 21–32)
CREAT SERPL-MCNC: 0.4 MG/DL (ref 0.5–1.3)
EGFRCR SERPLBLD CKD-EPI 2021: >90 ML/MIN/1.73M*2
EOSINOPHIL # BLD AUTO: 0.15 X10*3/UL (ref 0–0.4)
EOSINOPHIL NFR BLD AUTO: 1.9 %
ERYTHROCYTE [DISTWIDTH] IN BLOOD BY AUTOMATED COUNT: 16.7 % (ref 11.5–14.5)
GLUCOSE BLD MANUAL STRIP-MCNC: 106 MG/DL (ref 74–99)
GLUCOSE SERPL-MCNC: 100 MG/DL (ref 74–99)
HCT VFR BLD AUTO: 24.4 % (ref 41–52)
HGB BLD-MCNC: 8.5 G/DL (ref 13.5–17.5)
IMM GRANULOCYTES # BLD AUTO: 0.05 X10*3/UL (ref 0–0.5)
IMM GRANULOCYTES NFR BLD AUTO: 0.6 % (ref 0–0.9)
LABORATORY COMMENT REPORT: NORMAL
LYMPHOCYTES # BLD AUTO: 0.96 X10*3/UL (ref 0.8–3)
LYMPHOCYTES NFR BLD AUTO: 12.3 %
MAGNESIUM SERPL-MCNC: 1.83 MG/DL (ref 1.6–2.4)
MCH RBC QN AUTO: 32 PG (ref 26–34)
MCHC RBC AUTO-ENTMCNC: 34.8 G/DL (ref 32–36)
MCV RBC AUTO: 92 FL (ref 80–100)
MONOCYTES # BLD AUTO: 0.61 X10*3/UL (ref 0.05–0.8)
MONOCYTES NFR BLD AUTO: 7.8 %
NEUTROPHILS # BLD AUTO: 6.04 X10*3/UL (ref 1.6–5.5)
NEUTROPHILS NFR BLD AUTO: 77.3 %
NRBC BLD-RTO: 0 /100 WBCS (ref 0–0)
PATH REPORT.FINAL DX SPEC: NORMAL
PATH REPORT.GROSS SPEC: NORMAL
PATH REPORT.RELEVANT HX SPEC: NORMAL
PATH REPORT.TOTAL CANCER: NORMAL
PHOSPHATE SERPL-MCNC: 2.1 MG/DL (ref 2.5–4.9)
PLATELET # BLD AUTO: 357 X10*3/UL (ref 150–450)
POTASSIUM SERPL-SCNC: 4 MMOL/L (ref 3.5–5.3)
PROT SERPL-MCNC: 5.1 G/DL (ref 6.4–8.2)
RBC # BLD AUTO: 2.66 X10*6/UL (ref 4.5–5.9)
RH FACTOR (ANTIGEN D): NORMAL
SODIUM SERPL-SCNC: 151 MMOL/L (ref 136–145)
WBC # BLD AUTO: 7.8 X10*3/UL (ref 4.4–11.3)

## 2024-04-26 PROCEDURE — 86901 BLOOD TYPING SEROLOGIC RH(D): CPT

## 2024-04-26 PROCEDURE — 1100000001 HC PRIVATE ROOM DAILY

## 2024-04-26 PROCEDURE — 84100 ASSAY OF PHOSPHORUS: CPT

## 2024-04-26 PROCEDURE — 83735 ASSAY OF MAGNESIUM: CPT

## 2024-04-26 PROCEDURE — 85025 COMPLETE CBC W/AUTO DIFF WBC: CPT

## 2024-04-26 PROCEDURE — 2500000005 HC RX 250 GENERAL PHARMACY W/O HCPCS

## 2024-04-26 PROCEDURE — 2500000001 HC RX 250 WO HCPCS SELF ADMINISTERED DRUGS (ALT 637 FOR MEDICARE OP)

## 2024-04-26 PROCEDURE — 99024 POSTOP FOLLOW-UP VISIT: CPT | Performed by: SURGERY

## 2024-04-26 PROCEDURE — 82550 ASSAY OF CK (CPK): CPT | Performed by: INTERNAL MEDICINE

## 2024-04-26 PROCEDURE — 82947 ASSAY GLUCOSE BLOOD QUANT: CPT

## 2024-04-26 PROCEDURE — 2500000004 HC RX 250 GENERAL PHARMACY W/ HCPCS (ALT 636 FOR OP/ED)

## 2024-04-26 PROCEDURE — 36415 COLL VENOUS BLD VENIPUNCTURE: CPT

## 2024-04-26 PROCEDURE — 97530 THERAPEUTIC ACTIVITIES: CPT | Mod: GO

## 2024-04-26 PROCEDURE — C9113 INJ PANTOPRAZOLE SODIUM, VIA: HCPCS

## 2024-04-26 PROCEDURE — 94668 MNPJ CHEST WALL SBSQ: CPT

## 2024-04-26 PROCEDURE — 97530 THERAPEUTIC ACTIVITIES: CPT | Mod: GP

## 2024-04-26 PROCEDURE — 99233 SBSQ HOSP IP/OBS HIGH 50: CPT

## 2024-04-26 PROCEDURE — 80053 COMPREHEN METABOLIC PANEL: CPT

## 2024-04-26 RX ORDER — DEXTROSE MONOHYDRATE 50 MG/ML
75 INJECTION, SOLUTION INTRAVENOUS CONTINUOUS
Status: DISCONTINUED | OUTPATIENT
Start: 2024-04-26 | End: 2024-04-27

## 2024-04-26 RX ORDER — SODIUM,POTASSIUM PHOSPHATES 280-250MG
1 POWDER IN PACKET (EA) ORAL 4 TIMES DAILY
Status: DISPENSED | OUTPATIENT
Start: 2024-04-26 | End: 2024-04-26

## 2024-04-26 RX ORDER — PANTOPRAZOLE SODIUM 40 MG/1
40 TABLET, DELAYED RELEASE ORAL
Status: DISCONTINUED | OUTPATIENT
Start: 2024-04-27 | End: 2024-04-28

## 2024-04-26 RX ADMIN — ATORVASTATIN CALCIUM 40 MG: 40 TABLET, FILM COATED ORAL at 08:56

## 2024-04-26 RX ADMIN — ASPIRIN 81 MG CHEWABLE TABLET 81 MG: 81 TABLET CHEWABLE at 08:56

## 2024-04-26 RX ADMIN — Medication 5 MG: at 17:19

## 2024-04-26 RX ADMIN — FOLIC ACID 1 MG: 1 TABLET ORAL at 08:56

## 2024-04-26 RX ADMIN — POTASSIUM & SODIUM PHOSPHATES POWDER PACK 280-160-250 MG 1 PACKET: 280-160-250 PACK at 17:05

## 2024-04-26 RX ADMIN — ENOXAPARIN SODIUM 40 MG: 100 INJECTION SUBCUTANEOUS at 08:56

## 2024-04-26 RX ADMIN — Medication 200 MCG: at 08:56

## 2024-04-26 RX ADMIN — PANTOPRAZOLE SODIUM 40 MG: 40 INJECTION, POWDER, FOR SOLUTION INTRAVENOUS at 06:25

## 2024-04-26 ASSESSMENT — COGNITIVE AND FUNCTIONAL STATUS - GENERAL
DRESSING REGULAR LOWER BODY CLOTHING: TOTAL
CLIMB 3 TO 5 STEPS WITH RAILING: TOTAL
STANDING UP FROM CHAIR USING ARMS: TOTAL
EATING MEALS: TOTAL
MOVING FROM LYING ON BACK TO SITTING ON SIDE OF FLAT BED WITH BEDRAILS: A LOT
TURNING FROM BACK TO SIDE WHILE IN FLAT BAD: TOTAL
DAILY ACTIVITIY SCORE: 7
STANDING UP FROM CHAIR USING ARMS: TOTAL
CLIMB 3 TO 5 STEPS WITH RAILING: TOTAL
DRESSING REGULAR LOWER BODY CLOTHING: TOTAL
MOVING FROM LYING ON BACK TO SITTING ON SIDE OF FLAT BED WITH BEDRAILS: A LOT
PERSONAL GROOMING: TOTAL
PERSONAL GROOMING: TOTAL
MOVING TO AND FROM BED TO CHAIR: TOTAL
HELP NEEDED FOR BATHING: TOTAL
DRESSING REGULAR UPPER BODY CLOTHING: TOTAL
TURNING FROM BACK TO SIDE WHILE IN FLAT BAD: TOTAL
MOBILITY SCORE: 7
STANDING UP FROM CHAIR USING ARMS: TOTAL
PERSONAL GROOMING: TOTAL
DRESSING REGULAR UPPER BODY CLOTHING: TOTAL
EATING MEALS: TOTAL
HELP NEEDED FOR BATHING: TOTAL
TURNING FROM BACK TO SIDE WHILE IN FLAT BAD: A LOT
TOILETING: TOTAL
MOVING FROM LYING ON BACK TO SITTING ON SIDE OF FLAT BED WITH BEDRAILS: A LOT
MOVING TO AND FROM BED TO CHAIR: TOTAL
DRESSING REGULAR UPPER BODY CLOTHING: A LOT
MOVING TO AND FROM BED TO CHAIR: TOTAL
MOBILITY SCORE: 8
CLIMB 3 TO 5 STEPS WITH RAILING: TOTAL
MOBILITY SCORE: 7
DAILY ACTIVITIY SCORE: 6
WALKING IN HOSPITAL ROOM: TOTAL
TOILETING: TOTAL
DRESSING REGULAR LOWER BODY CLOTHING: TOTAL
EATING MEALS: TOTAL
TOILETING: TOTAL
HELP NEEDED FOR BATHING: TOTAL
DAILY ACTIVITIY SCORE: 6

## 2024-04-26 ASSESSMENT — PAIN - FUNCTIONAL ASSESSMENT
PAIN_FUNCTIONAL_ASSESSMENT: 0-10
PAIN_FUNCTIONAL_ASSESSMENT: WONG-BAKER FACES
PAIN_FUNCTIONAL_ASSESSMENT: 0-10

## 2024-04-26 ASSESSMENT — PAIN SCALES - GENERAL
PAINLEVEL_OUTOF10: 0 - NO PAIN

## 2024-04-26 ASSESSMENT — PAIN SCALES - WONG BAKER: WONGBAKER_NUMERICALRESPONSE: HURTS LITTLE MORE

## 2024-04-26 NOTE — PROGRESS NOTES
Physical Therapy    Physical Therapy Treatment    Patient Name: Ry Sandhu  MRN: [id number]  Today's Date: 4/26/2024  Time Calculation  Start Time: 1127  Stop Time: 1146  Time Calculation (min): 19 min       Assessment/Plan   PT Assessment  Evaluation/Treatment Tolerance: Patient limited by fatigue  Medical Staff Made Aware: Yes  End of Session Communication: PCT/NA/CTA, Bedside nurse  Assessment Comment: Pt limited in upright tolerance. Pt was able to tolerate sitting EOB x 5 min with min A before needing to return supine. Patient continues to benefit from skilled physical therapy to maximize functional mobility and safety. Pt remains appropriate for mod intensity therapy when medically appropriate for DC.  End of Session Patient Position: Bed, 3 rail up, Alarm on (mitts redonned at end of session)  PT Plan  Inpatient/Swing Bed or Outpatient: Inpatient  PT Plan  Treatment/Interventions: Bed mobility, Transfer training, Balance training, Strengthening, Endurance training, Therapeutic exercise, Therapeutic activity, Home exercise program  PT Plan: Skilled PT  PT Frequency: 5 times per week  PT Discharge Recommendations: Moderate intensity level of continued care  Equipment Recommended upon Discharge:  (none)  PT Recommended Transfer Status: Assist x1  PT - OK to Discharge: Yes  RN cleared prior to session    General Visit Information:   PT  Visit  PT Received On: 04/26/24  Response to Previous Treatment: Patient with no complaints from previous session.  General  Reason for Referral: acute hypoxic respiratory failure secondary to acute CAP with septic shock secondary to pneumonia s/p bilateral amputations on 4/5 (right BKA, left AKA) due to severe peripheral arterial disease and increased pressor requirements  Past Medical History Relevant to Rehab: COPD, CAD, PAD  Prior to Session Communication: Bedside nurse  Patient Position Received: Bed, 3 rail up, Alarm on (mitt restraints on)  Preferred Learning Style:  verbal  General Comment: Pt was drowsy but agreeable to therapy    Subjective   Precautions:  Precautions  Medical Precautions: Fall precautions  Vital Signs:  Vital Signs  SpO2: 94 %    Objective   Pain:  Pain Assessment  Pain Assessment: Millan-Baker FACES  Millan-Baker FACES Pain Rating: Hurts little more (stated none buut appered in pain during movement)  Pain Type: Surgical pain  Pain Location: Leg  Pain Orientation: Right, Left  Pain Interventions: Repositioned  Response to Interventions: no change  Cognition:  Cognition  Overall Cognitive Status: Impaired  Arousal/Alertness: Generalized responses  Postural Control:  Postural Control  Postural Control: Impaired  Head Control: forward head    Activity Tolerance:  Activity Tolerance  Endurance: Decreased tolerance for upright activites  Treatments:  Therapeutic Exercise  Therapeutic Exercise Performed: No (attempted but pt falling asleep and unabe to complete)    Therapeutic Activity  Therapeutic Activity Performed: Yes  Therapeutic Activity 1: static sitting EOB x 5 min with min A    Bed Mobility  Bed Mobility: Yes  Bed Mobility 1  Bed Mobility 1: Supine to sitting, Sitting to supine  Level of Assistance 1: Maximum assistance, Maximum verbal cues  Bed Mobility Comments 1: pt able to assist reaching for rails and pushing trunk up; max vc for sequencing and hand placement    Transfers  Transfer: No (pt requesting to return supine after 5 min due to fatigue)  Pt required increased time for mobility    Outcome Measures:  [facility]  Turning from your back to your side while in a flat bed without using bedrails: A lot  Moving from lying on your back to sitting on the side of a flat bed without using bedrails: A lot  Moving to and from bed to chair (including a wheelchair): Total  Standing up from a chair using your arms (e.g. wheelchair or bedside chair): Total  To walk in hospital room: Total  Climbing 3-5 steps with railing: Total  Basic Mobility - Total  Score: 8    Education Documentation  Body Mechanics, taught by Aspen Winston PT at 4/26/2024  1:17 PM.  Learner: Patient  Readiness: Acceptance  Method: Explanation, Demonstration  Response: Needs Reinforcement, Verbalizes Understanding    Home Exercise Program, taught by Aspen Winston PT at 4/26/2024  1:17 PM.  Learner: Patient  Readiness: Acceptance  Method: Explanation, Demonstration  Response: Needs Reinforcement, Verbalizes Understanding    Mobility Training, taught by Aspen Winston PT at 4/26/2024  1:17 PM.  Learner: Patient  Readiness: Acceptance  Method: Explanation, Demonstration  Response: Needs Reinforcement, Verbalizes Understanding    Education Comments  No comments found.        Encounter Problems       Encounter Problems (Active)       PT Problem       Patient will complete bed mobility with MINx1 with HOB elevated, use of bedrail   (Progressing)       Start:  04/09/24    Expected End:  04/30/24            Patient will complete bed<->chair lateral slide transfer with OD x1 using LRD as needed without acute LOB  (Not Progressing)       Start:  04/09/24    Expected End:  04/30/24            Patient will complete static (contact guard assist x1) and dynamic (minimal assist x1) sitting balance activities using UE support as needed in order to maintain midline without acute LOB.  (Progressing)       Start:  04/09/24    Expected End:  04/30/24            Patient will participate in BLE there-ex program in order to assist in improving strength and to assist with the completion of functional mobility tasks.  (Progressing)       Start:  04/09/24    Expected End:  04/30/24               Pain - Adult

## 2024-04-26 NOTE — CARE PLAN
The patient's goals for the shift include Maintain patient safety    The clinical goals for the shift include   Problem: Daily Care  Goal: Daily care needs are met  Outcome: Progressing     Problem: Psychosocial Needs  Goal: Demonstrates ability to cope with hospitalization/illness  Outcome: Progressing  Goal: Collaborate with me, my family, and caregiver to identify my specific goals  Outcome: Progressing     Problem: Discharge Barriers  Goal: My discharge needs are met  Outcome: Progressing     Problem: Respiratory  Goal: Clear secretions with interventions this shift  Outcome: Progressing  Goal: No signs of respiratory distress (eg. Use of accessory muscles. Peds grunting)  Outcome: Progressing  Goal: Tolerate mechanical ventilation evidenced by VS/agitation level this shift  Outcome: Progressing  Goal: Tolerate pulmonary toileting this shift  Outcome: Progressing  Goal: Verbalize decreased shortness of breath this shift  Outcome: Progressing  Goal: Wean oxygen to maintain O2 saturation per order/standard this shift  Outcome: Progressing  Goal: Increase self care and/or family involvement in next 24 hours  Outcome: Progressing     Problem: Skin  Goal: Participates in plan/prevention/treatment measures  4/26/2024 0736 by Kamila Chavez RN  Outcome: Progressing  4/26/2024 0735 by Kamila Chavez RN  Flowsheets (Taken 4/26/2024 0735)  Participates in plan/prevention/treatment measures: Elevate heels  Goal: Prevent/manage excess moisture  Outcome: Progressing  Flowsheets (Taken 4/26/2024 0736)  Prevent/manage excess moisture:   Cleanse incontinence/protect with barrier cream   Monitor for/manage infection if present     Problem: Fall/Injury  Goal: Not fall by end of shift  Outcome: Progressing  Goal: Be free from injury by end of the shift  Outcome: Progressing  Goal: Verbalize understanding of personal risk factors for fall in the hospital  Outcome: Progressing  Goal: Verbalize understanding of risk factor reduction  measures to prevent injury from fall in the home  Outcome: Progressing  Goal: Use assistive devices by end of the shift  Outcome: Progressing  Goal: Pace activities to prevent fatigue by end of the shift  Outcome: Progressing     Problem: Pain - Adult  Goal: Verbalizes/displays adequate comfort level or baseline comfort level  Outcome: Progressing     Problem: Safety - Adult  Goal: Free from fall injury  Outcome: Progressing     Problem: Discharge Planning  Goal: Discharge to home or other facility with appropriate resources  Outcome: Progressing     Problem: Chronic Conditions and Co-morbidities  Goal: Patient's chronic conditions and co-morbidity symptoms are monitored and maintained or improved  Outcome: Progressing     Problem: Knowledge Deficit  Goal: Patient/family/caregiver demonstrates understanding of disease process, treatment plan, medications, and discharge instructions  Outcome: Progressing     Problem: Mechanical Ventilation  Goal: Patient Will Maintain Patent Airway  Outcome: Progressing  Goal: Oral health is maintained or improved  Outcome: Progressing  Goal: Tracheostomy will be managed safely  Outcome: Progressing  Goal: ET tube will be managed safely  Outcome: Progressing  Goal: Ability to express needs and understand communication  Outcome: Progressing  Goal: Mobility/activity is maintained at optimum level for patient  Outcome: Progressing     Problem: Heart Failure  Goal: Improved gas exchange this shift  Outcome: Progressing  Goal: Improved urinary output this shift  Outcome: Progressing  Goal: Reduction in peripheral edema within 24 hours  Outcome: Progressing  Goal: Report improvement of dyspnea/breathlessness this shift  Outcome: Progressing  Goal: Weight from fluid excess reduced over 2-3 days, then stabilize  Outcome: Progressing  Goal: Increase self care and/or family involvement in 24 hours  Outcome: Progressing     Problem: Nutrition  Goal: Less than 5 days NPO/clear liquids  Outcome:  Progressing  Goal: Oral intake greater than 50%  Outcome: Progressing  Goal: Oral intake greater 75%  Outcome: Progressing  Goal: Consume prescribed supplement  Outcome: Progressing  Goal: Adequate PO fluid intake  Outcome: Progressing  Goal: Nutrition support goals are met within 48 hrs  Outcome: Progressing  Goal: Nutrition support is meeting 75% of nutrient needs  Outcome: Progressing  Goal: Tube feed tolerance  Outcome: Progressing  Goal: BG  mg/dL  Outcome: Progressing  Goal: Lab values WNL  Outcome: Progressing  Goal: Electrolytes WNL  Outcome: Progressing  Goal: Promote healing  Outcome: Progressing  Goal: Maintain stable weight  Outcome: Progressing  Goal: Reduce weight from edema/fluid  Outcome: Progressing  Goal: Gradual weight gain  Outcome: Progressing  Goal: Improve ostomy output  Outcome: Progressing

## 2024-04-26 NOTE — PROGRESS NOTES
[facility] is able to accept pt, [facility] unable to accept. [facility] called [facility] and Darrick at [facility] admissions to follow up if able to accept but there was no answer. Left a VM message with [facility] info. Will continue to follow.   Admission rep from [facility] called [facility] back and stated they are not able to accept pt.   [id number]-[facility] updated pt's spouse, Pura that only accepting SNF at this time is [facility] and awaiting to hear back from Darrick at [facility]. Per Pura, [facility] would be [facility]. Medical team and DSC updated.

## 2024-04-26 NOTE — PROGRESS NOTES
"Vascular Surgery Daily Progress Note    Ry Sandhu is a 74 y.o. male on day 22 of admission presenting with Pneumonia.    Subjective   Overnight events: NAEO  Pain well controlled  Dressing changed bedside. RN reports dressing changed yesterday due to patient soiling himself         Objective     Physical Exam:  Constitutional: no acute distress, chronically-ill appearing  Neuro: awake, alert, oriented; no gross deficits noted   HEENT: No deformities, no scleral icterus   Cardiac: regular rate  Pulmonary: unlabored respirations on nasal cannula  Abdomen: soft, non-tender, non-distended  Skin: warm and dry; wounds: bilateral AKA dressings in place, no hematoma or strikethrough. Removed. No necrosis, drainage, or hematoma appreciated. Replaced with cover sponge, tegederm, ACE.     Extremities: no peripheral edema noted. Bilateral AKA  MSK: motor and sensory intact in all extremities        Last Recorded Vitals  /65 (BP Location: Right arm)   Pulse 100   Temp 36.6 °C (97.9 °F) (Temporal)   Resp 18   Ht 1.88 m (6' 2\")   Wt 48.7 kg (107 lb 5.8 oz)   SpO2 92%   BMI 13.78 kg/m²     Intake/Output last 3 Shifts:  I/O last 3 completed shifts:  In: 3250 (66.7 mL/kg) [P.O.:20; NG/GT:3230]  Out: - (0 mL/kg)   Weight: 48.7 kg     Relevant Lab Results    Lab Results   Component Value Date     (H) 04/26/2024    K 4.0 04/26/2024     (H) 04/26/2024    CO2 36 (H) 04/26/2024    CREATININE 0.40 (L) 04/26/2024    BUN 38 (H) 04/26/2024    CALCIUM 7.8 (L) 04/26/2024    PHOS 2.1 (L) 04/26/2024    WBC 7.8 04/26/2024    HGB 8.5 (L) 04/26/2024    HCT 24.4 (L) 04/26/2024     04/26/2024        Imaging:   No results found.     Current medications:   Scheduled medications  aspirin, 81 mg, oral, Daily  atorvastatin, 40 mg, nasogastric tube, Daily  enoxaparin, 40 mg, subcutaneous, q24h  ergocalciferol, 200 mcg, nasogastric tube, Daily  folic acid, 1 mg, nasogastric tube, Daily  melatonin, 5 mg, nasogastric tube, " Daily  [START ON 4/27/2024] pantoprazole, 40 mg, oral, Daily before breakfast      Continuous medications     PRN medications  PRN medications: acetaminophen, dextrose, dextrose, glucagon, glucagon, HYDROmorphone, ipratropium-albuteroL, loperamide, oxyCODONE, oxygen, polyethylene glycol, silver nitrate applicators    Assessment/Plan   Principal Problem:    Pneumonia  Active Problems:    CAD (coronary artery disease)    CHF (congestive heart failure) (Multi)    HTN (hypertension)    Hyperlipidemia    Chronic obstructive pulmonary disease (Multi)    Peripheral vascular disease (CMS-HCC)    Gastroesophageal reflux disease    Anemia    Septicemia (Multi)    Inadequate oral intake    Patient is 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 Incisions closed with staples and covered with gauze/ACE. General surgery also performed PEG at case completion.     Recovering well with adequate pain control and no apparent complications. Incisions appear to be healing well. No hematoma.     Recommendations:   - Dressings removed and replaced; order written for dressing changes to be performed by RN daily  - non-weight bearing BLE  - ok for discharge from vascular surgery standpoint  - will arrange follow up in one month for staple removal     Discussed with attending, Dr. Conchita Sainz MD, PhD  Vascular Surgery, PGY2  [id number]

## 2024-04-26 NOTE — PROGRESS NOTES
Occupational Therapy    Occupational Therapy Treatment    Name: Ry Sandhu  MRN: [id number]  : 1950  Date: 24  Time Calculation  Start Time: 1020  Stop Time: 1050  Time Calculation (min): 30 min    Assessment:  Medical Staff Made Aware: Yes  End of Session Communication: PCT/NA/CTA  End of Session Patient Position: Bed, 3 rail up, Alarm on  Plan:  Treatment Interventions:  (ADL retraining; Functional transfer training; UE strengthening/ROM; Endurance training; Cognitive reorientation; Patient/family training; Equipment evaluation/education; Neuromuscular reeducation; Compensatory technique education)  OT Frequency: 3 times per week  OT Discharge Recommendations: Moderate intensity level of continued care  OT - OK to Discharge: Yes    Subjective   Previous Visit Info:  OT Last Visit  OT Received On: 24  General:  General  Reason for Referral: acute hypoxic respiratory failure secondary to acute CAP with septic shock secondary to pneumonia s/p bilateral amputations on  (right BKA, left AKA) due to severe peripheral arterial disease and increased pressor requirements  Past Medical History Relevant to Rehab: COPD, CAD, PAD  Family/Caregiver Present: Yes  Prior to Session Communication: Bedside nurse  Patient Position Received: Bed, 3 rail up, Alarm on  Preferred Learning Style: verbal  General Comment: PATIENT WAS AGREEABLE TO PARTICIPATION. HE WAS ALERT, ABLE TO FOLLOW 1 STEP COMMANDS, MAINTAIN CONVERSATION, AND USE ENVIRONMENTAL CUES TO ORIENT HIMSELF. MITTS DONNED B/WRISTS.  Precautions:  LE Weight Bearing Status:  (anticipate NWB BLEs d/t recent AKA/BKA)  Medical Precautions: Fall precautions  Vitals:  Vital Signs  Heart Rate: 95  Heart Rate Source: Monitor  SpO2: 94 %  BP: 118/57  MAP (mmHg): 77  BP Location: Left arm  BP Method: Automatic  Patient Position: Lying  Pain Assessment:  Pain Assessment  Pain Assessment: 0-10  Pain Score:  (UNABLE TO RATE BUT PAIN BEHAVIOR OBSERVED DURING BED  MOBILITY AND BALANCE RETRAINING)  Pain Location: Leg  Pain Orientation: Right, Left  Pain Interventions: Repositioned  Response to Interventions: EXHIBITS RELIEF/ RELAXED BEHAVIOR     Objective   Cognition:  Overall Cognitive Status: Impaired  Arousal/Alertness: Generalized responses  Orientation Level: Disoriented to time, Disoriented to situation  Attention:  (ABLE TO ATTEND, CONVERSE, AND MAINTAIN ALERTNESS DURING SESSION)      Bed Mobility/Transfers: Bed Mobility  Bed Mobility: Yes  Bed Mobility 1  Bed Mobility 1: Supine to sitting, Sitting to supine  Level of Assistance 1: Dependent  Bed Mobility 2  Bed Mobility  2: Rolling right  Level of Assistance 2: Maximum verbal cues  Bed Mobility 3  Bed Mobility 3: Rolling left  Level of Assistance 3: Maximum assistance    Sitting Balance:  Static Sitting Balance  Static Sitting-Balance Support: Bilateral upper extremity supported  Static Sitting-Level of Assistance: Dependent  Static Sitting-Comment/Number of Minutes: STATIC BALANCE RETRAINING ATTEMPTED FOR INCREASED PROPRIOCEPTION AND TOLERANCE FOR MIDLINE SITTING BALANCE. HE TOLERATED </= TO 1 MINUTE 2/2 FATIGUE, SOB WITH MIN EXERTION, POSTERIOR LEAN, AND PAIN BEHAVIOR.    Outcome Measures:  [facility] Daily Activity  Putting on and taking off regular lower body clothing: Total  Bathing (including washing, rinsing, drying): Total  Putting on and taking off regular upper body clothing: Total  Toileting, which includes using toilet, bedpan or urinal: Total  Taking care of personal grooming such as brushing teeth: Total  Eating Meals: Total  Daily Activity - Total Score: 6    Education Documentation  Body Mechanics, taught by Kati Carmen OT at 4/26/2024 11:51 AM.  Learner: Patient  Readiness: Eager  Method: Explanation  Response: Verbalizes Understanding, Demonstrated Understanding, Needs Reinforcement    ADL Training, taught by Kati Carmen OT at 4/26/2024 11:51 AM.  Learner: Patient  Readiness:  Eager  Method: Explanation  Response: Verbalizes Understanding, Demonstrated Understanding, Needs Reinforcement    Education Comments  No comments found.      Goals:  Encounter Problems       Encounter Problems (Active)       ADLs       Patient with complete upper body dressing with stand by assist level of assistance donning and doffing all UE clothes with no adaptive equipment. (Not Progressing)       Start:  04/10/24    Expected End:  04/24/24            Patient with complete lower body dressing with minimal assist  level of assistance donning and doffing all LE clothes  with PRN adaptive equipment. (Not Progressing)       Start:  04/10/24    Expected End:  04/24/24            Patient will feed self with supervision level of assistance using PRN adaptive equipment. (Not Progressing)       Start:  04/10/24    Expected End:  04/24/24            Patient will complete daily grooming tasks with supervision level of assistance and PRN adaptive equipment. (Progressing)       Start:  04/10/24    Expected End:  04/24/24            Patient will complete toileting including hygiene clothing management/hygiene with minimal assist  level of assistance. (Not Progressing)       Start:  04/10/24    Expected End:  04/24/24               BALANCE       Pt will maintain dynamic sitting balance during ADL task with minimal assist level of assistance in order to demonstrate decreased risk of falling and improved postural control. (Progressing)       Start:  04/10/24    Expected End:  04/24/24               COGNITION/SAFETY       Patient will follow 100% Simple commands to allow improved ADL performance. (Progressing)       Start:  04/10/24    Expected End:  04/24/24               EXERCISE/STRENGTHENING       Patient will complete BUE exercises in order to improve strength and activity tolerance for ADL performance.  (Not Progressing)       Start:  04/10/24    Expected End:  04/24/24               TRANSFERS       Patient will perform  bed mobility moderate assist level of assistance in order to improve safety and independence with mobility (Progressing)       Start:  04/10/24    Expected End:  04/24/24            Patient will complete functional transfers with slideboard/LRD with moderate assist level of assistance. (Not Progressing)       Start:  04/10/24    Expected End:  04/24/24

## 2024-04-26 NOTE — CARE PLAN
The patient's goals for the shift include Maintain patient safety    The clinical goals for the shift include Patient will remain safe and free from falls and/or injuries overnight    No issues overnight

## 2024-04-27 LAB
ALBUMIN SERPL BCP-MCNC: 1.9 G/DL (ref 3.4–5)
ALBUMIN SERPL BCP-MCNC: 2 G/DL (ref 3.4–5)
ALBUMIN SERPL BCP-MCNC: 2 G/DL (ref 3.4–5)
ALP SERPL-CCNC: 321 U/L (ref 33–136)
ALT SERPL W P-5'-P-CCNC: 69 U/L (ref 10–52)
ANION GAP SERPL CALC-SCNC: 10 MMOL/L (ref 10–20)
ANION GAP SERPL CALC-SCNC: 10 MMOL/L (ref 10–20)
ANION GAP SERPL CALC-SCNC: 9 MMOL/L (ref 10–20)
AST SERPL W P-5'-P-CCNC: 98 U/L (ref 9–39)
BASOPHILS # BLD AUTO: 0.02 X10*3/UL (ref 0–0.1)
BASOPHILS NFR BLD AUTO: 0.3 %
BILIRUB SERPL-MCNC: 0.3 MG/DL (ref 0–1.2)
BUN SERPL-MCNC: 36 MG/DL (ref 6–23)
BUN SERPL-MCNC: 37 MG/DL (ref 6–23)
BUN SERPL-MCNC: 41 MG/DL (ref 6–23)
CALCIUM SERPL-MCNC: 7.6 MG/DL (ref 8.6–10.6)
CALCIUM SERPL-MCNC: 7.9 MG/DL (ref 8.6–10.6)
CALCIUM SERPL-MCNC: 8.2 MG/DL (ref 8.6–10.6)
CHLORIDE SERPL-SCNC: 106 MMOL/L (ref 98–107)
CHLORIDE SERPL-SCNC: 109 MMOL/L (ref 98–107)
CHLORIDE SERPL-SCNC: 111 MMOL/L (ref 98–107)
CO2 SERPL-SCNC: 33 MMOL/L (ref 21–32)
CO2 SERPL-SCNC: 33 MMOL/L (ref 21–32)
CO2 SERPL-SCNC: 35 MMOL/L (ref 21–32)
CREAT SERPL-MCNC: 0.36 MG/DL (ref 0.5–1.3)
CREAT SERPL-MCNC: 0.37 MG/DL (ref 0.5–1.3)
CREAT SERPL-MCNC: 0.38 MG/DL (ref 0.5–1.3)
EGFRCR SERPLBLD CKD-EPI 2021: >90 ML/MIN/1.73M*2
EOSINOPHIL # BLD AUTO: 0.18 X10*3/UL (ref 0–0.4)
EOSINOPHIL NFR BLD AUTO: 2.5 %
ERYTHROCYTE [DISTWIDTH] IN BLOOD BY AUTOMATED COUNT: 16.5 % (ref 11.5–14.5)
GLUCOSE SERPL-MCNC: 107 MG/DL (ref 74–99)
GLUCOSE SERPL-MCNC: 123 MG/DL (ref 74–99)
GLUCOSE SERPL-MCNC: 132 MG/DL (ref 74–99)
HCT VFR BLD AUTO: 25 % (ref 41–52)
HGB BLD-MCNC: 7.6 G/DL (ref 13.5–17.5)
IMM GRANULOCYTES # BLD AUTO: 0.06 X10*3/UL (ref 0–0.5)
IMM GRANULOCYTES NFR BLD AUTO: 0.8 % (ref 0–0.9)
LYMPHOCYTES # BLD AUTO: 0.78 X10*3/UL (ref 0.8–3)
LYMPHOCYTES NFR BLD AUTO: 10.9 %
MAGNESIUM SERPL-MCNC: 1.81 MG/DL (ref 1.6–2.4)
MCH RBC QN AUTO: 29.8 PG (ref 26–34)
MCHC RBC AUTO-ENTMCNC: 30.4 G/DL (ref 32–36)
MCV RBC AUTO: 98 FL (ref 80–100)
MONOCYTES # BLD AUTO: 0.52 X10*3/UL (ref 0.05–0.8)
MONOCYTES NFR BLD AUTO: 7.3 %
NEUTROPHILS # BLD AUTO: 5.57 X10*3/UL (ref 1.6–5.5)
NEUTROPHILS NFR BLD AUTO: 78.2 %
NRBC BLD-RTO: 0 /100 WBCS (ref 0–0)
PHOSPHATE SERPL-MCNC: 3.2 MG/DL (ref 2.5–4.9)
PHOSPHATE SERPL-MCNC: 3.2 MG/DL (ref 2.5–4.9)
PHOSPHATE SERPL-MCNC: 3.7 MG/DL (ref 2.5–4.9)
PLATELET # BLD AUTO: 414 X10*3/UL (ref 150–450)
POTASSIUM SERPL-SCNC: 4 MMOL/L (ref 3.5–5.3)
POTASSIUM SERPL-SCNC: 4.3 MMOL/L (ref 3.5–5.3)
POTASSIUM SERPL-SCNC: 4.5 MMOL/L (ref 3.5–5.3)
PROT SERPL-MCNC: 5.1 G/DL (ref 6.4–8.2)
RBC # BLD AUTO: 2.55 X10*6/UL (ref 4.5–5.9)
SODIUM SERPL-SCNC: 145 MMOL/L (ref 136–145)
SODIUM SERPL-SCNC: 148 MMOL/L (ref 136–145)
SODIUM SERPL-SCNC: 150 MMOL/L (ref 136–145)
WBC # BLD AUTO: 7.1 X10*3/UL (ref 4.4–11.3)

## 2024-04-27 PROCEDURE — 1100000001 HC PRIVATE ROOM DAILY

## 2024-04-27 PROCEDURE — 99232 SBSQ HOSP IP/OBS MODERATE 35: CPT

## 2024-04-27 PROCEDURE — 80069 RENAL FUNCTION PANEL: CPT

## 2024-04-27 PROCEDURE — 85025 COMPLETE CBC W/AUTO DIFF WBC: CPT

## 2024-04-27 PROCEDURE — 36415 COLL VENOUS BLD VENIPUNCTURE: CPT

## 2024-04-27 PROCEDURE — 83735 ASSAY OF MAGNESIUM: CPT

## 2024-04-27 PROCEDURE — 2500000001 HC RX 250 WO HCPCS SELF ADMINISTERED DRUGS (ALT 637 FOR MEDICARE OP)

## 2024-04-27 PROCEDURE — 2500000004 HC RX 250 GENERAL PHARMACY W/ HCPCS (ALT 636 FOR OP/ED)

## 2024-04-27 PROCEDURE — 80069 RENAL FUNCTION PANEL: CPT | Mod: CCI

## 2024-04-27 PROCEDURE — 94668 MNPJ CHEST WALL SBSQ: CPT

## 2024-04-27 PROCEDURE — 84100 ASSAY OF PHOSPHORUS: CPT | Mod: 91

## 2024-04-27 RX ADMIN — Medication 5 MG: at 17:52

## 2024-04-27 RX ADMIN — ENOXAPARIN SODIUM 40 MG: 100 INJECTION SUBCUTANEOUS at 10:06

## 2024-04-27 RX ADMIN — ASPIRIN 81 MG CHEWABLE TABLET 81 MG: 81 TABLET CHEWABLE at 10:05

## 2024-04-27 RX ADMIN — Medication 200 MCG: at 10:05

## 2024-04-27 RX ADMIN — FOLIC ACID 1 MG: 1 TABLET ORAL at 10:05

## 2024-04-27 ASSESSMENT — COGNITIVE AND FUNCTIONAL STATUS - GENERAL
STANDING UP FROM CHAIR USING ARMS: TOTAL
HELP NEEDED FOR BATHING: TOTAL
MOBILITY SCORE: 7
TOILETING: TOTAL
CLIMB 3 TO 5 STEPS WITH RAILING: TOTAL
TURNING FROM BACK TO SIDE WHILE IN FLAT BAD: TOTAL
WALKING IN HOSPITAL ROOM: TOTAL
EATING MEALS: TOTAL
DRESSING REGULAR UPPER BODY CLOTHING: A LOT
DAILY ACTIVITIY SCORE: 7
MOVING TO AND FROM BED TO CHAIR: TOTAL
PERSONAL GROOMING: TOTAL
MOVING FROM LYING ON BACK TO SITTING ON SIDE OF FLAT BED WITH BEDRAILS: A LOT
DRESSING REGULAR LOWER BODY CLOTHING: TOTAL

## 2024-04-27 ASSESSMENT — PAIN SCALES - GENERAL
PAINLEVEL_OUTOF10: 0 - NO PAIN
PAINLEVEL_OUTOF10: 0 - NO PAIN

## 2024-04-27 ASSESSMENT — PAIN - FUNCTIONAL ASSESSMENT: PAIN_FUNCTIONAL_ASSESSMENT: 0-10

## 2024-04-27 NOTE — PROGRESS NOTES
Ry Sandhu is a 74 y.o. male on day 23 of admission presenting with Pneumonia.    Subjective   NAEON. Pt still in mittens to prevent pulling of lines/tubes. Pt on 3L O2 via NC.     Active Medications  aspirin, 81 mg, oral, Daily  [Held by provider] atorvastatin, 40 mg, nasogastric tube, Daily  enoxaparin, 40 mg, subcutaneous, q24h  ergocalciferol, 200 mcg, nasogastric tube, Daily  folic acid, 1 mg, nasogastric tube, Daily  melatonin, 5 mg, nasogastric tube, Daily  pantoprazole, 40 mg, oral, Daily before breakfast    PRN medications: acetaminophen, dextrose, dextrose, glucagon, glucagon, HYDROmorphone, ipratropium-albuteroL, loperamide, oxyCODONE, oxygen, polyethylene glycol, silver nitrate applicators                  Objective     Physical Exam  Constitutional:       Appearance: Normal appearance.   HENT:      Head: Normocephalic and atraumatic.      Mouth/Throat:      Mouth: Mucous membranes are dry.      Pharynx: No posterior oropharyngeal erythema.   Eyes:      Extraocular Movements: Extraocular movements intact.      Pupils: Pupils are equal, round, and reactive to light.   Cardiovascular:      Rate and Rhythm: Normal rate and regular rhythm.      Pulses: Normal pulses.      Heart sounds: Normal heart sounds.   Pulmonary:      Comments: Slightly increased WOB, diminished air entry in the bases  Abdominal:      General: Abdomen is flat. There is no distension.      Palpations: Abdomen is soft.      Tenderness: There is no abdominal tenderness.      Comments: PEG in place. Dressing is c/d/i   Musculoskeletal:      Right lower leg: No edema.      Left lower leg: No edema.      Comments: S/p bilateral BKA. Dressings are c/d/i   Skin:     Findings: Bruising present.   Neurological:      General: No focal deficit present.      Mental Status: He is alert.      Comments: Aox2, some deficits in attention         Last Recorded Vitals  Blood pressure (!) 103/43, pulse 80, temperature 36.9 °C (98.4 °F), temperature  "source Temporal, resp. rate 24, height 1.88 m (6' 2\"), weight 49.6 kg (109 lb 5.6 oz), SpO2 97%.  Intake/Output last 3 Shifts:  I/O last 3 completed shifts:  In: 6443 (129.9 mL/kg) [I.V.:2193 (44.2 mL/kg); Blood:350; NG/GT:3900]  Out: 1 (0 mL/kg) [Stool:1]  Weight: 49.6 kg     Relevant Results  Results from last 7 days   Lab Units 04/27/24  0848   WBC AUTO x10*3/uL 7.1   HEMOGLOBIN g/dL 7.6*   HEMATOCRIT % 25.0*   PLATELETS AUTO x10*3/uL 414      Results from last 7 days   Lab Units 04/27/24  0848   SODIUM mmol/L 150*   POTASSIUM mmol/L 4.0   CHLORIDE mmol/L 111*   CO2 mmol/L 33*   BUN mg/dL 36*   CREATININE mg/dL 0.37*   GLUCOSE mg/dL 123*   CALCIUM mg/dL 8.2*          XR chest 1 view    Result Date: 4/16/2024    1. Interval improvement in hazy right-sided opacification, with progression to multifocal patchy airspace opacities and associated costophrenic angle blunting. Persistent silhouetting of the right hemidiaphragm and costophrenic angle. Interval improvement of silhouetting of the right heart border. Findings likely represent a combination of effusion and associated consolidation. 2. Similar hazy left basilar opacities, which may represent a infectious/inflammatory process. 3. Coarse background of interstitial markings, which can be seen in the setting of chronic lung disease.     XR chest 1 view    Result Date: 4/13/2024     1. Interval complete opacification of the right hemithorax. Correlate with mucous plugging and right lung collapse. 2. Patchy airspace opacity in the left mid and lower lung. Correlate with concern for infection and aspiration. 3. Component of interstitial pulmonary prominence and edema.       US right upper quadrant    Result Date: 4/11/2024    1. Diffusely echogenic liver parenchyma, similar to prior exam, consistent with hepatic steatosis. 2. Partially visualized right-sided pleural effusion.      CT head wo IV contrast    Result Date: 4/10/2024     No acute intracranial abnormality. " Chronic intracranial findings as above.       CT chest wo IV contrast    Result Date: 4/6/2024      1.  Interlobular septal thickening with extensive ground-glass and consolidative opacities throughout bilateral lungs with more coarse opacities in the posterior aspect of the right upper and lower lobes. There are moderate sized bilateral pleural effusions. Findings are nonspecific and may represent atypical infection, pulmonary hemorrhage, or pulmonary edema. Acute respiratory distress syndrome may have a similar appearance. 2. Severe coronary artery calcifications. Recommend correlation with coronary artery disease risk factors. 3. Moderate emphysematous changes.     US right upper quadrant    Result Date: 4/5/2024    1. Diffusely echogenic liver parenchyma consistent with steatosis. 2. Thickened/edematous gallbladder wall with no evidence of hyperemia, gallbladder wall distention or cholelithiasis. Findings are equivocal and most likely due to fluid overload.     CT head wo IV contrast    Result Date: 4/5/2024    No evidence of acute intracranial abnormality. If concerns for an acute stroke may consider brain MRI for further evaluation.      XR chest 1 view    Result Date: 4/5/2024    1. Perihilar and predominantly central airspace opacity in bilateral lungs. Findings may be due to severe edema with infection or aspiration not excluded. 2. Bilateral pleural effusion, left more than right. 3. Nonobstructive bowel gas pattern. 4. Medical devices as above.                      Assessment/Plan   Patient is a 74-year-old male with a history of HTN, HLD, COPD, CAD, PAD s/p s/p b/l iliofemoral endarterectomy and bovine patch angioplasty (8/21/23) and L CFA/SFA arterectomy and SFA, YISEL/EIA stent (05/2020) who initially presented with bilateral dry gangrene and colitis. Course complicated by acute hypoxic respiratory failure and septic shock requiring vasopressors and intubation. Patient s/p right BKA and left AKA. Now  extubated and off pressors, planning for revision of amputations.      Update 4/27:  -Na from 148 to 150 - will increase FWF to 500q6 and plan to repeat RFP in PM. Pending PM rfp, can give more D5-water  -Per vascular surgery continue daily dressing change, NWB BLE  -Pending SNF placement      #Bilateral dry gangrene  #Peripheral vascular disease  #Coronary artery disease  ::Most recent cath report in 8/23: EF of 40%, moderate coronary artery disease with significant stenosis  ::Extensive history of vascular disease with b/l iliofemoral endarterectomy and angioplasty, arterectomies, stents  ::Taken emergently for right BKA, left AKA on 4/5  -POD#3 of right AKA, dressing change today  -Oxycodone 5mg q4h PRN, Dilaudid 0.4mg PRN for pain  -Atorvastatin 40mg  -Aspirin 81mg daily  -Vascular surgery to follow outpatient    #Acute hypoxic respiratory failure  #Community acquired pneumonia  #Pleural effusion  #Mucus plugging  #Atelectasis  ::Growing Corynebacterium striatum  ::Abx history: Azithromycin 500 mg daily (4/1 - 4/4), Ceftriaxone (3/31 - 4/5), Doxycycline (4/4 - 4/5), Flagyl (3/25 - 4/5), Vancomycin/Zosyn (4/5-4/12, 4/13-4/15)   ::S/p two thoracentesis  -Aggressive bronchopulmonary hygiene    #Acute encephalopathy  #Delirium  #Dysphagia  ::Likely septic in etiology, improved  ::Vitamin B12 and folate WNL, TSH 7.61 from fT4 WNL  -Delirium precautions  -Melatonin 5mg at bedtime  -Tylenol 650mg q6h PRN, Oxy 5mg q4h PRN, Dilaudid 0.4mg for breakthrough pain  -Failed barium swallow, s/p PEG placement  -PEG tube placed with bolus feeds    #Hypernatremia  ::Na of 150 at peak  ::Unclear etiology, possibly insensible losses from tachypnea, wounds  -Titrated free water flushes to 300cc q6h, can titrate further as needed    #Hepatic steatosis  ::Have been increasing for the past few days, liver enzymes have fluctuated during this hospital course  ::RUQ U/S on 4/10 showed hepatic steatosis with no evidence of biliary  dilation  -Continue to monitor    #COPD  ::No PFT on file  -DuoNeb q6h PRN  -Titrate oxygen to 88-92%    #Acute on chronic blood loss anemia  ::Oozing for post-operattive wounds  ::Required 1u PRBC  -Active T&S  -No signs of coagulopathy    Code status: DNR, ok for elective intubation  DVT ppx: Lovenox  GI ppx: Protonix  Oxygen: 2LNC  Access: PIV, external urinary catheter, Dobhoff  NOK: Pura Sandhu (wife) [phone]     Dispo: Discharge to rehab after post-op monitoring period         Daisy Charles MD  IM PGY-2

## 2024-04-28 LAB
ALBUMIN SERPL BCP-MCNC: 2 G/DL (ref 3.4–5)
ALP SERPL-CCNC: 307 U/L (ref 33–136)
ALT SERPL W P-5'-P-CCNC: 65 U/L (ref 10–52)
ANION GAP SERPL CALC-SCNC: 9 MMOL/L (ref 10–20)
AST SERPL W P-5'-P-CCNC: 78 U/L (ref 9–39)
BASOPHILS # BLD AUTO: 0.01 X10*3/UL (ref 0–0.1)
BASOPHILS NFR BLD AUTO: 0.1 %
BILIRUB SERPL-MCNC: 0.3 MG/DL (ref 0–1.2)
BUN SERPL-MCNC: 37 MG/DL (ref 6–23)
CALCIUM SERPL-MCNC: 7.9 MG/DL (ref 8.6–10.6)
CHLORIDE SERPL-SCNC: 107 MMOL/L (ref 98–107)
CO2 SERPL-SCNC: 33 MMOL/L (ref 21–32)
CREAT SERPL-MCNC: 0.37 MG/DL (ref 0.5–1.3)
EGFRCR SERPLBLD CKD-EPI 2021: >90 ML/MIN/1.73M*2
EOSINOPHIL # BLD AUTO: 0.17 X10*3/UL (ref 0–0.4)
EOSINOPHIL NFR BLD AUTO: 2.1 %
ERYTHROCYTE [DISTWIDTH] IN BLOOD BY AUTOMATED COUNT: 16.2 % (ref 11.5–14.5)
GLUCOSE SERPL-MCNC: 157 MG/DL (ref 74–99)
HCT VFR BLD AUTO: 25.4 % (ref 41–52)
HGB BLD-MCNC: 7.4 G/DL (ref 13.5–17.5)
IMM GRANULOCYTES # BLD AUTO: 0.07 X10*3/UL (ref 0–0.5)
IMM GRANULOCYTES NFR BLD AUTO: 0.9 % (ref 0–0.9)
LYMPHOCYTES # BLD AUTO: 1.12 X10*3/UL (ref 0.8–3)
LYMPHOCYTES NFR BLD AUTO: 14.1 %
MAGNESIUM SERPL-MCNC: 1.73 MG/DL (ref 1.6–2.4)
MCH RBC QN AUTO: 28.7 PG (ref 26–34)
MCHC RBC AUTO-ENTMCNC: 29.1 G/DL (ref 32–36)
MCV RBC AUTO: 98 FL (ref 80–100)
MONOCYTES # BLD AUTO: 0.54 X10*3/UL (ref 0.05–0.8)
MONOCYTES NFR BLD AUTO: 6.8 %
NEUTROPHILS # BLD AUTO: 6.01 X10*3/UL (ref 1.6–5.5)
NEUTROPHILS NFR BLD AUTO: 76 %
NRBC BLD-RTO: 0 /100 WBCS (ref 0–0)
PHOSPHATE SERPL-MCNC: 3.2 MG/DL (ref 2.5–4.9)
PLATELET # BLD AUTO: 480 X10*3/UL (ref 150–450)
POTASSIUM SERPL-SCNC: 3.8 MMOL/L (ref 3.5–5.3)
PROT SERPL-MCNC: 5.1 G/DL (ref 6.4–8.2)
RBC # BLD AUTO: 2.58 X10*6/UL (ref 4.5–5.9)
SODIUM SERPL-SCNC: 145 MMOL/L (ref 136–145)
WBC # BLD AUTO: 7.9 X10*3/UL (ref 4.4–11.3)

## 2024-04-28 PROCEDURE — 83735 ASSAY OF MAGNESIUM: CPT

## 2024-04-28 PROCEDURE — 1100000001 HC PRIVATE ROOM DAILY

## 2024-04-28 PROCEDURE — 2500000001 HC RX 250 WO HCPCS SELF ADMINISTERED DRUGS (ALT 637 FOR MEDICARE OP)

## 2024-04-28 PROCEDURE — 99232 SBSQ HOSP IP/OBS MODERATE 35: CPT

## 2024-04-28 PROCEDURE — 84100 ASSAY OF PHOSPHORUS: CPT

## 2024-04-28 PROCEDURE — 36415 COLL VENOUS BLD VENIPUNCTURE: CPT

## 2024-04-28 PROCEDURE — 80053 COMPREHEN METABOLIC PANEL: CPT

## 2024-04-28 PROCEDURE — 94668 MNPJ CHEST WALL SBSQ: CPT

## 2024-04-28 PROCEDURE — 2500000004 HC RX 250 GENERAL PHARMACY W/ HCPCS (ALT 636 FOR OP/ED)

## 2024-04-28 PROCEDURE — 85025 COMPLETE CBC W/AUTO DIFF WBC: CPT

## 2024-04-28 RX ORDER — ESOMEPRAZOLE MAGNESIUM 40 MG/1
10 GRANULE, DELAYED RELEASE ORAL
Status: DISCONTINUED | OUTPATIENT
Start: 2024-04-28 | End: 2024-04-28

## 2024-04-28 RX ORDER — ESOMEPRAZOLE MAGNESIUM 40 MG/1
40 GRANULE, DELAYED RELEASE ORAL
Status: DISCONTINUED | OUTPATIENT
Start: 2024-04-29 | End: 2024-04-30 | Stop reason: HOSPADM

## 2024-04-28 RX ADMIN — ASPIRIN 81 MG CHEWABLE TABLET 81 MG: 81 TABLET CHEWABLE at 10:36

## 2024-04-28 RX ADMIN — FOLIC ACID 1 MG: 1 TABLET ORAL at 10:36

## 2024-04-28 RX ADMIN — Medication 5 MG: at 18:23

## 2024-04-28 RX ADMIN — Medication 200 MCG: at 10:36

## 2024-04-28 RX ADMIN — ENOXAPARIN SODIUM 40 MG: 100 INJECTION SUBCUTANEOUS at 10:36

## 2024-04-28 RX ADMIN — ESOMEPRAZOLE MAGNESIUM 10 MG: 40 FOR SUSPENSION ORAL at 10:39

## 2024-04-28 ASSESSMENT — COGNITIVE AND FUNCTIONAL STATUS - GENERAL
EATING MEALS: TOTAL
STANDING UP FROM CHAIR USING ARMS: TOTAL
DRESSING REGULAR UPPER BODY CLOTHING: TOTAL
WALKING IN HOSPITAL ROOM: TOTAL
PERSONAL GROOMING: TOTAL
MOBILITY SCORE: 7
DRESSING REGULAR LOWER BODY CLOTHING: TOTAL
TOILETING: TOTAL
WALKING IN HOSPITAL ROOM: TOTAL
DAILY ACTIVITIY SCORE: 6
HELP NEEDED FOR BATHING: TOTAL
CLIMB 3 TO 5 STEPS WITH RAILING: TOTAL
TURNING FROM BACK TO SIDE WHILE IN FLAT BAD: TOTAL
DAILY ACTIVITIY SCORE: 6
TOILETING: TOTAL
EATING MEALS: TOTAL
PERSONAL GROOMING: TOTAL
STANDING UP FROM CHAIR USING ARMS: TOTAL
DRESSING REGULAR UPPER BODY CLOTHING: TOTAL
MOVING TO AND FROM BED TO CHAIR: TOTAL
CLIMB 3 TO 5 STEPS WITH RAILING: TOTAL
MOVING FROM LYING ON BACK TO SITTING ON SIDE OF FLAT BED WITH BEDRAILS: A LOT
MOVING TO AND FROM BED TO CHAIR: TOTAL
TURNING FROM BACK TO SIDE WHILE IN FLAT BAD: TOTAL
HELP NEEDED FOR BATHING: TOTAL
MOBILITY SCORE: 7
MOVING FROM LYING ON BACK TO SITTING ON SIDE OF FLAT BED WITH BEDRAILS: A LOT
DRESSING REGULAR LOWER BODY CLOTHING: TOTAL

## 2024-04-28 ASSESSMENT — PAIN - FUNCTIONAL ASSESSMENT: PAIN_FUNCTIONAL_ASSESSMENT: 0-10

## 2024-04-28 ASSESSMENT — PAIN SCALES - GENERAL
PAINLEVEL_OUTOF10: 0 - NO PAIN
PAINLEVEL_OUTOF10: 0 - NO PAIN

## 2024-04-28 NOTE — PROGRESS NOTES
Ry Sandhu is a 74 y.o. male on day 24 of admission presenting with Pneumonia.    Subjective   NAEON. Pt still in mittens to prevent pulling of lines/tubes. Pt on 3L O2 via NC. Denying any bleeding, pain, difficulty breathing    Active Medications  aspirin, 81 mg, oral, Daily  [Held by provider] atorvastatin, 40 mg, nasogastric tube, Daily  enoxaparin, 40 mg, subcutaneous, q24h  ergocalciferol, 200 mcg, nasogastric tube, Daily  folic acid, 1 mg, nasogastric tube, Daily  melatonin, 5 mg, nasogastric tube, Daily  pantoprazole, 40 mg, oral, Daily before breakfast    PRN medications: acetaminophen, dextrose, dextrose, glucagon, glucagon, HYDROmorphone, ipratropium-albuteroL, loperamide, oxyCODONE, oxygen, polyethylene glycol, silver nitrate applicators                  Objective     Physical Exam  Constitutional:       Appearance: Normal appearance.   HENT:      Head: Normocephalic and atraumatic.      Mouth/Throat:      Mouth: Mucous membranes are dry.      Pharynx: No posterior oropharyngeal erythema.   Eyes:      Extraocular Movements: Extraocular movements intact.      Pupils: Pupils are equal, round, and reactive to light.   Cardiovascular:      Rate and Rhythm: Normal rate and regular rhythm.      Pulses: Normal pulses.      Heart sounds: Normal heart sounds.   Pulmonary:      Comments: Slightly increased WOB, tachypneic, diminished air entry in the bases  Abdominal:      General: Abdomen is flat. There is no distension.      Palpations: Abdomen is soft.      Tenderness: There is no abdominal tenderness.      Comments: PEG in place. Dressing is c/d/i   Musculoskeletal:      Right lower leg: No edema.      Left lower leg: No edema.      Comments: S/p bilateral BKA. Dressings are c/d/i   Skin:     Findings: Bruising present.   Neurological:      General: No focal deficit present.      Mental Status: He is alert.      Comments: Aox2, some deficits in attention         Last Recorded Vitals  Blood pressure  "123/58, pulse 86, temperature 36.2 °C (97.2 °F), temperature source Temporal, resp. rate 18, height 1.88 m (6' 2\"), weight 49.5 kg (109 lb 2 oz), SpO2 98%.  Intake/Output last 3 Shifts:  I/O last 3 completed shifts:  In: 4990 (100.8 mL/kg) [Blood:350; NG/GT:4640]  Out: 1 (0 mL/kg) [Stool:1]  Weight: 49.5 kg     Relevant Results  Results from last 7 days   Lab Units 04/27/24  0848   WBC AUTO x10*3/uL 7.1   HEMOGLOBIN g/dL 7.6*   HEMATOCRIT % 25.0*   PLATELETS AUTO x10*3/uL 414      Results from last 7 days   Lab Units 04/27/24  1748   SODIUM mmol/L 145   POTASSIUM mmol/L 4.3   CHLORIDE mmol/L 106   CO2 mmol/L 33*   BUN mg/dL 37*   CREATININE mg/dL 0.36*   GLUCOSE mg/dL 132*   CALCIUM mg/dL 7.9*          XR chest 1 view    Result Date: 4/16/2024    1. Interval improvement in hazy right-sided opacification, with progression to multifocal patchy airspace opacities and associated costophrenic angle blunting. Persistent silhouetting of the right hemidiaphragm and costophrenic angle. Interval improvement of silhouetting of the right heart border. Findings likely represent a combination of effusion and associated consolidation. 2. Similar hazy left basilar opacities, which may represent a infectious/inflammatory process. 3. Coarse background of interstitial markings, which can be seen in the setting of chronic lung disease.     XR chest 1 view    Result Date: 4/13/2024     1. Interval complete opacification of the right hemithorax. Correlate with mucous plugging and right lung collapse. 2. Patchy airspace opacity in the left mid and lower lung. Correlate with concern for infection and aspiration. 3. Component of interstitial pulmonary prominence and edema.       US right upper quadrant    Result Date: 4/11/2024    1. Diffusely echogenic liver parenchyma, similar to prior exam, consistent with hepatic steatosis. 2. Partially visualized right-sided pleural effusion.      CT head wo IV contrast    Result Date: 4/10/2024     No " acute intracranial abnormality. Chronic intracranial findings as above.       CT chest wo IV contrast    Result Date: 4/6/2024      1.  Interlobular septal thickening with extensive ground-glass and consolidative opacities throughout bilateral lungs with more coarse opacities in the posterior aspect of the right upper and lower lobes. There are moderate sized bilateral pleural effusions. Findings are nonspecific and may represent atypical infection, pulmonary hemorrhage, or pulmonary edema. Acute respiratory distress syndrome may have a similar appearance. 2. Severe coronary artery calcifications. Recommend correlation with coronary artery disease risk factors. 3. Moderate emphysematous changes.     US right upper quadrant    Result Date: 4/5/2024    1. Diffusely echogenic liver parenchyma consistent with steatosis. 2. Thickened/edematous gallbladder wall with no evidence of hyperemia, gallbladder wall distention or cholelithiasis. Findings are equivocal and most likely due to fluid overload.     CT head wo IV contrast    Result Date: 4/5/2024    No evidence of acute intracranial abnormality. If concerns for an acute stroke may consider brain MRI for further evaluation.      XR chest 1 view    Result Date: 4/5/2024    1. Perihilar and predominantly central airspace opacity in bilateral lungs. Findings may be due to severe edema with infection or aspiration not excluded. 2. Bilateral pleural effusion, left more than right. 3. Nonobstructive bowel gas pattern. 4. Medical devices as above.                      Assessment/Plan   Patient is a 74-year-old male with a history of HTN, HLD, COPD, CAD, PAD s/p s/p b/l iliofemoral endarterectomy and bovine patch angioplasty (8/21/23) and L CFA/SFA arterectomy and SFA, YISEL/EIA stent (05/2020) who initially presented with bilateral dry gangrene and colitis. Course complicated by acute hypoxic respiratory failure and septic shock requiring vasopressors and intubation. Patient s/p  right BKA and left AKA. Now extubated and off pressors, planning for revision of amputations.      Update 4/28:  -Fu morning sodium for continued titration of FWF  -Per vascular surgery continue daily dressing change, NWB BLE  -Pending SNF placement    #Bilateral dry gangrene  #Peripheral vascular disease  #Coronary artery disease  ::Most recent cath report in 8/23: EF of 40%, moderate coronary artery disease with significant stenosis  ::Extensive history of vascular disease with b/l iliofemoral endarterectomy and angioplasty, arterectomies, stents  ::Taken emergently for right BKA, left AKA on 4/5  -Oxycodone 5mg q4h PRN, Dilaudid 0.4mg PRN for pain. Has not used pain PRNs in days  -Atorvastatin 40mg held currently  -Aspirin 81mg daily  -Daily dressing changes per Vascular Surgery  -Vascular surgery to follow outpatient    #Acute hypoxic respiratory failure  #Community acquired pneumonia  #Pleural effusion  #Mucus plugging  #Atelectasis  ::Growing Corynebacterium striatum  ::Abx history: Azithromycin 500 mg daily (4/1 - 4/4), Ceftriaxone (3/31 - 4/5), Doxycycline (4/4 - 4/5), Flagyl (3/25 - 4/5), Vancomycin/Zosyn (4/5-4/12, 4/13-4/15)   ::S/p two thoracentesis  -Aggressive bronchopulmonary hygiene    #Acute encephalopathy  #Delirium  #Dysphagia  ::Likely septic in etiology, improved  ::Vitamin B12 and folate WNL, TSH 7.61 from fT4 WNL  -Delirium precautions  -Melatonin 5mg at bedtime  -Tylenol 650mg q6h PRN, Oxy 5mg q4h PRN, Dilaudid 0.4mg for breakthrough pain  -Failed barium swallow, s/p PEG placement  -PEG tube placed with bolus feeds    #Hypernatremia  ::Na of 151 at peak  ::Unclear etiology, possibly insensible losses from tachypnea, wounds  -Titrated free water flushes to 500cc q6h, can titrate further as needed    #Hepatic steatosis  ::Have been increasing for the past few days, liver enzymes have fluctuated during this hospital course  ::RUQ U/S on 4/10 showed hepatic steatosis with no evidence of biliary  dilation  -Continue to monitor  -Atorvastatin held, may resume based on LFT trend    #COPD  ::No PFT on file  -DuoNeb q6h PRN  -Titrate oxygen to 88-92%    #Acute on chronic blood loss anemia  ::Oozing for post-operattive wounds  ::Required 1u PRBC  -Active T&S  -No signs of coagulopathy    Code status: DNR, ok for elective intubation  DVT ppx: Lovenox  GI ppx: Protonix  Oxygen: 2LNC  Access: PIV, external urinary catheter, Dobhoff  NOK: Pura Dubonmons (wife) [phone]     Dispo: Discharge to rehab after post-op monitoring period         Wayne Rosen MD  IM PGY-1

## 2024-04-28 NOTE — CARE PLAN
Patient had no complaints of pain, slept well throughout the night.     Problem: Skin  Goal: Prevent/manage excess moisture  Recent Flowsheet Documentation  Taken 4/27/2024 2327 by Esperanza Steele RN  Prevent/manage excess moisture:   Moisturize dry skin   Cleanse incontinence/protect with barrier cream  Goal: Prevent/manage excess moisture  Flowsheets (Taken 4/27/2024 2327)  Prevent/manage excess moisture:   Moisturize dry skin   Cleanse incontinence/protect with barrier cream     Problem: Fall/Injury  Goal: Not fall by end of shift  Outcome: Progressing  Goal: Be free from injury by end of the shift  Outcome: Progressing   The patient's goals for the shift include Maintain patient safety    The clinical goals for the shift include patient will remain free from injuries throughout the shift.    Patient remains safe and stable. No c/o discomfort. Remained on bilat mitt restraints to prevent interference with medical device.

## 2024-04-28 NOTE — PROGRESS NOTES
Pt should be ready for discharge tomorrow. To go to [facility].  They should have a bed tomorrow.  Sent updated notes.  Care Transitions will follow up tomorrow on bed availability and discharge planning.  Елена PRATT, ALISHAS (ex [id number])

## 2024-04-29 PROBLEM — S88.919A AMPUTATION OF LEG (MULTI): Status: ACTIVE | Noted: 2024-04-29

## 2024-04-29 LAB
ABO GROUP (TYPE) IN BLOOD: NORMAL
ALBUMIN SERPL BCP-MCNC: 2.1 G/DL (ref 3.4–5)
ALP SERPL-CCNC: 297 U/L (ref 33–136)
ALT SERPL W P-5'-P-CCNC: 58 U/L (ref 10–52)
ANION GAP SERPL CALC-SCNC: 11 MMOL/L (ref 10–20)
ANTIBODY SCREEN: NORMAL
AST SERPL W P-5'-P-CCNC: 70 U/L (ref 9–39)
BASOPHILS # BLD AUTO: 0.03 X10*3/UL (ref 0–0.1)
BASOPHILS NFR BLD AUTO: 0.4 %
BILIRUB SERPL-MCNC: 0.3 MG/DL (ref 0–1.2)
BUN SERPL-MCNC: 35 MG/DL (ref 6–23)
CALCIUM SERPL-MCNC: 8.2 MG/DL (ref 8.6–10.6)
CHLORIDE SERPL-SCNC: 104 MMOL/L (ref 98–107)
CO2 SERPL-SCNC: 31 MMOL/L (ref 21–32)
CREAT SERPL-MCNC: 0.36 MG/DL (ref 0.5–1.3)
EGFRCR SERPLBLD CKD-EPI 2021: >90 ML/MIN/1.73M*2
EOSINOPHIL # BLD AUTO: 0.15 X10*3/UL (ref 0–0.4)
EOSINOPHIL NFR BLD AUTO: 2 %
ERYTHROCYTE [DISTWIDTH] IN BLOOD BY AUTOMATED COUNT: 16.5 % (ref 11.5–14.5)
GLUCOSE SERPL-MCNC: 104 MG/DL (ref 74–99)
HCT VFR BLD AUTO: 25.9 % (ref 41–52)
HGB BLD-MCNC: 7.9 G/DL (ref 13.5–17.5)
IMM GRANULOCYTES # BLD AUTO: 0.08 X10*3/UL (ref 0–0.5)
IMM GRANULOCYTES NFR BLD AUTO: 1.1 % (ref 0–0.9)
LYMPHOCYTES # BLD AUTO: 1.33 X10*3/UL (ref 0.8–3)
LYMPHOCYTES NFR BLD AUTO: 17.5 %
MAGNESIUM SERPL-MCNC: 1.78 MG/DL (ref 1.6–2.4)
MCH RBC QN AUTO: 30.3 PG (ref 26–34)
MCHC RBC AUTO-ENTMCNC: 30.5 G/DL (ref 32–36)
MCV RBC AUTO: 99 FL (ref 80–100)
MONOCYTES # BLD AUTO: 0.67 X10*3/UL (ref 0.05–0.8)
MONOCYTES NFR BLD AUTO: 8.8 %
NEUTROPHILS # BLD AUTO: 5.34 X10*3/UL (ref 1.6–5.5)
NEUTROPHILS NFR BLD AUTO: 70.2 %
NRBC BLD-RTO: 0 /100 WBCS (ref 0–0)
PHOSPHATE SERPL-MCNC: 3.9 MG/DL (ref 2.5–4.9)
PLATELET # BLD AUTO: 503 X10*3/UL (ref 150–450)
POTASSIUM SERPL-SCNC: 4.1 MMOL/L (ref 3.5–5.3)
PROT SERPL-MCNC: 5.3 G/DL (ref 6.4–8.2)
RBC # BLD AUTO: 2.61 X10*6/UL (ref 4.5–5.9)
RH FACTOR (ANTIGEN D): NORMAL
SODIUM SERPL-SCNC: 142 MMOL/L (ref 136–145)
WBC # BLD AUTO: 7.6 X10*3/UL (ref 4.4–11.3)

## 2024-04-29 PROCEDURE — 2500000004 HC RX 250 GENERAL PHARMACY W/ HCPCS (ALT 636 FOR OP/ED)

## 2024-04-29 PROCEDURE — 1100000001 HC PRIVATE ROOM DAILY

## 2024-04-29 PROCEDURE — 2500000001 HC RX 250 WO HCPCS SELF ADMINISTERED DRUGS (ALT 637 FOR MEDICARE OP)

## 2024-04-29 PROCEDURE — 86901 BLOOD TYPING SEROLOGIC RH(D): CPT

## 2024-04-29 PROCEDURE — 84100 ASSAY OF PHOSPHORUS: CPT

## 2024-04-29 PROCEDURE — 99231 SBSQ HOSP IP/OBS SF/LOW 25: CPT

## 2024-04-29 PROCEDURE — 80053 COMPREHEN METABOLIC PANEL: CPT

## 2024-04-29 PROCEDURE — 83735 ASSAY OF MAGNESIUM: CPT

## 2024-04-29 PROCEDURE — 36415 COLL VENOUS BLD VENIPUNCTURE: CPT

## 2024-04-29 PROCEDURE — 97530 THERAPEUTIC ACTIVITIES: CPT | Mod: GP,CQ

## 2024-04-29 PROCEDURE — 85025 COMPLETE CBC W/AUTO DIFF WBC: CPT

## 2024-04-29 RX ORDER — ERGOCALCIFEROL (VITAMIN D2) 200 MCG/ML
200 DROPS ORAL DAILY
Qty: 30 ML | Refills: 0 | Status: CANCELLED | OUTPATIENT
Start: 2024-04-29 | End: 2024-05-29

## 2024-04-29 RX ORDER — ACETAMINOPHEN 500 MG
5 TABLET ORAL DAILY
Qty: 30 TABLET | Refills: 0 | Status: CANCELLED | OUTPATIENT
Start: 2024-04-29 | End: 2024-05-29

## 2024-04-29 RX ORDER — FOLIC ACID 1 MG/1
1 TABLET ORAL DAILY
Qty: 30 TABLET | Refills: 0 | Status: CANCELLED | OUTPATIENT
Start: 2024-04-29 | End: 2024-05-29

## 2024-04-29 RX ORDER — POLYETHYLENE GLYCOL 3350 17 G/17G
17 POWDER, FOR SOLUTION ORAL DAILY PRN
Qty: 30 PACKET | Refills: 0 | Status: CANCELLED | OUTPATIENT
Start: 2024-04-29 | End: 2024-05-29

## 2024-04-29 RX ORDER — ACETAMINOPHEN 160 MG/5ML
650 SOLUTION ORAL EVERY 6 HOURS PRN
Qty: 2436 ML | Refills: 0 | Status: CANCELLED | OUTPATIENT
Start: 2024-04-29 | End: 2024-05-29

## 2024-04-29 RX ORDER — ESOMEPRAZOLE MAGNESIUM 40 MG/1
40 GRANULE, DELAYED RELEASE ORAL
Qty: 30 PACKET | Refills: 0 | Status: CANCELLED | OUTPATIENT
Start: 2024-04-29 | End: 2024-05-29

## 2024-04-29 RX ORDER — IPRATROPIUM BROMIDE AND ALBUTEROL SULFATE 2.5; .5 MG/3ML; MG/3ML
3 SOLUTION RESPIRATORY (INHALATION) EVERY 6 HOURS PRN
Qty: 180 ML | Refills: 11 | Status: CANCELLED | OUTPATIENT
Start: 2024-04-29

## 2024-04-29 RX ORDER — SILVER NITRATE 38.21; 12.74 MG/1; MG/1
STICK TOPICAL 2 TIMES DAILY PRN
Qty: 100 EACH | Refills: 0 | Status: CANCELLED | OUTPATIENT
Start: 2024-04-29 | End: 2024-05-05

## 2024-04-29 RX ADMIN — ENOXAPARIN SODIUM 40 MG: 100 INJECTION SUBCUTANEOUS at 10:20

## 2024-04-29 RX ADMIN — ASPIRIN 81 MG CHEWABLE TABLET 81 MG: 81 TABLET CHEWABLE at 10:20

## 2024-04-29 RX ADMIN — Medication 200 MCG: at 10:20

## 2024-04-29 RX ADMIN — FOLIC ACID 1 MG: 1 TABLET ORAL at 10:20

## 2024-04-29 RX ADMIN — Medication 5 MG: at 18:41

## 2024-04-29 RX ADMIN — ESOMEPRAZOLE MAGNESIUM 40 MG: 40 FOR SUSPENSION ORAL at 06:05

## 2024-04-29 ASSESSMENT — COGNITIVE AND FUNCTIONAL STATUS - GENERAL
MOVING FROM LYING ON BACK TO SITTING ON SIDE OF FLAT BED WITH BEDRAILS: A LOT
MOBILITY SCORE: 7
DRESSING REGULAR LOWER BODY CLOTHING: TOTAL
TURNING FROM BACK TO SIDE WHILE IN FLAT BAD: TOTAL
TOILETING: TOTAL
STANDING UP FROM CHAIR USING ARMS: TOTAL
CLIMB 3 TO 5 STEPS WITH RAILING: TOTAL
WALKING IN HOSPITAL ROOM: TOTAL
MOVING FROM LYING ON BACK TO SITTING ON SIDE OF FLAT BED WITH BEDRAILS: A LOT
CLIMB 3 TO 5 STEPS WITH RAILING: TOTAL
TURNING FROM BACK TO SIDE WHILE IN FLAT BAD: TOTAL
MOBILITY SCORE: 7
HELP NEEDED FOR BATHING: TOTAL
MOVING TO AND FROM BED TO CHAIR: TOTAL
EATING MEALS: TOTAL
DAILY ACTIVITIY SCORE: 6
PERSONAL GROOMING: TOTAL
WALKING IN HOSPITAL ROOM: TOTAL
MOVING TO AND FROM BED TO CHAIR: TOTAL
DRESSING REGULAR UPPER BODY CLOTHING: TOTAL
STANDING UP FROM CHAIR USING ARMS: TOTAL

## 2024-04-29 ASSESSMENT — PAIN SCALES - GENERAL
PAINLEVEL_OUTOF10: 0 - NO PAIN
PAINLEVEL_OUTOF10: 0 - NO PAIN

## 2024-04-29 ASSESSMENT — PAIN - FUNCTIONAL ASSESSMENT: PAIN_FUNCTIONAL_ASSESSMENT: 0-10

## 2024-04-29 NOTE — DISCHARGE INSTRUCTIONS
Dear Mr. Sandhu,    You were admitted to [facility] as a transfer from [facility] ICU on 4/4/2024 for concerns of a pneumonia leading to an infection in your blood leading you to require additional ICU stay. In addition, you had some changes to your mental state and required a tube to be placed in your throat to help you breath. You were placed on appropriate antibiotics to help you clear this infection. You also needed a chest tube to help drain fluids in your lungs from the pneumonia. Your blood count was low and you required blood transfusions to get it up.     You found it difficult to take food through your mouth and eventually a tube was placed through your abdominal area and into your stomach to help you get nutrition, medication and water. You were seen by the Vascular surgery team and they did a revision to your knee amputations to make sure that any infected areas were removed. We also noticed that your blood sodium level was high on our lab checks and we have adjusted the water flushes in your abdominal feeding tube to help control for this. You will require follow-up labs 1 week from your discharge in the outpatient setting to make sure that this is within the permitted levels.     Our Physical therapy and Occupational therapy teams evaluated you and recommended that you be discharged to a Skilled Nursing Facility to continue with your rehab. Please see recommended daily dressing change instructions for you and your rehab care team:    Daily dressing changes to bilateral AKA wounds:  Cleanse, dry, apply cover sponge over staple line, apply occlusive dressing (tegederm) to create a barrier over the incision; wrap gently with ACE for edema management.    You eventually remained hemodynamically stable whilst admitted and showed interval improvement with the regimen we had you on. We worked with our pharmacists to ensure you were comfortable and your medications were optimized given your  comorbidities.     On discharge, you should follow up with your PCP who will continue to follow your sodium labs and advise further changes. We have stopped metoprolol succinate and switched you to metoprolol tartrate so that you can get that medication through your peg tube. Please discuss with your pcp whether you need a ppi outpatient and whether you should resume losartan for blood pressure.     Also plan to follow up with your Vascular Surgery team in a month for interval assessment. Per vascular surgery recommendations, we have stopped your plavix, but you should continue your aspirin.   The team will contact you regarding your appointments, but you can also call on [phone] to plan for this.     Thanks for choosing [facility].    Your Medicine Team

## 2024-04-29 NOTE — CARE PLAN
Patient is confused, Restraints/mitts on due to interference with medical devices. At times pt is restless. Dressings dry and intact, pt to be transferred to SNF tomorrow pending all verifications. Bed alarm active and audible.     Problem: Daily Care  Goal: Daily care needs are met  Outcome: Progressing     Problem: Safety - Medical Restraint  Goal: Remains free of injury from restraints (Restraint for Interference with Medical Device)  Outcome: Progressing     Problem: Skin  Goal: Participates in plan/prevention/treatment measures  Outcome: Progressing  Goal: Prevent/manage excess moisture  Recent Flowsheet Documentation  Taken 4/28/2024 2220 by Esperanza Steele RN  Prevent/manage excess moisture:   Cleanse incontinence/protect with barrier cream   Moisturize dry skin     Problem: Skin  Goal: Prevent/manage excess moisture  4/28/2024 2221 by Esperanza Steele RN  Outcome: Progressing  4/28/2024 2220 by Esperanza Steele RN  Flowsheets (Taken 4/28/2024 2220)  Prevent/manage excess moisture:   Cleanse incontinence/protect with barrier cream   Moisturize dry skin       Problem: Fall/Injury  Goal: Not fall by end of shift  Outcome: Progressing  Goal: Be free from injury by end of the shift  Outcome: Progressing    The patient's goals for the shift include Maintain patient safety    The clinical goals for the shift include Patient will remain safe and free from falls or injuries throughout the shift    Over the shift, the patient did  make progress toward the following goals.

## 2024-04-29 NOTE — PROGRESS NOTES
Daily Progress Note    Ry Sandhu is a 74 y.o. male admitted on 4/4/2024 11:44 PM for Pneumonia.     Overnight:  No acute events reported    Subjective   Pt was seen at the bedside and complained of dry oropharynx. Was told to use wet sponge stick often and ask nursing for it. Denied any fever/chills, chest pain/tightness, dyspnea, abdominal pain, N/V/D/C, dysuria.     Objective   Vitals: I/O:   Vitals:    04/29/24 1219   BP: 84/59   Pulse: 92   Resp: 26   Temp: 36.6 °C (97.9 °F)   SpO2: 93%        24hr Min/Max:  Temp  Min: 36.5 °C (97.7 °F)  Max: 36.6 °C (97.9 °F)  Pulse  Min: 85  Max: 92  BP  Min: 84/59  Max: 116/67  Resp  Min: 20  Max: 26  SpO2  Min: 93 %  Max: 98 %   Intake/Output Summary (Last 24 hours) at 4/29/2024 1841  Last data filed at 4/29/2024 1219  Gross per 24 hour   Intake 1720 ml   Output 1400 ml   Net 320 ml        Net IO Since Admission: 33,538.75 mL [04/29/24 1841]      PE:  General: Awake, alert, conversant, appears stated age  HEENT: Pupils equal and round, no scleral icterus or conjunctivitis. Mucous membranes dry.  Skin: No suspect lesions or rashes noted on visible skin  Chest: Mild WOB, on 3L NC O2  Cardiac: Regular rate and rhythm, normal s1, s2, no M/R/G, no JVD  Abdomen: Soft, ND, NT, no involuntary guarding - PEG  : No flank pain or indwelling urinary catheter  EXT: No peripheral edema, no asymmetry noted  MSK: B/l AKA stumps wrapped  Neuro: AOx2, moving all limbs spontaneously, follows commands  Psych: Some deficits in attention , appropriate mood and affect    Labs:  CBC RFP   Lab Results   Component Value Date    WBC 7.6 04/29/2024    HGB 7.9 (L) 04/29/2024    HCT 25.9 (L) 04/29/2024    MCV 99 04/29/2024     (H) 04/29/2024    NEUTROABS 5.34 04/29/2024    Lab Results   Component Value Date     04/29/2024    K 4.1 04/29/2024     04/29/2024    CO2 31 04/29/2024    BUN 35 (H) 04/29/2024    CREATININE 0.36 (L) 04/29/2024    CREATININE 0.37 (L) 04/28/2024     Lab  Results   Component Value Date    MG 1.78 04/29/2024    PHOS 3.9 04/29/2024    CALCIUM 8.2 (L) 04/29/2024         Hepatic Function ABG/VBG   Lab Results   Component Value Date    ALT 58 (H) 04/29/2024    AST 70 (H) 04/29/2024    GGT 1,358 (H) 04/10/2024    ALKPHOS 297 (H) 04/29/2024     Lab Results   Component Value Date    BILIDIR 0.2 04/13/2024      Lab Results   Component Value Date    PROTIME 10.4 04/23/2024    APTT 31 04/13/2024    INR 0.9 04/23/2024    Lab Results   Component Value Date    LACTATE 1.6 04/09/2024        Results from last 7 days   Lab Units 04/29/24  0629 04/28/24  1202 04/27/24  1748 04/27/24  0848 04/27/24  0032 04/26/24  1131 04/26/24  0916 04/25/24  1802 04/25/24  1211 04/25/24  1147 04/24/24  1911 04/24/24  1604   POCT GLUCOSE mg/dL  --   --   --   --   --  106*  --  160*  --  148* 135* 97   GLUCOSE mg/dL 104* 157* 132* 123* 107*  --    < >  --    < >  --   --   --     < > = values in this interval not displayed.     Imaging:  No results found.     Medications:  Scheduled Medications:  PRN Medications:    aspirin, 81 mg, oral, Daily  [Held by provider] atorvastatin, 40 mg, nasogastric tube, Daily  enoxaparin, 40 mg, subcutaneous, q24h  ergocalciferol, 200 mcg, nasogastric tube, Daily  esomeprazole, 40 mg, g-tube, Daily before breakfast  folic acid, 1 mg, nasogastric tube, Daily  melatonin, 5 mg, nasogastric tube, Daily     PRN medications: acetaminophen, dextrose, dextrose, glucagon, glucagon, HYDROmorphone, ipratropium-albuteroL, loperamide, oxyCODONE, oxygen, polyethylene glycol, silver nitrate applicators       Assessment/Plan:   Ry Sandhu is a 74 y.o. male  with a history of HTN, HLD, COPD, CAD, PAD s/p s/p b/l iliofemoral endarterectomy and bovine patch angioplasty (8/21/23) and L CFA/SFA arterectomy and SFA, YISEL/EIA stent (05/2020) who initially presented with bilateral dry gangrene and colitis. Course complicated by acute hypoxic respiratory failure and septic shock requiring  vasopressors and intubation. Patient s/p right BKA and left AKA. Now extubated and off pressors, planning for revision of amputations.     Update:  4/29:  -Na improved to 142. FWF decreased to 400cc q6h  -Will be transferred to SNF in AM 4/30 4/28:  -Fu morning sodium for continued titration of FWF  -Per vascular surgery continue daily dressing change, NWB BLE  -Pending SNF placement     #Bilateral dry gangrene  #Peripheral vascular disease  #Coronary artery disease  ::Most recent cath report in 8/23: EF of 40%, moderate coronary artery disease with significant stenosis  ::Extensive history of vascular disease with b/l iliofemoral endarterectomy and angioplasty, arterectomies, stents  ::Taken emergently for right BKA, left AKA on 4/5  -Oxycodone 5mg q4h PRN, Dilaudid 0.4mg PRN for pain. Has not used pain PRNs in days  -Atorvastatin 40mg held currently  -Aspirin 81mg daily  -Daily dressing changes per Vascular Surgery  -Vascular surgery to follow outpatient     #Acute hypoxic respiratory failure  #Community acquired pneumonia  #Pleural effusion  #Mucus plugging  #Atelectasis  ::Growing Corynebacterium striatum  ::Abx history: Azithromycin 500 mg daily (4/1 - 4/4), Ceftriaxone (3/31 - 4/5), Doxycycline (4/4 - 4/5), Flagyl (3/25 - 4/5), Vancomycin/Zosyn (4/5-4/12, 4/13-4/15)   ::S/p two thoracentesis  -Aggressive bronchopulmonary hygiene     #Acute encephalopathy  #Delirium  #Dysphagia  ::Likely septic in etiology, improved  ::Vitamin B12 and folate WNL, TSH 7.61 from fT4 WNL  -Delirium precautions  -Melatonin 5mg at bedtime  -Tylenol 650mg q6h PRN, Oxy 5mg q4h PRN, Dilaudid 0.4mg for breakthrough pain  -Failed barium swallow, s/p PEG placement  -PEG tube placed with bolus feeds     #Hypernatremia  ::Na of 151 at peak  :: Currently 145  ::Unclear etiology, possibly insensible losses from tachypnea, wounds  -Titrated free water flushes to 500cc q6h, can titrate further as needed     #Hepatic steatosis  ::Have been  increasing for the past few days, liver enzymes have fluctuated during this hospital course  ::RUQ U/S on 4/10 showed hepatic steatosis with no evidence of biliary dilation  -Continue to monitor  -Atorvastatin held, may resume based on LFT trend     #COPD  ::No PFT on file  -DuoNeb q6h PRN  -Titrate oxygen to 88-92%     #Acute on chronic blood loss anemia  ::Oozing for post-operattive wounds  ::Required 1u PRBC  -Active T&S  -No signs of coagulopathy     Code status: DNR, ok for elective intubation  DVT ppx: Lovenox  GI ppx: Protonix  Oxygen: 2LNC  Access: PIV, external urinary catheter, Dobhoff  NOK: Pura Sandhu (wife) [phone]     Patient assessment and plan staffed with the attending physician on service, Dr. Taylor.    Arthur Priest MD  Prelim PGY-1 Internal Medicine

## 2024-04-29 NOTE — PROGRESS NOTES
Physical Therapy    Physical Therapy Treatment    Patient Name: Ry Sandhu  MRN: [id number]  Today's Date: 4/29/2024  Time Calculation  Start Time: 1417  Stop Time: 1446  Time Calculation (min): 29 min       Assessment/Plan   PT Assessment  PT Assessment Results: Decreased strength, Decreased endurance, Impaired balance, Decreased mobility, Decreased cognition, Orthopedic restrictions  Rehab Prognosis: Good  Barriers to Discharge: None  Evaluation/Treatment Tolerance: Patient limited by fatigue  Medical Staff Made Aware: Yes  Barriers to Participation: Comorbidities  End of Session Communication: PCT/NA/CTA, Bedside nurse  End of Session Patient Position: Bed, 3 rail up, Alarm off, not on at start of session  PT Plan  Inpatient/Swing Bed or Outpatient: Inpatient  PT Plan  Treatment/Interventions: Bed mobility, Transfer training, Balance training, Strengthening, Endurance training, Therapeutic exercise, Therapeutic activity, Home exercise program  PT Plan: Skilled PT  PT Frequency: 5 times per week  PT Discharge Recommendations: Moderate intensity level of continued care  Equipment Recommended upon Discharge:  (none)  PT Recommended Transfer Status: Assist x1  PT - OK to Discharge: Yes      General Visit Information:   PT  Visit  PT Received On: 04/29/24  Response to Previous Treatment: Patient with no complaints from previous session.  General  Family/Caregiver Present: No  Co-Treatment: OT  Co-Treatment Reason: to maximize patient safety and mobility 2/2 patient requiring Max A x 2 for sup<->sit trans.  Prior to Session Communication: PCT/NA/CTA  Patient Position Received: Bed, 3 rail up, Alarm off, not on at start of session  Preferred Learning Style: verbal  General Comment: Patient supine in bed requiring assist with hygiene tasks due to patient being incontinent of stool    Subjective   Precautions:  Precautions  LE Weight Bearing Status:  (R BKA, L AKA)  Precautions Comment: peg, abd binder, jared  mitts  Vital Signs:       Objective   Pain:  Pain Assessment  Pain Assessment: 0-10  Pain Score: 0 - No pain  Cognition:  Cognition  Overall Cognitive Status: Impaired  Orientation Level: Disoriented to time  Postural Control:  Postural Control  Postural Control: Impaired  Posture Comment: Tends to leans posteriorly w static sitting. Unable to maintain posture without assist.  Static Sitting Balance  Static Sitting-Balance Support: Bilateral upper extremity supported  Static Sitting-Level of Assistance:  (Mod<->Max A for trunk control)  Static Sitting-Comment/Number of Minutes: 2-3 min    Treatments:       Bed Mobility  Bed Mobility: Yes  Bed Mobility 1  Bed Mobility 1: Rolling right, Rolling left  Level of Assistance 1: Moderate assistance, Moderate verbal cues (x2)  Bed Mobility Comments 1: x2 trials in each direction  Bed Mobility 2  Bed Mobility  2: Supine to sitting, Sitting to supine  Level of Assistance 2: Maximum assistance, Maximum verbal cues (x2)  Bed Mobility 3  Bed Mobility 3: Scooting  Level of Assistance 3: Dependent (to boost up in bed)  Bed Mobility Comments 3: with use of samson pad and draw sheet    Outcome Measures:  [facility] Basic Mobility  Turning from your back to your side while in a flat bed without using bedrails: A lot  Moving from lying on your back to sitting on the side of a flat bed without using bedrails: Total  Moving to and from bed to chair (including a wheelchair): Total  Standing up from a chair using your arms (e.g. wheelchair or bedside chair): Total  To walk in hospital room: Total  Climbing 3-5 steps with railing: Total  Basic Mobility - Total Score: 7    Education Documentation  Body Mechanics, taught by Shannon Christopher PTA at 4/29/2024  3:44 PM.  Learner: Patient  Readiness: Eager  Method: Explanation  Response: Needs Reinforcement    Home Exercise Program, taught by Shannon Christopher PTA at 4/29/2024  3:44 PM.  Learner: Patient  Readiness: Eager  Method: Explanation  Response:  Needs Reinforcement    Mobility Training, taught by Shannon Christopher PTA at 4/29/2024  3:44 PM.  Learner: Patient  Readiness: Eager  Method: Explanation  Response: Needs Reinforcement    Education Comments  No comments found.        OP EDUCATION:       Encounter Problems       Encounter Problems (Active)       PT Problem       Patient will complete bed mobility with MINx1 with HOB elevated, use of bedrail   (Progressing)       Start:  04/09/24    Expected End:  04/30/24            Patient will complete bed<->chair lateral slide transfer with OD x1 using LRD as needed without acute LOB  (Progressing)       Start:  04/09/24    Expected End:  04/30/24            Patient will complete static (contact guard assist x1) and dynamic (minimal assist x1) sitting balance activities using UE support as needed in order to maintain midline without acute LOB.  (Progressing)       Start:  04/09/24    Expected End:  04/30/24            Patient will participate in BLE there-ex program in order to assist in improving strength and to assist with the completion of functional mobility tasks.  (Progressing)       Start:  04/09/24    Expected End:  04/30/24               Pain - Adult               no

## 2024-04-29 NOTE — PROGRESS NOTES
Pt medically ready per medical team. [facility] had transport set for 1900 via [facility] but [facility] SN[facility] now requesting transport to changed for tomorrow morning. [facility] notified medical team and pt's RN and pending new time confirmation.   [facility] confirmed transport to [facility] for 11:30am on 4/30. Medical team, pt's RN, and pt's spouse, Pura aware.

## 2024-04-29 NOTE — CARE PLAN
The patient's goals for the shift include Maintain patient safety    The clinical goals for the shift include patient will remain free from injuries throughout the shift.     Patient remains safe and stable. No c/o discomfort. Remained on bilat mitt restraints to prevent interference with medical device.

## 2024-04-29 NOTE — SIGNIFICANT EVENT
Patient known to vascular surgery service s/p bilateral AKAs on 4/23.    Patient does not need further treatment with Plavix at this time. Ok for ASA from vascular standpoint.    Discussed with chief Dr. Rito Duran MD  PGY-3 General Surgery  Vascular Surgery [id number]

## 2024-04-30 VITALS
HEIGHT: 74 IN | OXYGEN SATURATION: 96 % | HEART RATE: 85 BPM | TEMPERATURE: 97.9 F | BODY MASS INDEX: 14.01 KG/M2 | WEIGHT: 109.13 LBS | DIASTOLIC BLOOD PRESSURE: 50 MMHG | SYSTOLIC BLOOD PRESSURE: 102 MMHG | RESPIRATION RATE: 16 BRPM

## 2024-04-30 LAB
ALBUMIN SERPL BCP-MCNC: 2 G/DL (ref 3.4–5)
ALP SERPL-CCNC: 273 U/L (ref 33–136)
ALT SERPL W P-5'-P-CCNC: 51 U/L (ref 10–52)
ANION GAP SERPL CALC-SCNC: 9 MMOL/L (ref 10–20)
AST SERPL W P-5'-P-CCNC: 54 U/L (ref 9–39)
BASOPHILS # BLD AUTO: 0.02 X10*3/UL (ref 0–0.1)
BASOPHILS NFR BLD AUTO: 0.3 %
BILIRUB SERPL-MCNC: 0.3 MG/DL (ref 0–1.2)
BUN SERPL-MCNC: 35 MG/DL (ref 6–23)
CALCIUM SERPL-MCNC: 8.2 MG/DL (ref 8.6–10.6)
CHLORIDE SERPL-SCNC: 105 MMOL/L (ref 98–107)
CO2 SERPL-SCNC: 32 MMOL/L (ref 21–32)
CREAT SERPL-MCNC: 0.36 MG/DL (ref 0.5–1.3)
EGFRCR SERPLBLD CKD-EPI 2021: >90 ML/MIN/1.73M*2
EOSINOPHIL # BLD AUTO: 0.19 X10*3/UL (ref 0–0.4)
EOSINOPHIL NFR BLD AUTO: 2.5 %
ERYTHROCYTE [DISTWIDTH] IN BLOOD BY AUTOMATED COUNT: 17 % (ref 11.5–14.5)
GLUCOSE SERPL-MCNC: 130 MG/DL (ref 74–99)
HCT VFR BLD AUTO: 25.6 % (ref 41–52)
HGB BLD-MCNC: 7.4 G/DL (ref 13.5–17.5)
IMM GRANULOCYTES # BLD AUTO: 0.1 X10*3/UL (ref 0–0.5)
IMM GRANULOCYTES NFR BLD AUTO: 1.3 % (ref 0–0.9)
LYMPHOCYTES # BLD AUTO: 1.14 X10*3/UL (ref 0.8–3)
LYMPHOCYTES NFR BLD AUTO: 14.9 %
MAGNESIUM SERPL-MCNC: 1.76 MG/DL (ref 1.6–2.4)
MCH RBC QN AUTO: 28.9 PG (ref 26–34)
MCHC RBC AUTO-ENTMCNC: 28.9 G/DL (ref 32–36)
MCV RBC AUTO: 100 FL (ref 80–100)
MONOCYTES # BLD AUTO: 0.69 X10*3/UL (ref 0.05–0.8)
MONOCYTES NFR BLD AUTO: 9 %
NEUTROPHILS # BLD AUTO: 5.52 X10*3/UL (ref 1.6–5.5)
NEUTROPHILS NFR BLD AUTO: 72 %
NRBC BLD-RTO: 0 /100 WBCS (ref 0–0)
PHOSPHATE SERPL-MCNC: 3.5 MG/DL (ref 2.5–4.9)
PLATELET # BLD AUTO: 544 X10*3/UL (ref 150–450)
POTASSIUM SERPL-SCNC: 4.1 MMOL/L (ref 3.5–5.3)
PROT SERPL-MCNC: 5.2 G/DL (ref 6.4–8.2)
RBC # BLD AUTO: 2.56 X10*6/UL (ref 4.5–5.9)
SODIUM SERPL-SCNC: 142 MMOL/L (ref 136–145)
WBC # BLD AUTO: 7.7 X10*3/UL (ref 4.4–11.3)

## 2024-04-30 PROCEDURE — 2500000001 HC RX 250 WO HCPCS SELF ADMINISTERED DRUGS (ALT 637 FOR MEDICARE OP)

## 2024-04-30 PROCEDURE — 36415 COLL VENOUS BLD VENIPUNCTURE: CPT

## 2024-04-30 PROCEDURE — 83735 ASSAY OF MAGNESIUM: CPT

## 2024-04-30 PROCEDURE — 84100 ASSAY OF PHOSPHORUS: CPT

## 2024-04-30 PROCEDURE — 2500000004 HC RX 250 GENERAL PHARMACY W/ HCPCS (ALT 636 FOR OP/ED)

## 2024-04-30 PROCEDURE — 80053 COMPREHEN METABOLIC PANEL: CPT

## 2024-04-30 PROCEDURE — 99239 HOSP IP/OBS DSCHRG MGMT >30: CPT

## 2024-04-30 PROCEDURE — 85025 COMPLETE CBC W/AUTO DIFF WBC: CPT

## 2024-04-30 RX ORDER — ASPIRIN 81 MG/1
81 TABLET ORAL DAILY
Start: 2024-04-30 | End: 2024-06-29

## 2024-04-30 RX ORDER — IPRATROPIUM BROMIDE AND ALBUTEROL SULFATE 2.5; .5 MG/3ML; MG/3ML
3 SOLUTION RESPIRATORY (INHALATION) EVERY 6 HOURS PRN
Qty: 180 ML | Refills: 11 | Status: SHIPPED | OUTPATIENT
Start: 2024-04-30

## 2024-04-30 RX ORDER — ATORVASTATIN CALCIUM 40 MG/1
40 TABLET, FILM COATED ORAL DAILY
Status: CANCELLED
Start: 2024-04-30 | End: 2024-05-30

## 2024-04-30 RX ORDER — ACETAMINOPHEN 160 MG/5ML
650 SOLUTION ORAL EVERY 6 HOURS PRN
Start: 2024-04-30 | End: 2024-05-30

## 2024-04-30 RX ORDER — ASPIRIN 81 MG/1
81 TABLET ORAL DAILY
Status: CANCELLED
Start: 2024-04-30

## 2024-04-30 RX ORDER — LOPERAMIDE HCL 1MG/7.5ML
2 LIQUID (ML) ORAL 4 TIMES DAILY PRN
Qty: 150 ML | Refills: 0 | Status: SHIPPED | OUTPATIENT
Start: 2024-04-30

## 2024-04-30 RX ORDER — ASCORBIC ACID 500 MG
500 TABLET ORAL 2 TIMES DAILY
Status: CANCELLED
Start: 2024-04-30 | End: 2024-05-30

## 2024-04-30 RX ORDER — POLYETHYLENE GLYCOL 3350 17 G/17G
17 POWDER, FOR SOLUTION ORAL DAILY PRN
Start: 2024-04-30 | End: 2024-05-30

## 2024-04-30 RX ORDER — BISMUTH SUBSALICYLATE 262 MG
1 TABLET,CHEWABLE ORAL DAILY
Start: 2024-04-30 | End: 2024-05-30

## 2024-04-30 RX ORDER — ESOMEPRAZOLE MAGNESIUM 40 MG/1
40 GRANULE, DELAYED RELEASE ORAL
Status: CANCELLED
Start: 2024-04-30 | End: 2024-05-30

## 2024-04-30 RX ORDER — ACETAMINOPHEN 500 MG
5 TABLET ORAL DAILY
Start: 2024-04-30 | End: 2024-05-30

## 2024-04-30 RX ORDER — POTASSIUM CHLORIDE 750 MG/1
20 TABLET, FILM COATED, EXTENDED RELEASE ORAL DAILY
Start: 2024-04-30 | End: 2024-05-30

## 2024-04-30 RX ORDER — METOPROLOL TARTRATE 25 MG/1
12.5 TABLET, FILM COATED ORAL 2 TIMES DAILY
Start: 2024-04-30 | End: 2024-06-29

## 2024-04-30 RX ORDER — FERROUS SULFATE 325(65) MG
325 TABLET ORAL 2 TIMES DAILY
Start: 2024-04-30 | End: 2024-05-30

## 2024-04-30 RX ORDER — LANOLIN ALCOHOL/MO/W.PET/CERES
400 CREAM (GRAM) TOPICAL ONCE
Status: COMPLETED | OUTPATIENT
Start: 2024-04-30 | End: 2024-04-30

## 2024-04-30 RX ORDER — ERGOCALCIFEROL (VITAMIN D2) 200 MCG/ML
200 DROPS ORAL DAILY
Start: 2024-04-30 | End: 2024-05-30

## 2024-04-30 RX ORDER — ASCORBIC ACID 500 MG
500 TABLET ORAL 2 TIMES DAILY
Start: 2024-04-30 | End: 2024-06-29

## 2024-04-30 RX ORDER — FOLIC ACID 1 MG/1
1 TABLET ORAL DAILY
Start: 2024-04-30 | End: 2024-05-30

## 2024-04-30 RX ADMIN — FOLIC ACID 1 MG: 1 TABLET ORAL at 09:08

## 2024-04-30 RX ADMIN — Medication 200 MCG: at 09:08

## 2024-04-30 RX ADMIN — MAGNESIUM OXIDE TAB 400 MG (241.3 MG ELEMENTAL MG) 400 MG: 400 (241.3 MG) TAB at 11:28

## 2024-04-30 RX ADMIN — ASPIRIN 81 MG CHEWABLE TABLET 81 MG: 81 TABLET CHEWABLE at 09:09

## 2024-04-30 RX ADMIN — ENOXAPARIN SODIUM 40 MG: 100 INJECTION SUBCUTANEOUS at 09:08

## 2024-04-30 RX ADMIN — ESOMEPRAZOLE MAGNESIUM 40 MG: 40 FOR SUSPENSION ORAL at 10:52

## 2024-04-30 ASSESSMENT — COGNITIVE AND FUNCTIONAL STATUS - GENERAL
MOVING FROM LYING ON BACK TO SITTING ON SIDE OF FLAT BED WITH BEDRAILS: TOTAL
TOILETING: TOTAL
PERSONAL GROOMING: TOTAL
STANDING UP FROM CHAIR USING ARMS: TOTAL
TURNING FROM BACK TO SIDE WHILE IN FLAT BAD: TOTAL
MOVING FROM LYING ON BACK TO SITTING ON SIDE OF FLAT BED WITH BEDRAILS: TOTAL
DRESSING REGULAR UPPER BODY CLOTHING: TOTAL
MOBILITY SCORE: 6
HELP NEEDED FOR BATHING: TOTAL
TOILETING: TOTAL
DRESSING REGULAR LOWER BODY CLOTHING: TOTAL
TURNING FROM BACK TO SIDE WHILE IN FLAT BAD: TOTAL
WALKING IN HOSPITAL ROOM: TOTAL
HELP NEEDED FOR BATHING: TOTAL
EATING MEALS: TOTAL
PERSONAL GROOMING: TOTAL
CLIMB 3 TO 5 STEPS WITH RAILING: TOTAL
DRESSING REGULAR UPPER BODY CLOTHING: TOTAL
MOVING TO AND FROM BED TO CHAIR: TOTAL
DRESSING REGULAR LOWER BODY CLOTHING: TOTAL
DAILY ACTIVITIY SCORE: 6
STANDING UP FROM CHAIR USING ARMS: TOTAL
EATING MEALS: TOTAL
MOBILITY SCORE: 6
WALKING IN HOSPITAL ROOM: TOTAL
MOVING TO AND FROM BED TO CHAIR: TOTAL
CLIMB 3 TO 5 STEPS WITH RAILING: TOTAL
DAILY ACTIVITIY SCORE: 6

## 2024-04-30 ASSESSMENT — PAIN SCALES - GENERAL
PAINLEVEL_OUTOF10: 0 - NO PAIN
PAINLEVEL_OUTOF10: 0 - NO PAIN

## 2024-04-30 NOTE — PROGRESS NOTES
Daily Progress Note    Ry Sandhu is a 74 y.o. male admitted on 4/4/2024 11:44 PM for Pneumonia.     Overnight:  No acute events reported    Subjective   Pt was seen at the bedside and was in no acute distress.  [] BM  [] Pain  [] A/O    Denied any fever/chills, chest pain/tightness, dyspnea, abdominal pain, N/V/D/C, dysuria.     Objective   Vitals: I/O:   Vitals:    04/30/24 0442   BP: 112/55   Pulse: 81   Resp: 16   Temp: 36.1 °C (97 °F)   SpO2: 95%        24hr Min/Max:  Temp  Min: 36.1 °C (97 °F)  Max: 36.6 °C (97.9 °F)  Pulse  Min: 81  Max: 92  BP  Min: 84/59  Max: 112/55  Resp  Min: 16  Max: 26  SpO2  Min: 93 %  Max: 95 %   Intake/Output Summary (Last 24 hours) at 4/30/2024 0713  Last data filed at 4/30/2024 0019  Gross per 24 hour   Intake 630 ml   Output 1000 ml   Net -370 ml        Net IO Since Admission: 33,768.75 mL [04/30/24 0713]      PE:  General: Awake, alert, conversant, appears stated age  HEENT: Pupils equal and round, no scleral icterus or conjunctivitis. Mucous membranes dry.  Skin: No suspect lesions or rashes noted on visible skin  Chest: Mild WOB, on 3L NC O2  Cardiac: Regular rate and rhythm, normal s1, s2, no M/R/G, no JVD  Abdomen: Soft, ND, NT, no involuntary guarding - PEG  : No flank pain or indwelling urinary catheter  EXT: No peripheral edema, no asymmetry noted  MSK: B/l AKA stumps wrapped  Neuro: AOx2, moving all limbs spontaneously, follows commands  Psych: Some deficits in attention , appropriate mood and affect    Labs:  CBC RFP   Lab Results   Component Value Date    WBC 7.6 04/29/2024    HGB 7.9 (L) 04/29/2024    HCT 25.9 (L) 04/29/2024    MCV 99 04/29/2024     (H) 04/29/2024    NEUTROABS 5.34 04/29/2024    Lab Results   Component Value Date     04/29/2024    K 4.1 04/29/2024     04/29/2024    CO2 31 04/29/2024    BUN 35 (H) 04/29/2024    CREATININE 0.36 (L) 04/29/2024    CREATININE 0.37 (L) 04/28/2024     Lab Results   Component Value Date    MG 1.78  04/29/2024    PHOS 3.9 04/29/2024    CALCIUM 8.2 (L) 04/29/2024         Hepatic Function ABG/VBG   Lab Results   Component Value Date    ALT 58 (H) 04/29/2024    AST 70 (H) 04/29/2024    GGT 1,358 (H) 04/10/2024    ALKPHOS 297 (H) 04/29/2024     Lab Results   Component Value Date    BILIDIR 0.2 04/13/2024      Lab Results   Component Value Date    PROTIME 10.4 04/23/2024    APTT 31 04/13/2024    INR 0.9 04/23/2024    Lab Results   Component Value Date    LACTATE 1.6 04/09/2024        Results from last 7 days   Lab Units 04/29/24  0629 04/28/24  1202 04/27/24  1748 04/27/24  0848 04/27/24  0032 04/26/24  1131 04/26/24  0916 04/25/24  1802 04/25/24  1211 04/25/24  1147 04/24/24  1911 04/24/24  1604   POCT GLUCOSE mg/dL  --   --   --   --   --  106*  --  160*  --  148* 135* 97   GLUCOSE mg/dL 104* 157* 132* 123* 107*  --    < >  --    < >  --   --   --     < > = values in this interval not displayed.     Imaging:  No results found.     Medications:  Scheduled Medications:  PRN Medications:    aspirin, 81 mg, oral, Daily  [Held by provider] atorvastatin, 40 mg, nasogastric tube, Daily  enoxaparin, 40 mg, subcutaneous, q24h  ergocalciferol, 200 mcg, nasogastric tube, Daily  esomeprazole, 40 mg, g-tube, Daily before breakfast  folic acid, 1 mg, nasogastric tube, Daily  melatonin, 5 mg, nasogastric tube, Daily     PRN medications: acetaminophen, dextrose, dextrose, glucagon, glucagon, HYDROmorphone, ipratropium-albuteroL, loperamide, oxyCODONE, oxygen, polyethylene glycol, silver nitrate applicators       Assessment/Plan:   Ry Sandhu is a 74 y.o. male  with a history of HTN, HLD, COPD, CAD, PAD s/p s/p b/l iliofemoral endarterectomy and bovine patch angioplasty (8/21/23) and L CFA/SFA arterectomy and SFA, YISEL/EIA stent (05/2020) who initially presented with bilateral dry gangrene and colitis. Course complicated by acute hypoxic respiratory failure and septic shock requiring vasopressors and intubation. Patient s/p  right BKA and left AKA. Now extubated and off pressors, planning for revision of amputations.     Update:  4/29:  -Na improved to 142. FWF decreased to 400cc q6h  -Will be transferred to SNF in AM 4/30 4/28:  -Fu morning sodium for continued titration of FWF  -Per vascular surgery continue daily dressing change, NWB BLE  -Pending SNF placement     #Bilateral dry gangrene  #Peripheral vascular disease  #Coronary artery disease  ::Most recent cath report in 8/23: EF of 40%, moderate coronary artery disease with significant stenosis  ::Extensive history of vascular disease with b/l iliofemoral endarterectomy and angioplasty, arterectomies, stents  ::Taken emergently for right BKA, left AKA on 4/5  -Oxycodone 5mg q4h PRN, Dilaudid 0.4mg PRN for pain. Has not used pain PRNs in days  -Atorvastatin 40mg held currently  -Aspirin 81mg daily  -Daily dressing changes per Vascular Surgery  -Vascular surgery to follow outpatient     #Acute hypoxic respiratory failure  #Community acquired pneumonia  #Pleural effusion  #Mucus plugging  #Atelectasis  ::Growing Corynebacterium striatum  ::Abx history: Azithromycin 500 mg daily (4/1 - 4/4), Ceftriaxone (3/31 - 4/5), Doxycycline (4/4 - 4/5), Flagyl (3/25 - 4/5), Vancomycin/Zosyn (4/5-4/12, 4/13-4/15)   ::S/p two thoracentesis  -Aggressive bronchopulmonary hygiene     #Acute encephalopathy  #Delirium  #Dysphagia  ::Likely septic in etiology, improved  ::Vitamin B12 and folate WNL, TSH 7.61 from fT4 WNL  -Delirium precautions  -Melatonin 5mg at bedtime  -Tylenol 650mg q6h PRN, Oxy 5mg q4h PRN, Dilaudid 0.4mg for breakthrough pain  -Failed barium swallow, s/p PEG placement  -PEG tube placed with bolus feeds     #Hypernatremia  ::Na of 151 at peak  :: Currently 145  ::Unclear etiology, possibly insensible losses from tachypnea, wounds  -Titrated free water flushes to 500cc q6h, can titrate further as needed     #Hepatic steatosis  ::Have been increasing for the past few days, liver  enzymes have fluctuated during this hospital course  ::RUQ U/S on 4/10 showed hepatic steatosis with no evidence of biliary dilation  -Continue to monitor  -Atorvastatin held, may resume based on LFT trend     #COPD  ::No PFT on file  -DuoNeb q6h PRN  -Titrate oxygen to 88-92%     #Acute on chronic blood loss anemia  ::Oozing for post-operattive wounds  ::Required 1u PRBC  -Active T&S  -No signs of coagulopathy     Code status: DNR, ok for elective intubation  DVT ppx: Lovenox  GI ppx: Protonix  Oxygen: 2LNC  Access: PIV, external urinary catheter, Dobhoff  NOK: Pura Sandhu (wife) [phone]     Patient assessment and plan staffed with the attending physician on service, Dr. Taylor.    Arthur Priest MD  Prelim PGY-1 Internal Medicine

## 2024-04-30 NOTE — DISCHARGE SUMMARY
Discharge Diagnosis  Pneumonia, Sepsis, infection of bone and soft tissue     Issues Requiring Follow-Up  - Vascular Surgery follow up for AKA to bilateral lower extremities   - PCP following admission for Hypernatremia monitoring and continued care     Test Results Pending At Discharge  Pending Labs       Order Current Status    Surgical Pathology Exam In process    Surgical Pathology Exam In process    AFB Culture/Smear Preliminary result    Fungal Culture/Smear Preliminary result            Hospital Course  Mr. Sandhu is a 74 YOM with PMH HTN, DLD, COPD, CAD, PAD s/p revascularization procedures to b/l LE (iliofemoral endarterectomy and angioplasty 8/21/23; left CFA/SFA arterectomy and SFA, YISEL/EIA stent 05/2020) presenting to  MICU on 4/5 as a transfer from [facility] for AHRF 2/2 CAP vs colitis, septic shock, and encephalopathy.    [facility] Course:  Initially presented to OSH with 3-4 days of generalized weakness, nausea, vomiting, and diarrhea.  Found to have colitis on CT imaging, was started on IV flagyl and admitted.  Hgb 7.2 without obvious source of bleeding, received 1 unit pRBC.  Found to have dry gangrene of left foot i/s/o known severe bilateral lower extremity peripheral vascular disease, vascular surgery consulted and recommending no acute surgical intervention.  Hospital course complicated by worsening altered mental status, with CT head at that time demonstrating findings suspicious for early ischemic change in right basal ganglia.  Also with acute hypoxic respiratory failure, found to have bilateral pneumonia with pleural effusions, was placed on CPAP for AHRF and CTX/doxy for PNA.  Thoracentesis on 4/2 draining 1L on left with labs suggesting transudative effusion (serum total protein on 3.8, pleural fluid protein was 0.7, serum LDH was 722, fluid LDH was 183), cultures negative. New hypotension requiring initiation of norepinephrine gtt. Stable on CPAP settings, but was intubated  prior to transfer here for airway protection given degree of obtundation (A&O x0 but responsive).     [facility] Course:  Patient started on vanc/zosyn for CAP and continued on Levophed, initially requiring 2nd pressor (vaso). Vascular surgery was consulted on admission due to b/l LE gangrene contributing to escalating pressor requirement. Underwent emergent right BKA and 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, planning for revision 4/16. Started midodrine 5 mg TID and weaned off pressors 4/13. Was also stress dosed hydrocortisone (50 q6 4/5-4/7 > 40 q8 4/7-4/9). Sputum cultures 4/5 growing Corneybacterium striatum group, continued on vanc/zosyn with initial plan for 7 day treatment (4/5-4/12) with repeat sputum cultures 4/10 NGTD. Extubated 4/8, weaned to as low as 4L NC but with escalating O2 requirements to 10L NC , with uptrending leukocytosis from 13 to 19. CXR 4/13 with complete opacification of right lung thought 2/2 mucus plugging, vanc/zosyn restarted and aggressive BPH initiated with repeat CXR with improved oxygenation. Left CVC removed and WBC downtrended to 14.3, vanc/zosyn discontinued. 4/15 patient underwent right-sided thora with 1L off, transudative by Light's criteria. Patient also requiring multiple transfusions of PRBC (4/8: H/H 5.5 s/p 2uPRBC; 4/13: H/H 6.7 s/p 1uPRBC, 4/16: H/H 7.6 s/p 1uPRBC) thought to be partially due to oozing from surgical site vs underlying coagulopathy i/s/o liver injury due to heavy EtOH use vs sepsis. Patient also with hypernatremia, treating with intermittent FWF. Plan for revision of amputations with vascular surgery pending resolution of leukocytosis. He failed an MBS on 4/17 and eventually there was a decision to have a PEG placed.     Regular Nursing Floor:   He was transferred to the [facility] 4/20, and he then received a PEG placement in the OR with vascular surgery during his revision of BKA/AKA on Tuesday 4/23. He tolerated this well and remained without issue. He was monitored for medical  stability and bilateral lower extremity wounds were changed on 4/26. He was instructed to stop his home Plavix but continue with Aspirin daily. His home Metop Succinate was split into 2 doses and transitioned to Metop Tartrate given the Succinate's incompatibility with PEG tube administration route. The resumption of his home Losartan and PPI was left to the discretion of his PCP. He was eventually discharged to a SNF on 4/30 to continue rehab. His Na was stable at ~142 with recommendations for this to be checked by his PCP over the subsequent 5 weeks with qweekly RFPs. Final daily wound recs from Vascular Surgery were listed as follows:    Cleanse, dry, apply cover sponge over staple line, apply occlusive dressing (tegederm) to create a barrier over the incision; wrap gently with ACE for edema management.      Pertinent Physical Exam At Time of Discharge  General: Awake, alert, conversant, appears stated age  HEENT: Pupils equal and round, no scleral icterus or conjunctivitis. Mucous membranes dry.  Skin: No suspect lesions or rashes noted on visible skin  Chest: Mild WOB, on 3L NC O2  Cardiac: Regular rate and rhythm, normal s1, s2, no M/R/G, no JVD  Abdomen: Soft, ND, NT, no involuntary guarding - PEG  : No flank pain or indwelling urinary catheter  EXT: No peripheral edema, no asymmetry noted  MSK: B/l AKA stumps wrapped  Neuro: AOx2, moving all limbs spontaneously, follows commands  Psych: Some deficits in attention , appropriate mood and affect    Home Medications     Medication List      START taking these medications     acetaminophen 650 mg/20.3 mL solution oral liquid; Commonly known as:   Tylenol; 20.3 mL (650 mg) by g-tube route every 6 hours if needed for   pain.; Replaces: acetaminophen 325 mg tablet   ergocalciferol 200 mcg/mL (8,000 unit/mL) drops; Commonly known as:   Vitamin D-2; 1 mL (200 mcg) by g-tube route once daily.   folic acid 1 mg tablet; Commonly known as: Folvite; 1 tablet (1 mg) by    g-tube route once daily.   ipratropium-albuteroL 0.5-2.5 mg/3 mL nebulizer solution; Commonly known   as: Duo-Neb; Take 3 mL by nebulization every 6 hours if needed for   wheezing.   loperamide 1 mg/7.5 mL liquid; Commonly known as: Imodium A-D; 15 mL (2   mg) by g-tube route 4 times a day as needed for diarrhea.   melatonin 5 mg tablet; 1 tablet (5 mg) by g-tube route once daily.   metoprolol tartrate 25 mg tablet; Commonly known as: Lopressor; 0.5   tablets (12.5 mg) by g-tube route 2 times a day. Please give through peg   tube   polyethylene glycol 17 gram packet; Commonly known as: Glycolax,   Miralax; Take 17 g by mouth once daily as needed (Hold if  >2BM daily).   Please give through peg tube     CHANGE how you take these medications     ascorbic acid 500 mg tablet; Commonly known as: Vitamin C; 1 tablet (500   mg) by g-tube route 2 times a day.; What changed: how to take this   aspirin 81 mg EC tablet; Take 1 tablet (81 mg) by mouth once daily.   Please give through peg tube; What changed: additional instructions   ferrous sulfate (325 mg ferrous sulfate) tablet; Take 1 tablet by mouth   2 times a day. Please give through peg tube; What changed: additional   instructions   multivitamin tablet; Take 1 tablet by mouth once daily. Please Give   through PEG; What changed: additional instructions   potassium chloride CR 10 mEq ER tablet; Commonly known as: Klor-Con;   Take 2 tablets (20 mEq) by mouth once daily. Do not crush, chew, or split.    Please give through peg tube; What changed: how much to take, additional   instructions     STOP taking these medications     acetaminophen 325 mg tablet; Commonly known as: Tylenol; Replaced by:   acetaminophen 650 mg/20.3 mL solution oral liquid   atorvastatin 40 mg tablet; Commonly known as: Lipitor   clopidogrel 75 mg tablet; Commonly known as: Plavix   Kapspargo Sprinkle 25 mg 24 hr capsule; Generic drug: metoprolol   succinate XL   losartan 25 mg tablet; Commonly  known as: Cozaar   Senexon-S 8.6-50 mg tablet; Generic drug: sennosides-docusate sodium       Outpatient Follow-Up  Future Appointments   Date Time Provider Department [place]   5/31/2024  9:20 AM MURPHY Tapia-CNP [facility]   6/17/2024  2:20 PM Efrain Arango MD PhD [facility]       Arthur Priest MD  Prelim PGY-1 Internal Medicine

## 2024-04-30 NOTE — PROGRESS NOTES
Occupational Therapy                 Therapy Communication Note    Patient Name: Ry Sandhu  MRN: [id number]  Today's Date: 4/30/2024     Discipline: Occupational Therapy    Comment: OT for follow up treatment session; Nursing in pt room. Pt being prepped for discharge anticipated within the hour per nursing. Nursing addressing pt needs at this time. No OT treatment session

## 2024-04-30 NOTE — CARE PLAN
The patient's goals for the shift include Maintain patient safety    The clinical goals for the shift include Patient will remain safe and free from falls and/or injuries overnight    Over the shift, the patient did not make progress toward the following goals. Barriers to progression include ***. Recommendations to address these barriers include ***.

## 2024-05-01 ENCOUNTER — OUTSIDE SERVICES (OUTPATIENT)
Dept: PRIMARY CARE CLINIC | Age: 74
End: 2024-05-01
Payer: COMMERCIAL

## 2024-05-01 DIAGNOSIS — R53.1 WEAKNESS: ICD-10-CM

## 2024-05-01 DIAGNOSIS — R13.10 DYSPHAGIA, UNSPECIFIED TYPE: ICD-10-CM

## 2024-05-01 DIAGNOSIS — J44.9 CHRONIC OBSTRUCTIVE PULMONARY DISEASE, UNSPECIFIED COPD TYPE (HCC): ICD-10-CM

## 2024-05-01 DIAGNOSIS — J96.01 ACUTE RESPIRATORY FAILURE WITH HYPOXIA (HCC): ICD-10-CM

## 2024-05-01 DIAGNOSIS — I10 PRIMARY HYPERTENSION: ICD-10-CM

## 2024-05-01 DIAGNOSIS — Z93.1 GASTROSTOMY STATUS (HCC): ICD-10-CM

## 2024-05-01 DIAGNOSIS — J18.9 PNEUMONIA DUE TO INFECTIOUS ORGANISM, UNSPECIFIED LATERALITY, UNSPECIFIED PART OF LUNG: Primary | ICD-10-CM

## 2024-05-01 DIAGNOSIS — I25.10 CORONARY ARTERY DISEASE INVOLVING NATIVE CORONARY ARTERY OF NATIVE HEART WITHOUT ANGINA PECTORIS: ICD-10-CM

## 2024-05-01 DIAGNOSIS — D50.9 IRON DEFICIENCY ANEMIA, UNSPECIFIED IRON DEFICIENCY ANEMIA TYPE: ICD-10-CM

## 2024-05-01 DIAGNOSIS — I73.9 PVD (PERIPHERAL VASCULAR DISEASE) (HCC): ICD-10-CM

## 2024-05-01 LAB
LABORATORY COMMENT REPORT: NORMAL
LABORATORY COMMENT REPORT: NORMAL
PATH REPORT.FINAL DX SPEC: NORMAL
PATH REPORT.FINAL DX SPEC: NORMAL
PATH REPORT.GROSS SPEC: NORMAL
PATH REPORT.GROSS SPEC: NORMAL
PATH REPORT.RELEVANT HX SPEC: NORMAL
PATH REPORT.RELEVANT HX SPEC: NORMAL
PATH REPORT.TOTAL CANCER: NORMAL
PATH REPORT.TOTAL CANCER: NORMAL

## 2024-05-01 PROCEDURE — 99497 ADVNCD CARE PLAN 30 MIN: CPT | Performed by: INTERNAL MEDICINE

## 2024-05-01 PROCEDURE — 99306 1ST NF CARE HIGH MDM 50: CPT | Performed by: INTERNAL MEDICINE

## 2024-05-06 LAB
FUNGUS SPEC CULT: NORMAL
FUNGUS SPEC FUNGUS STN: NORMAL

## 2024-05-20 ASSESSMENT — COPD QUESTIONNAIRES: COPD: 1

## 2024-05-20 ASSESSMENT — ENCOUNTER SYMPTOMS
COUGH: 0
TROUBLE SWALLOWING: 0
GASTROINTESTINAL NEGATIVE: 1
SHORTNESS OF BREATH: 0
RHINORRHEA: 0
EYE REDNESS: 0
SINUS PAIN: 0
SORE THROAT: 0
WHEEZING: 0
EYE ITCHING: 0
SINUS PRESSURE: 0
VOICE CHANGE: 0
EYE DISCHARGE: 0

## 2024-05-20 ASSESSMENT — VISUAL ACUITY: OU: 1

## 2024-05-20 NOTE — PROGRESS NOTES
pack-year smoking history. He has quit using smokeless tobacco. He reports current alcohol use. He reports that he does not use drugs.     Objective:  Vital signs for Naresh was reviewed in the EMR chart.     Physical Exam:  Physical Exam  Vitals reviewed.   Constitutional:       General: He is not in acute distress.     Appearance: Normal appearance. He is normal weight. He is not ill-appearing or toxic-appearing.      Comments: Appears appropriate for age   HENT:      Head: Normocephalic and atraumatic.      Right Ear: Tympanic membrane and external ear normal.      Left Ear: Tympanic membrane and external ear normal.      Ears:      Comments: Middle ear well aerated.     Nose: Nose normal.      Mouth/Throat:      Mouth: Mucous membranes are moist.      Dentition: Normal dentition. No gingival swelling or dental caries.      Pharynx: Oropharynx is clear. Uvula midline.      Comments: Gums appear healthy.   No thrush no mucositis  Eyes:      General: Vision grossly intact.      Extraocular Movements: Extraocular movements intact.      Conjunctiva/sclera: Conjunctivae normal.      Pupils: Pupils are equal, round, and reactive to light.      Comments: Fundoscopic exam is benign  Retinal vessels: Normal   Neck:      Vascular: No carotid bruit.      Comments: Thyroid is normal in size.  Carotids 2+ and equal bilaterally  Cardiovascular:      Rate and Rhythm: Normal rate and regular rhythm.      Pulses: Normal pulses.      Heart sounds: Normal heart sounds, S1 normal and S2 normal. No murmur heard.     No friction rub. No gallop.      Comments: Pedal pulses 2+ and equal bilaterally  Pulmonary:      Effort: Pulmonary effort is normal.      Breath sounds: Normal breath sounds.   Abdominal:      General: Bowel sounds are normal. There is no distension.      Palpations: Abdomen is soft. There is no mass.      Tenderness: There is no abdominal tenderness. There is no guarding or rebound.      Hernia: No hernia is present.

## 2024-06-06 LAB
ACID FAST STN SPEC: NORMAL
MYCOBACTERIUM SPEC CULT: NORMAL

## (undated) DEVICE — SUTURE, VICRYL, 2-0, 18 IN, CT-2, VIOLET

## (undated) DEVICE — DRAPE, ISOLATION, BAG, DRAW STRING CLOSURE, STERI DRAPE, LARGE, 20 X 20 IN, DISPOSABLE, STERILE

## (undated) DEVICE — DRESSING, GAUZE, PETROLATUM, PATCH, XEROFORM, 1 X 8 IN, STERILE

## (undated) DEVICE — TOWEL, SURGICAL, NEURO, O/R, 16 X 26, BLUE, STERILE

## (undated) DEVICE — MANIFOLD, 4 PORT NEPTUNE STANDARD

## (undated) DEVICE — WOUND SYSTEM, DEBRIDEMENT & CLEANING, O.R DUOPAK

## (undated) DEVICE — GLOVE, SURGICAL, PROTEXIS PI MICRO, 7.0, PF, LF

## (undated) DEVICE — BOLSTER, HIP

## (undated) DEVICE — DRAPE, SHEET, FAN FOLDED, HALF, 44 X 58 IN, DISPOSABLE, LF, STERILE

## (undated) DEVICE — COUNTER, NEEDLE, FOAM BLOCK, W/MAGNET, W/BLADE GUARD, 10 COUNT, RED, LF

## (undated) DEVICE — PROTECTOR, NERVE, ULNAR, PINK

## (undated) DEVICE — SUTURE, PROLENE, 2-0, 18 IN, FS, BLUE

## (undated) DEVICE — SPONGE, GAUZE, XRAY DECT, 16 PLY, 4 X 4, W/MASTER DMT,STERILE

## (undated) DEVICE — APPLICATOR, CHLORAPREP, W/ORANGE TINT, 26ML

## (undated) DEVICE — Device

## (undated) DEVICE — SPONGE, HEMOSTATIC, CELLULOSE, SURGICEL, 2 X 14 IN

## (undated) DEVICE — PEG KIT, GASTRO, STANDARD, PULL, 20 FR, W/XYLOCAINE AMPULE

## (undated) DEVICE — TUBING, SUCTION, CONNECTING, STERILE 0.25 X 120 IN., LF

## (undated) DEVICE — SUTURE, PROLENE, 4-0, TAPER POINT, SH/SH BLUE 36IN

## (undated) DEVICE — SUTURE, VICRYL, 3-0,18 IN, SH, UNDYED

## (undated) DEVICE — HEMOSTAT, SURGICEL POWDER 3.0GRAMS

## (undated) DEVICE — BLADE, SAW, SAGITTAL, 18.5 X 73 X 0.76 MM, STAINLESS STEEL, STERILE

## (undated) DEVICE — SUTURE, SILK, 3-0, 18 IN SH/CR, BLACK

## (undated) DEVICE — BANDAGE, COFLEX, 4 X 5 YDS, TAN, STERILE, LF

## (undated) DEVICE — DRESSING, GAUZE, WASHED FLUFF, LARGE, STERILE

## (undated) DEVICE — DRESSING, GAUZE, 16 PLY, 4 X 4 IN, STERILE

## (undated) DEVICE — STAPLER, SKIN PROXIMATE, 35 WIDE

## (undated) DEVICE — SUTURE, SILK, 2-0, 30 IN, SH, CONTROL RELEASE, MULTIPACK, BLACK

## (undated) DEVICE — BANDAGE, GAUZE, CONFORMING, KERLIX, 6 PLY, 4.5 IN X 4.1 YD

## (undated) DEVICE — DRAPE, SHEET, EXTREMITY, W/ARM BOARD COVERS, 87 X 106 X 128 IN, DISPOSABLE, LF, STERILE

## (undated) DEVICE — DRESSING, GAUZE, PETROLATUM, VASELINE, CISION, 1 X 8 IN, STERILE

## (undated) DEVICE — PREP TRAY, SKIN, DRY, W/GLOVES

## (undated) DEVICE — DISSECTOR, EXACT, LIGASURE

## (undated) DEVICE — WAX, BONE, 2.5 GM

## (undated) DEVICE — MOUTH PIECE, UNIVERSAL, DISPOSABLE

## (undated) DEVICE — SUTURE, SILK, 3-0, 18 IN, MULTIPACK, BLACK

## (undated) DEVICE — COVER, BACK TABLE, 65 X 90, HVY REINFORCED

## (undated) DEVICE — COVER, CART, 45 X 27 X 48 IN, CLEAR